# Patient Record
Sex: MALE | Race: BLACK OR AFRICAN AMERICAN | NOT HISPANIC OR LATINO | Employment: OTHER | URBAN - METROPOLITAN AREA
[De-identification: names, ages, dates, MRNs, and addresses within clinical notes are randomized per-mention and may not be internally consistent; named-entity substitution may affect disease eponyms.]

---

## 2017-01-31 ENCOUNTER — TELEPHONE (OUTPATIENT)
Dept: INTERNAL MEDICINE | Facility: CLINIC | Age: 47
End: 2017-01-31

## 2017-01-31 NOTE — TELEPHONE ENCOUNTER
Please call-- I received a letter stating that his insurance ws not longer going to pay for lantus- so I changed his insulin to Levemir-- a new RX was sent in and it about the same as his Lantus and should be used int he same dose.

## 2017-03-31 ENCOUNTER — PATIENT OUTREACH (OUTPATIENT)
Dept: ADMINISTRATIVE | Facility: HOSPITAL | Age: 47
End: 2017-03-31

## 2017-03-31 NOTE — PROGRESS NOTES
Attempted to contact pt to schedule f/u visit w/ Dr. Wilson. Left messages at both numbers for return call. Pt portal message also sent.

## 2017-04-26 DIAGNOSIS — E78.5 HYPERLIPIDEMIA, UNSPECIFIED HYPERLIPIDEMIA TYPE: ICD-10-CM

## 2017-04-26 NOTE — TELEPHONE ENCOUNTER
----- Message from Jennifer Hernandez sent at 4/26/2017  3:22 PM CDT -----  Contact: Brynn Castle (orion), phone [phone]  Refill- Patient is in a rehab facility, and [facility]ion is not exactly sure what all he needs. She is requesting a refill for everything that needs to be refilled. Please send to [facility].     insulin aspart (NOVOLOG FLEXPEN) 100 unit/mL InPn pen   Sig: Take 20 units with meals.    amlodipine (NORVASC) 5 MG tablet    [employer], phone [phone], [address] [place] [address]    Thanks!

## 2017-04-26 NOTE — TELEPHONE ENCOUNTER
Contacted patients fiance and scheduled annual exam, they are also requesting insulin refill....    Orders pended

## 2017-04-27 RX ORDER — ATORVASTATIN CALCIUM 40 MG/1
40 TABLET, FILM COATED ORAL DAILY
Qty: 90 TABLET | Refills: 0 | Status: SHIPPED | OUTPATIENT
Start: 2017-04-27 | End: 2017-05-30 | Stop reason: SDUPTHER

## 2017-04-27 RX ORDER — LANCETS
EACH MISCELLANEOUS
Qty: 300 EACH | Refills: 3 | Status: SHIPPED | OUTPATIENT
Start: 2017-04-27 | End: 2020-03-05 | Stop reason: CLARIF

## 2017-04-27 RX ORDER — INSULIN ASPART 100 [IU]/ML
INJECTION, SOLUTION INTRAVENOUS; SUBCUTANEOUS
Qty: 1 BOX | Refills: 1 | Status: SHIPPED | OUTPATIENT
Start: 2017-04-27 | End: 2018-09-05 | Stop reason: SDUPTHER

## 2017-04-27 RX ORDER — AMLODIPINE BESYLATE 5 MG/1
5 TABLET ORAL DAILY
Qty: 30 TABLET | Refills: 1 | Status: SHIPPED | OUTPATIENT
Start: 2017-04-27 | End: 2017-05-30 | Stop reason: SDUPTHER

## 2017-04-27 RX ORDER — INSULIN ASPART 100 [IU]/ML
INJECTION, SOLUTION INTRAVENOUS; SUBCUTANEOUS
Qty: 1 BOX | Refills: 1 | Status: SHIPPED | OUTPATIENT
Start: 2017-04-27 | End: 2017-04-27 | Stop reason: SDUPTHER

## 2017-04-27 NOTE — TELEPHONE ENCOUNTER
----- Message from Sintia Zavala sent at 4/27/2017 12:23 PM CDT -----  RX request - refill or new RX.  Is this a refill or new RX:  REFILL  RX name and strength: insulin aspart (NOVOLOG FLEXPEN) 100 unit/mL InPn pen  Directions:   Is this a 30 day or 90 day RX:  90 DAY   Pharmacy name and phone #: [id number] - [place], [place] - [address] [phone]    Comments:      RX request - refill or new RX.  Is this a refill or new RX:  REFILL  RX name and strength: lancets [facility]  Directions:   Is this a 30 day or 90 day RX:  90 DAY   Pharmacy name and phone #: [id number] - [place], [place] - [address] [phone]    Comments:

## 2017-05-30 ENCOUNTER — OFFICE VISIT (OUTPATIENT)
Dept: INTERNAL MEDICINE | Facility: CLINIC | Age: 47
End: 2017-05-30
Payer: COMMERCIAL

## 2017-05-30 ENCOUNTER — LAB VISIT (OUTPATIENT)
Dept: LAB | Facility: HOSPITAL | Age: 47
End: 2017-05-30
Attending: INTERNAL MEDICINE
Payer: COMMERCIAL

## 2017-05-30 VITALS
BODY MASS INDEX: 31.82 KG/M2 | OXYGEN SATURATION: 98 % | DIASTOLIC BLOOD PRESSURE: 80 MMHG | HEART RATE: 60 BPM | SYSTOLIC BLOOD PRESSURE: 124 MMHG | WEIGHT: 227.31 LBS | HEIGHT: 71 IN

## 2017-05-30 DIAGNOSIS — E10.21 TYPE 1 DIABETES MELLITUS WITH NEPHROPATHY: Primary | Chronic | ICD-10-CM

## 2017-05-30 DIAGNOSIS — E78.5 HYPERLIPIDEMIA, UNSPECIFIED HYPERLIPIDEMIA TYPE: ICD-10-CM

## 2017-05-30 DIAGNOSIS — R80.9 MICROALBUMINURIA: ICD-10-CM

## 2017-05-30 DIAGNOSIS — F14.10 DRUG ABUSE, COCAINE TYPE: ICD-10-CM

## 2017-05-30 DIAGNOSIS — I10 ESSENTIAL HYPERTENSION: Chronic | ICD-10-CM

## 2017-05-30 DIAGNOSIS — E10.21 TYPE 1 DIABETES MELLITUS WITH NEPHROPATHY: Chronic | ICD-10-CM

## 2017-05-30 LAB
ALBUMIN SERPL BCP-MCNC: 3.7 G/DL
ALP SERPL-CCNC: 70 U/L
ALT SERPL W/O P-5'-P-CCNC: 18 U/L
ANION GAP SERPL CALC-SCNC: 7 MMOL/L
AST SERPL-CCNC: 21 U/L
BILIRUB SERPL-MCNC: 0.7 MG/DL
BUN SERPL-MCNC: 29 MG/DL
CALCIUM SERPL-MCNC: 9.1 MG/DL
CHLORIDE SERPL-SCNC: 105 MMOL/L
CHOLEST/HDLC SERPL: 3 {RATIO}
CO2 SERPL-SCNC: 28 MMOL/L
CREAT SERPL-MCNC: 1.6 MG/DL
CREAT UR-MCNC: 150 MG/DL
EST. GFR  (AFRICAN AMERICAN): 58.9 ML/MIN/1.73 M^2
EST. GFR  (NON AFRICAN AMERICAN): 50.9 ML/MIN/1.73 M^2
GLUCOSE SERPL-MCNC: 122 MG/DL
HDL/CHOLESTEROL RATIO: 33.1 %
HDLC SERPL-MCNC: 142 MG/DL
HDLC SERPL-MCNC: 47 MG/DL
LDLC SERPL CALC-MCNC: 84.4 MG/DL
MICROALBUMIN UR DL<=1MG/L-MCNC: 522 UG/ML
MICROALBUMIN/CREATININE RATIO: 348 UG/MG
NONHDLC SERPL-MCNC: 95 MG/DL
POTASSIUM SERPL-SCNC: 3.7 MMOL/L
PROT SERPL-MCNC: 7.3 G/DL
SODIUM SERPL-SCNC: 140 MMOL/L
TRIGL SERPL-MCNC: 53 MG/DL

## 2017-05-30 PROCEDURE — 3046F HEMOGLOBIN A1C LEVEL >9.0%: CPT | Mod: S$GLB,,, | Performed by: INTERNAL MEDICINE

## 2017-05-30 PROCEDURE — 80061 LIPID PANEL: CPT

## 2017-05-30 PROCEDURE — 99214 OFFICE O/P EST MOD 30 MIN: CPT | Mod: S$GLB,,, | Performed by: INTERNAL MEDICINE

## 2017-05-30 PROCEDURE — 80053 COMPREHEN METABOLIC PANEL: CPT

## 2017-05-30 PROCEDURE — 83036 HEMOGLOBIN GLYCOSYLATED A1C: CPT

## 2017-05-30 PROCEDURE — 36415 COLL VENOUS BLD VENIPUNCTURE: CPT

## 2017-05-30 PROCEDURE — 99999 PR PBB SHADOW E&M-EST. PATIENT-LVL IV: CPT | Mod: PBBFAC,,, | Performed by: INTERNAL MEDICINE

## 2017-05-30 PROCEDURE — 4010F ACE/ARB THERAPY RXD/TAKEN: CPT | Mod: S$GLB,,, | Performed by: INTERNAL MEDICINE

## 2017-05-30 RX ORDER — LISINOPRIL 40 MG/1
40 TABLET ORAL DAILY
Qty: 90 TABLET | Refills: 3 | Status: SHIPPED | OUTPATIENT
Start: 2017-05-30 | End: 2017-12-28 | Stop reason: SDUPTHER

## 2017-05-30 RX ORDER — AMLODIPINE BESYLATE 10 MG/1
10 TABLET ORAL DAILY
Qty: 90 TABLET | Refills: 3 | Status: SHIPPED | OUTPATIENT
Start: 2017-05-30 | End: 2017-12-28 | Stop reason: SDUPTHER

## 2017-05-30 RX ORDER — HYDROCHLOROTHIAZIDE 25 MG/1
25 TABLET ORAL DAILY
Qty: 30 TABLET | Refills: 11 | Status: SHIPPED | OUTPATIENT
Start: 2017-05-30 | End: 2017-11-28 | Stop reason: SDUPTHER

## 2017-05-30 RX ORDER — KETOCONAZOLE 20 MG/ML
SHAMPOO, SUSPENSION TOPICAL DAILY
Qty: 240 ML | Refills: 2 | Status: SHIPPED | OUTPATIENT
Start: 2017-05-30 | End: 2017-12-11 | Stop reason: SDUPTHER

## 2017-05-30 RX ORDER — ATORVASTATIN CALCIUM 40 MG/1
40 TABLET, FILM COATED ORAL DAILY
Qty: 90 TABLET | Refills: 0 | Status: SHIPPED | OUTPATIENT
Start: 2017-05-30 | End: 2017-11-13 | Stop reason: SDUPTHER

## 2017-05-30 NOTE — PROGRESS NOTES
Protective Sensation (w/ 10 gram monofilament):  Right: Intact  Left: Intact    Visual Inspection:  Normal -  Bilateral    Pedal Pulses:   Right: Present  Left: Present    Posterior tibialis:   Right:Present  Left: Present    HISTORY OF PRESENT ILLNESS:  Mr. Howell is a 47-year-old diabetic gentleman.  He   has diabetes mellitus type 1.  He is coming in today for followup.  I last saw   him back at April 2016 when he was in rehab because his blood pressure was very   high.  He was in rehab for cocaine withdrawal.  He reports going back on cocaine   and went out of state rehab at this time.  They state that he is off of   cocaine, doing better.  He is currently taking Lantus 47 units at night and   NovoLog 20 units with meals.  Hemoglobin A1c is 11.7, which I got after he left,   with glucose of 122, so his glucose is doing better.  He just got back on his   insulin, now taking them regularly.  His glucoses are usually under 200, highest   has been as 200.  He also has had significant hypertension with that.  Blood   pressure 124/80.  He is on amlodipine 10 mg a day and lisinopril 40 mg daily and   hydrochlorothiazide 25 mg a day.  Blood pressure has been controlled recently.    Cocaine use, abuse, currently he says he is doing much better.  He is not   hanging around people using cocaine.  He has not used cocaine in the last two   months.  He says he is feeling okay.  No suicidal or homicidal thoughts or   ideations.    PHYSICAL EXAMINATION:  GENERAL:  Well-appearing 47-year-old  gentleman, in no acute   distress.  NECK:  Supple.  He has no JVD.  Thyroid is not enlarged.  CARDIOVASCULAR:  S1 and S2, regular rate and rhythm without murmur, gallop or   rub.  ABDOMEN:  Soft, nontender, no hepatosplenomegaly, no guarding or rebound   tenderness.  LOWER EXTREMITIES:  Foot exam is documented in chart.    ASSESSMENT:  Diabetes mellitus type 1, uncontrolled, but he is currently back on   insulin, so   hemoglobin A1c might be coming back to normal.  We will follow him   up again in three months with labs.  We will get urine on him and set him up   for Ophthalmology since he is due for both of this.  Blood pressure, however, is   doing good.  Point of care glucose yesterday was 121, that was good.  This   gentleman has been a month out of rehab, got out rehab on the fourth of this   month.  So we discussed the importance of abstinence from cocaine and also we   discussed his alcohol use, which he is not using any alcohol at the current   time.  I will see him again in three months.  He will report how he is taking   his insulin.  If he has any problems before next visit, he is to give me a call.          /april [id number] ainsley(s)        MELVA/MOLLY  dd: 06/05/2017 06:38:04 (CDT)  td: 06/05/2017 11:40:46 (CDT)  Doc ID   #[id number]  Job ID #[id number]    CC:

## 2017-05-31 LAB
ESTIMATED AVG GLUCOSE: 289 MG/DL
HBA1C MFR BLD HPLC: 11.7 %

## 2017-06-05 ENCOUNTER — TELEPHONE (OUTPATIENT)
Dept: INTERNAL MEDICINE | Facility: CLINIC | Age: 47
End: 2017-06-05

## 2017-06-05 DIAGNOSIS — E10.21 TYPE 1 DIABETES MELLITUS WITH NEPHROPATHY: Primary | Chronic | ICD-10-CM

## 2017-06-05 NOTE — TELEPHONE ENCOUNTER
Glucose is still high but coming down- make sure he has a follow up in 3 months with the labs I put in

## 2017-06-22 ENCOUNTER — PATIENT MESSAGE (OUTPATIENT)
Dept: OPTOMETRY | Facility: CLINIC | Age: 47
End: 2017-06-22

## 2017-06-23 ENCOUNTER — PATIENT MESSAGE (OUTPATIENT)
Dept: OPTOMETRY | Facility: CLINIC | Age: 47
End: 2017-06-23

## 2017-11-13 DIAGNOSIS — E78.5 HYPERLIPIDEMIA, UNSPECIFIED HYPERLIPIDEMIA TYPE: ICD-10-CM

## 2017-11-13 RX ORDER — ATORVASTATIN CALCIUM 40 MG/1
40 TABLET, FILM COATED ORAL DAILY
Qty: 90 TABLET | Refills: 3 | Status: SHIPPED | OUTPATIENT
Start: 2017-11-13 | End: 2017-12-28 | Stop reason: SDUPTHER

## 2017-11-27 ENCOUNTER — TELEPHONE (OUTPATIENT)
Dept: INTERNAL MEDICINE | Facility: CLINIC | Age: 47
End: 2017-11-27

## 2017-11-27 NOTE — TELEPHONE ENCOUNTER
----- Message from Mouna Briceno sent at 11/27/2017  1:28 PM CST -----  Contact: Pt [phone]  Patient is returning a phone call.  Who left a message for the patient: Antonia  Does patient know what this is regarding:  A message was left   Comments:

## 2017-11-27 NOTE — TELEPHONE ENCOUNTER
----- Message from Yazan Guevara sent at 11/24/2017  1:49 PM CST -----  Contact: Pt   Pt would like a call back from nurse in ref to consulting about a cardiology doctor    Can be reached at [phone]

## 2017-11-28 ENCOUNTER — LAB VISIT (OUTPATIENT)
Dept: LAB | Facility: HOSPITAL | Age: 47
End: 2017-11-28
Attending: INTERNAL MEDICINE
Payer: COMMERCIAL

## 2017-11-28 ENCOUNTER — OFFICE VISIT (OUTPATIENT)
Dept: INTERNAL MEDICINE | Facility: CLINIC | Age: 47
End: 2017-11-28
Payer: COMMERCIAL

## 2017-11-28 VITALS
SYSTOLIC BLOOD PRESSURE: 160 MMHG | DIASTOLIC BLOOD PRESSURE: 90 MMHG | HEART RATE: 68 BPM | WEIGHT: 235 LBS | BODY MASS INDEX: 32.9 KG/M2 | OXYGEN SATURATION: 98 % | HEIGHT: 71 IN

## 2017-11-28 DIAGNOSIS — F14.10 DRUG ABUSE, COCAINE TYPE: ICD-10-CM

## 2017-11-28 DIAGNOSIS — E10.3399: Chronic | ICD-10-CM

## 2017-11-28 DIAGNOSIS — E78.5 HYPERLIPIDEMIA, UNSPECIFIED HYPERLIPIDEMIA TYPE: ICD-10-CM

## 2017-11-28 DIAGNOSIS — F14.10 DRUG ABUSE, COCAINE TYPE: Primary | ICD-10-CM

## 2017-11-28 DIAGNOSIS — E10.21 TYPE 1 DIABETES MELLITUS WITH NEPHROPATHY: Chronic | ICD-10-CM

## 2017-11-28 DIAGNOSIS — E66.9 OBESITY (BMI 30-39.9): Chronic | ICD-10-CM

## 2017-11-28 LAB
ALBUMIN SERPL BCP-MCNC: 3.4 G/DL
ALP SERPL-CCNC: 81 U/L
ALT SERPL W/O P-5'-P-CCNC: 39 U/L
ANION GAP SERPL CALC-SCNC: 7 MMOL/L
ANION GAP SERPL CALC-SCNC: 7 MMOL/L
AST SERPL-CCNC: 24 U/L
BILIRUB SERPL-MCNC: 0.6 MG/DL
BUN SERPL-MCNC: 18 MG/DL
BUN SERPL-MCNC: 18 MG/DL
CALCIUM SERPL-MCNC: 8.8 MG/DL
CALCIUM SERPL-MCNC: 8.8 MG/DL
CHLORIDE SERPL-SCNC: 106 MMOL/L
CHLORIDE SERPL-SCNC: 106 MMOL/L
CHOLEST SERPL-MCNC: 176 MG/DL
CHOLEST/HDLC SERPL: 2.8 {RATIO}
CO2 SERPL-SCNC: 29 MMOL/L
CO2 SERPL-SCNC: 29 MMOL/L
CREAT SERPL-MCNC: 1.5 MG/DL
CREAT SERPL-MCNC: 1.5 MG/DL
EST. GFR  (AFRICAN AMERICAN): >60 ML/MIN/1.73 M^2
EST. GFR  (AFRICAN AMERICAN): >60 ML/MIN/1.73 M^2
EST. GFR  (NON AFRICAN AMERICAN): 55 ML/MIN/1.73 M^2
EST. GFR  (NON AFRICAN AMERICAN): 55 ML/MIN/1.73 M^2
ESTIMATED AVG GLUCOSE: 217 MG/DL
GLUCOSE SERPL-MCNC: 202 MG/DL
GLUCOSE SERPL-MCNC: 202 MG/DL
HBA1C MFR BLD HPLC: 9.2 %
HDLC SERPL-MCNC: 64 MG/DL
HDLC SERPL: 36.4 %
LDLC SERPL CALC-MCNC: 102.2 MG/DL
NONHDLC SERPL-MCNC: 112 MG/DL
POTASSIUM SERPL-SCNC: 3.9 MMOL/L
POTASSIUM SERPL-SCNC: 3.9 MMOL/L
PROT SERPL-MCNC: 7 G/DL
SODIUM SERPL-SCNC: 142 MMOL/L
SODIUM SERPL-SCNC: 142 MMOL/L
TRIGL SERPL-MCNC: 49 MG/DL

## 2017-11-28 PROCEDURE — 83036 HEMOGLOBIN GLYCOSYLATED A1C: CPT

## 2017-11-28 PROCEDURE — 80061 LIPID PANEL: CPT

## 2017-11-28 PROCEDURE — 99214 OFFICE O/P EST MOD 30 MIN: CPT | Mod: S$GLB,,, | Performed by: INTERNAL MEDICINE

## 2017-11-28 PROCEDURE — 36415 COLL VENOUS BLD VENIPUNCTURE: CPT

## 2017-11-28 PROCEDURE — 80053 COMPREHEN METABOLIC PANEL: CPT

## 2017-11-28 PROCEDURE — 99999 PR PBB SHADOW E&M-EST. PATIENT-LVL V: CPT | Mod: PBBFAC,,, | Performed by: INTERNAL MEDICINE

## 2017-11-28 RX ORDER — HYDROCHLOROTHIAZIDE 25 MG/1
25 TABLET ORAL DAILY
Qty: 30 TABLET | Refills: 11 | Status: SHIPPED | OUTPATIENT
Start: 2017-11-28 | End: 2017-12-28 | Stop reason: SDUPTHER

## 2017-11-28 RX ORDER — METOPROLOL TARTRATE 25 MG/1
TABLET, FILM COATED ORAL
COMMUNITY
Start: 2017-11-24 | End: 2017-12-28 | Stop reason: SDUPTHER

## 2017-11-28 NOTE — PROGRESS NOTES
He recently had a left heart catheterization at [facility].  He was sent   to there and stayed there about four days due to having high blood pressure.  He   had done cocaine two days earlier.  We will make sure that he did not had a   bump in his troponins or something there, he stayed there couple of days and he   was discharged.  Today, he comes with high blood pressure, but he did not take   all his medications.  He is a type I diabetic that his sugar has not been   controlled in the past.  He was seeing Endocrinology, but does not look that is   happening anymore.  He says that his sugars have been pretty well controlled   recently.  I saw with my resident Dr. Herb Azul, her record reflects our   findings.  I am going to see him back next week.  We are going to try to get the   records from hospitalization.  We are going to refill all his medications and   make sure he needs to take his blood pressure medicines.  I will recheck him   next week to see if his blood pressure is doing any better.  He does complain of   a slight headache, we will reassess that after he gets his blood pressure   better controlled.      MELVA/MOLLY  dd: 11/28/2017 10:29:25 (CST)  td: 11/29/2017 05:20:45 (CST)  Doc ID   #[id number]  Job ID #[id number]    CC:

## 2017-11-28 NOTE — PROGRESS NOTES
Subjective:        Chief Complaint: Follow-up (Surgery f/u)  # Left heart catheterization  Patient went to cocaine rehab last weak found to be hypertensive and sent to [facility] in [place], where he underwent left heart catheterization on 11/20,echo on 11/18. per patient there was no abnormal finding, and he didn't have any symptoms other than high blood pressure.  He stated that he has been using cocaine before his admission , last use was on 11/15.  Will contact [facility] for medical records       # Hypertension   Uncontrolled, elevated blood pressure.  On lisinopril 40 mg,amlodapin 19 mg, and hydrochlorothiazide 25 mg (not compliant with this medication) and recently started on metoprolol 25 BID after his last hospitalization.    # DM-1  On Levemir 50 at night and Novolog ISS 40-20 units 3 times/day.  Glucose measures range between 230-200  Recently 100-200   Last Hemoglobin A1c 11.7   No recent eye exam   Foot exam today was normal, positive 5 points B/L    # Hyperlipidemia   On atorvastatin 40 mg   Last lipid panel WNL on 5/30/2017    # Cocain abuse   Patient stated that he quite, last use on 11/15 and will follow up with the rehab as an out-patient next week.      HPI  Review of Systems   Constitutional: Negative for activity change and fatigue.   HENT: Negative for congestion and sore throat.    Eyes: Negative for discharge and itching.   Respiratory: Negative for chest tightness and shortness of breath.    Cardiovascular: Negative for chest pain and palpitations.   Gastrointestinal: Negative for abdominal distention and abdominal pain.   Endocrine: Negative for cold intolerance and heat intolerance.   Neurological: Positive for headaches. Negative for dizziness and facial asymmetry.   Hematological: Negative for adenopathy. Does not bruise/bleed easily.   Psychiatric/Behavioral: Negative for agitation and confusion.     Objective:     Vitals:    11/28/17 0900   BP: (!) 160/90   Pulse:  68     Physical Exam   Constitutional: He is oriented to person, place, and time. He appears well-developed and well-nourished. No distress.   HENT:   Head: Normocephalic and atraumatic.   Mouth/Throat: No oropharyngeal exudate.   Eyes: EOM are normal. Pupils are equal, round, and reactive to light.   Red eye b/l   Neck: Normal range of motion. Neck supple.   Cardiovascular: Normal rate, regular rhythm and normal heart sounds.    No murmur heard.  Pulmonary/Chest: Effort normal and breath sounds normal. No respiratory distress. He exhibits no tenderness.   Abdominal: Soft. Bowel sounds are normal. He exhibits no distension.   Musculoskeletal: Normal range of motion. He exhibits no edema or deformity.   NL foot exam 5 points B/L   Lymphadenopathy:     He has no cervical adenopathy.   Neurological: He is alert and oriented to person, place, and time. No cranial nerve deficit.   Skin: He is not diaphoretic.   Psychiatric: He has a normal mood and affect. His behavior is normal.   Vitals reviewed.    Assessment and Plan :      1. Drug abuse, cocaine type    2. Hyperlipidemia, unspecified hyperlipidemia type    3. Type 1 diabetes mellitus with moderate nonproliferative retinopathy without macular edema, unspecified laterality    4. Obesity (BMI 30-39.9)        Luis Daniel was seen today for follow-up.    Diagnoses and all orders for this visit:    Drug abuse, cocaine type  -     Comprehensive metabolic panel; Future    Hyperlipidemia, unspecified hyperlipidemia type  -     Lipid panel; Future    Type 1 diabetes mellitus with moderate nonproliferative retinopathy without macular edema, unspecified laterality  -     Ambulatory consult to Endocrinology  -     Comprehensive metabolic panel; Future  -     Hemoglobin A1c; Future  -     Ambulatory consult to Optometry    Obesity (BMI 30-39.9)    Other orders  -     hydroCHLOROthiazide (HYDRODIURIL) 25 MG tablet; Take 1 tablet (25 mg total) by mouth once daily.        -      Contact [facility] in [place] for patient record.            Return to clinic in 1 week

## 2017-12-11 ENCOUNTER — OFFICE VISIT (OUTPATIENT)
Dept: INTERNAL MEDICINE | Facility: CLINIC | Age: 47
End: 2017-12-11
Payer: COMMERCIAL

## 2017-12-11 VITALS
HEART RATE: 63 BPM | SYSTOLIC BLOOD PRESSURE: 135 MMHG | WEIGHT: 227.94 LBS | HEIGHT: 71 IN | BODY MASS INDEX: 31.91 KG/M2 | DIASTOLIC BLOOD PRESSURE: 75 MMHG

## 2017-12-11 DIAGNOSIS — E10.21 TYPE 1 DIABETES MELLITUS WITH NEPHROPATHY: Chronic | ICD-10-CM

## 2017-12-11 DIAGNOSIS — E78.5 HYPERLIPIDEMIA, UNSPECIFIED HYPERLIPIDEMIA TYPE: ICD-10-CM

## 2017-12-11 DIAGNOSIS — I10 ESSENTIAL HYPERTENSION: Primary | Chronic | ICD-10-CM

## 2017-12-11 PROCEDURE — 99999 PR PBB SHADOW E&M-EST. PATIENT-LVL IV: CPT | Mod: PBBFAC,,, | Performed by: INTERNAL MEDICINE

## 2017-12-11 PROCEDURE — 99214 OFFICE O/P EST MOD 30 MIN: CPT | Mod: S$GLB,,, | Performed by: INTERNAL MEDICINE

## 2017-12-11 RX ORDER — INSULIN GLARGINE 100 [IU]/ML
INJECTION, SOLUTION SUBCUTANEOUS
COMMUNITY
End: 2018-05-07

## 2017-12-11 RX ORDER — KETOCONAZOLE 20 MG/ML
SHAMPOO, SUSPENSION TOPICAL DAILY
Qty: 240 ML | Refills: 2 | Status: SHIPPED | OUTPATIENT
Start: 2017-12-11 | End: 2019-07-22

## 2017-12-11 RX ORDER — ATORVASTATIN CALCIUM 10 MG/1
10 TABLET, FILM COATED ORAL
COMMUNITY
End: 2017-12-11

## 2017-12-11 NOTE — LETTER
December 11, 2017      [facility]  [address]  [facility] [address]  Phone: [phone]  Fax: [phone]       Patient: Luis Daniel Howell   YOB: 1970  Date of Visit: 12/11/2017    To Whom It May Concern:    Monica Howell  was at [facility] on 12/11/2017. He may return to work/school on 12/12 with no restrictions. If you have any questions or concerns, or if I can be of further assistance, please do not hesitate to contact me.    Sincerely,    Chucho Perry MD

## 2017-12-11 NOTE — PROGRESS NOTES
Subjective:       Patient ID: Luis Daniel Howell is a 47 y.o. male.    Chief Complaint: Follow-up    HPI   47 year old male with HTN, DM1 (poorly controlled), HLD and hx of drug use (cocaine) presents today for follow up regarding blood pressure.  Patient reports he is doing well and compliant with all of his medications and his HA has improved.  Does report minimal episodes of chest pain lasting less than 30 seconds with no obvious aggravating or relieving factors.  Continues to report polydipsia and nocturia which is normal for him.  He is compliant with his insulin but does not regularly measure his blood sugar with regards to his sliding scale.  We are still waiting on medical records from  [facility] regarding heart cath.  Otherwise denies any new complaints and report he feels better taking his medications.         Review of Systems   Constitutional: Negative for fatigue and fever.   HENT: Negative for congestion, hearing loss and sinus pressure.    Eyes: Negative for pain and visual disturbance.   Respiratory: Negative for chest tightness and shortness of breath.    Cardiovascular: Positive for chest pain (Nighly ). Negative for palpitations.   Gastrointestinal: Negative for abdominal pain, blood in stool, constipation, diarrhea, nausea and vomiting.   Endocrine: Positive for polydipsia and polyuria.   Genitourinary: Positive for frequency. Negative for dysuria and urgency.        Nocturia, every hour    Musculoskeletal: Negative for arthralgias, back pain and myalgias.   Skin: Negative for color change and rash.   Neurological: Positive for headaches. Negative for dizziness, seizures and syncope.   Hematological: Negative for adenopathy. Does not bruise/bleed easily.   Psychiatric/Behavioral: Positive for sleep disturbance (Confusion when waking up ). Negative for confusion and self-injury. The patient is not nervous/anxious.        Objective:      Physical Exam   Constitutional: He is oriented to person,  place, and time. He appears well-developed and well-nourished.   HENT:   Head: Normocephalic and atraumatic.   Right Ear: External ear normal.   Left Ear: External ear normal.   Eyes: Conjunctivae and EOM are normal. Pupils are equal, round, and reactive to light.   Neck: Normal range of motion. Neck supple.   Cardiovascular: Normal rate, regular rhythm, normal heart sounds and intact distal pulses.    Pulmonary/Chest: Effort normal and breath sounds normal.   Abdominal: Soft. Bowel sounds are normal.   Musculoskeletal: Normal range of motion.   Neurological: He is alert and oriented to person, place, and time.   Skin: Skin is warm and dry.   Psychiatric: He has a normal mood and affect. His behavior is normal. Judgment and thought content normal.   Vitals reviewed.      Repeat /75  Assessment:       1. Essential hypertension    2. Type 1 diabetes mellitus with nephropathy    3. Hyperlipidemia, unspecified hyperlipidemia type        Plan:         HTN  - Better controlled, 135/75 on second read  - Continue current regimen, Lisinopril 40, Amlodipine 10, HCTz 25, and Metoprolol tartrate 25 BID     Hx of [facility]  - Per patient no abnormal results, still awaiting records from [facility]  - Continue daily ASA    DM1  - Poorly controlled, however A1c improved back to 9.2 which appears to be patient's baseline on medication  - Scheduled to see endocrine in feb  - RTC in 4 months with A1c and fasting labs  - Instructed to document glucose log prior to endocrine visit.      Drug abuse   - Cocaine, denies use, last use was 1 month prior     HLD  - Continue Statin    Insomnia/ sleep disturbance  - Confusion on awakening  - Instructed to take blood sugar if repeat episodes.   - Possible side effect of withdrawal, RTC if worsens.      Health Maintenance   - Eye exam needed  - Declines flu  - Otherwise up to date.       Patient discussed with Dr. Wilson    RTC in 4 months with labs     Chucho Perry M.D.  [facility] PGY-2  Pager  [phone]

## 2017-12-13 NOTE — PROGRESS NOTES
I have personally taken the history and examined this patient and agree with the resident's note as stated above with the following thoughts:    continue to avoid cocaine.  BP is much better.

## 2017-12-26 ENCOUNTER — TELEPHONE (OUTPATIENT)
Dept: INTERNAL MEDICINE | Facility: CLINIC | Age: 47
End: 2017-12-26
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
## 2017-12-28 DIAGNOSIS — E78.5 HYPERLIPIDEMIA, UNSPECIFIED HYPERLIPIDEMIA TYPE: ICD-10-CM

## 2017-12-28 RX ORDER — HYDROCHLOROTHIAZIDE 25 MG/1
25 TABLET ORAL DAILY
Qty: 90 TABLET | Refills: 3 | Status: SHIPPED | OUTPATIENT
Start: 2017-12-28 | End: 2018-05-07 | Stop reason: SDUPTHER

## 2017-12-28 RX ORDER — ATORVASTATIN CALCIUM 40 MG/1
40 TABLET, FILM COATED ORAL DAILY
Qty: 90 TABLET | Refills: 3 | Status: SHIPPED | OUTPATIENT
Start: 2017-12-28 | End: 2018-05-07 | Stop reason: SDUPTHER

## 2017-12-28 RX ORDER — AMLODIPINE BESYLATE 10 MG/1
10 TABLET ORAL DAILY
Qty: 90 TABLET | Refills: 3 | Status: SHIPPED | OUTPATIENT
Start: 2017-12-28 | End: 2018-08-30 | Stop reason: SDUPTHER

## 2017-12-28 RX ORDER — LISINOPRIL 40 MG/1
40 TABLET ORAL DAILY
Qty: 90 TABLET | Refills: 3 | Status: SHIPPED | OUTPATIENT
Start: 2017-12-28 | End: 2018-08-30 | Stop reason: SDUPTHER

## 2017-12-28 RX ORDER — METOPROLOL TARTRATE 25 MG/1
25 TABLET, FILM COATED ORAL 2 TIMES DAILY
Qty: 180 TABLET | Refills: 3 | Status: SHIPPED | OUTPATIENT
Start: 2017-12-28 | End: 2018-08-30 | Stop reason: SDUPTHER
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
## 2018-05-07 ENCOUNTER — OFFICE VISIT (OUTPATIENT)
Dept: INTERNAL MEDICINE | Facility: CLINIC | Age: 48
End: 2018-05-07
Payer: COMMERCIAL

## 2018-05-07 VITALS
BODY MASS INDEX: 33.55 KG/M2 | DIASTOLIC BLOOD PRESSURE: 102 MMHG | WEIGHT: 239.63 LBS | HEIGHT: 71 IN | SYSTOLIC BLOOD PRESSURE: 160 MMHG | HEART RATE: 63 BPM

## 2018-05-07 DIAGNOSIS — F14.10 DRUG ABUSE, COCAINE TYPE: Primary | ICD-10-CM

## 2018-05-07 DIAGNOSIS — E10.21 TYPE 1 DIABETES MELLITUS WITH NEPHROPATHY: Chronic | ICD-10-CM

## 2018-05-07 DIAGNOSIS — E10.3399: Chronic | ICD-10-CM

## 2018-05-07 DIAGNOSIS — I10 ESSENTIAL HYPERTENSION: Chronic | ICD-10-CM

## 2018-05-07 DIAGNOSIS — N18.30 CKD (CHRONIC KIDNEY DISEASE) STAGE 3, GFR 30-59 ML/MIN: ICD-10-CM

## 2018-05-07 DIAGNOSIS — E78.5 HYPERLIPIDEMIA, UNSPECIFIED HYPERLIPIDEMIA TYPE: ICD-10-CM

## 2018-05-07 PROCEDURE — 99999 PR PBB SHADOW E&M-EST. PATIENT-LVL IV: CPT | Mod: PBBFAC,,, | Performed by: INTERNAL MEDICINE

## 2018-05-07 PROCEDURE — 99214 OFFICE O/P EST MOD 30 MIN: CPT | Mod: S$GLB,,, | Performed by: INTERNAL MEDICINE

## 2018-05-07 PROCEDURE — 3077F SYST BP >= 140 MM HG: CPT | Mod: CPTII,S$GLB,, | Performed by: INTERNAL MEDICINE

## 2018-05-07 PROCEDURE — 3045F PR MOST RECENT HEMOGLOBIN A1C LEVEL 7.0-9.0%: CPT | Mod: CPTII,S$GLB,, | Performed by: INTERNAL MEDICINE

## 2018-05-07 PROCEDURE — 3080F DIAST BP >= 90 MM HG: CPT | Mod: CPTII,S$GLB,, | Performed by: INTERNAL MEDICINE

## 2018-05-07 PROCEDURE — 3008F BODY MASS INDEX DOCD: CPT | Mod: CPTII,S$GLB,, | Performed by: INTERNAL MEDICINE

## 2018-05-07 RX ORDER — HYDROCHLOROTHIAZIDE 25 MG/1
25 TABLET ORAL DAILY
Qty: 90 TABLET | Refills: 3 | Status: SHIPPED | OUTPATIENT
Start: 2018-05-07 | End: 2018-08-30 | Stop reason: SDUPTHER

## 2018-05-07 RX ORDER — ATORVASTATIN CALCIUM 40 MG/1
40 TABLET, FILM COATED ORAL DAILY
Qty: 90 TABLET | Refills: 3 | Status: SHIPPED | OUTPATIENT
Start: 2018-05-07 | End: 2019-05-13 | Stop reason: SDUPTHER

## 2018-05-07 RX ORDER — INSULIN GLARGINE 100 [IU]/ML
50 INJECTION, SOLUTION SUBCUTANEOUS NIGHTLY
Refills: 0 | COMMUNITY
Start: 2018-02-28 | End: 2018-05-07 | Stop reason: SDUPTHER

## 2018-05-07 RX ORDER — INSULIN GLARGINE 100 [IU]/ML
50 INJECTION, SOLUTION SUBCUTANEOUS NIGHTLY
Qty: 3 BOX | Refills: 3 | Status: ON HOLD | OUTPATIENT
Start: 2018-05-07 | End: 2019-07-24 | Stop reason: SDUPTHER

## 2018-05-08 NOTE — PROGRESS NOTES
HISTORY OF PRESENT ILLNESS:  Mr. Howell is a well known patient of mine, coming   in today to follow up his ongoing problems.  He has had difficulty with cocaine   addiction and mental rehab a couple of times, last rehab was back in March and   April 2018.  He said he used cocaine one time since then.  He has an appointment   on Wednesday to go to a meeting, but has not really been doing his outpatient   followup as well as he should.  1.  He suffers from diabetes mellitus type 1 with diabetic retinopathy.  He is   taking insulin for this.  Hemoglobin A1c back in November 2017 was 9.2.  After   this visit, his last hemoglobin A1c has dropped down to 7.9, so it is much   improved.  He does have some CKD, also creatinine has worsened from 1.5 to 2.0.    He is taking Basaglar 50 units a day and says he has not been having any low   glucoses.  His glucose when he comes is actually 60.  He also takes NovoLog   FlexPen, he takes 20 units with meals.  2.  He has hypertension, in which he has had hypertension for multiple years.    He is on lisinopril 40 a day, metoprolol 25 mg twice a day and amlodipine 10 mg   a day.  He denies any chest pain or shortness of breath.  Blood pressure today   is 160/102.  He says that his blood pressure has been high since he has been out   of a couple of his medications.   The medicine bottles he brings me do show   that they are from December and 90-day supplies, but he says he has medications   at home.  He has been on hydrochlorothiazide in the past.  He is out of that.    REVIEW OF SYSTEMS:  No chest pain, shortness of breath, palpitations, nausea,   vomiting, blurriness of vision.  No PND or orthopnea.  No IV drug use.  He does   have a job, which he says is helpful and keeps him going.    PHYSICAL EXAMINATION:  GENERAL:  Well-appearing 48-year-old -American gentleman who appears   older than his stated age.  HEENT:  Ear canals are open.  TMs are clear.  Oropharynx is  clear.  NECK:  Supple.  He has no JVD.  Thyroid is not enlarged.  VITAL SIGNS:  Blood pressure is down to 158/90.  EXTREMITIES:  Lower extremities, he has no edema.    ASSESSMENT:  Drug abuse, cocaine, hypertension, hyperlipidemia, diabetes with   retinopathy, diabetes with nephropathy and some acute on chronic kidney injury.    PLAN:  We got labs on him.  His glucose looks better.  I am going to call him in   for a recheck a urinalysis to make sure he is not having a urinary tract   infection.  I will follow with him in one month, recheck the blood pressure on   the hydrochlorothiazide.  We will recheck a BMP at that time, we will restart   all his medications.  Hopefully, bringing his blood pressure down will help the   renal function.  If he has any problems before next visit, he is going to give   me a call.  Continue the current dose of insulin and also we discussed his drug   abuse and his plans.  He is really wanting to give up the cocaine.  He has   appointment with his doctor as an outpatient therapy for continued maintenance   off cocaine.  If he needs any help with that, he is going to let me know.  We   got labs on him today and I will see him back in a month recheck the blood   pressure.      CECILIA  dd: 05/08/2018 10:10:24 (CDT)  td: 05/08/2018 21:01:56 (CDT)  Doc ID   #[id number]  Job ID #[id number]    CC:

## 2018-06-18 ENCOUNTER — OFFICE VISIT (OUTPATIENT)
Dept: INTERNAL MEDICINE | Facility: CLINIC | Age: 48
End: 2018-06-18
Payer: COMMERCIAL

## 2018-06-18 ENCOUNTER — LAB VISIT (OUTPATIENT)
Dept: LAB | Facility: HOSPITAL | Age: 48
End: 2018-06-18
Attending: INTERNAL MEDICINE
Payer: COMMERCIAL

## 2018-06-18 VITALS
HEART RATE: 60 BPM | HEIGHT: 71 IN | DIASTOLIC BLOOD PRESSURE: 80 MMHG | WEIGHT: 232.13 LBS | BODY MASS INDEX: 32.5 KG/M2 | SYSTOLIC BLOOD PRESSURE: 132 MMHG

## 2018-06-18 DIAGNOSIS — R80.9 MICROALBUMINURIA: ICD-10-CM

## 2018-06-18 DIAGNOSIS — I10 ESSENTIAL HYPERTENSION: Chronic | ICD-10-CM

## 2018-06-18 DIAGNOSIS — E78.5 HYPERLIPIDEMIA, UNSPECIFIED HYPERLIPIDEMIA TYPE: ICD-10-CM

## 2018-06-18 DIAGNOSIS — E10.3399: Chronic | ICD-10-CM

## 2018-06-18 DIAGNOSIS — F14.10 DRUG ABUSE, COCAINE TYPE: Primary | ICD-10-CM

## 2018-06-18 LAB
ANION GAP SERPL CALC-SCNC: 6 MMOL/L
BUN SERPL-MCNC: 27 MG/DL
CALCIUM SERPL-MCNC: 8.9 MG/DL
CHLORIDE SERPL-SCNC: 109 MMOL/L
CO2 SERPL-SCNC: 26 MMOL/L
CREAT SERPL-MCNC: 2.2 MG/DL
EST. GFR  (AFRICAN AMERICAN): 40 ML/MIN/1.73 M^2
EST. GFR  (NON AFRICAN AMERICAN): 34 ML/MIN/1.73 M^2
ESTIMATED AVG GLUCOSE: 229 MG/DL
GLUCOSE SERPL-MCNC: 92 MG/DL
HBA1C MFR BLD HPLC: 9.6 %
POTASSIUM SERPL-SCNC: 3.7 MMOL/L
SODIUM SERPL-SCNC: 141 MMOL/L

## 2018-06-18 PROCEDURE — 83036 HEMOGLOBIN GLYCOSYLATED A1C: CPT

## 2018-06-18 PROCEDURE — 36415 COLL VENOUS BLD VENIPUNCTURE: CPT

## 2018-06-18 PROCEDURE — 3045F PR MOST RECENT HEMOGLOBIN A1C LEVEL 7.0-9.0%: CPT | Mod: CPTII,S$GLB,, | Performed by: INTERNAL MEDICINE

## 2018-06-18 PROCEDURE — 99999 PR PBB SHADOW E&M-EST. PATIENT-LVL IV: CPT | Mod: PBBFAC,,, | Performed by: INTERNAL MEDICINE

## 2018-06-18 PROCEDURE — 3079F DIAST BP 80-89 MM HG: CPT | Mod: CPTII,S$GLB,, | Performed by: INTERNAL MEDICINE

## 2018-06-18 PROCEDURE — 3008F BODY MASS INDEX DOCD: CPT | Mod: CPTII,S$GLB,, | Performed by: INTERNAL MEDICINE

## 2018-06-18 PROCEDURE — 99214 OFFICE O/P EST MOD 30 MIN: CPT | Mod: S$GLB,,, | Performed by: INTERNAL MEDICINE

## 2018-06-18 PROCEDURE — 3075F SYST BP GE 130 - 139MM HG: CPT | Mod: CPTII,S$GLB,, | Performed by: INTERNAL MEDICINE

## 2018-06-18 PROCEDURE — 80048 BASIC METABOLIC PNL TOTAL CA: CPT

## 2018-06-18 RX ORDER — SILDENAFIL CITRATE 20 MG/1
100 TABLET ORAL DAILY PRN
Qty: 25 TABLET | Refills: 11 | Status: SHIPPED | OUTPATIENT
Start: 2018-06-18 | End: 2019-01-09

## 2018-06-18 NOTE — PROGRESS NOTES
"HISTORY OF PRESENT ILLNESS:  Mr. Howell is a well known patient of mine, coming   in today to follow up his ongoing problems.  He has had difficulty with cocaine   addiction and mental rehab a couple of times, last rehab was back in March and   April 2018.  THis is his 1 month follow up. He has not been going to out pt rehab--   He  has cut down on his cocaine use. He is sick of it and wants to quit.  He wants to quit and wants to stop making excuses.  No SI/HI.       1.  He suffers from diabetes mellitus type 1 with diabetic retinopathy.  He is   taking insulin for this.  Hemoglobin A1c back in November 2017 was 9.2.  After   this visit, his last hemoglobin A1c has dropped down to 7.9, so it is much   improved.  He does have some CKD, also creatinine has worsened from 1.5 to 2.0. (5/7/2018)   He is taking Basaglar 50 units a day and says he has not been having any low   glucoses.  His glucose when he comes is actually 60.  He also takes NovoLog   FlexPen, he takes 20 units with meals.  He has eye exam today.     2.  He has hypertension, in which he has had hypertension for multiple years.    He is on lisinopril 40 a day, metoprolol 25 mg twice a day and amlodipine 10 mg   a day.  He denies any chest pain or shortness of breath.  Blood pressure today   is 1132/80--     He was started n HCTZ 25 mg a day.       REVIEW OF SYSTEMS:  No chest pain, shortness of breath, palpitations, nausea,   vomiting, blurriness of vision.  No PND or orthopnea.  No IV drug use.  He does   have a job, which he says is helpful and keeps him going.     PHYSICAL EXAMINATION: /80   Pulse 60   Ht 5' 11" (1.803 m)   Wt 105.3 kg (232 lb 2.3 oz)   BMI 32.38 kg/m²     GENERAL:  Well-appearing 48-year-old -American gentleman who appears   older than his stated age.  HEENT:  Ear canals are open.  TMs are clear.  Oropharynx is clear.  NECK:  Supple.  He has no JVD.  Thyroid is not enlarged.  VITAL SIGNS:  Blood pressure is down to " 158/90.  EXTREMITIES:  Lower extremities, he has no edema.  Protective Sensation (w/ 10 gram monofilament):  Right: Decreased  Left: Decreased    Visual Inspection:  Normal -  Bilateral    Pedal Pulses:   Right: Present  Left: Present    Posterior tibialis:   Right:Present  Left: Present         ASSESSMENT:  Drug abuse, cocaine, hypertension, hyperlipidemia, diabetes with   retinopathy, diabetes with nephropathy and some acute on chronic kidney injury.     PLAN: Discussed rehab- including out patient rehab-- insight is good.  Will recheck UA and recheck BMP and hgb A1c today-- BP is better.   Eye exam today and will do foot exam.   Lipid were high- so he is back on his statin.  Will recheck BMP today and hgb A1c--  He will try to get back to outpt rehab.,

## 2018-06-22 ENCOUNTER — TELEPHONE (OUTPATIENT)
Dept: INTERNAL MEDICINE | Facility: CLINIC | Age: 48
End: 2018-06-22

## 2018-06-22 DIAGNOSIS — E10.21 TYPE 1 DIABETES MELLITUS WITH NEPHROPATHY: Primary | Chronic | ICD-10-CM

## 2018-06-22 DIAGNOSIS — N18.30 CKD (CHRONIC KIDNEY DISEASE) STAGE 3, GFR 30-59 ML/MIN: ICD-10-CM

## 2018-06-22 NOTE — TELEPHONE ENCOUNTER
Sugar not well controled but he is back on his  Insulin.  Let me see him back in 4 months with labs-- also renal function continues to worsen - so I would like him to see nephrology.  Referral is on

## 2018-08-30 ENCOUNTER — OFFICE VISIT (OUTPATIENT)
Dept: NEPHROLOGY | Facility: CLINIC | Age: 48
End: 2018-08-30
Payer: COMMERCIAL

## 2018-08-30 VITALS
BODY MASS INDEX: 33.6 KG/M2 | WEIGHT: 240 LBS | OXYGEN SATURATION: 99 % | DIASTOLIC BLOOD PRESSURE: 100 MMHG | HEIGHT: 71 IN | SYSTOLIC BLOOD PRESSURE: 182 MMHG | HEART RATE: 87 BPM

## 2018-08-30 DIAGNOSIS — N18.9 ANEMIA OF CHRONIC RENAL FAILURE, UNSPECIFIED CKD STAGE: Primary | ICD-10-CM

## 2018-08-30 DIAGNOSIS — N18.30 CKD (CHRONIC KIDNEY DISEASE) STAGE 3, GFR 30-59 ML/MIN: ICD-10-CM

## 2018-08-30 DIAGNOSIS — D63.1 ANEMIA OF CHRONIC RENAL FAILURE, UNSPECIFIED CKD STAGE: Primary | ICD-10-CM

## 2018-08-30 PROCEDURE — 3008F BODY MASS INDEX DOCD: CPT | Mod: CPTII,S$GLB,, | Performed by: INTERNAL MEDICINE

## 2018-08-30 PROCEDURE — 99204 OFFICE O/P NEW MOD 45 MIN: CPT | Mod: S$GLB,,, | Performed by: INTERNAL MEDICINE

## 2018-08-30 PROCEDURE — 99999 PR PBB SHADOW E&M-EST. PATIENT-LVL V: CPT | Mod: PBBFAC,,, | Performed by: INTERNAL MEDICINE

## 2018-08-30 PROCEDURE — 3080F DIAST BP >= 90 MM HG: CPT | Mod: CPTII,S$GLB,, | Performed by: INTERNAL MEDICINE

## 2018-08-30 PROCEDURE — 3077F SYST BP >= 140 MM HG: CPT | Mod: CPTII,S$GLB,, | Performed by: INTERNAL MEDICINE

## 2018-08-30 RX ORDER — METOPROLOL TARTRATE 25 MG/1
25 TABLET, FILM COATED ORAL 2 TIMES DAILY
Qty: 180 TABLET | Refills: 3 | Status: SHIPPED | OUTPATIENT
Start: 2018-08-30 | End: 2018-10-10 | Stop reason: ALTCHOICE

## 2018-08-30 RX ORDER — HYDROCHLOROTHIAZIDE 25 MG/1
25 TABLET ORAL DAILY
Qty: 90 TABLET | Refills: 3 | Status: SHIPPED | OUTPATIENT
Start: 2018-08-30 | End: 2018-10-25 | Stop reason: ALTCHOICE

## 2018-08-30 RX ORDER — LISINOPRIL 40 MG/1
40 TABLET ORAL DAILY
Qty: 90 TABLET | Refills: 3 | Status: SHIPPED | OUTPATIENT
Start: 2018-08-30 | End: 2018-09-05 | Stop reason: SDUPTHER

## 2018-08-30 RX ORDER — AMLODIPINE BESYLATE 10 MG/1
10 TABLET ORAL DAILY
Qty: 90 TABLET | Refills: 3 | Status: SHIPPED | OUTPATIENT
Start: 2018-08-30 | End: 2019-07-26 | Stop reason: SDUPTHER

## 2018-08-30 NOTE — ASSESSMENT & PLAN NOTE
1. CKD: stage IIIb vs IIIa, recent rapid rise possibly some acute component and 2ndary combo ACE-I, NSAID and inadequate hydration while working outside in the heat.       Lab Results   Component Value Date    CREATININE 2.2 (H) 06/18/2018     Protein Creatinine Ratios: microalbumin a little over 500mg on most recent labs, will check UPC ratio    No results found for: UTPCR  ·   ·   Acid-Base: not an issue  Lab Results   Component Value Date     06/18/2018    K 3.7 06/18/2018    CO2 26 06/18/2018     2. HTN: Blood pressures very poorly controlled today and secondary having been out of BP medications x 3 weeks, will refill today and patient is instructed to get a BP cuff and keep a written record at home    3. Renal osteodystrophy: check PTH, vitamin D, calcium and posphorus for next visit  Lab Results   Component Value Date    CALCIUM 8.9 06/18/2018       4. Anemia: not anemic  Lab Results   Component Value Date    HGB 14.3 04/07/2009        5. DM:  Last HbA1C, needs better controlled, would like to see A1c < 7.0, managed by PCP  Lab Results   Component Value Date    HGBA1C 9.6 (H) 06/18/2018       6. Lipid management: reviewed, managed by PCP  Lab Results   Component Value Date    LDLCALC 128.8 05/07/2018       7. ESRD planing:     Follow up in 2-3 months    Will order    RFP  CBC  Iron studies  PTH  Vitamin D  UA  UPC level    Renal ultrasound  Patient discussed with Dr Carr

## 2018-08-30 NOTE — PATIENT INSTRUCTIONS
1) NO NSAIDs  2) stay well hydrated, especially when working outside in the heat  3)  refilled BP meds today and get a home BP cuff and write down readings  4) follow up with us in 2-3 months
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
## 2018-09-01 NOTE — PROGRESS NOTES
ATTENDING PHYSICIAN ATTESTATION  I have personally interviewed and examined the patient. I thoroughly reviewed the demographic, clinical, laboratorial and imaging information available in medical records. I agree with the assessment and recommendations provided by the subspecialty resident. Dr. Vasquez was under my supervision.

## 2018-09-05 ENCOUNTER — OFFICE VISIT (OUTPATIENT)
Dept: INTERNAL MEDICINE | Facility: CLINIC | Age: 48
End: 2018-09-05
Payer: COMMERCIAL

## 2018-09-05 ENCOUNTER — PATIENT MESSAGE (OUTPATIENT)
Dept: ADMINISTRATIVE | Facility: OTHER | Age: 48
End: 2018-09-05

## 2018-09-05 ENCOUNTER — LAB VISIT (OUTPATIENT)
Dept: LAB | Facility: HOSPITAL | Age: 48
End: 2018-09-05
Attending: INTERNAL MEDICINE
Payer: COMMERCIAL

## 2018-09-05 VITALS
BODY MASS INDEX: 32.1 KG/M2 | DIASTOLIC BLOOD PRESSURE: 96 MMHG | HEIGHT: 71 IN | OXYGEN SATURATION: 98 % | HEART RATE: 58 BPM | WEIGHT: 229.25 LBS | SYSTOLIC BLOOD PRESSURE: 150 MMHG

## 2018-09-05 DIAGNOSIS — N18.9 ANEMIA OF CHRONIC RENAL FAILURE, UNSPECIFIED CKD STAGE: ICD-10-CM

## 2018-09-05 DIAGNOSIS — E10.21 TYPE 1 DIABETES MELLITUS WITH NEPHROPATHY: Chronic | ICD-10-CM

## 2018-09-05 DIAGNOSIS — D63.1 ANEMIA OF CHRONIC RENAL FAILURE, UNSPECIFIED CKD STAGE: ICD-10-CM

## 2018-09-05 DIAGNOSIS — E10.3399: Chronic | ICD-10-CM

## 2018-09-05 DIAGNOSIS — N18.30 CKD (CHRONIC KIDNEY DISEASE) STAGE 3, GFR 30-59 ML/MIN: ICD-10-CM

## 2018-09-05 DIAGNOSIS — E78.5 HYPERLIPIDEMIA, UNSPECIFIED HYPERLIPIDEMIA TYPE: ICD-10-CM

## 2018-09-05 DIAGNOSIS — F14.10 DRUG ABUSE, COCAINE TYPE: Primary | ICD-10-CM

## 2018-09-05 DIAGNOSIS — I10 ESSENTIAL HYPERTENSION: Chronic | ICD-10-CM

## 2018-09-05 DIAGNOSIS — F14.10 DRUG ABUSE, COCAINE TYPE: ICD-10-CM

## 2018-09-05 LAB
25(OH)D3+25(OH)D2 SERPL-MCNC: 22 NG/ML
ALBUMIN SERPL BCP-MCNC: 3.7 G/DL
ALBUMIN SERPL BCP-MCNC: 3.7 G/DL
ALP SERPL-CCNC: 76 U/L
ALT SERPL W/O P-5'-P-CCNC: 19 U/L
ANION GAP SERPL CALC-SCNC: 7 MMOL/L
AST SERPL-CCNC: 20 U/L
BASOPHILS # BLD AUTO: 0 K/UL
BASOPHILS NFR BLD: 0 %
BILIRUB SERPL-MCNC: 0.9 MG/DL
BUN SERPL-MCNC: 22 MG/DL
CALCIUM SERPL-MCNC: 9.3 MG/DL
CHLORIDE SERPL-SCNC: 106 MMOL/L
CO2 SERPL-SCNC: 29 MMOL/L
CREAT SERPL-MCNC: 2 MG/DL
DIFFERENTIAL METHOD: ABNORMAL
EOSINOPHIL # BLD AUTO: 0.1 K/UL
EOSINOPHIL NFR BLD: 1.6 %
ERYTHROCYTE [DISTWIDTH] IN BLOOD BY AUTOMATED COUNT: 13.1 %
EST. GFR  (AFRICAN AMERICAN): 45 ML/MIN/1.73 M^2
EST. GFR  (NON AFRICAN AMERICAN): 39 ML/MIN/1.73 M^2
ESTIMATED AVG GLUCOSE: 252 MG/DL
FERRITIN SERPL-MCNC: 163 NG/ML
GLUCOSE SERPL-MCNC: 96 MG/DL
HBA1C MFR BLD HPLC: 10.4 %
HCT VFR BLD AUTO: 36.1 %
HCV AB SERPL QL IA: NEGATIVE
HGB BLD-MCNC: 11.6 G/DL
HIV 1+2 AB+HIV1 P24 AG SERPL QL IA: NEGATIVE
IRON SERPL-MCNC: 92 UG/DL
LYMPHOCYTES # BLD AUTO: 1.9 K/UL
LYMPHOCYTES NFR BLD: 32.5 %
MCH RBC QN AUTO: 26.4 PG
MCHC RBC AUTO-ENTMCNC: 32.1 G/DL
MCV RBC AUTO: 82 FL
MONOCYTES # BLD AUTO: 0.5 K/UL
MONOCYTES NFR BLD: 8.5 %
NEUTROPHILS # BLD AUTO: 3.3 K/UL
NEUTROPHILS NFR BLD: 57.2 %
PHOSPHATE SERPL-MCNC: 3.2 MG/DL
PLATELET # BLD AUTO: 156 K/UL
PMV BLD AUTO: 12.3 FL
POTASSIUM SERPL-SCNC: 3.5 MMOL/L
PROT SERPL-MCNC: 7.1 G/DL
PTH-INTACT SERPL-MCNC: 126 PG/ML
RBC # BLD AUTO: 4.39 M/UL
SATURATED IRON: 32 %
SODIUM SERPL-SCNC: 142 MMOL/L
TOTAL IRON BINDING CAPACITY: 290 UG/DL
TRANSFERRIN SERPL-MCNC: 196 MG/DL
WBC # BLD AUTO: 5.75 K/UL

## 2018-09-05 PROCEDURE — 85025 COMPLETE CBC W/AUTO DIFF WBC: CPT

## 2018-09-05 PROCEDURE — 82728 ASSAY OF FERRITIN: CPT

## 2018-09-05 PROCEDURE — 82306 VITAMIN D 25 HYDROXY: CPT

## 2018-09-05 PROCEDURE — 3077F SYST BP >= 140 MM HG: CPT | Mod: CPTII,S$GLB,, | Performed by: INTERNAL MEDICINE

## 2018-09-05 PROCEDURE — 83036 HEMOGLOBIN GLYCOSYLATED A1C: CPT

## 2018-09-05 PROCEDURE — 99999 PR PBB SHADOW E&M-EST. PATIENT-LVL V: CPT | Mod: PBBFAC,,, | Performed by: INTERNAL MEDICINE

## 2018-09-05 PROCEDURE — 36415 COLL VENOUS BLD VENIPUNCTURE: CPT

## 2018-09-05 PROCEDURE — 83540 ASSAY OF IRON: CPT

## 2018-09-05 PROCEDURE — 3080F DIAST BP >= 90 MM HG: CPT | Mod: CPTII,S$GLB,, | Performed by: INTERNAL MEDICINE

## 2018-09-05 PROCEDURE — 3046F HEMOGLOBIN A1C LEVEL >9.0%: CPT | Mod: CPTII,S$GLB,, | Performed by: INTERNAL MEDICINE

## 2018-09-05 PROCEDURE — 3008F BODY MASS INDEX DOCD: CPT | Mod: CPTII,S$GLB,, | Performed by: INTERNAL MEDICINE

## 2018-09-05 PROCEDURE — 83970 ASSAY OF PARATHORMONE: CPT

## 2018-09-05 PROCEDURE — 86703 HIV-1/HIV-2 1 RESULT ANTBDY: CPT

## 2018-09-05 PROCEDURE — 80069 RENAL FUNCTION PANEL: CPT

## 2018-09-05 PROCEDURE — 86803 HEPATITIS C AB TEST: CPT

## 2018-09-05 PROCEDURE — 99214 OFFICE O/P EST MOD 30 MIN: CPT | Mod: S$GLB,,, | Performed by: INTERNAL MEDICINE

## 2018-09-05 PROCEDURE — 80053 COMPREHEN METABOLIC PANEL: CPT

## 2018-09-05 RX ORDER — INSULIN ASPART 100 [IU]/ML
INJECTION, SOLUTION INTRAVENOUS; SUBCUTANEOUS
Qty: 1 BOX | Refills: 1 | Status: SHIPPED | OUTPATIENT
Start: 2018-09-05 | End: 2018-12-09 | Stop reason: SDUPTHER

## 2018-09-05 RX ORDER — LISINOPRIL 40 MG/1
40 TABLET ORAL DAILY
Qty: 90 TABLET | Refills: 3 | Status: ON HOLD | OUTPATIENT
Start: 2018-09-05 | End: 2019-07-24 | Stop reason: HOSPADM

## 2018-09-06 NOTE — PROGRESS NOTES
HISTORY OF PRESENT ILLNESS:  He is a 48-year-old gentleman with history of   cocaine abuse coming in today to follow up his ongoing medical problems.  He has   CKD III.  He saw Nephrology last back in last week.  At that time, his blood   pressure was elevated.  Blood pressure is again elevated at 150/100.  They   recommended getting his blood pressure better controlled.  Most recent   creatinine for him was 2.0 today and normal potassium.  He has hypertension for   many years.  He is on hydrochlorothiazide, Lopressor 25, amlodipine 10 and   luckily he brings his medicines in with him today because I am thinking about   starting him on hydralazine though it seems like he is missing his lisinopril   40.  Blood pressure is 150/100.  On recheck, it is still elevated at 150/96.  He   has diabetes mellitus type 1.  He has not seen Endocrinology for quite a while.    Hemoglobin A1c is 10.4.  He feels like his diabetes has been doing better,   which is incorrect.  Back in June 2018, his hemoglobin A1c was 9.6 and back in   May 2018, his hemoglobin A1c was 7.9, so actually diabetes seems to be doing   worse.  He has not been following with Endocrinology.  He is currently taking   NovoLog 20 units with meals and Basaglar 50 units every evening.  He denies any   polyuria or polydipsia.  He says he has been feeling pretty well.    He has a history of cocaine abuse.  He has gotten rid of his car, which he says   was a big distractor for him staying off cocaine when he used to start running   around after work and he says he always ended up in the same wrong places.  He   otherwise denies any new complaints.    PHYSICAL EXAMINATION:  GENERAL:  He is a well-appearing 48-year-old -American gentleman, appears   about stated age.  NECK:  Supple.  He has no JVD.  Thyroid is not enlarged.  CARDIOVASCULAR:  S1 and S2.  Regular rate and rhythm without murmur, gallop or   rub.  ABDOMEN:  Soft and nontender.  No hepatosplenomegaly.   No guarding or rebound   tenderness.  LOWER EXTREMITIES:  No edema.    ASSESSMENT AND PLAN:  CKD stage III.  We will try to get blood pressure under   control and also work to get diabetes under better control.  We are going to   restart his lisinopril 40 and recheck his blood pressure in a couple of months.    For his blood pressure, also I am getting him set up with a Digital   Hypertension Clinic.  He does this ____ is going to do this and we will help him   get a blood pressure cuff.  Next, for his diabetes, he has not seen   Endocrinology in quite a while.  We are going to get him start back with   diabetic education and get him set to see Endocrine nurse.  He last followed   with Endocrine back in 2016.  It looks like he had an appointment in February 2018, with Dr. Espinoza, but it got canceled.  Soon, we will try to get him back   with Endocrinology.  I will see him back again in 2 to 3 months.      MELVA/IN  dd: 09/05/2018 22:07:13 (CDT)  td: 09/06/2018 13:50:04 (CDT)  Doc ID   #[id number]  Job ID #[id number]    CC:

## 2018-09-12 ENCOUNTER — PATIENT OUTREACH (OUTPATIENT)
Dept: OTHER | Facility: OTHER | Age: 48
End: 2018-09-12

## 2018-09-12 NOTE — PROGRESS NOTES
Last 5 Patient Entered Readings                                      Current 30 Day Average: 160/94     Recent Readings 9/10/2018 9/6/2018 9/5/2018 9/5/2018    SBP (mmHg) 156 165 158 180    DBP (mmHg) 93 96 92 108    Pulse 59 64 59 60        09/12: LVM.  Will call on 09/17 to complete enrollment call.

## 2018-09-12 NOTE — LETTER
Rosa Geller, PharmD  [address] [place] [address]     Dear Luis Daniel Howell,    Welcome to the [facility]!           My name is Rosa Geller PharmD and I am your dedicated Digital Medicine clinician.  As an expert in medication management, I will help ensure that the medications you are taking continue to provide you with the intended benefits.        I am Teja Camarena and I will be your health  for the duration of the program.  My  job is to help you identify lifestyle changes to improve your blood pressure control.  We will talk about nutrition, exercise, and other ways that you may be able to adjust your current habits to better your health. Together, we will work to improve your overall health and encourage you to meet your goals for a healthier lifestyle.    What we expect from YOU:    You will need to take blood pressure readings multiple times a week and no less than one reading per week.   It is important that you take your measurements at different times during the day, when possible.     What you should expect from your Digital Medicine Care Team:   We will provide you with education about high blood pressure, including lifestyle changes that could help you to control your blood pressure.   We will review your weekly readings and provide you with monthly blood pressure progress reports after you have been in the program for more than 30 days.   We will send monthly progress reports on your blood pressure control to your physician so they can follow along with your progress as well.    You will be able to reach me by phone at [phone] or through your [facility] account by clicking my name under Care Team on the right side of the home screen.    I look forward to working with you to achieve your blood pressure goals!    Sincerely,    Rosa Geller PharmD  Your personal clinician    Please visit  [email or web address] to learn more about high blood pressure and what you can do lower your blood pressure.                                                                                           Luis Daniel Howell  [address] [place] [address]

## 2018-09-17 NOTE — PROGRESS NOTES
Last 5 Patient Entered Readings                                      Current 30 Day Average: 156/91     Recent Readings 9/12/2018 9/12/2018 9/10/2018 9/6/2018 9/5/2018    SBP (mmHg) 145 156 156 165 158    DBP (mmHg) 82 89 93 96 92    Pulse 60 59 59 64 59          09/17: LVM. Sent [employer]. Will call back on 09/24 to complete enrollment call.

## 2018-09-24 NOTE — PROGRESS NOTES
Last 5 Patient Entered Readings                                      Current 30 Day Average: 153/93     Recent Readings 9/21/2018 9/17/2018 9/12/2018 9/12/2018 9/10/2018    SBP (mmHg) 151 145 145 156 156    DBP (mmHg) 96 97 82 89 93    Pulse 53 70 60 59 59          09/24: LM with mother.  States he will give me a call.  Will follow up in one week.

## 2018-09-25 NOTE — PROGRESS NOTES
Last 5 Patient Entered Readings                                      Current 30 Day Average: 152/91     Recent Readings 9/24/2018 9/21/2018 9/17/2018 9/12/2018 9/12/2018    SBP (mmHg) 145 151 145 145 156    DBP (mmHg) 81 96 97 82 89    Pulse 70 53 70 60 59          Mr. Luis Daniel Howell is a 48 y.o. male who is newly enrolled in the [facility]pert[facility].     The following information was reviewed/updated:  Preferred pharmacy   [id number] - [place] - [address]  [address] [facility]  [place] [address]  Phone: [phone] Fax: [phone]    [id number] - [place], [place] - [address]  [address]  [place] [address]  Phone: [phone] Fax: [phone]    [id number] - [place], [place] - [address]  [address]  [facility] [address]  Phone: [phone] Fax: [phone]    [facility]  [address] [facility] [address]  Phone: [phone] Fax: [phone]      Patient prefers a 90 days supply    HYPERTENSION    Explained that we expect patient to obtain several blood pressures per week at random times of day.   Our goal is to get  BP to consistently below 130/80mmHg and make the process convenient so patient can avoid extra trips to the office. Getting your blood pressure below 130/80mmHg (definition of control) will reduce your risk for heart attack, kidney failure, stroke and death (as well as kidney failure, eye disease, & dementia).     Patient is not meeting the goal already. Current BP average 152/91 mmHg.  When asked what the patient thinks is causing BP to be elevated, he states: greasy foods    Discussed appropriate BP measuring technique:  Before taking your blood pressure, find a quiet place. You will need to listen for your heartbeat.  Roll up the sleeve on your left arm or remove any tight-sleeved clothing, if needed. (It's best to take your blood pressure from your left arm if  you are right-handed.You can use the other arm if you have been told by your health care provider to do so.)  Rest in a chair next to a table for 5 to 10 minutes. (Your left arm should rest comfortably at heart level.)  Sit up straight with your back against the chair, legs uncrossed and on the ground.  Rest your forearm on the table with the palm of your hand facing up.  You should not talk, read the newspaper, or watch television during this process.    Last 5 Patient Entered Readings                                      Current 30 Day Average: 152/91     Recent Readings 9/24/2018 9/21/2018 9/17/2018 9/12/2018 9/12/2018    SBP (mmHg) 145 151 145 145 156    DBP (mmHg) 81 96 97 82 89    Pulse 70 53 70 60 59           Instructed patient not to allow anyone else to use phone and BP cuff or glucometer.   I'm not available for emergencies. Patient will call [facility] on-call ([phone] or [phone]) or [phone] if needed.     Patient and I agreed that he will continue to monitor blood pressure and sodium intake and continue to remain adherent to medications.     I will plan to follow-up with the patient in 3 weeks.   Emailed patient link to [facility] webpages and my contact information in case he has any questions.    Lifestyle Assessment:    Diet: Reports eating greasy foods and loves soda.  Drinks about one 6pack a day.  Reports he is trying to cut back on that and consume more water.  Informed patient to maintain low sodium diet and avoid greasy foods.  Aim for baked foods like chicken and consume more vegetables.    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  Reports when he comes home he finds himself sitting.  Encouraged patient to try to exercise at least 15min 3x a week after work.    Alcohol/Tobacco: Reports occasional beer.  No tobacco use.  States he is a recovering cocaine addict.    Reviewed technique. Has been sitting on bed.  Informed patient to sit at dining  room table.  Informed patient to take medication one hour before taking pressure.

## 2018-10-10 ENCOUNTER — PATIENT OUTREACH (OUTPATIENT)
Dept: OTHER | Facility: OTHER | Age: 48
End: 2018-10-10

## 2018-10-10 DIAGNOSIS — I10 HYPERTENSION, UNSPECIFIED TYPE: Primary | ICD-10-CM

## 2018-10-10 RX ORDER — METOPROLOL SUCCINATE 50 MG/1
50 TABLET, EXTENDED RELEASE ORAL DAILY
Qty: 30 TABLET | Refills: 11 | Status: ON HOLD | OUTPATIENT
Start: 2018-10-10 | End: 2019-07-24 | Stop reason: HOSPADM

## 2018-10-10 NOTE — PROGRESS NOTES
Last 5 Patient Entered Readings                                      Current 30 Day Average: 151/88     Recent Readings 10/4/2018 10/3/2018 10/2/2018 9/25/2018 9/24/2018    SBP (mmHg) 167 164 140 150 145    DBP (mmHg) 98 92 75 82 81    Pulse 68 77 64 55 70        Hypertension Medications             amLODIPine (NORVASC) 10 MG tablet Take 1 tablet (10 mg total) by mouth once daily. q noon    hydroCHLOROthiazide (HYDRODIURIL) 25 MG tablet Take 1 tablet (25 mg total) by mouth once daily. q noon    lisinopril (PRINIVIL,ZESTRIL) 40 MG tablet Take 1 tablet (40 mg total) by mouth once daily. q noon    metoprolol tartrate (LOPRESSOR) 25 MG tablet Take 1 tablet (25 mg total) by mouth 2 (two) times daily.        Assessment/ Plan:   [facility] Reviewed my role and responsibilities.   Reviewed recent readings. Per 2017 ACC/ AHA HTN guidelines (goal of BP < 130/80), current 30-day average is uncontrolled.   Patient states he is not taking metoprolol IR 25mg BID, only taking once daily. 2/2 noncompliance, will change metoprolol to ER 50mg.   Patient denies having questions or concerns. Instructed patient to call if he has any questions or concerns, patient confirms understanding.   Will continue to monitor. WCB in 2 weeks. If BP remains elevated, will discuss changing hctz to chlorthalidone.

## 2018-10-16 ENCOUNTER — PATIENT OUTREACH (OUTPATIENT)
Dept: OTHER | Facility: OTHER | Age: 48
End: 2018-10-16

## 2018-10-16 NOTE — PROGRESS NOTES
Last 5 Patient Entered Readings                                      Current 30 Day Average: 152/88     Recent Readings 10/10/2018 10/10/2018 10/4/2018 10/3/2018 10/2/2018    SBP (mmHg) 151 175 167 164 140    DBP (mmHg) 83 91 98 92 75    Pulse 54 52 68 77 64          10/16: LVM.  Will call back in 2 weeks.  Pharmacist scheduled to call next week.

## 2018-10-24 ENCOUNTER — PATIENT OUTREACH (OUTPATIENT)
Dept: OTHER | Facility: OTHER | Age: 48
End: 2018-10-24

## 2018-10-24 DIAGNOSIS — I10 HYPERTENSION, UNSPECIFIED TYPE: Primary | ICD-10-CM

## 2018-10-24 NOTE — PROGRESS NOTES
Last 5 Patient Entered Readings                                      Current 30 Day Average: 155/89     Recent Readings 10/23/2018 10/22/2018 10/21/2018 10/20/2018 10/10/2018    SBP (mmHg) 170 154 159 152 151    DBP (mmHg) 92 83 98 101 83    Pulse 60 58 56 64 54        Hypertension Medications             amLODIPine (NORVASC) 10 MG tablet Take 1 tablet (10 mg total) by mouth once daily.    hydroCHLOROthiazide (HYDRODIURIL) 25 MG tablet Take 1 tablet (25 mg total) by mouth once daily.    lisinopril (PRINIVIL,ZESTRIL) 40 MG tablet Take 1 tablet (40 mg total) by mouth once daily.    metoprolol succinate (TOPROL-XL) 50 MG 24 hr tablet Take 1 tablet (50 mg total) by mouth once daily.        Assessment/ Plan:   Called patient to follow up since changing metoprolol IR 25mg BID to metoprolol er 50mg daily. Patient confirms he is tolerating change well.   Reviewed recent readings. Per 2017 ACC/ AHA HTN guidelines (goal of BP < 130/80), current 30-day average is uncontrolled.   Patient denies hypertensive s/sx (SOB, CP, severe headaches, changes in vision, dizziness, fatigue, confusion, anxiety, nosebleeds) associated with high readings.  Patient states he has been without amlodipine for about 1 week, stressed importance of medication compliance.   Discussed with and instructed patient to stop hctz 25 and start chlorthalidone 25, patient confirms understanding. Patient's current SrCr is 2.0mg/dl. Will schedule CMP for 11/8.    Patient denies having questions or concerns. Instructed patient to call if he has any questions or concerns, patient confirms understanding.   Will continue to monitor. WCB in 2 weeks.

## 2018-10-25 RX ORDER — CHLORTHALIDONE 25 MG/1
25 TABLET ORAL EVERY MORNING
Qty: 30 TABLET | Refills: 11 | Status: SHIPPED | OUTPATIENT
Start: 2018-10-25 | End: 2019-07-26

## 2018-10-30 NOTE — PROGRESS NOTES
Last 5 Patient Entered Readings                                      Current 30 Day Average: 158/90     Recent Readings 10/24/2018 10/24/2018 10/23/2018 10/22/2018 10/21/2018    SBP (mmHg) 165 180 170 154 159    DBP (mmHg) 90 102 92 83 98    Pulse 53 55 60 58 56          10/30: LVM.  Sent MyChart.

## 2018-11-02 NOTE — PROGRESS NOTES
Last 5 Patient Entered Readings                                      Current 30 Day Average: 160/93     Recent Readings 10/24/2018 10/24/2018 10/23/2018 10/22/2018 10/21/2018    SBP (mmHg) 165 180 170 154 159    DBP (mmHg) 90 102 92 83 98    Pulse 53 55 60 58 56          11/2: Returning patient's call.  LVM.  Will call in 1 week.

## 2018-11-06 NOTE — PROGRESS NOTES
Last 5 Patient Entered Readings                                      Current 30 Day Average: 159/93     Recent Readings 10/24/2018 10/24/2018 10/23/2018 10/22/2018 10/21/2018    SBP (mmHg) 165 180 170 154 159    DBP (mmHg) 90 102 92 83 98    Pulse 53 55 60 58 56          11/6: Patient reports waking up in the middle of the night soaked in sweat since starting chlorthalidone.  Stopped taking medication until appt on 11/8.  Informed patient he just has labs on 11/8.  States he will try to make an appt with Dr. Wilson.  Will go by the [facility] to get BP cuff set up with new phone.

## 2018-11-08 ENCOUNTER — PATIENT OUTREACH (OUTPATIENT)
Dept: OTHER | Facility: OTHER | Age: 48
End: 2018-11-08

## 2018-11-08 ENCOUNTER — LAB VISIT (OUTPATIENT)
Dept: LAB | Facility: HOSPITAL | Age: 48
End: 2018-11-08
Attending: INTERNAL MEDICINE
Payer: COMMERCIAL

## 2018-11-08 DIAGNOSIS — I10 HYPERTENSION, UNSPECIFIED TYPE: ICD-10-CM

## 2018-11-08 LAB
ALBUMIN SERPL BCP-MCNC: 3.6 G/DL
ALP SERPL-CCNC: 74 U/L
ALT SERPL W/O P-5'-P-CCNC: 14 U/L
ANION GAP SERPL CALC-SCNC: 8 MMOL/L
AST SERPL-CCNC: 17 U/L
BILIRUB SERPL-MCNC: 0.8 MG/DL
BUN SERPL-MCNC: 23 MG/DL
CALCIUM SERPL-MCNC: 9 MG/DL
CHLORIDE SERPL-SCNC: 106 MMOL/L
CO2 SERPL-SCNC: 27 MMOL/L
CREAT SERPL-MCNC: 2.1 MG/DL
EST. GFR  (AFRICAN AMERICAN): 42 ML/MIN/1.73 M^2
EST. GFR  (NON AFRICAN AMERICAN): 36 ML/MIN/1.73 M^2
GLUCOSE SERPL-MCNC: 188 MG/DL
POTASSIUM SERPL-SCNC: 4 MMOL/L
PROT SERPL-MCNC: 6.8 G/DL
SODIUM SERPL-SCNC: 141 MMOL/L

## 2018-11-08 PROCEDURE — 36415 COLL VENOUS BLD VENIPUNCTURE: CPT

## 2018-11-08 PROCEDURE — 80053 COMPREHEN METABOLIC PANEL: CPT

## 2018-11-08 NOTE — PROGRESS NOTES
Last 5 Patient Entered Readings                                      Current 30 Day Average: 159/93     Recent Readings 10/24/2018 10/24/2018 10/23/2018 10/22/2018 10/21/2018    SBP (mmHg) 165 180 170 154 159    DBP (mmHg) 90 102 92 83 98    Pulse 53 55 60 58 56        Hypertension Medications             amLODIPine (NORVASC) 10 MG tablet Take 1 tablet (10 mg total) by mouth once daily.    chlorthalidone (HYGROTEN) 25 MG Tab Take 1 tablet (25 mg total) by mouth every morning. Stop hydrochlorathiazide 25mg.    lisinopril (PRINIVIL,ZESTRIL) 40 MG tablet Take 1 tablet (40 mg total) by mouth once daily.    metoprolol succinate (TOPROL-XL) 50 MG 24 hr tablet Take 1 tablet (50 mg total) by mouth once daily.        Assessment/ Plan:   Called patient to follow up since starting chlorthalidone. Patient states he stopped taking chlorthalidone for several days due night sweats and stomach cramps. Discussed with and instructed patient to resume chlorthalidone, but 12.5mg qam, patient confirms understanding. Patient admits to not always taking his medication at the same time daily, stressed importance of consistent timing of medications. CMP stable.     Patient states he had his digital cuff fixed today, plans to resume readings tonight.     Reviewed recent readings. Per 2017 ACC/ AHA HTN guidelines (goal of BP < 130/80), current 30-day average is uncontrolled.   Patient denies having questions or concerns. Instructed patient to call if he has any questions or concerns, patient confirms understanding.   Will continue to monitor. WCB in 2 weeks.

## 2018-11-14 ENCOUNTER — PATIENT OUTREACH (OUTPATIENT)
Dept: OTHER | Facility: OTHER | Age: 48
End: 2018-11-14

## 2018-11-14 NOTE — PROGRESS NOTES
Last 5 Patient Entered Readings                                      Current 30 Day Average: 165/97     Recent Readings 11/13/2018 11/13/2018 11/12/2018 11/12/2018 11/12/2018    SBP (mmHg) 195 182 153 172 183    DBP (mmHg) 105 106 87 90 105    Pulse 61 56 60 61 59        Hypertension Medications             amLODIPine (NORVASC) 10 MG tablet Take 1 tablet (10 mg total) by mouth once daily.    chlorthalidone (HYGROTEN) 25 MG Tab Take 1 tablet (25 mg total) by mouth every morning. Stop hydrochlorathiazide 25mg.    lisinopril (PRINIVIL,ZESTRIL) 40 MG tablet Take 1 tablet (40 mg total) by mouth once daily.    metoprolol succinate (TOPROL-XL) 50 MG 24 hr tablet Take 1 tablet (50 mg total) by mouth once daily.        Plan:   Called patient to follow up regarding critically high readings taken on 11/13, 182/106 and 195/105. Reviewed BP readings. Per 2017 ACC/ AHA HTN guidelines  (goal of BP < 130/80), current 30-day average is uncontrolled. CMP on 11/8- stable.  LVM, requested patient call back at his convenience.  Will continue to monitor. WCB in 1 week.

## 2018-11-21 NOTE — PROGRESS NOTES
Last 5 Patient Entered Readings                                      Current 30 Day Average: 168/96     Recent Readings 11/17/2018 11/13/2018 11/13/2018 11/12/2018 11/12/2018    SBP (mmHg) 165 195 182 153 172    DBP (mmHg) 96 105 106 87 90    Pulse 49 61 56 60 61        Hypertension Medications             amLODIPine (NORVASC) 10 MG tablet Take 1 tablet (10 mg total) by mouth once daily.    chlorthalidone (HYGROTEN) 25 MG Tab Take 1 tablet (25 mg total) by mouth every morning. Stop hydrochlorathiazide 25mg.    lisinopril (PRINIVIL,ZESTRIL) 40 MG tablet Take 1 tablet (40 mg total) by mouth once daily.    metoprolol succinate (TOPROL-XL) 50 MG 24 hr tablet Take 1 tablet (50 mg total) by mouth once daily.        Plan:   Called patient to follow up, reviewed BP readings. Per 2017 ACC/ AHA HTN guidelines  (goal of BP < 130/80), current 30-day average is uncontrolled.  LVM, 2nd attempt, requested patient call back at his convenience.  Will continue to monitor. WCB in 2 weeks.

## 2018-11-26 NOTE — PROGRESS NOTES
Last 5 Patient Entered Readings                                      Current 30 Day Average: 177/100     Recent Readings 11/25/2018 11/25/2018 11/17/2018 11/13/2018 11/13/2018    SBP (mmHg) 199 189 165 195 182    DBP (mmHg) 107 105 96 105 106    Pulse 67 65 49 61 56        Hypertension Medications             amLODIPine (NORVASC) 10 MG tablet Take 1 tablet (10 mg total) by mouth once daily.    chlorthalidone (HYGROTEN) 25 MG Tab Take 1 tablet (25 mg total) by mouth every morning. Stop hydrochlorathiazide 25mg.    lisinopril (PRINIVIL,ZESTRIL) 40 MG tablet Take 1 tablet (40 mg total) by mouth once daily.    metoprolol succinate (TOPROL-XL) 50 MG 24 hr tablet Take 1 tablet (50 mg total) by mouth once daily.        Plan:   Called patient to follow up regarding critically elevated readings. Reviewed BP readings. Per 2017 ACC/ AHA HTN guidelines  (goal of BP < 130/80), current 30-day average is uncontrolled.  LVM, 3rd attempt, requested patient call back at his convenience and instructed patient to go to the ED if BP > 180/110 and is accompanied by hypertensive s/sx associated.  Will continue to monitor. WCB in 1 week. If communication non-compliance continues, will request for HC Teja Camarena to begin the discharge process.

## 2018-11-29 ENCOUNTER — PATIENT OUTREACH (OUTPATIENT)
Dept: OTHER | Facility: OTHER | Age: 48
End: 2018-11-29

## 2018-11-29 NOTE — PROGRESS NOTES
Last 5 Patient Entered Readings                                      Current 30 Day Average: 173/99     Recent Readings 11/26/2018 11/26/2018 11/26/2018 11/26/2018 11/25/2018    SBP (mmHg) 151 175 190 190 199    DBP (mmHg) 78 97 101 111 107    Pulse 65 62 62 63 67          11/29: LVM.  Will call in 2 weeks.  Pharmacist scheduled to call next week.

## 2018-12-03 NOTE — PROGRESS NOTES
Last 5 Patient Entered Readings                                      Current 30 Day Average: 173/99     Recent Readings 11/26/2018 11/26/2018 11/26/2018 11/26/2018 11/25/2018    SBP (mmHg) 151 175 190 190 199    DBP (mmHg) 78 97 101 111 107    Pulse 65 62 62 63 67        Hypertension Medications             amLODIPine (NORVASC) 10 MG tablet Take 1 tablet (10 mg total) by mouth once daily.    chlorthalidone (HYGROTEN) 25 MG Tab Take 1 tablet (25 mg total) by mouth every morning. Stop hydrochlorathiazide 25mg.    lisinopril (PRINIVIL,ZESTRIL) 40 MG tablet Take 1 tablet (40 mg total) by mouth once daily.    metoprolol succinate (TOPROL-XL) 50 MG 24 hr tablet Take 1 tablet (50 mg total) by mouth once daily.        Plan:   Called patient to follow up regarding critically elevated readings. Reviewed BP readings. Per 2017 ACC/ AHA HTN guidelines  (goal of BP < 130/80), current 30-day average is uncontrolled.  LVM, 4th attempt, requested patient call back at his convenience and instructed patient to go to the ED if BP > 180/110 and is accompanied by hypertensive s/sx associated.  Will continue to monitor. WCB in 1 week. If communication non-compliance continues, will request for HC Teja Camarena to begin the discharge process.

## 2018-12-10 RX ORDER — INSULIN ASPART 100 [IU]/ML
INJECTION, SOLUTION INTRAVENOUS; SUBCUTANEOUS
Qty: 15 SYRINGE | Refills: 8 | Status: SHIPPED | OUTPATIENT
Start: 2018-12-10 | End: 2019-11-20

## 2018-12-10 NOTE — PROGRESS NOTES
Last 5 Patient Entered Readings                                      Current 30 Day Average: 173/99     Recent Readings 11/26/2018 11/26/2018 11/26/2018 11/26/2018 11/25/2018    SBP (mmHg) 151 175 190 190 199    DBP (mmHg) 78 97 101 111 107    Pulse 65 62 62 63 67        Hypertension Medications             amLODIPine (NORVASC) 10 MG tablet Take 1 tablet (10 mg total) by mouth once daily.    chlorthalidone (HYGROTEN) 25 MG Tab Take 1 tablet (25 mg total) by mouth every morning. Stop hydrochlorathiazide 25mg.    lisinopril (PRINIVIL,ZESTRIL) 40 MG tablet Take 1 tablet (40 mg total) by mouth once daily.    metoprolol succinate (TOPROL-XL) 50 MG 24 hr tablet Take 1 tablet (50 mg total) by mouth once daily.        Plan:   Called patient to follow up regarding critically elevated readings. Reviewed BP readings. Per 2017 ACC/ AHA HTN guidelines  (goal of BP < 130/80), current 30-day average is uncontrolled.  LVM, 5th attempt, requested patient call back at his convenience. Will also send [facility] message.   Will continue to monitor. WCB in 4 weeks. Will request for HC Teja Camarena to begin the discharge process.

## 2018-12-10 NOTE — PROGRESS NOTES
Last 5 Patient Entered Readings                                      Current 30 Day Average: 173/99     Recent Readings 11/26/2018 11/26/2018 11/26/2018 11/26/2018 11/25/2018    SBP (mmHg) 151 175 190 190 199    DBP (mmHg) 78 97 101 111 107    Pulse 65 62 62 63 67          12/10: Per pharmacist request, patient going through non compliance.  Will send non compliance letter.  Will call in 2 weeks.

## 2018-12-26 NOTE — PROGRESS NOTES
Last 5 Patient Entered Readings                                      Current 30 Day Average: 175/100     Recent Readings 11/26/2018 11/26/2018 11/26/2018 11/26/2018 11/25/2018    SBP (mmHg) 151 175 190 190 199    DBP (mmHg) 78 97 101 111 107    Pulse 65 62 62 63 67          12/26: LVM.  Sent MyChart.  Will call in 2 weeks.

## 2019-01-04 NOTE — PROGRESS NOTES
Last 5 Patient Entered Readings                                      Current 30 Day Average: 166/97     Recent Readings 12/26/2018 12/26/2018 11/26/2018 11/26/2018 11/26/2018    SBP (mmHg) 166 179 151 175 190    DBP (mmHg) 96 98 78 97 101    Pulse 67 67 65 62 62        Hypertension Medications             amLODIPine (NORVASC) 10 MG tablet Take 1 tablet (10 mg total) by mouth once daily.    chlorthalidone (HYGROTEN) 25 MG Tab Take 1 tablet (25 mg total) by mouth every morning. Stop hydrochlorathiazide 25mg.    lisinopril (PRINIVIL,ZESTRIL) 40 MG tablet Take 1 tablet (40 mg total) by mouth once daily.    metoprolol succinate (TOPROL-XL) 50 MG 24 hr tablet Take 1 tablet (50 mg total) by mouth once daily.        Plan:   Called patient to follow up regarding critically elevated readings. Reviewed BP readings. Per 2017 ACC/ AHA HTN guidelines  (goal of BP < 130/80), current 30-day average is uncontrolled.  LVM, 6th attempt, requested patient call back at his convenience. Would like to discuss changing lisinopril 40 to olmesartan 40.  Will continue to monitor. WCB in 6 weeks. HC Teja Camarena has begun the discharge process.

## 2019-01-09 ENCOUNTER — OFFICE VISIT (OUTPATIENT)
Dept: INTERNAL MEDICINE | Facility: CLINIC | Age: 49
End: 2019-01-09
Payer: COMMERCIAL

## 2019-01-09 ENCOUNTER — LAB VISIT (OUTPATIENT)
Dept: LAB | Facility: HOSPITAL | Age: 49
End: 2019-01-09
Attending: INTERNAL MEDICINE
Payer: COMMERCIAL

## 2019-01-09 VITALS
DIASTOLIC BLOOD PRESSURE: 90 MMHG | SYSTOLIC BLOOD PRESSURE: 160 MMHG | HEART RATE: 52 BPM | BODY MASS INDEX: 32.07 KG/M2 | OXYGEN SATURATION: 98 % | HEIGHT: 71 IN | WEIGHT: 229.06 LBS

## 2019-01-09 DIAGNOSIS — F14.10 DRUG ABUSE, COCAINE TYPE: ICD-10-CM

## 2019-01-09 DIAGNOSIS — E10.3399: Chronic | ICD-10-CM

## 2019-01-09 DIAGNOSIS — N18.30 CKD (CHRONIC KIDNEY DISEASE) STAGE 3, GFR 30-59 ML/MIN: ICD-10-CM

## 2019-01-09 DIAGNOSIS — E78.5 HYPERLIPIDEMIA, UNSPECIFIED HYPERLIPIDEMIA TYPE: ICD-10-CM

## 2019-01-09 DIAGNOSIS — E10.3399: Primary | Chronic | ICD-10-CM

## 2019-01-09 DIAGNOSIS — I10 ESSENTIAL HYPERTENSION: Chronic | ICD-10-CM

## 2019-01-09 LAB
ALBUMIN SERPL BCP-MCNC: 3.3 G/DL
ALP SERPL-CCNC: 78 U/L
ALT SERPL W/O P-5'-P-CCNC: 17 U/L
ANION GAP SERPL CALC-SCNC: 6 MMOL/L
AST SERPL-CCNC: 14 U/L
BILIRUB SERPL-MCNC: 0.5 MG/DL
BUN SERPL-MCNC: 23 MG/DL
CALCIUM SERPL-MCNC: 8.5 MG/DL
CHLORIDE SERPL-SCNC: 104 MMOL/L
CHOLEST SERPL-MCNC: 144 MG/DL
CHOLEST/HDLC SERPL: 2.8 {RATIO}
CO2 SERPL-SCNC: 29 MMOL/L
CREAT SERPL-MCNC: 2.5 MG/DL
EST. GFR  (AFRICAN AMERICAN): 34 ML/MIN/1.73 M^2
EST. GFR  (NON AFRICAN AMERICAN): 29 ML/MIN/1.73 M^2
ESTIMATED AVG GLUCOSE: 105 MG/DL
GLUCOSE SERPL-MCNC: 257 MG/DL
HBA1C MFR BLD HPLC: 5.3 %
HDLC SERPL-MCNC: 52 MG/DL
HDLC SERPL: 36.1 %
LDLC SERPL CALC-MCNC: 82.4 MG/DL
NONHDLC SERPL-MCNC: 92 MG/DL
POTASSIUM SERPL-SCNC: 3.9 MMOL/L
PROT SERPL-MCNC: 6.7 G/DL
SODIUM SERPL-SCNC: 139 MMOL/L
TRIGL SERPL-MCNC: 48 MG/DL

## 2019-01-09 PROCEDURE — 3077F SYST BP >= 140 MM HG: CPT | Mod: CPTII,S$GLB,, | Performed by: INTERNAL MEDICINE

## 2019-01-09 PROCEDURE — 99214 PR OFFICE/OUTPT VISIT, EST, LEVL IV, 30-39 MIN: ICD-10-PCS | Mod: S$GLB,,, | Performed by: INTERNAL MEDICINE

## 2019-01-09 PROCEDURE — 3080F PR MOST RECENT DIASTOLIC BLOOD PRESSURE >= 90 MM HG: ICD-10-PCS | Mod: CPTII,S$GLB,, | Performed by: INTERNAL MEDICINE

## 2019-01-09 PROCEDURE — 83036 HEMOGLOBIN GLYCOSYLATED A1C: CPT

## 2019-01-09 PROCEDURE — 99999 PR PBB SHADOW E&M-EST. PATIENT-LVL V: ICD-10-PCS | Mod: PBBFAC,,, | Performed by: INTERNAL MEDICINE

## 2019-01-09 PROCEDURE — 3044F HG A1C LEVEL LT 7.0%: CPT | Mod: CPTII,S$GLB,, | Performed by: INTERNAL MEDICINE

## 2019-01-09 PROCEDURE — 3080F DIAST BP >= 90 MM HG: CPT | Mod: CPTII,S$GLB,, | Performed by: INTERNAL MEDICINE

## 2019-01-09 PROCEDURE — 80053 COMPREHEN METABOLIC PANEL: CPT

## 2019-01-09 PROCEDURE — 3008F BODY MASS INDEX DOCD: CPT | Mod: CPTII,S$GLB,, | Performed by: INTERNAL MEDICINE

## 2019-01-09 PROCEDURE — 80061 LIPID PANEL: CPT

## 2019-01-09 PROCEDURE — 36415 COLL VENOUS BLD VENIPUNCTURE: CPT

## 2019-01-09 PROCEDURE — 3008F PR BODY MASS INDEX (BMI) DOCUMENTED: ICD-10-PCS | Mod: CPTII,S$GLB,, | Performed by: INTERNAL MEDICINE

## 2019-01-09 PROCEDURE — 3077F PR MOST RECENT SYSTOLIC BLOOD PRESSURE >= 140 MM HG: ICD-10-PCS | Mod: CPTII,S$GLB,, | Performed by: INTERNAL MEDICINE

## 2019-01-09 PROCEDURE — 99214 OFFICE O/P EST MOD 30 MIN: CPT | Mod: S$GLB,,, | Performed by: INTERNAL MEDICINE

## 2019-01-09 PROCEDURE — 99999 PR PBB SHADOW E&M-EST. PATIENT-LVL V: CPT | Mod: PBBFAC,,, | Performed by: INTERNAL MEDICINE

## 2019-01-09 PROCEDURE — 3044F PR MOST RECENT HEMOGLOBIN A1C LEVEL <7.0%: ICD-10-PCS | Mod: CPTII,S$GLB,, | Performed by: INTERNAL MEDICINE

## 2019-01-09 RX ORDER — HYDRALAZINE HYDROCHLORIDE 10 MG/1
10 TABLET, FILM COATED ORAL 3 TIMES DAILY
Qty: 90 TABLET | Refills: 11 | Status: ON HOLD | OUTPATIENT
Start: 2019-01-09 | End: 2019-07-24 | Stop reason: HOSPADM

## 2019-01-09 RX ORDER — SILDENAFIL 100 MG/1
100 TABLET, FILM COATED ORAL DAILY PRN
Qty: 6 TABLET | Refills: 3 | Status: SHIPPED | OUTPATIENT
Start: 2019-01-09 | End: 2019-07-22

## 2019-01-09 NOTE — PROGRESS NOTES
Last 5 Patient Entered Readings                                      Current 30 Day Average: 166/97     Recent Readings 12/26/2018 12/26/2018 11/26/2018 11/26/2018 11/26/2018    SBP (mmHg) 166 179 151 175 190    DBP (mmHg) 96 98 78 97 101    Pulse 67 67 65 62 62          1/9: Patient returned call.  States he got his phone set up with his BP cuff after seeing PCP.  Will start taking more readings.  Informed patient pharmacist has been trying to reach him.  Will call in 1 week to follow up on readings.

## 2019-01-09 NOTE — PROGRESS NOTES
Internal Medicine Clinic Note  1/9/2019      Subjective:       Patient ID: Luis Daniel Howell is a 48 y.o. male being seen for an established visit.    Chief Complaint: Follow-up (4 Month) and Headache      HPI  Patient is a 48 year old male with HTN, uncontrolled DMI, CKD stage III, and hyperlipidemia, as well as a history of cocaine use that presents to clinic for four month follow up of his hypertension. Patient reports that he has been feeling well over all but he has a headache this morning and that he has not taken his blood pressure medications this morning. He says he does not get these headaches often. It is reported as a tight persistent headache, he has tried nothing for it. He reports that his blood pressure usually runs high. HE has been set up with a blood pressure reader at home however he has not been using it and his blood pressure at time of visit remains elevated with an automatic reading of 150/90 and a manual reading of 160/90. He reports taking his insulin as instructed with 14 units of short acting insulin with meals and 50 of the long acting insulin nightly. He also reports excessive fatigue without excessive physical activity; patient has no formal exercise habits just, walking to work. Patient also has some concerns of some bumps on the back of his head. These appear primarily after he gets his haircuts and they tend to resolve spontaneously. Patient also reports some cocaine use during his New Year celebration. Lastly he requests a prescription for viagra.       Past Medical History:   Diagnosis Date    Diabetes mellitus     Diabetes mellitus type I     Hyperlipidemia     Hypertension        Past Surgical History:   Procedure Laterality Date    CIRCUMCISION      WISDOM TOOTH EXTRACTION         Family History   Problem Relation Age of Onset    Cancer Mother     Hypertension Mother     Cataracts Mother     Hypertension Father     Diabetes Father     Diabetes Brother     Amblyopia  "Neg Hx     Blindness Neg Hx     Glaucoma Neg Hx     Macular degeneration Neg Hx     Retinal detachment Neg Hx     Strabismus Neg Hx     Stroke Neg Hx     Thyroid disease Neg Hx        Social History     Socioeconomic History    Marital status: Single     Spouse name: None    Number of children: 1    Years of education: None    Highest education level: None   Social Needs    Financial resource strain: None    Food insecurity - worry: None    Food insecurity - inability: None    Transportation needs - medical: None    Transportation needs - non-medical: None   Occupational History     Employer: [employer]   Tobacco Use    Smoking status: Never Smoker   Substance and Sexual Activity    Alcohol use: No    Drug use: Yes     Types: "Crack" cocaine     Comment: in remission     Sexual activity: Yes     Partners: Female   Other Topics Concern    None   Social History Narrative    None       Review of Systems   Constitutional: Positive for fatigue. Negative for activity change and appetite change.   Respiratory: Negative for cough, chest tightness, shortness of breath and wheezing.    Cardiovascular: Negative for chest pain, palpitations and leg swelling.   Gastrointestinal: Negative for abdominal pain, constipation, diarrhea, nausea and vomiting.   Genitourinary:        Requests viagra   Musculoskeletal: Negative for arthralgias, back pain and myalgias.   Skin: Positive for rash (small white bumps at the back of head). Negative for color change and pallor.   Neurological: Positive for headaches. Negative for dizziness and weakness.          Medication List           Accurate as of 1/9/19  9:26 AM. If you have any questions, ask your nurse or doctor.               START taking these medications    hydrALAZINE 10 MG tablet  Commonly known as:  APRESOLINE  Take 1 tablet (10 mg total) by mouth 3 (three) times daily.  Started by:  Janak Wilson MD     sildenafil 100 MG tablet  Commonly known as:  " "VIAGRA  Take 1 tablet (100 mg total) by mouth daily as needed for Erectile Dysfunction.  Replaces:  sildenafil 20 mg Tab  Started by:  Janak Wilson MD        CONTINUE taking these medications    amLODIPine 10 MG tablet  Commonly known as:  NORVASC  Take 1 tablet (10 mg total) by mouth once daily.     atorvastatin 40 MG tablet  Commonly known as:  LIPITOR  Take 1 tablet (40 mg total) by mouth once daily.     BASAGLAR KWIKPEN U-100 INSULIN glargine 100 units/mL (3mL) SubQ pen  Generic drug:  insulin  Inject 50 Units into the skin every evening.     * blood sugar diagnostic Strp  1 each by Misc.(Non-Drug; Combo Route) route 4 (four) times daily.     * blood sugar diagnostic Strp  One Touch Verio-- use to check blood glucose 4 times a day     blood-glucose meter Misc  Use to check blood glucose 4 times a day     cetirizine 10 MG tablet  Commonly known as:  ZYRTEC  Take 1 tablet (10 mg total) by mouth every morning.     chlorthalidone 25 MG Tab  Commonly known as:  HYGROTEN  Take 1 tablet (25 mg total) by mouth every morning. Stop hydrochlorathiazide 25mg.     fluticasone 50 mcg/actuation nasal spray  Commonly known as:  FLONASE  1 spray (50 mcg total) by Each Nare route 2 (two) times daily as needed.     insulin aspart U-100 100 unit/mL Inpn pen  Commonly known as:  NovoLOG Flexpen U-100 Insulin  INJECT 20 UNITS UNDER THE SKIN WITH MEALS     ketoconazole 2 % shampoo  Commonly known as:  NIZORAL  Apply topically once daily.     lancets Misc  One Touch Verio Use to check glucose 4 times a day     lisinopril 40 MG tablet  Commonly known as:  PRINIVIL,ZESTRIL  Take 1 tablet (40 mg total) by mouth once daily.     metoprolol succinate 50 MG 24 hr tablet  Commonly known as:  TOPROL-XL  Take 1 tablet (50 mg total) by mouth once daily.     pen needle, diabetic 31 gauge x 3/16" Ndle  Commonly known as:  ADVOCATE PEN NEEDLE  Pt uses 4 times a day. Pt needs longer needle than bd ultra fine.     pen needle, diabetic 32 gauge x " "5/32" Ndle  Commonly known as:  BD ULTRA-FINE ALEJANDRO PEN NEEDLE  Pt injects insulin 1 time daily.     triamcinolone acetonide 0.1% 0.1 % ointment  Commonly known as:  KENALOG  Apply topically 2 (two) times daily.         * This list has 2 medication(s) that are the same as other medications prescribed for you. Read the directions carefully, and ask your doctor or other care provider to review them with you.            STOP taking these medications    sildenafil 20 mg Tab  Commonly known as:  REVATIO  Replaced by:  sildenafil 100 MG tablet  Stopped by:  Janak Wilson MD           Where to Get Your Medications      These medications were sent to [facility]/pharmacy #[id number] - [place], [place] - [address]  [address] [place] [address]    Phone:  [phone]   · hydrALAZINE 10 MG tablet  · sildenafil 100 MG tablet         Patient Active Problem List    Diagnosis Date Noted    CKD (chronic kidney disease) stage 3, GFR 30-59 ml/min 06/22/2018    Obesity (BMI 30-39.9) 04/07/2016    Type 1 diabetes mellitus with moderate nonproliferative diabetic retinopathy and without macular edema 04/05/2016    Drug abuse, cocaine type 03/17/2016    Essential hypertension 08/29/2014    Microalbuminuria 07/07/2014    Type 1 diabetes mellitus with nephropathy     Hyperlipidemia            Objective:      BP (!) 150/90 (BP Location: Right arm, Patient Position: Sitting, BP Method: Large (Manual))   Pulse (!) 52   Ht 5' 11" (1.803 m)   Wt 103.9 kg (229 lb 0.9 oz)   SpO2 98%   BMI 31.95 kg/m²   Estimated body mass index is 31.95 kg/m² as calculated from the following:    Height as of this encounter: 5' 11" (1.803 m).    Weight as of this encounter: 103.9 kg (229 lb 0.9 oz).    Physical Exam   Constitutional: He is oriented to person, place, and time. He appears well-developed and well-nourished. No distress.   HENT:   Head: Normocephalic and atraumatic.   Eyes: Conjunctivae and EOM are normal. Pupils are equal, round, and reactive to " light.   Neck: Normal range of motion. Neck supple.   Cardiovascular: Normal rate, regular rhythm and normal heart sounds.   Pulmonary/Chest: Effort normal and breath sounds normal.   Abdominal: Soft. Bowel sounds are normal.   Musculoskeletal: Normal range of motion. He exhibits no edema or tenderness.   Neurological: He is alert and oriented to person, place, and time.   Skin: Skin is warm and dry. Rash (small white bumps at back of head. Some dry skin) noted.   Psychiatric: He has a normal mood and affect. His behavior is normal.   Vitals reviewed.      Assessment:         1. Type 1 diabetes mellitus with moderate nonproliferative retinopathy without macular edema, unspecified laterality  Comprehensive metabolic panel    Hemoglobin A1c   2. Essential hypertension     3. Hyperlipidemia, unspecified hyperlipidemia type  Lipid panel   4. Drug abuse, cocaine type     5. CKD (chronic kidney disease) stage 3, GFR 30-59 ml/min           Plan:         1. Type 1 diabetes mellitus  - Patient has uncontrolled diabetes mellitus with a previous hemoglobin A1c of 10.1  - He reports taking his insulin as instructed  - Will get CMP and HA1c today    2. Essential Hypertension  - Patient has elevated blood pressure of 150/90 automatic reading and 160/90 manual reading.   - He reports being compliant with his HTN medication however he has not used his blood pressure cuff at home  - Started patient on hydralazine 10 mg TID     3. Hyperlipidemia  - Currently taking atorvastatin 40 mg daily  - Lipid panel today    4. Drug abuse; cocaine    5. CKD stage III  - Patient's last creatinine clearance was 42  - Currently on lisinopril 40 mg  - Will get CMP today      Orders Placed This Encounter   Procedures    Lipid panel     Standing Status:   Future     Number of Occurrences:   1     Standing Expiration Date:   3/9/2020    Comprehensive metabolic panel     Standing Status:   Future     Number of Occurrences:   1     Standing Expiration  Date:   3/9/2020    Hemoglobin A1c     Standing Status:   Future     Number of Occurrences:   1     Standing Expiration Date:   3/9/2020             Patient seen and plan of care discussed with Dr. Katie Swanson  Internal Medicine, PGY-1  [phone]

## 2019-01-09 NOTE — PROGRESS NOTES
Last 5 Patient Entered Readings                                      Current 30 Day Average: 166/97     Recent Readings 12/26/2018 12/26/2018 11/26/2018 11/26/2018 11/26/2018    SBP (mmHg) 166 179 151 175 190    DBP (mmHg) 96 98 78 97 101    Pulse 67 67 65 62 62          1/9: LVM.  Will call in 1 week.

## 2019-01-16 ENCOUNTER — PATIENT OUTREACH (OUTPATIENT)
Dept: OTHER | Facility: OTHER | Age: 49
End: 2019-01-16

## 2019-01-16 NOTE — LETTER
April 11, 2019     Luis Daniel Howell  [address] [place] [address]       Dear Luis Daniel,    We have made several attempts to encourage your participation in [facility]s [facility] Program. Unfortunately, we have been unsuccessful.     This is an official notice that you are no longer enrolled in the digital medicine program, and thus, we will no longer be managing your disease states. Please note this has no impact on your relationship with [facility] or your providers. Going forward, please reach out to your primary care provider with any questions or concerns regarding your health.    Please contact [phone] if you have any additional questions.    Sincerely,  The [facility] Digital [facility] Team

## 2019-01-16 NOTE — PROGRESS NOTES
Last 5 Patient Entered Readings                                      Current 30 Day Average: 165/98     Recent Readings 1/11/2019 1/11/2019 1/10/2019 12/26/2018 12/26/2018    SBP (mmHg) 139 165 190 166 179    DBP (mmHg) 85 101 109 96 98    Pulse 59 52 58 67 67          1/16: LVM.  Will call in 1 week.

## 2019-01-16 NOTE — PROGRESS NOTES
"I have personally taken the history and examined this patient and agree with the resident's note as stated above with the following thoughts:  BP (!) 160/90 (BP Location: Right arm)   Pulse (!) 52   Ht 5' 11" (1.803 m)   Wt 103.9 kg (229 lb 0.9 oz)   SpO2 98%   BMI 31.95 kg/m²     Discussed getting vaccines up to date.  Discussed importance of low fat diet and exercise     HGB A1c looks netter-- BP still too high- will start Hydralizine .  Renal function continues to deteriorate     "

## 2019-01-16 NOTE — LETTER
March 14, 2019     Luis Daniel Howell  [address] [place] [address]       Dear Luis Daniel,    Thank you for enrolling in [facility]. To participate, we ask that you submit information at least once weekly through your [facility] account and maintain regular contact with your Care Team. We have not received any data or heard from you in some time.     The Digital Medicine Care Team has attempted to reach you on multiple occasions to determine if you would like to continue participating in the program. While we encourage you to continue participating fully, we understand that circumstances may change.     To continue participating in the program, please contact me at [phone]. If we do not hear back, you will be un-enrolled, and your physician will be notified of your decision.    If you have submitted data and believe you are receiving this letter in error, please call the Digital Medicine Patient Support Line at [phone] for troubleshooting.      We look forward to hearing from you soon.    Sincerely,     Teja Camarena  [facility]   [phone]

## 2019-01-24 NOTE — PROGRESS NOTES
Last 5 Patient Entered Readings                                      Current 30 Day Average: 165/98     Recent Readings 1/11/2019 1/11/2019 1/10/2019 12/26/2018 12/26/2018    SBP (mmHg) 139 165 190 166 179    DBP (mmHg) 85 101 109 96 98    Pulse 59 52 58 67 67        1/24: LVM.  Sent MyChart.  Will call in 2 weeks.

## 2019-02-07 NOTE — PROGRESS NOTES
Last 5 Patient Entered Readings                                      Current 30 Day Average: 165/98     Recent Readings 1/11/2019 1/11/2019 1/10/2019 12/26/2018 12/26/2018    SBP (mmHg) 139 165 190 166 179    DBP (mmHg) 85 101 109 96 98    Pulse 59 52 58 67 67          2/7: LVM.  Will call in 1 week.  Reminded patient to send at least 2-3 readings/week.

## 2019-02-14 NOTE — PROGRESS NOTES
Last 5 Patient Entered Readings                                      Current 30 Day Average:      Recent Readings 1/11/2019 1/11/2019 1/10/2019 12/26/2018 12/26/2018    SBP (mmHg) 139 165 190 166 179    DBP (mmHg) 85 101 109 96 98    Pulse 59 52 58 67 67        Hypertension Medications             amLODIPine (NORVASC) 10 MG tablet Take 1 tablet (10 mg total) by mouth once daily.    chlorthalidone (HYGROTEN) 25 MG Tab Take 1 tablet (25 mg total) by mouth every morning. Stop hydrochlorathiazide 25mg.    hydrALAZINE (APRESOLINE) 10 MG tablet Take 1 tablet (10 mg total) by mouth 3 (three) times daily.    lisinopril (PRINIVIL,ZESTRIL) 40 MG tablet Take 1 tablet (40 mg total) by mouth once daily.    metoprolol succinate (TOPROL-XL) 50 MG 24 hr tablet Take 1 tablet (50 mg total) by mouth once daily.        No readings since 1/11. HC spoke to patient on today, needs IT assistance. Will continue to monitor. Will follow up in 1 month.

## 2019-02-14 NOTE — PROGRESS NOTES
"Last 5 Patient Entered Readings                                      Current 30 Day Average:      Recent Readings 1/11/2019 1/11/2019 1/10/2019 12/26/2018 12/26/2018    SBP (mmHg) 139 165 190 166 179    DBP (mmHg) 85 101 109 96 98    Pulse 59 52 58 67 67          2/14: Patient states he is not able to connect his phone to his BP monitor.  States when he gets home he will call so I can "guide him through it."  "

## 2019-02-21 NOTE — PROGRESS NOTES
Last 5 Patient Entered Readings                                      Current 30 Day Average:      Recent Readings 1/11/2019 1/11/2019 1/10/2019 12/26/2018 12/26/2018    SBP (mmHg) 139 165 190 166 179    DBP (mmHg) 85 101 109 96 98    Pulse 59 52 58 67 67          2/21: LVM.  Reminded patient to send readings.  Will call in 1 week.

## 2019-02-28 NOTE — PROGRESS NOTES
Last 5 Patient Entered Readings                                      Current 30 Day Average:      Recent Readings 1/11/2019 1/11/2019 1/10/2019 12/26/2018 12/26/2018    SBP (mmHg) 139 165 190 166 179    DBP (mmHg) 85 101 109 96 98    Pulse 59 52 58 67 67          2/28: LVM.  Sent MyChart.  Will call in 2 weeks.  Reminded patient to send at least 1 reading/week.

## 2019-03-13 NOTE — PROGRESS NOTES
Last 5 Patient Entered Readings                                      Current 30 Day Average:      Recent Readings 1/11/2019 1/11/2019 1/10/2019 12/26/2018 12/26/2018    SBP (mmHg) 139 165 190 166 179    DBP (mmHg) 85 101 109 96 98    Pulse 59 52 58 67 67        Hypertension Medications             amLODIPine (NORVASC) 10 MG tablet Take 1 tablet (10 mg total) by mouth once daily.    chlorthalidone (HYGROTEN) 25 MG Tab Take 1 tablet (25 mg total) by mouth every morning. Stop hydrochlorathiazide 25mg.    hydrALAZINE (APRESOLINE) 10 MG tablet Take 1 tablet (10 mg total) by mouth 3 (three) times daily.    lisinopril (PRINIVIL,ZESTRIL) 40 MG tablet Take 1 tablet (40 mg total) by mouth once daily.    metoprolol succinate (TOPROL-XL) 50 MG 24 hr tablet Take 1 tablet (50 mg total) by mouth once daily.        3/13- Still no readings since 1/11. Will request for HC to reach out to discuss lack of readings and participation. Will continue to monitor, will follow up in 6 weeks.     4/23- No readings since 1/11. HC has started discharge process. Will continue to monitor, will follow up in 6 weeks.

## 2019-03-13 NOTE — PROGRESS NOTES
Last 5 Patient Entered Readings                                      Current 30 Day Average:      Recent Readings 1/11/2019 1/11/2019 1/10/2019 12/26/2018 12/26/2018    SBP (mmHg) 139 165 190 166 179    DBP (mmHg) 85 101 109 96 98    Pulse 59 52 58 67 67          3/13: Patient states he has not been able to download apps to phone.  Reports confusion between [employer] and [employer].  Explained the difference of the two.  Patient states he needs to download MyChart.  States he will download it tonight.  Will follow up tomorrow.

## 2019-03-14 NOTE — PROGRESS NOTES
Last 5 Patient Entered Readings                                      Current 30 Day Average:      Recent Readings 1/11/2019 1/11/2019 1/10/2019 12/26/2018 12/26/2018    SBP (mmHg) 139 165 190 166 179    DBP (mmHg) 85 101 109 96 98    Pulse 59 52 58 67 67          3/14: LVM.  Sending non compliance letter.  No readings since 1/11/2019.

## 2019-03-28 ENCOUNTER — TELEPHONE (OUTPATIENT)
Dept: INTERNAL MEDICINE | Facility: CLINIC | Age: 49
End: 2019-03-28

## 2019-03-28 NOTE — PROGRESS NOTES
Last 5 Patient Entered Readings                                      Current 30 Day Average:      Recent Readings 1/11/2019 1/11/2019 1/10/2019 12/26/2018 12/26/2018    SBP (mmHg) 139 165 190 166 179    DBP (mmHg) 85 101 109 96 98    Pulse 59 52 58 67 67          3/28: LVM.  Messaging enrolling provider.  Will call in 2 weeks.  No BP readings since 1/11/2019

## 2019-03-28 NOTE — TELEPHONE ENCOUNTER
----- Message from Teja Camarena sent at 3/28/2019  8:32 AM CDT -----  Regarding: Patient Non Compliant with Digital Medicine Program  Dr. Wilson,     Your patient Mrs. Luis Daniel Howell  was enrolled in the [facility] Digital Medicine program on 9/25/2018. Unfortunately, we have been unable to maintain contact with him to continue monitoring, despite our best efforts.     If you still believe this patient is a good candidate for digital medicine, please reach out to him to encourage participation. If going forward we are unable to communicate with him, we will unfortunately have to discharge him from the program.    Please let me know if you have any questions.    Sincerely,  Teja Camarena, MPH  Health   [phone]

## 2019-04-11 NOTE — PROGRESS NOTES
Last 5 Patient Entered Readings                                      Current 30 Day Average:      Recent Readings 1/11/2019 1/11/2019 1/10/2019 12/26/2018 12/26/2018    SBP (mmHg) 139 165 190 166 179    DBP (mmHg) 85 101 109 96 98    Pulse 59 52 58 67 67          4/11: LVM. Sending discharge letter.  If no response in 2 weeks, will remove patient from [facility] registry.

## 2019-04-26 NOTE — PROGRESS NOTES
Last 5 Patient Entered Readings                                      Current 30 Day Average:      Recent Readings 1/11/2019 1/11/2019 1/10/2019 12/26/2018 12/26/2018    SBP (mmHg) 139 165 190 166 179    DBP (mmHg) 85 101 109 96 98    Pulse 59 52 58 67 67          4/26: No response from patient.  Messaging enrolling provider and [facility].

## 2019-05-13 DIAGNOSIS — E78.5 HYPERLIPIDEMIA, UNSPECIFIED HYPERLIPIDEMIA TYPE: ICD-10-CM

## 2019-05-15 RX ORDER — ATORVASTATIN CALCIUM 40 MG/1
TABLET, FILM COATED ORAL
Qty: 90 TABLET | Refills: 3 | Status: SHIPPED | OUTPATIENT
Start: 2019-05-15 | End: 2019-07-25 | Stop reason: SDUPTHER

## 2019-07-22 DIAGNOSIS — E11.9 TYPE 2 DIABETES MELLITUS WITHOUT COMPLICATION: ICD-10-CM

## 2019-07-22 PROBLEM — I10 MALIGNANT HYPERTENSION: Status: ACTIVE | Noted: 2019-07-22

## 2019-07-22 PROBLEM — R79.89 TROPONIN LEVEL ELEVATED: Status: ACTIVE | Noted: 2019-07-22

## 2019-07-23 PROBLEM — N18.30 STAGE 3 CHRONIC KIDNEY DISEASE: Status: ACTIVE | Noted: 2019-07-23

## 2019-07-23 PROBLEM — R79.89 ELEVATED TROPONIN: Status: ACTIVE | Noted: 2019-07-23

## 2019-07-23 PROBLEM — I16.1 HYPERTENSIVE EMERGENCY: Status: ACTIVE | Noted: 2019-07-22

## 2019-07-23 PROBLEM — R79.89 TROPONIN LEVEL ELEVATED: Status: ACTIVE | Noted: 2019-07-23

## 2019-07-25 DIAGNOSIS — E78.5 HYPERLIPIDEMIA, UNSPECIFIED HYPERLIPIDEMIA TYPE: ICD-10-CM

## 2019-07-25 RX ORDER — ATORVASTATIN CALCIUM 40 MG/1
40 TABLET, FILM COATED ORAL DAILY
Qty: 90 TABLET | Refills: 3 | Status: SHIPPED | OUTPATIENT
Start: 2019-07-25 | End: 2019-07-26 | Stop reason: SDUPTHER

## 2019-07-25 RX ORDER — ATORVASTATIN CALCIUM 40 MG/1
TABLET, FILM COATED ORAL
Qty: 90 TABLET | Refills: 3 | Status: SHIPPED | OUTPATIENT
Start: 2019-07-25 | End: 2019-07-25

## 2019-07-25 NOTE — TELEPHONE ENCOUNTER
----- Message from Oksana Sandy sent at 7/25/2019 10:47 AM CDT -----  Contact: [phone]  Type: Rx    Name of medication(s): Atorvastatin calcium 40mg    Is this a refill? New rx?  refill    Who prescribed medication?  Katie    Pharmacy Name, Phone, & Location:[facility]/pharmacy #[id number] - [place], [place]  [address]      Comments:   Please advise, thank you

## 2019-07-26 ENCOUNTER — OFFICE VISIT (OUTPATIENT)
Dept: INTERNAL MEDICINE | Facility: CLINIC | Age: 49
End: 2019-07-26
Payer: COMMERCIAL

## 2019-07-26 DIAGNOSIS — E78.5 HYPERLIPIDEMIA, UNSPECIFIED HYPERLIPIDEMIA TYPE: ICD-10-CM

## 2019-07-26 DIAGNOSIS — E10.3399: Chronic | ICD-10-CM

## 2019-07-26 DIAGNOSIS — F14.10 DRUG ABUSE, COCAINE TYPE: Primary | ICD-10-CM

## 2019-07-26 DIAGNOSIS — N18.30 STAGE 3 CHRONIC KIDNEY DISEASE: ICD-10-CM

## 2019-07-26 PROCEDURE — 3008F BODY MASS INDEX DOCD: CPT | Mod: CPTII,S$GLB,, | Performed by: INTERNAL MEDICINE

## 2019-07-26 PROCEDURE — 99999 PR PBB SHADOW E&M-EST. PATIENT-LVL IV: ICD-10-PCS | Mod: PBBFAC,,, | Performed by: INTERNAL MEDICINE

## 2019-07-26 PROCEDURE — 3080F PR MOST RECENT DIASTOLIC BLOOD PRESSURE >= 90 MM HG: ICD-10-PCS | Mod: CPTII,S$GLB,, | Performed by: INTERNAL MEDICINE

## 2019-07-26 PROCEDURE — 3046F PR MOST RECENT HEMOGLOBIN A1C LEVEL > 9.0%: ICD-10-PCS | Mod: CPTII,S$GLB,, | Performed by: INTERNAL MEDICINE

## 2019-07-26 PROCEDURE — 3077F SYST BP >= 140 MM HG: CPT | Mod: CPTII,S$GLB,, | Performed by: INTERNAL MEDICINE

## 2019-07-26 PROCEDURE — 3080F DIAST BP >= 90 MM HG: CPT | Mod: CPTII,S$GLB,, | Performed by: INTERNAL MEDICINE

## 2019-07-26 PROCEDURE — 99999 PR PBB SHADOW E&M-EST. PATIENT-LVL IV: CPT | Mod: PBBFAC,,, | Performed by: INTERNAL MEDICINE

## 2019-07-26 PROCEDURE — 3046F HEMOGLOBIN A1C LEVEL >9.0%: CPT | Mod: CPTII,S$GLB,, | Performed by: INTERNAL MEDICINE

## 2019-07-26 PROCEDURE — 99214 PR OFFICE/OUTPT VISIT, EST, LEVL IV, 30-39 MIN: ICD-10-PCS | Mod: S$GLB,,, | Performed by: INTERNAL MEDICINE

## 2019-07-26 PROCEDURE — 3008F PR BODY MASS INDEX (BMI) DOCUMENTED: ICD-10-PCS | Mod: CPTII,S$GLB,, | Performed by: INTERNAL MEDICINE

## 2019-07-26 PROCEDURE — 99214 OFFICE O/P EST MOD 30 MIN: CPT | Mod: S$GLB,,, | Performed by: INTERNAL MEDICINE

## 2019-07-26 PROCEDURE — 3077F PR MOST RECENT SYSTOLIC BLOOD PRESSURE >= 140 MM HG: ICD-10-PCS | Mod: CPTII,S$GLB,, | Performed by: INTERNAL MEDICINE

## 2019-07-26 RX ORDER — HYDRALAZINE HYDROCHLORIDE 100 MG/1
100 TABLET, FILM COATED ORAL EVERY 8 HOURS
Qty: 90 TABLET | Refills: 0 | Status: SHIPPED | OUTPATIENT
Start: 2019-07-26 | End: 2019-10-16

## 2019-07-26 RX ORDER — AMLODIPINE BESYLATE 10 MG/1
10 TABLET ORAL DAILY
Qty: 90 TABLET | Refills: 3 | Status: SHIPPED | OUTPATIENT
Start: 2019-07-26 | End: 2020-06-25 | Stop reason: SDUPTHER

## 2019-07-26 RX ORDER — INSULIN GLARGINE 100 [IU]/ML
45 INJECTION, SOLUTION SUBCUTANEOUS NIGHTLY
Qty: 3 BOX | Refills: 3
Start: 2019-07-26 | End: 2021-04-23 | Stop reason: SDUPTHER

## 2019-07-26 RX ORDER — ATORVASTATIN CALCIUM 40 MG/1
40 TABLET, FILM COATED ORAL DAILY
Qty: 90 TABLET | Refills: 3 | Status: SHIPPED | OUTPATIENT
Start: 2019-07-26 | End: 2020-06-25 | Stop reason: SDUPTHER

## 2019-07-26 RX ORDER — LABETALOL 300 MG/1
300 TABLET, FILM COATED ORAL 2 TIMES DAILY
Qty: 180 TABLET | Refills: 3 | Status: SHIPPED | OUTPATIENT
Start: 2019-07-26 | End: 2020-06-25 | Stop reason: SDUPTHER

## 2019-07-26 NOTE — MEDICAL/APP STUDENT
"Subjective:       Patient ID: Luis Daniel Howell is a 49 y.o. male.    Chief Complaint: Hospital Follow Up    The patient is a 48 y/o male with HTN, HLD, DM1, CKD stage 3, and cocaine abuse who presents to the clinic for a hospital f/u.  He was admitted to the hospital 5 days ago on 7/22/19 for chest pain, SOB, headache, and FARLEY, and had a BP of 221/127.  He had a 2 day ICU stay for a hypertensive emergency.  The patient admits to medication noncompliance and cocaine use which prompted this hospital admission.  There were mild elevations in his troponins and lateral ST and T wave changes on his EKG consistent with ischemic changes.  The patient was discharged from the ICU 2 days ago on a new antihypertensive regimen including labetalol 300 mg daily and hydralazine 100 mg tid.  He has felt "run down" since his d/c, but has not had CP, SOB, FARLEY, palpitations, light-headedness, dizziness, or syncope.    Review of Systems   Constitutional: Positive for fatigue. Negative for diaphoresis and fever.   HENT: Negative for congestion, rhinorrhea and trouble swallowing.    Eyes: Negative for photophobia and visual disturbance.   Respiratory: Negative for cough, choking, chest tightness, shortness of breath and wheezing.    Cardiovascular: Negative for chest pain, palpitations and leg swelling.   Gastrointestinal: Negative for abdominal distention, blood in stool, constipation, diarrhea, nausea and vomiting.   Endocrine: Negative for cold intolerance and heat intolerance.   Genitourinary: Negative for difficulty urinating, flank pain and frequency.   Musculoskeletal: Negative for arthralgias and joint swelling.   Skin: Negative for color change, rash and wound.   Allergic/Immunologic: Negative.    Neurological: Negative for dizziness, seizures, syncope, weakness, light-headedness, numbness and headaches.   Hematological: Negative for adenopathy.   Psychiatric/Behavioral: Negative.        Past Medical History:   Diagnosis Date    " "Diabetes mellitus     Diabetes mellitus type I     Hyperlipidemia     Hypertension      Objective:     BP (!) 156/90 (BP Location: Left arm, Patient Position: Sitting, BP Method: Large (Manual))   Pulse 68   Ht 5' 11" (1.803 m)   Wt 108.6 kg (239 lb 6.7 oz)   SpO2 98%   BMI 33.39 kg/m²   Physical Exam   Constitutional: He is oriented to person, place, and time. He appears well-developed and well-nourished. No distress.   HENT:   Head: Normocephalic and atraumatic.   Mouth/Throat: Oropharynx is clear and moist. No oropharyngeal exudate.   Eyes: Pupils are equal, round, and reactive to light. Conjunctivae are normal. Right eye exhibits no discharge. Left eye exhibits no discharge. No scleral icterus.   Neck: Normal range of motion. Neck supple. No JVD present.   Cardiovascular: Normal rate, regular rhythm, normal heart sounds and intact distal pulses. Exam reveals no gallop and no friction rub.   No murmur heard.  Pulses:       Dorsalis pedis pulses are 2+ on the right side, and 2+ on the left side.        Posterior tibial pulses are 2+ on the right side, and 2+ on the left side.   Pulmonary/Chest: Effort normal and breath sounds normal. No stridor. No respiratory distress. He has no wheezes. He has no rales. He exhibits no tenderness.   Abdominal: Soft. Bowel sounds are normal. He exhibits no distension and no mass. There is no tenderness.   Musculoskeletal: Normal range of motion. He exhibits no edema, tenderness or deformity.        Right foot: There is normal range of motion and no deformity.        Left foot: There is normal range of motion and no deformity.   Feet:   Right Foot:   Protective Sensation: 3 sites tested. 3 sites sensed.   Skin Integrity: Negative for ulcer, blister, skin breakdown, erythema, warmth, callus or dry skin.   Left Foot:   Protective Sensation: 3 sites tested. 3 sites sensed.   Skin Integrity: Negative for ulcer, blister, skin breakdown, erythema, warmth, callus or dry skin. "   Lymphadenopathy:     He has no cervical adenopathy.   Neurological: He is alert and oriented to person, place, and time. No sensory deficit. Coordination normal.   Skin: Skin is warm and dry. He is not diaphoretic.   Psychiatric: He has a normal mood and affect. His behavior is normal. Judgment and thought content normal.       Assessment:       1. Drug abuse, cocaine type    2. DM (diabetes mellitus) type I uncontrolled with renal manifestation    3. Stage 3 chronic kidney disease    4. Type 1 diabetes mellitus with moderate nonproliferative retinopathy without macular edema, unspecified laterality    5. Hyperlipidemia, unspecified hyperlipidemia type      6. Hypertension    Plan:   Luis Daniel was seen today for hospital follow up.    Diagnoses and all orders for this visit:    Drug abuse, cocaine type   -counseled to abstain from cocaine use; patient agreed    DM (diabetes mellitus) type I uncontrolled with renal manifestation  -     Hemoglobin A1c; Future  -     Comprehensive metabolic panel; Future   - insulin glargine 45 units every evening and insulin aspart 10-14 units after meals  Stage 3 chronic kidney disease    Type 1 diabetes mellitus with moderate nonproliferative retinopathy without macular edema, unspecified laterality   -continue insuliun glargine 45 units every evening and insulin aspart 10-14 units after meals    Hyperlipidemia, unspecified hyperlipidemia type  -     Lipid panel; Future  -     atorvastatin (LIPITOR) 40 MG tablet; Take 1 tablet (40 mg total) by mouth once daily.    Hypertension  - amlodipine 10 mg once daily  -labetalol 300 mg once daily  -hydralazine 100 mg 3x daily    Other orders  -     BASAGLAR KWIKPEN U-100 INSULIN glargine 100 units/mL (3mL) SubQ pen; Inject 45 Units into the skin every evening.  -     amLODIPine (NORVASC) 10 MG tablet; Take 1 tablet (10 mg total) by mouth once daily.  -     hydrALAZINE (APRESOLINE) 100 MG tablet; Take 1 tablet (100 mg total) by mouth every 8  (eight) hours.  -     labetalol (NORMODYNE) 300 MG tablet; Take 1 tablet (300 mg total) by mouth 2 (two) times daily.    RTC in 1 month for HTN f/u.

## 2019-07-26 NOTE — PROGRESS NOTES
"Subjective:       Patient ID: Luis Daniel Howell is a 49 y.o. male.    Chief Complaint: Hospital Follow Up    The patient is a 50 y/o male with HTN, HLD, DM1, CKD stage 3, and cocaine abuse who presents to the clinic for a hospital f/u.  He was admitted to the hospital 5 days ago on 7/22/19 for chest pain, SOB, headache, and FARLEY, and had a BP of 221/127.  He had a 2 day ICU stay for a hypertensive emergency.  The patient admits to medication noncompliance and cocaine use which prompted this hospital admission.  There were mild elevations in his troponins and lateral ST and T wave changes on his EKG consistent with ischemic changes.  The patient was discharged from the ICU 2 days ago on a new antihypertensive regimen including labetalol 300 mg daily and hydralazine 100 mg tid.  He has felt "run down" since his d/c, but has not had CP, SOB, FARLEY, palpitations, light-headedness, dizziness, or syncope.    Review of Systems   Constitutional: Positive for fatigue. Negative for diaphoresis and fever.   HENT: Negative for congestion, rhinorrhea and trouble swallowing.    Eyes: Negative for photophobia and visual disturbance.   Respiratory: Negative for cough, choking, chest tightness, shortness of breath and wheezing.    Cardiovascular: Negative for chest pain, palpitations and leg swelling.   Gastrointestinal: Negative for abdominal distention, blood in stool, constipation, diarrhea, nausea and vomiting.   Endocrine: Negative for cold intolerance and heat intolerance.   Genitourinary: Negative for difficulty urinating, flank pain and frequency.   Musculoskeletal: Negative for arthralgias and joint swelling.   Skin: Negative for color change, rash and wound.   Allergic/Immunologic: Negative.    Neurological: Negative for dizziness, seizures, syncope, weakness, light-headedness, numbness and headaches.   Hematological: Negative for adenopathy.   Psychiatric/Behavioral: Negative.        Past Medical History:   Diagnosis Date    " "Diabetes mellitus     Diabetes mellitus type I     Hyperlipidemia     Hypertension      Objective:     BP (!) 156/90 (BP Location: Left arm, Patient Position: Sitting, BP Method: Large (Manual))   Pulse 68   Ht 5' 11" (1.803 m)   Wt 108.6 kg (239 lb 6.7 oz)   SpO2 98%   BMI 33.39 kg/m²   Physical Exam   Constitutional: He is oriented to person, place, and time. He appears well-developed and well-nourished. No distress.   HENT:   Head: Normocephalic and atraumatic.   Mouth/Throat: Oropharynx is clear and moist. No oropharyngeal exudate.   Eyes: Pupils are equal, round, and reactive to light. Conjunctivae are normal. Right eye exhibits no discharge. Left eye exhibits no discharge. No scleral icterus.   Neck: Normal range of motion. Neck supple. No JVD present.   Cardiovascular: Normal rate, regular rhythm, normal heart sounds and intact distal pulses. Exam reveals no gallop and no friction rub.   No murmur heard.  Pulses:       Dorsalis pedis pulses are 2+ on the right side, and 2+ on the left side.        Posterior tibial pulses are 2+ on the right side, and 2+ on the left side.   Pulmonary/Chest: Effort normal and breath sounds normal. No stridor. No respiratory distress. He has no wheezes. He has no rales. He exhibits no tenderness.   Abdominal: Soft. Bowel sounds are normal. He exhibits no distension and no mass. There is no tenderness.   Musculoskeletal: Normal range of motion. He exhibits no edema, tenderness or deformity.        Right foot: There is normal range of motion and no deformity.        Left foot: There is normal range of motion and no deformity.   Feet:   Right Foot:   Protective Sensation: 3 sites tested. 3 sites sensed.   Skin Integrity: Negative for ulcer, blister, skin breakdown, erythema, warmth, callus or dry skin.   Left Foot:   Protective Sensation: 3 sites tested. 3 sites sensed.   Skin Integrity: Negative for ulcer, blister, skin breakdown, erythema, warmth, callus or dry skin. "   Lymphadenopathy:     He has no cervical adenopathy.   Neurological: He is alert and oriented to person, place, and time. No sensory deficit. Coordination normal.   Skin: Skin is warm and dry. He is not diaphoretic.   Psychiatric: He has a normal mood and affect. His behavior is normal. Judgment and thought content normal.       Assessment:       1. Drug abuse, cocaine type    2. DM (diabetes mellitus) type I uncontrolled with renal manifestation    3. Stage 3 chronic kidney disease    4. Type 1 diabetes mellitus with moderate nonproliferative retinopathy without macular edema, unspecified laterality    5. Hyperlipidemia, unspecified hyperlipidemia type      6. Hypertension    Plan:   Luis Daniel was seen today for hospital follow up.    Diagnoses and all orders for this visit:    Drug abuse, cocaine type   -counseled to abstain from cocaine use; patient agreed    DM (diabetes mellitus) type I uncontrolled with renal manifestation  -     Hemoglobin A1c; Future  -     Comprehensive metabolic panel; Future   - insulin glargine 45 units every evening and insulin aspart 10-14 units after meals  Stage 3 chronic kidney disease    Type 1 diabetes mellitus with moderate nonproliferative retinopathy without macular edema, unspecified laterality   -continue insuliun glargine 45 units every evening and insulin aspart 10-14 units after meals    Hyperlipidemia, unspecified hyperlipidemia type  -     Lipid panel; Future  -     atorvastatin (LIPITOR) 40 MG tablet; Take 1 tablet (40 mg total) by mouth once daily.    Hypertension  - amlodipine 10 mg once daily  -labetalol 300 mg once daily  -hydralazine 100 mg 3x daily    Other orders  -     BASAGLAR KWIKPEN U-100 INSULIN glargine 100 units/mL (3mL) SubQ pen; Inject 45 Units into the skin every evening.  -     amLODIPine (NORVASC) 10 MG tablet; Take 1 tablet (10 mg total) by mouth once daily.  -     hydrALAZINE (APRESOLINE) 100 MG tablet; Take 1 tablet (100 mg total) by mouth every 8  (eight) hours.  -     labetalol (NORMODYNE) 300 MG tablet; Take 1 tablet (300 mg total) by mouth 2 (two) times daily.    RTC in 1 month for HTN f/u.

## 2019-07-27 VITALS
OXYGEN SATURATION: 98 % | DIASTOLIC BLOOD PRESSURE: 92 MMHG | SYSTOLIC BLOOD PRESSURE: 155 MMHG | WEIGHT: 239.44 LBS | BODY MASS INDEX: 33.52 KG/M2 | HEART RATE: 68 BPM | HEIGHT: 71 IN

## 2019-07-28 NOTE — PROGRESS NOTES
HISTORY OF PRESENT ILLNESS:  He is a 49-year-old gentleman with CKD,   hypertension, diabetes mellitus, hyperlipidemia and cocaine abuse history,   coming in for hospital followup.  He was in the hospital on 07/22/2019 for chest   pain and hypertensive emergency.  He had two day ICU stay for the hypertensive   emergency.  He admits noncompliance with his medications as one problem, also   cocaine use.  He had an elevated troponin with EKG changes, but I do not want to   do a left heart catheterization due to his acute on chronic renal failure.  He   was discharged on labetalol 300 mg twice daily, hydralazine 100 mg three times a   day.  He says he has felt run down.  He denies any chest pain, shortness of   breath, palpitations, nausea or vomiting.  He is off cocaine again.  He says he   is doing okay otherwise.  Blood pressure today unfortunately is still elevated.    On first check it is 156/90, on second check it is still 155/92.  He denies any   trouble with noncompliance since the Emergency Room visit.  During his   hospitalization, troponins came back as 0.12 and it is trending down.    Hemoglobin A1c was 9.7 and his creatinine, which usually runs about 2.2 with a   GFR above 30 worsened.  Usually ____ clearance is right at 32.3 with a BUN of   23.  He does have followup with Cardiology.  He denies any other problems today.    PHYSICAL EXAMINATION:  VITAL SIGNS:  Blood pressure is still elevated as above.  GENERAL:  He is a well-appearing 49-year-old -American gentleman in no   acute distress ____ no acute distress.  NECK:  Supple.  CHEST:  Clear without wheeze.  CARDIOVASCULAR:  S1 and S2, regular rate and rhythm without murmur, gallop or   rub.  ABDOMEN:  Soft, nontender, no hepatosplenomegaly, no guarding or rebound   tenderness.  LOWER EXTREMITIES:  No edema.    ASSESSMENT:  Cocaine abuse, chronic.  He has been in rehab several times,   diabetes mellitus type 1, uncontrolled with worsening renal  manifestations,   stage III CKD, approaching stage IV CKD, hyperlipidemia and hypertension.    PLAN:  Today is that we are going to restart amlodipine 10 mg a day and bring   his Basaglar back to 45 units a day and use the NovoLog as scheduled.  We   discussed cocaine abuse.  We need to stop the cocaine.  He understands.  I just   had some morena discussion today, he is 49 years old, approaching stage IV CKD   and that we need to start talking about dialysis, talked with Nephrology.  He   needs to take his medicine appropriately and he needs to avoid cocaine.  If   there is anything we can do to help him with that, he needs to let us know.  He   has been through rehab several times.  He does not think he needs to go to   outpatient rehab at the current time.  We discussed appropriate use of   medication and he says that he has not been taking his medication regularly.  We   discussed the risks for him including heart attack, stroke and renal failure as   possible complications of not taking his medicines appropriately.  We will see   him back again in one month to recheck his blood pressure.      MELVA/IN  dd: 07/27/2019 20:20:38 (CDT)  td: 07/27/2019 23:38:37 (CDT)  Doc ID   #[id number]  Job ID #[id number]    CC:

## 2019-07-31 ENCOUNTER — TELEPHONE (OUTPATIENT)
Dept: INTERNAL MEDICINE | Facility: CLINIC | Age: 49
End: 2019-07-31

## 2019-07-31 ENCOUNTER — NURSE TRIAGE (OUTPATIENT)
Dept: ADMINISTRATIVE | Facility: CLINIC | Age: 49
End: 2019-07-31

## 2019-07-31 NOTE — TELEPHONE ENCOUNTER
----- Message from Margarita Tai sent at 7/31/2019  9:54 AM CDT -----  Contact: [phone]  Patient would like to get medical advice.  Symptoms (please be specific): fluid on his legs   How long has patient had these symptoms: 5 days    Pharmacy name and phone # (copy/paste from chart):  [facility] #[id number] - [place] - [address]   [phone] (Phone)  [phone] (Fax)  Would the patient rather a call back or a response via [facility]?: call back   Comments: please advise, thanks

## 2019-07-31 NOTE — TELEPHONE ENCOUNTER
Patient was seen on Friday for HTN. On Monday, patient stated that he was dx with pneumonia. Patient states that he is SOB, has edema of the legs, and chest tightness (intermittent)- lasts for all night. Patient advised to go to the Emergency Department. Patient verbalized understanding.     Reason for Disposition   Chest pain lasting longer than 5 minutes and ANY of the following:* Over 50 years old* Over 30 years old and at least one cardiac risk factor (i.e., high blood pressure, diabetes, high cholesterol, obesity, smoker or strong family history of heart disease)* Pain is crushing, pressure-like, or heavy * Took nitroglycerin and chest pain was not relieved* History of heart disease (i.e., angina, heart attack, bypass surgery, angioplasty, CHF)    Protocols used: CHEST PAIN-A-OH

## 2019-08-09 PROBLEM — I51.89 GRADE I DIASTOLIC DYSFUNCTION: Status: ACTIVE | Noted: 2019-08-09

## 2019-08-09 PROBLEM — R06.09 DYSPNEA ON EXERTION: Status: ACTIVE | Noted: 2019-08-09

## 2019-10-16 ENCOUNTER — OFFICE VISIT (OUTPATIENT)
Dept: INTERNAL MEDICINE | Facility: CLINIC | Age: 49
End: 2019-10-16
Payer: COMMERCIAL

## 2019-10-16 VITALS
BODY MASS INDEX: 34.6 KG/M2 | OXYGEN SATURATION: 98 % | WEIGHT: 247.13 LBS | HEIGHT: 71 IN | DIASTOLIC BLOOD PRESSURE: 84 MMHG | HEART RATE: 67 BPM | SYSTOLIC BLOOD PRESSURE: 150 MMHG

## 2019-10-16 DIAGNOSIS — F14.10 DRUG ABUSE, COCAINE TYPE: ICD-10-CM

## 2019-10-16 DIAGNOSIS — N18.4 CKD (CHRONIC KIDNEY DISEASE) STAGE 4, GFR 15-29 ML/MIN: ICD-10-CM

## 2019-10-16 PROCEDURE — 3008F BODY MASS INDEX DOCD: CPT | Mod: CPTII,S$GLB,, | Performed by: INTERNAL MEDICINE

## 2019-10-16 PROCEDURE — 3046F PR MOST RECENT HEMOGLOBIN A1C LEVEL > 9.0%: ICD-10-PCS | Mod: CPTII,S$GLB,, | Performed by: INTERNAL MEDICINE

## 2019-10-16 PROCEDURE — 3008F PR BODY MASS INDEX (BMI) DOCUMENTED: ICD-10-PCS | Mod: CPTII,S$GLB,, | Performed by: INTERNAL MEDICINE

## 2019-10-16 PROCEDURE — 99999 PR PBB SHADOW E&M-EST. PATIENT-LVL IV: CPT | Mod: PBBFAC,,, | Performed by: INTERNAL MEDICINE

## 2019-10-16 PROCEDURE — 3077F SYST BP >= 140 MM HG: CPT | Mod: CPTII,S$GLB,, | Performed by: INTERNAL MEDICINE

## 2019-10-16 PROCEDURE — 3046F HEMOGLOBIN A1C LEVEL >9.0%: CPT | Mod: CPTII,S$GLB,, | Performed by: INTERNAL MEDICINE

## 2019-10-16 PROCEDURE — 3077F PR MOST RECENT SYSTOLIC BLOOD PRESSURE >= 140 MM HG: ICD-10-PCS | Mod: CPTII,S$GLB,, | Performed by: INTERNAL MEDICINE

## 2019-10-16 PROCEDURE — 99999 PR PBB SHADOW E&M-EST. PATIENT-LVL IV: ICD-10-PCS | Mod: PBBFAC,,, | Performed by: INTERNAL MEDICINE

## 2019-10-16 PROCEDURE — 3079F PR MOST RECENT DIASTOLIC BLOOD PRESSURE 80-89 MM HG: ICD-10-PCS | Mod: CPTII,S$GLB,, | Performed by: INTERNAL MEDICINE

## 2019-10-16 PROCEDURE — 99214 PR OFFICE/OUTPT VISIT, EST, LEVL IV, 30-39 MIN: ICD-10-PCS | Mod: S$GLB,,, | Performed by: INTERNAL MEDICINE

## 2019-10-16 PROCEDURE — 3079F DIAST BP 80-89 MM HG: CPT | Mod: CPTII,S$GLB,, | Performed by: INTERNAL MEDICINE

## 2019-10-16 PROCEDURE — 99214 OFFICE O/P EST MOD 30 MIN: CPT | Mod: S$GLB,,, | Performed by: INTERNAL MEDICINE

## 2019-10-16 RX ORDER — HYDRALAZINE HYDROCHLORIDE 100 MG/1
100 TABLET, FILM COATED ORAL EVERY 8 HOURS
Qty: 270 TABLET | Refills: 3 | OUTPATIENT
Start: 2019-10-16 | End: 2020-10-07

## 2019-10-17 PROBLEM — N18.30 CKD (CHRONIC KIDNEY DISEASE) STAGE 3, GFR 30-59 ML/MIN: Status: RESOLVED | Noted: 2018-06-22 | Resolved: 2019-10-17

## 2019-10-18 NOTE — PROGRESS NOTES
"He is a 49-year-old gentleman with CKD,   hypertension, diabetes mellitus, hyperlipidemia and cocaine abuse history,   coming in for follow-up after rehab.  He spent 30 days in rehab after being caught when he  underwent drug check by his work..  He had cocaine system again.  This is the 3rd time he has been in a rehab.   He was in the hospital on 07/22/2019 for chest   pain and hypertensive emergency.  He had two day ICU stay for the hypertensive   emergency.  He admits noncompliance with his medications as one problem, also   cocaine use.   He has acute kidney injury in September current he seeing Dr. Leighann Wesley in [facility].  She started him on Procrit that he has not picked up yet.  He has anemia secondary to significant CKD.  This also seeing Endocrinology for his diabetes.  He has been insulin more regularly.  He was cut down from 25 units a day to units a day and she but he has not done this.  He is still taking hte 25 units a day.  His glucoses  are in 70s.  Recent GFR was 26.      PHYSICAL EXAMINATION: BP (!) 150/84 (BP Location: Left arm, Patient Position: Sitting, BP Method: Large (Manual))   Pulse 67   Ht 5' 11" (1.803 m)   Wt 112.1 kg (247 lb 2.2 oz)   SpO2 98%   BMI 34.47 kg/m²     VITAL SIGNS:  Blood pressure is still elevated as above.  GENERAL:  He is a well-appearing 49-year-old -American gentleman in no   acute distress  no acute distress.  NECK:  Supple.  CHEST:  Clear without wheeze.  CARDIOVASCULAR:  S1 and S2, regular rate and rhythm without murmur, gallop or   rub.  ABDOMEN:  Soft, nontender, no hepatosplenomegaly, no guarding or rebound   tenderness.  LOWER EXTREMITIES:  No edema.     ASSESSMENT:  Cocaine abuse, chronic.  He has been in rehab 3 times,   diabetes mellitus type 1, uncontrolled with worsening renal manifestations,   stage III CKDstage IV , hyperlipidemia and hypertension.     PLAN:   went over how he needs to take his blood pressure medications.  We discussed " the importance of refraining from cocaine use.  Will decrease his Basaglar back to 20 units a day     He understands.  I just   had some morena discussion today, he is 49 years old,with stage  IV CKD   and that we need to start talking about dialysis,.  He needs to follow up with Nephrology.  He is also seeing Endocrinology.  We refilled his blood pressure medications.  We will follow him up again in 2 months.

## 2019-11-19 ENCOUNTER — TELEPHONE (OUTPATIENT)
Dept: INTERNAL MEDICINE | Facility: CLINIC | Age: 49
End: 2019-11-19

## 2019-11-19 NOTE — TELEPHONE ENCOUNTER
----- Message from Shae Lakhani sent at 11/19/2019 12:45 PM CST -----  Prescription Alternative Needed:     The pharmacy needs alternative on the following RX:    insulin aspart U-100 (NOVOLOG FLEXPEN U-100 INSULIN) 100 unit/mL InPn pen    Reason: Insurance requires 3 month supply. Please send new RX to pharmacy.    Pharmacy: [facility]/PHARMACY #[id number] [place] - [phone]    Please advise.    Thank You

## 2019-11-20 RX ORDER — INSULIN LISPRO 100 [IU]/ML
INJECTION, SOLUTION INTRAVENOUS; SUBCUTANEOUS
Qty: 15 SYRINGE | Refills: 3 | Status: SHIPPED | OUTPATIENT
Start: 2019-11-20 | End: 2020-09-29 | Stop reason: SDUPTHER

## 2019-12-03 ENCOUNTER — PATIENT OUTREACH (OUTPATIENT)
Dept: ADMINISTRATIVE | Facility: HOSPITAL | Age: 49
End: 2019-12-03

## 2019-12-03 NOTE — PROGRESS NOTES
Attempted to outreach to pt. Pt unavailable, per lady who answered phone. Message left requesting he call back. Pt is due for diabetes eye exam.

## 2019-12-06 ENCOUNTER — NURSE TRIAGE (OUTPATIENT)
Dept: ADMINISTRATIVE | Facility: CLINIC | Age: 49
End: 2019-12-06

## 2019-12-06 NOTE — TELEPHONE ENCOUNTER
Reason for Disposition   [1] Systolic BP  >= 180 OR Diastolic >= 110 AND [2] missed most recent dose of blood pressure medication    Additional Information   Negative: Difficult to awaken or acting confused (e.g., disoriented, slurred speech)   Negative: Severe difficulty breathing (e.g., struggling for each breath, speaks in single words)   Negative: [1] Weakness of the face, arm or leg on one side of the body AND [2] new onset   Negative: [1] Numbness (i.e., loss of sensation) of the face, arm or leg on one side of the body AND [2] new onset   Negative: [1] Chest pain lasts > 5 minutes AND [2] history of heart disease  (i.e., heart attack, bypass surgery, angina, angioplasty, CHF)   Negative: [1] Chest pain AND [2] took nitrogylcerin AND [3] pain was not relieved   Negative: [1] Systolic BP  >= 160 OR Diastolic >= 100 AND [2] cardiac or neurologic symptoms (e.g., chest pain, difficulty breathing, unsteady gait, blurred vision)   Negative: [1] Pregnant > 20 weeks AND [2] new hand or face swelling   Negative: [1] Pregnant > 20 weeks AND [2] BP Systolic BP  >= 140 OR Diastolic >= 90   Negative: [1] Systolic BP  >= 200 OR Diastolic >= 120  AND [2] having NO cardiac or neurologic symptoms   Negative: [1] Postpartum < 6 weeks AND [2] BP Systolic BP  >= 140 OR Diastolic >= 90   Negative: Sounds like a life-threatening emergency to the triager    Protocols used: HIGH BLOOD PRESSURE-A-AH

## 2019-12-07 NOTE — TELEPHONE ENCOUNTER
Called patient and informed that the on call for Dr. Burdick is not returning our pages. His bp is now 164/89 HR 64 and states that he is not having any symptoms. Patient informed that per the notes from Dr. Wesley, she wants him on the furosemide 40 mg BID, so he should call her during office hours to ask for the refill. Patient verbalized understanding.

## 2020-09-24 ENCOUNTER — TELEPHONE (OUTPATIENT)
Dept: INTERNAL MEDICINE | Facility: CLINIC | Age: 50
End: 2020-09-24

## 2020-09-24 NOTE — TELEPHONE ENCOUNTER
----- Message from Ashley Daniels sent at 9/24/2020 10:36 AM CDT -----  Contact: Pt [phone]  Caller is requesting an earlier appt than we can schedule.  Caller declined first available appointment listed below. Caller will not accept being placed on the wait list and is requesting a message be sent to the provider.  When is the next available appointment:  10/05  Did you offer to schedule the next available appt and put the patient on the wait list?:   yes/no  What visit type: ep  Symptoms:    Patient preference of timeframe to be scheduled:  asap  What is the reason the patient is requesting a sooner appointment? (insurance terminating, changing jobs):  follow up from ER  Would the patient rather a call back or a response via [facility]?:  call  Comments:

## 2020-09-25 ENCOUNTER — TELEPHONE (OUTPATIENT)
Dept: INTERNAL MEDICINE | Facility: CLINIC | Age: 50
End: 2020-09-25

## 2020-09-25 NOTE — TELEPHONE ENCOUNTER
----- Message from Bay Hemphill sent at 9/25/2020  3:20 PM CDT -----  Regarding: Rx refill  Contact: Patient [phone]  RX request - refill or new RX.  Is this a refill or new RX:  Refill   RX name and strength: Nova Log Insulin   Directions:   Is this a 30 day or 90 day RX:    Pharmacy name and phone # [id number] [place] - [address] [phone] (Phone) [phone] (Fax)      Comments:  stating all out of Rx, call to inform has been sent.      Please call an advise  Thank you

## 2020-09-28 ENCOUNTER — TELEPHONE (OUTPATIENT)
Dept: INTERNAL MEDICINE | Facility: CLINIC | Age: 50
End: 2020-09-28

## 2020-09-28 NOTE — TELEPHONE ENCOUNTER
----- Message from Malena Bains sent at 9/28/2020 10:27 AM CDT -----  Contact: Luis Daniel jg [phone] or [phone]  Type:  Needs Medical Advice    Who Called: Luis Daniel gregorio  Symptoms (please be specific):   How long has patient had these symptoms:   Pharmacy name and phone #:    Would the patient rather a call back or a response via [facility]? Call back  Best Call Back Number:  [phone] or [phone]  Additional Information: Pt is calling to get status on medication refill

## 2020-09-29 RX ORDER — INSULIN LISPRO 100 [IU]/ML
INJECTION, SOLUTION INTRAVENOUS; SUBCUTANEOUS
Qty: 15 SYRINGE | Refills: 3 | Status: SHIPPED | OUTPATIENT
Start: 2020-09-29 | End: 2020-10-05

## 2020-09-29 NOTE — TELEPHONE ENCOUNTER
----- Message from Aditi Jacobs sent at 9/28/2020  5:40 PM CDT -----  Regarding: Requesting refill pt out medication  Pt calling to request a refill for insulin( Nova log) pt is out of medication. Please send to [facility] pharmacy on [address].    Pt can be reached at [phone]      Thank you!

## 2020-10-05 ENCOUNTER — OFFICE VISIT (OUTPATIENT)
Dept: INTERNAL MEDICINE | Facility: CLINIC | Age: 50
End: 2020-10-05
Payer: COMMERCIAL

## 2020-10-05 ENCOUNTER — PATIENT MESSAGE (OUTPATIENT)
Dept: ADMINISTRATIVE | Facility: HOSPITAL | Age: 50
End: 2020-10-05

## 2020-10-05 VITALS
BODY MASS INDEX: 32.62 KG/M2 | SYSTOLIC BLOOD PRESSURE: 132 MMHG | OXYGEN SATURATION: 97 % | HEIGHT: 71 IN | HEART RATE: 59 BPM | DIASTOLIC BLOOD PRESSURE: 80 MMHG | WEIGHT: 233 LBS

## 2020-10-05 DIAGNOSIS — I10 ESSENTIAL HYPERTENSION: Chronic | ICD-10-CM

## 2020-10-05 DIAGNOSIS — E78.5 HYPERLIPIDEMIA, UNSPECIFIED HYPERLIPIDEMIA TYPE: ICD-10-CM

## 2020-10-05 DIAGNOSIS — E10.3399: Chronic | ICD-10-CM

## 2020-10-05 DIAGNOSIS — E66.9 OBESITY (BMI 30-39.9): Chronic | ICD-10-CM

## 2020-10-05 DIAGNOSIS — F14.10 DRUG ABUSE, COCAINE TYPE: ICD-10-CM

## 2020-10-05 DIAGNOSIS — L30.9 DERMATITIS: ICD-10-CM

## 2020-10-05 DIAGNOSIS — R73.9 HYPERGLYCEMIA: ICD-10-CM

## 2020-10-05 DIAGNOSIS — N18.4 CKD (CHRONIC KIDNEY DISEASE) STAGE 4, GFR 15-29 ML/MIN: ICD-10-CM

## 2020-10-05 PROCEDURE — 99214 PR OFFICE/OUTPT VISIT, EST, LEVL IV, 30-39 MIN: ICD-10-PCS | Mod: S$GLB,,, | Performed by: INTERNAL MEDICINE

## 2020-10-05 PROCEDURE — 3008F PR BODY MASS INDEX (BMI) DOCUMENTED: ICD-10-PCS | Mod: CPTII,S$GLB,, | Performed by: INTERNAL MEDICINE

## 2020-10-05 PROCEDURE — 3079F DIAST BP 80-89 MM HG: CPT | Mod: CPTII,S$GLB,, | Performed by: INTERNAL MEDICINE

## 2020-10-05 PROCEDURE — 3075F PR MOST RECENT SYSTOLIC BLOOD PRESS GE 130-139MM HG: ICD-10-PCS | Mod: CPTII,S$GLB,, | Performed by: INTERNAL MEDICINE

## 2020-10-05 PROCEDURE — 99999 PR PBB SHADOW E&M-EST. PATIENT-LVL IV: ICD-10-PCS | Mod: PBBFAC,,, | Performed by: INTERNAL MEDICINE

## 2020-10-05 PROCEDURE — 99999 PR PBB SHADOW E&M-EST. PATIENT-LVL IV: CPT | Mod: PBBFAC,,, | Performed by: INTERNAL MEDICINE

## 2020-10-05 PROCEDURE — 3008F BODY MASS INDEX DOCD: CPT | Mod: CPTII,S$GLB,, | Performed by: INTERNAL MEDICINE

## 2020-10-05 PROCEDURE — 99214 OFFICE O/P EST MOD 30 MIN: CPT | Mod: S$GLB,,, | Performed by: INTERNAL MEDICINE

## 2020-10-05 PROCEDURE — 3075F SYST BP GE 130 - 139MM HG: CPT | Mod: CPTII,S$GLB,, | Performed by: INTERNAL MEDICINE

## 2020-10-05 PROCEDURE — 3079F PR MOST RECENT DIASTOLIC BLOOD PRESSURE 80-89 MM HG: ICD-10-PCS | Mod: CPTII,S$GLB,, | Performed by: INTERNAL MEDICINE

## 2020-10-05 RX ORDER — LABETALOL 300 MG/1
300 TABLET, FILM COATED ORAL 2 TIMES DAILY
Qty: 180 TABLET | Refills: 3 | Status: SHIPPED | OUTPATIENT
Start: 2020-10-05 | End: 2021-04-23 | Stop reason: SDUPTHER

## 2020-10-05 RX ORDER — BLOOD SUGAR DIAGNOSTIC
STRIP MISCELLANEOUS
Qty: 300 EACH | Refills: 2 | Status: SHIPPED | OUTPATIENT
Start: 2020-10-05 | End: 2022-07-12

## 2020-10-05 RX ORDER — INSULIN ASPART 100 [IU]/ML
20 INJECTION, SOLUTION INTRAVENOUS; SUBCUTANEOUS
Qty: 1 BOX | Refills: 5 | Status: SHIPPED | OUTPATIENT
Start: 2020-10-05 | End: 2020-12-28

## 2020-10-05 NOTE — LETTER
October 5, 2020      [facility]  [address]  [facility] [address]  Phone: [phone]  Fax: [phone]       Patient: Luis Daniel Howell   YOB: 1970  Date of Visit: 10/05/2020    To Whom It May Concern:    Monica Howell  was at [facility] on 10/05/2020. He may return to work/school on 10/6/2020 With/no restrictions. If you have any questions or concerns, or if I can be of further assistance, please do not hesitate to contact me.    Sincerely,    Ellen Arias MA

## 2020-10-05 NOTE — PROGRESS NOTES
"  He is a 50-year-old gentleman who I saw last in 10/16/2020 with CKD,   hypertension, diabetes mellitus, hyperlipidemia and cocaine abuse history,   coming in for follow-up.         Last visit he had just spent  30 days in rehab after being caught when he  underwent drug check by his work..  He had cocaine system again.  This is the 3rd time he has been in a rehab.   He was in the hospital on 07/22/2019 for chest   pain and hypertensive emergency. He had two day ICU stay for the hypertensive   emergency.  He admits noncompliance with his medications as one problem, also   cocaine use.  He has  Been complaint with his meds.  He has been using cocaine some- last was Wednesday.  He is trying to get off the cocaine-- his bp is really high when he takes cocaine. He dies not wish to go back to rehab.         He had another episode 9/21/2020- which we also reviewed.He was having atypical chest pain and htn after cocaine use.             He has acute kidney injury in September 2019 current he seeing Dr. Leighann Wesley in [facility] 9/2019.  She started him on Procrit that he has not picked up yet.  He has anemia secondary to significant CKD.        He was  seeing Endocrinology for his diabetes, but last visit was 6 years ago in 2020.  .  He has been insulin more regularly.  He was cut down from 10-14   units Novolog with meals.     He is still taking the 25 units Basaglar a day.  His glucoses  are in 70s.  Recent GFR was 26.        PHYSICAL EXAMINATION:     /80 (BP Location: Right arm, Patient Position: Sitting, BP Method: Large (Manual))   Pulse (!) 59   Ht 5' 11" (1.803 m)   Wt 105.7 kg (233 lb 0.4 oz)   SpO2 97%   BMI 32.50 kg/m²        VITAL SIGNS:  Blood pressure is still elevated as above.  GENERAL:  He is a well-appearing 50 -year-old -American gentleman in no   acute distress  no acute distress.  NECK:  Supple.  CHEST:  Clear without wheeze.  CARDIOVASCULAR:  S1 and S2, regular rate and rhythm " without murmur, gallop or   rub.  ABDOMEN:  Soft, nontender, no hepatosplenomegaly, no guarding or rebound   tenderness.  LOWER EXTREMITIES:  No edema.  Protective Sensation (w/ 10 gram monofilament):  Right: Intact  Left: Decreased    Visual Inspection:  Normal -  Bilateral    Pedal Pulses:   Right: Present  Left: Present    Posterior tibialis:   Right:Present  Left: Present         ASSESSMENT:  Cocaine abuse, chronic.  He has been in rehab 3 times, Was in ed with Atypical cp and htn last month  diabetes mellitus type 1, uncontrolled with worsening renal manifestations,   stage III CKDstage IV , hyperlipidemia and hypertension.     PLAN:   went over how he needs to take his blood pressure medications.  We discussed the importance of refraining from cocaine use.  Will decrease his Basaglar and novolog.   He understands.  I just   had some morena discussion today, he is 50  years old,with stage  IV CKD   and that we need to start talking about dialysis,.  He needs to follow up with Nephrology.He has not seen them in 1 year.       We refilled his blood pressure medications.  We will follow him up again in 2 months.   He is not interested in rehab or seeing rasheed.

## 2020-10-06 ENCOUNTER — PATIENT OUTREACH (OUTPATIENT)
Dept: ADMINISTRATIVE | Facility: OTHER | Age: 50
End: 2020-10-06

## 2020-10-06 NOTE — PROGRESS NOTES
Care Everywhere: updated  Immunization: updated  Health Maintenance: updated  Media Review:   Legacy Review:   Order placed:   Upcoming appts:optometry 10/7/2020

## 2020-10-07 ENCOUNTER — LAB VISIT (OUTPATIENT)
Dept: LAB | Facility: HOSPITAL | Age: 50
End: 2020-10-07
Attending: INTERNAL MEDICINE
Payer: COMMERCIAL

## 2020-10-07 ENCOUNTER — OFFICE VISIT (OUTPATIENT)
Dept: NEPHROLOGY | Facility: CLINIC | Age: 50
End: 2020-10-07
Payer: COMMERCIAL

## 2020-10-07 ENCOUNTER — OFFICE VISIT (OUTPATIENT)
Dept: OPTOMETRY | Facility: CLINIC | Age: 50
End: 2020-10-07
Payer: COMMERCIAL

## 2020-10-07 VITALS
OXYGEN SATURATION: 98 % | HEART RATE: 55 BPM | WEIGHT: 229.5 LBS | SYSTOLIC BLOOD PRESSURE: 142 MMHG | DIASTOLIC BLOOD PRESSURE: 90 MMHG | BODY MASS INDEX: 32.13 KG/M2 | HEIGHT: 71 IN

## 2020-10-07 DIAGNOSIS — E10.3312 TYPE 1 DIABETES MELLITUS WITH MODERATE NONPROLIFERATIVE RETINOPATHY OF LEFT EYE AND MACULAR EDEMA: Primary | ICD-10-CM

## 2020-10-07 DIAGNOSIS — N18.4 CKD (CHRONIC KIDNEY DISEASE) STAGE 4, GFR 15-29 ML/MIN: ICD-10-CM

## 2020-10-07 DIAGNOSIS — H25.13 NUCLEAR SCLEROSIS OF BOTH EYES: ICD-10-CM

## 2020-10-07 DIAGNOSIS — E10.3291 TYPE 1 DIABETES MELLITUS WITH MILD NONPROLIFERATIVE DIABETIC RETINOPATHY WITHOUT MACULAR EDEMA, RIGHT EYE: ICD-10-CM

## 2020-10-07 DIAGNOSIS — H47.20 OPTIC NERVE ATROPHY, LEFT: ICD-10-CM

## 2020-10-07 LAB
25(OH)D3+25(OH)D2 SERPL-MCNC: 30 NG/ML (ref 30–96)
ANION GAP SERPL CALC-SCNC: 8 MMOL/L (ref 8–16)
BUN SERPL-MCNC: 33 MG/DL (ref 6–20)
CALCIUM SERPL-MCNC: 8.9 MG/DL (ref 8.7–10.5)
CHLORIDE SERPL-SCNC: 108 MMOL/L (ref 95–110)
CO2 SERPL-SCNC: 26 MMOL/L (ref 23–29)
CREAT SERPL-MCNC: 4 MG/DL (ref 0.5–1.4)
EST. GFR  (AFRICAN AMERICAN): 19 ML/MIN/1.73 M^2
EST. GFR  (NON AFRICAN AMERICAN): 16.5 ML/MIN/1.73 M^2
GLUCOSE SERPL-MCNC: 172 MG/DL (ref 70–110)
IRON SERPL-MCNC: 71 UG/DL (ref 45–160)
POTASSIUM SERPL-SCNC: 4 MMOL/L (ref 3.5–5.1)
PTH-INTACT SERPL-MCNC: 197 PG/ML (ref 9–77)
SATURATED IRON: 24 % (ref 20–50)
SODIUM SERPL-SCNC: 142 MMOL/L (ref 136–145)
TOTAL IRON BINDING CAPACITY: 299 UG/DL (ref 250–450)
TRANSFERRIN SERPL-MCNC: 202 MG/DL (ref 200–375)

## 2020-10-07 PROCEDURE — 3080F DIAST BP >= 90 MM HG: CPT | Mod: CPTII,S$GLB,, | Performed by: INTERNAL MEDICINE

## 2020-10-07 PROCEDURE — 99213 OFFICE O/P EST LOW 20 MIN: CPT | Mod: S$GLB,,, | Performed by: INTERNAL MEDICINE

## 2020-10-07 PROCEDURE — 99213 PR OFFICE/OUTPT VISIT, EST, LEVL III, 20-29 MIN: ICD-10-PCS | Mod: S$GLB,,, | Performed by: INTERNAL MEDICINE

## 2020-10-07 PROCEDURE — 36415 COLL VENOUS BLD VENIPUNCTURE: CPT

## 2020-10-07 PROCEDURE — 99999 PR PBB SHADOW E&M-EST. PATIENT-LVL IV: CPT | Mod: PBBFAC,,, | Performed by: INTERNAL MEDICINE

## 2020-10-07 PROCEDURE — 3080F PR MOST RECENT DIASTOLIC BLOOD PRESSURE >= 90 MM HG: ICD-10-PCS | Mod: CPTII,S$GLB,, | Performed by: INTERNAL MEDICINE

## 2020-10-07 PROCEDURE — 82306 VITAMIN D 25 HYDROXY: CPT

## 2020-10-07 PROCEDURE — 3077F SYST BP >= 140 MM HG: CPT | Mod: CPTII,S$GLB,, | Performed by: INTERNAL MEDICINE

## 2020-10-07 PROCEDURE — 99999 PR PBB SHADOW E&M-EST. PATIENT-LVL IV: ICD-10-PCS | Mod: PBBFAC,,, | Performed by: INTERNAL MEDICINE

## 2020-10-07 PROCEDURE — 83970 ASSAY OF PARATHORMONE: CPT

## 2020-10-07 PROCEDURE — 3077F PR MOST RECENT SYSTOLIC BLOOD PRESSURE >= 140 MM HG: ICD-10-PCS | Mod: CPTII,S$GLB,, | Performed by: INTERNAL MEDICINE

## 2020-10-07 PROCEDURE — 80048 BASIC METABOLIC PNL TOTAL CA: CPT

## 2020-10-07 PROCEDURE — 3008F PR BODY MASS INDEX (BMI) DOCUMENTED: ICD-10-PCS | Mod: CPTII,S$GLB,, | Performed by: INTERNAL MEDICINE

## 2020-10-07 PROCEDURE — 92004 COMPRE OPH EXAM NEW PT 1/>: CPT | Mod: S$GLB,,, | Performed by: OPTOMETRIST

## 2020-10-07 PROCEDURE — 92004 PR EYE EXAM, NEW PATIENT,COMPREHESV: ICD-10-PCS | Mod: S$GLB,,, | Performed by: OPTOMETRIST

## 2020-10-07 PROCEDURE — 83540 ASSAY OF IRON: CPT

## 2020-10-07 PROCEDURE — 99999 PR PBB SHADOW E&M-EST. PATIENT-LVL IV: ICD-10-PCS | Mod: PBBFAC,,, | Performed by: OPTOMETRIST

## 2020-10-07 PROCEDURE — 99999 PR PBB SHADOW E&M-EST. PATIENT-LVL IV: CPT | Mod: PBBFAC,,, | Performed by: OPTOMETRIST

## 2020-10-07 PROCEDURE — 3008F BODY MASS INDEX DOCD: CPT | Mod: CPTII,S$GLB,, | Performed by: INTERNAL MEDICINE

## 2020-10-07 RX ORDER — AMITRIPTYLINE HYDROCHLORIDE 25 MG/1
25 TABLET, FILM COATED ORAL
COMMUNITY
End: 2021-09-30

## 2020-10-07 RX ORDER — FLUTICASONE PROPIONATE 50 MCG
2 SPRAY, SUSPENSION (ML) NASAL
Status: ON HOLD | COMMUNITY
End: 2022-07-31

## 2020-10-07 NOTE — LETTER
October 7, 2020      [facility]  [address]  [facility] [address]  Phone: [phone]       Patient: Luis Daniel Howell   YOB: 1970  Date of Visit: 10/07/2020    To Whom It May Concern:    Monica Howell  was at [facility] on 10/07/2020. He may return to work/school on 10/08 with no restrictions. If you have any questions or concerns, or if I can be of further assistance, please do not hesitate to contact me.    Sincerely,    Ophthalmology Department

## 2020-10-07 NOTE — PROGRESS NOTES
REASON FOR CONSULT: CKD    REFERRING PHYSICIAN: Janak Wilson MD      HISTORY OF PRESENT ILLNESS: 50 y.o. male who is new to me  has a past medical history of Diabetes mellitus, Diabetes mellitus type I, Hyperlipidemia, and Hypertension. patient was referred here for ckd management, last seen by nephrology was 10/2019.   The patient denies taking NSAIDs or new antibiotics, recreational drugs, recent episode of dehydration, diarrhea, nausea or vomiting, acute illness, hospitalization or exposure to IV radiocontrast.     ROS:  General: negative for chills, or fatigue    Respiratory: No cough, shortness of breath, or wheezing  Cardiovascular: No chest pain or dyspnea   Gastrointestinal: No abdominal pain, change in bowel habits  Genito-Urinary: No dysuria, trouble voiding, or hematuria  Musculoskeletal: ROS: negative for - joint pain, joint stiffness, joint swelling, muscle pain or muscular weakness      PAST MEDICAL HISTORY:  Past Medical History:   Diagnosis Date    Diabetes mellitus     Diabetes mellitus type I     Hyperlipidemia     Hypertension        PAST SURGICAL HISTORY:  Past Surgical History:   Procedure Laterality Date    CIRCUMCISION      WISDOM TOOTH EXTRACTION         FAMILY HISTORY:   Family History   Problem Relation Age of Onset    Cancer Mother     Hypertension Mother     Cataracts Mother     Hypertension Father     Diabetes Father     Diabetes Brother     Amblyopia Neg Hx     Blindness Neg Hx     Glaucoma Neg Hx     Macular degeneration Neg Hx     Retinal detachment Neg Hx     Strabismus Neg Hx     Stroke Neg Hx     Thyroid disease Neg Hx        SOCIAL HISTORY:  Social History     Socioeconomic History    Marital status: Single     Spouse name: Not on file    Number of children: 1    Years of education: Not on file    Highest education level: Not on file   Occupational History     Employer: [employer]   Social Needs    Financial resource strain: Not on file    Food  "insecurity     Worry: Not on file     Inability: Not on file    Transportation needs     Medical: Not on file     Non-medical: Not on file   Tobacco Use    Smoking status: Never Smoker   Substance and Sexual Activity    Alcohol use: No    Drug use: Yes     Types: "Crack" cocaine     Comment: in remission     Sexual activity: Yes     Partners: Female   Lifestyle    Physical activity     Days per week: Not on file     Minutes per session: Not on file    Stress: Not on file   Relationships    Social connections     Talks on phone: Not on file     Gets together: Not on file     Attends Nondenominational service: Not on file     Active member of club or organization: Not on file     Attends meetings of clubs or organizations: Not on file     Relationship status: Not on file   Other Topics Concern    Not on file   Social History Narrative    Not on file       ALLERGIES:  Review of patient's allergies indicates:  No Known Allergies    MEDICATIONS:    Current Outpatient Medications:     amLODIPine (NORVASC) 10 MG tablet, Take 1 tablet (10 mg total) by mouth once daily., Disp: 90 tablet, Rfl: 3    aspirin 81 MG Chew, Take 1 tablet (81 mg total) by mouth once daily., Disp: 30 tablet, Rfl: 11    atorvastatin (LIPITOR) 40 MG tablet, Take 1 tablet (40 mg total) by mouth once daily., Disp: 90 tablet, Rfl: 3    BASAGLAR KWIKPEN U-100 INSULIN glargine 100 units/mL (3mL) SubQ pen, Inject 45 Units into the skin every evening., Disp: 3 Box, Rfl: 3    calcitRIOL (ROCALTROL) 0.25 MCG Cap, Take 0.25 mcg by mouth once daily., Disp: , Rfl:     ferrous gluconate (FERGON) 324 MG tablet, Take 324 mg by mouth daily with breakfast., Disp: , Rfl:     furosemide (LASIX) 20 MG tablet, Take 1 tablet (20 mg total) by mouth daily as needed., Disp: 30 tablet, Rfl: 0    hydrocortisone 1 % cream, Apply to affected area 2 times daily, Disp: 30 g, Rfl: 0    insulin aspart U-100 (NOVOLOG FLEXPEN U-100 INSULIN) 100 unit/mL (3 mL) InPn pen, Inject " "20 Units into the skin 3 (three) times daily with meals., Disp: 1 Box, Rfl: 5    labetaloL (NORMODYNE) 300 MG tablet, Take 1 tablet (300 mg total) by mouth 2 (two) times daily., Disp: 180 tablet, Rfl: 3    metOLazone (ZAROXOLYN) 5 MG tablet, Take 5 mg by mouth once daily., Disp: , Rfl:     pen needle, diabetic (COMFORT EZ PEN NEEDLES) 32 gauge x 1/4" Ndle, Use to give insulin 4 times a day-- whatever brand is covered, Disp: 300 each, Rfl: 2    vitamin D (VITAMIN D3) 1000 units Tab, Take 2,000 Units by mouth once daily., Disp: , Rfl:    Medication List with Changes/Refills   Current Medications    AMLODIPINE (NORVASC) 10 MG TABLET    Take 1 tablet (10 mg total) by mouth once daily.    ASPIRIN 81 MG CHEW    Take 1 tablet (81 mg total) by mouth once daily.    ATORVASTATIN (LIPITOR) 40 MG TABLET    Take 1 tablet (40 mg total) by mouth once daily.    BASAGLAR KWIKPEN U-100 INSULIN GLARGINE 100 UNITS/ML (3ML) SUBQ PEN    Inject 45 Units into the skin every evening.    CALCITRIOL (ROCALTROL) 0.25 MCG CAP    Take 0.25 mcg by mouth once daily.    FERROUS GLUCONATE (FERGON) 324 MG TABLET    Take 324 mg by mouth daily with breakfast.    FUROSEMIDE (LASIX) 20 MG TABLET    Take 1 tablet (20 mg total) by mouth daily as needed.    HYDROCORTISONE 1 % CREAM    Apply to affected area 2 times daily    INSULIN ASPART U-100 (NOVOLOG FLEXPEN U-100 INSULIN) 100 UNIT/ML (3 ML) INPN PEN    Inject 20 Units into the skin 3 (three) times daily with meals.    LABETALOL (NORMODYNE) 300 MG TABLET    Take 1 tablet (300 mg total) by mouth 2 (two) times daily.    METOLAZONE (ZAROXOLYN) 5 MG TABLET    Take 5 mg by mouth once daily.    PEN NEEDLE, DIABETIC (COMFORT EZ PEN NEEDLES) 32 GAUGE X 1/4" NDLE    Use to give insulin 4 times a day-- whatever brand is covered    VITAMIN D (VITAMIN D3) 1000 UNITS TAB    Take 2,000 Units by mouth once daily.        PHYSICAL EXAM:  There were no vitals taken for this visit.    General: No distress, No fever or " chills  Lungs:Clear to auscultation bilaterally, No Crackles  Heart: Regular rate and rhythm, no murmur, gallops or rubs  Abdomen: Soft, no tenderness, bowel sounds normal  Extremities: Atraumatic, no edema in LE            LABS:  Lab Results   Component Value Date    CREATININE 3.8 (H) 10/05/2020    CREATININE 3.8 (H) 10/05/2020       Prot/Creat Ratio, Ur   Date Value Ref Range Status   09/05/2018 1.76 (H) 0.00 - 0.20 Final       Lab Results   Component Value Date     10/05/2020     10/05/2020    K 4.3 10/05/2020    K 4.3 10/05/2020    CO2 25 10/05/2020    CO2 25 10/05/2020       last PTH   Lab Results   Component Value Date    .0 (H) 09/05/2018    CALCIUM 8.8 10/05/2020    CALCIUM 8.8 10/05/2020    PHOS 3.2 09/05/2018       Lab Results   Component Value Date    HGB 10.1 (L) 09/21/2020        Lab Results   Component Value Date    HGBA1C 9.1 (H) 10/05/2020       Lab Results   Component Value Date    LDLCALC 68.6 10/05/2020           ASSESSMENT:  1-CKD stage IV 2/2 diabetic and hypertensive nephropathy and cocaine abuse   2-DM type I  3.-HTN   4-h/o cocaine abuse   5-AOCD        PLAN:  -since 2017 he has CKD , was at stage II until 7/2019 whne his GFR dropped to < 30 , blood work from 10/5 showed SCR 3.8 and GFR 20 which improved from 9/2020 when the SCR was 4.2 and GFR 18.no hyperk or acidosis   -uncontrolled DM, A1C from 10/5 was 9.1  -will get PTH and vit D level   -will get UPCR , last UA from 9/2020 showed +2 protein   -last HBG from 9/21/20 10.1, will get iron sat   -Continue current BP meds regimen  -Avoid NSAIDs intake      RTC in 4m      Thanks for allowing me to participate in the care of this patient.     8:49 AM    TERRA ROLDAN MD  NEPHROLOGY ATTENDING
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
## 2020-10-07 NOTE — LETTER
October 7, 2020      Janak Wilson Jr., MD  [address]  [facility] [address]           [facility] - Nephrology 5th Fl  [address]  [facility] [address]  Phone: [phone]  Fax: [phone]          Patient: Luis Daniel Howell   MR Number: [id number]   YOB: 1970   Date of Visit: 10/7/2020       Dear Dr. Janak Wilson Jr.:    Thank you for referring Luis Daniel Howell to me for evaluation. Attached you will find relevant portions of my assessment and plan of care.    If you have questions, please do not hesitate to call me. I look forward to following Luis Daniel Howell along with you.    Sincerely,    Veena Montiel MD    Enclosure  CC:  No Recipients    If you would like to receive this communication electronically, please contact [email or web address] or [phone] to request more information on [employer] Link access.    For providers and/or their staff who would like to refer a patient to [facility], please contact us through our one-stop-shop provider referral line, [facility], at [phone].    If you feel you have received this communication in error or would no longer like to receive these types of communications, please e-mail [email or web address]

## 2020-10-07 NOTE — LETTER
October 7, 2020      Janak Wilson Jr., MD  [address]  [facility] [address]           [facility]  [address]  [facility] [address]  Phone: [phone]          Patient: Luis Daniel Howell   MR Number: [id number]   YOB: 1970   Date of Visit: 10/7/2020       Dear Dr. Janak Wilson Jr.:    Thank you for referring Luis Daniel Howell to me for evaluation. Attached you will find relevant portions of my assessment and plan of care.    If you have questions, please do not hesitate to call me. I look forward to following Luis Daniel Howell along with you.    Sincerely,    Ashley Wallis, OD    Enclosure  CC:  No Recipients    If you would like to receive this communication electronically, please contact [email or web address] or [phone] to request more information on [email or web address] Link access.    For providers and/or their staff who would like to refer a patient to [facility], please contact us through our one-stop-shop provider referral line, [facility], at [phone].    If you feel you have received this communication in error or would no longer like to receive these types of communications, please e-mail [email or web address]

## 2020-11-02 ENCOUNTER — OFFICE VISIT (OUTPATIENT)
Dept: OPHTHALMOLOGY | Facility: CLINIC | Age: 50
End: 2020-11-02
Payer: COMMERCIAL

## 2020-11-02 DIAGNOSIS — E10.3593 CONTROLLED TYPE 1 DIABETES MELLITUS WITH BOTH EYES AFFECTED BY PROLIFERATIVE RETINOPATHY WITHOUT MACULAR EDEMA: Primary | ICD-10-CM

## 2020-11-02 DIAGNOSIS — H46.9 OPTIC NEUROPATHY, LEFT: ICD-10-CM

## 2020-11-02 PROCEDURE — 92235 FLUORESCEIN ANGIOGRAPHY - OU - BOTH EYES: ICD-10-PCS | Mod: S$GLB,,, | Performed by: OPHTHALMOLOGY

## 2020-11-02 PROCEDURE — 99999 PR PBB SHADOW E&M-EST. PATIENT-LVL III: ICD-10-PCS | Mod: PBBFAC,,, | Performed by: OPHTHALMOLOGY

## 2020-11-02 PROCEDURE — 99999 PR PBB SHADOW E&M-EST. PATIENT-LVL III: CPT | Mod: PBBFAC,,, | Performed by: OPHTHALMOLOGY

## 2020-11-02 PROCEDURE — 92004 PR EYE EXAM, NEW PATIENT,COMPREHESV: ICD-10-PCS | Mod: S$GLB,,, | Performed by: OPHTHALMOLOGY

## 2020-11-02 PROCEDURE — 92134 POSTERIOR SEGMENT OCT RETINA (OCULAR COHERENCE TOMOGRAPHY)-BOTH EYES: ICD-10-PCS | Mod: S$GLB,,, | Performed by: OPHTHALMOLOGY

## 2020-11-02 PROCEDURE — 92235 FLUORESCEIN ANGRPH MLTIFRAME: CPT | Mod: S$GLB,,, | Performed by: OPHTHALMOLOGY

## 2020-11-02 PROCEDURE — 92004 COMPRE OPH EXAM NEW PT 1/>: CPT | Mod: S$GLB,,, | Performed by: OPHTHALMOLOGY

## 2020-11-02 PROCEDURE — 92134 CPTRZ OPH DX IMG PST SGM RTA: CPT | Mod: S$GLB,,, | Performed by: OPHTHALMOLOGY

## 2020-11-02 RX ORDER — FLUORESCEIN 500 MG/ML
5 INJECTION INTRAVENOUS ONCE
Status: COMPLETED | OUTPATIENT
Start: 2020-11-02 | End: 2020-11-02

## 2020-11-02 RX ORDER — METOLAZONE 5 MG/1
5 TABLET ORAL DAILY
Status: CANCELLED | OUTPATIENT
Start: 2020-11-02

## 2020-11-02 RX ADMIN — FLUORESCEIN 500 MG: 500 INJECTION INTRAVENOUS at 12:11

## 2020-11-02 NOTE — TELEPHONE ENCOUNTER
Covering for Dr. Wilson. While calcitriol, ferrous gluconate, and metolazone are on the patient's medication list--they are listed as historical meds, I do not see where Dr. Wilson has ever prescribed these or refilled these for the patient in the past.  Will defer to Dr. Wilson for these refills on his return tomorrow.  Would recommend patient discuss these refills with whoever was prescribing them for him previously as well.  Please notify patient.

## 2020-11-02 NOTE — PATIENT INSTRUCTIONS
Fluorescein Angiography     A fluorescein angiogram of the retina   Fluorescein angiography is an eye test. It is done to look at the back of your eye, including:  · The blood vessels in your eye  · The layer of tissue at the back of your eye (the retina)  · The center of your retina (the macula)  · The optic nerve  This test can diagnose diseases found in these areas. It can also diagnose other conditions that affect these areas. To do this test, a dye called fluorescein is shot (injected) into your arm. The dye goes into your bloodstream and up into the blood vessels in your eyes. A special camera is then used to take images (angiograms) of your eyes.  Getting ready for your test  Tell your healthcare provider if you:  · Are pregnant or think you may be pregnant  · Are breastfeeding  · Have a history of severe allergic reactions, including to X-ray dye or other medicines  · Have kidney problems  Tell your provider about any medicines you are taking. You may need to stop taking all or some of these before the test. This includes:  · All prescription medicines  · Over-the-counter medicines such as aspirin or ibuprofen  · Street drugs  · Herbs, vitamins, and other supplements  You should arrange for an adult family member or friend to drive you home after your test. Your vision will be blurry for up to 12 hours.  Follow any other instructions from your healthcare provider.  During your test  · You are given eye drops to enlarge (dilate) your pupils.  · You then sit in front of a special camera. You place your chin on the chin rest and look into the camera.  · Images are taken of your eyes, one eye at a time.  · Fluorescein dye is then injected into your arm. The lights in the room are turned off. You may have mild nausea. You may have a warm feeling in your arm or upper body. Tell your provider if your skin feels itchy or if you are having trouble breathing. If so, you could be having an allergic reaction to the  dye.  · More pictures of your eyes are taken over 15 to 30 minutes. The camera shines a bright light into your eyes. Try to keep your head still and your eyes open.  · When enough images have been taken, the test is over.  After your test  Your vision will be blurry for up to 4 to 12 hours. This is because of your dilated pupils. Your eye will be more sensitive to light for up to 12 hours. You may want to wear sunglasses during this time. Do not drive if your vision is very blurry. You may also find it uncomfortable to read. Your skin may look yellow for a few hours. This is from the dye. Your urine will be bright yellow or orange for 24 to 48 hours after the test.     Risks and possible complications  All procedures have some risks. Possible risks of fluorescein angiography include:  · Upset stomach (nausea) and vomiting  · Leaking dye around the injection site that causes pain and swelling  · Metallic taste in your mouth  · Infection at injection site  · Allergic reaction to the dye  · Dry mouth or too much saliva  · Faster heart rate  · Sweating  · Lower back pain   Date Last Reviewed: 5/30/2015  © 0456-5646 [employer]. [address] [place] [address], [place] [address]. All rights reserved. This information is not intended as a substitute for professional medical care. Always follow your healthcare professional's instructions.

## 2020-11-02 NOTE — TELEPHONE ENCOUNTER
----- Message from Kp Montana sent at 11/2/2020  3:55 PM CST -----  Regarding: Pt [phone]  Is this a refill or new RX: Refill    RX name and strength: metOLazone (ZAROXOLYN) 5 MG tablet, calcitRIOL (ROCALTROL) 0.25, and ferrous gluconate (FERGON) 324 MG tablet    Pharmacy name and phone # [id number] [place] - [address] [facility] [phone] (Phone) [phone] (Fax)

## 2020-11-02 NOTE — LETTER
November 3, 2020      Ashley Wallis, OD  [address]  [facility] [address]           Fan Kevin - [facility] 1st Fl  [address]  [facility] [address]  Phone: [phone]  Fax: [phone]          Patient: Luis Daniel Howell   MR Number: [id number]   YOB: 1970   Date of Visit: 11/2/2020       Dear Dr. Ashley Wallis:    Thank you for referring Luis Daniel Howell to me for evaluation. Attached you will find relevant portions of my assessment and plan of care.    If you have questions, please do not hesitate to call me. I look forward to following Luis Daniel Howell along with you.    Sincerely,    Abdiel Fitzpatrick MD    Enclosure  CC:  No Recipients    If you would like to receive this communication electronically, please contact [email or web address] or [phone] to request more information on [email or web address] access.    For providers and/or their staff who would like to refer a patient to [facility], please contact us through our one-stop-shop provider referral line, [facility], at [phone].    If you feel you have received this communication in error or would no longer like to receive these types of communications, please e-mail [email or web address]

## 2020-11-04 RX ORDER — CALCITRIOL 0.25 UG/1
0.25 CAPSULE ORAL DAILY
Qty: 90 CAPSULE | Refills: 1 | Status: SHIPPED | OUTPATIENT
Start: 2020-11-04 | End: 2020-11-18 | Stop reason: SDUPTHER

## 2020-11-04 RX ORDER — METOLAZONE 5 MG/1
5 TABLET ORAL DAILY
Qty: 90 TABLET | Refills: 1 | Status: SHIPPED | OUTPATIENT
Start: 2020-11-04 | End: 2020-11-18 | Stop reason: SDUPTHER

## 2020-11-04 RX ORDER — FERROUS GLUCONATE 324(38)MG
324 TABLET ORAL
COMMUNITY
Start: 2020-11-04 | End: 2020-11-18 | Stop reason: SDUPTHER

## 2020-11-04 NOTE — PROGRESS NOTES
Subjective:       Patient ID: Luis Daniel Howell is a 50 y.o. male      Chief Complaint   Patient presents with    Diabetic Eye Exam    Blurred Vision     History of Present Illness  HPI     DLS 10/07/2020 Dr Wallis OD    Pt here today for Diabetic eye exam, DFE and OCT     Pt reports has never really had good vision OS and has been poor for   years.  Va OD good.  No f/f/pain OU  Says his BS has never really been controlled  Last ck his BS on 10/30/2020 and it was 232       Gtts   At's PRN OU       Ocular HX   NS OU   Diabetic Retinopathy OU     Med Hx   HTN  Diabtic x 15 years , IDDM , uncontrolled Per pt           (+)DM  Hemoglobin A1C       Date                     Value               Ref Range             Status                10/05/2020               9.1 (H)             4.0 - 5.6 %           Final                 07/22/2019               9.7 (H)             4.0 - 5.6 %           Final                 01/09/2019               5.3                 4.0 - 5.6 %           Final                  Last edited by Abdiel Fitzpatrick MD on 11/3/2020  6:10 PM. (History)        Imaging:    See report    Assessment/Plan:     1. Optic neuropathy, left  Etiology unclear.  Recommend eval by neuroophth to see if any w/u necessary    - Flourescein Angiography - OU - Both Eyes    2. 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  RBA discussed.  Pt agrees  - Posterior Segment OCT Retina-Both eyes    Follow up in about 2 weeks (around 11/16/2020), or if symptoms worsen or fail to improve, for PRP OS.

## 2020-11-13 ENCOUNTER — PATIENT OUTREACH (OUTPATIENT)
Dept: ADMINISTRATIVE | Facility: OTHER | Age: 50
End: 2020-11-13

## 2020-11-18 NOTE — TELEPHONE ENCOUNTER
----- Message from Olga Carranza sent at 11/18/2020 12:00 PM CST -----  Requesting an RX refill or new RX.  Is this a refill or new RX: refill  RX name and strength:calcitRIOL (ROCALTROL) 0.25 MCG Cap  Is this a 30 day or 90 day RX: 30  Pharmacy name and phone # (copy/paste from chart):  [facility] #[id number] [place], [place] - [address] [phone] (Phone)  [phone] (Fax)      Comments:                     Requesting an RX refill or new RX.  Is this a refill or new RX: refill  RX name and strength:metOLazone (ZAROXOLYN) 5 MG tablet  Is this a 30 day or 90 day RX: 30  Pharmacy name and phone # (copy/paste from chart):  [facility]pharm[facility] #[id number] [place], [place] - [address] [phone] (Phone)  [phone] (Fax)      Comments:           Requesting an RX refill or new RX.  Is this a refill or new RX: refill  RX name and strength:ferrous gluconate (FERGON) 324 MG tablet  Is this a 30 day or 90 day RX: 30  Pharmacy name and phone # (copy/paste from chart):    Comments: [facility]/pharmacy #[facility] [place], [place] - [address] [phone] (Phone)  [phone] (Fax)

## 2020-11-19 RX ORDER — CALCITRIOL 0.25 UG/1
0.25 CAPSULE ORAL DAILY
Qty: 90 CAPSULE | Refills: 1 | Status: SHIPPED | OUTPATIENT
Start: 2020-11-19 | End: 2020-11-30 | Stop reason: SDUPTHER

## 2020-11-19 RX ORDER — FERROUS GLUCONATE 324(38)MG
324 TABLET ORAL
COMMUNITY
Start: 2020-11-19 | End: 2020-11-30 | Stop reason: SDUPTHER

## 2020-11-19 RX ORDER — METOLAZONE 5 MG/1
5 TABLET ORAL DAILY
Qty: 90 TABLET | Refills: 1 | Status: SHIPPED | OUTPATIENT
Start: 2020-11-19 | End: 2020-11-30 | Stop reason: SDUPTHER

## 2020-11-30 ENCOUNTER — LAB VISIT (OUTPATIENT)
Dept: LAB | Facility: HOSPITAL | Age: 50
End: 2020-11-30
Attending: INTERNAL MEDICINE
Payer: COMMERCIAL

## 2020-11-30 ENCOUNTER — OFFICE VISIT (OUTPATIENT)
Dept: INTERNAL MEDICINE | Facility: CLINIC | Age: 50
End: 2020-11-30
Payer: COMMERCIAL

## 2020-11-30 VITALS
WEIGHT: 235.69 LBS | DIASTOLIC BLOOD PRESSURE: 90 MMHG | SYSTOLIC BLOOD PRESSURE: 166 MMHG | HEIGHT: 71 IN | BODY MASS INDEX: 33 KG/M2 | OXYGEN SATURATION: 100 % | HEART RATE: 58 BPM

## 2020-11-30 DIAGNOSIS — F14.10 DRUG ABUSE, COCAINE TYPE: ICD-10-CM

## 2020-11-30 DIAGNOSIS — E78.5 HYPERLIPIDEMIA, UNSPECIFIED HYPERLIPIDEMIA TYPE: ICD-10-CM

## 2020-11-30 DIAGNOSIS — N18.4 CKD (CHRONIC KIDNEY DISEASE) STAGE 4, GFR 15-29 ML/MIN: ICD-10-CM

## 2020-11-30 DIAGNOSIS — E10.3393 TYPE 1 DIABETES MELLITUS WITH MODERATE NONPROLIFERATIVE RETINOPATHY OF BOTH EYES WITHOUT MACULAR EDEMA: Primary | Chronic | ICD-10-CM

## 2020-11-30 DIAGNOSIS — I10 ESSENTIAL HYPERTENSION: Chronic | ICD-10-CM

## 2020-11-30 DIAGNOSIS — E10.3393 TYPE 1 DIABETES MELLITUS WITH MODERATE NONPROLIFERATIVE RETINOPATHY OF BOTH EYES WITHOUT MACULAR EDEMA: Chronic | ICD-10-CM

## 2020-11-30 DIAGNOSIS — I10 MALIGNANT HYPERTENSION: ICD-10-CM

## 2020-11-30 PROBLEM — N18.30 STAGE 3 CHRONIC KIDNEY DISEASE: Status: RESOLVED | Noted: 2019-07-23 | Resolved: 2020-11-30

## 2020-11-30 LAB
ALBUMIN SERPL BCP-MCNC: 3.5 G/DL (ref 3.5–5.2)
ALP SERPL-CCNC: 72 U/L (ref 55–135)
ALT SERPL W/O P-5'-P-CCNC: 19 U/L (ref 10–44)
ANION GAP SERPL CALC-SCNC: 4 MMOL/L (ref 8–16)
AST SERPL-CCNC: 17 U/L (ref 10–40)
BILIRUB SERPL-MCNC: 0.4 MG/DL (ref 0.1–1)
BUN SERPL-MCNC: 34 MG/DL (ref 6–20)
CALCIUM SERPL-MCNC: 8.5 MG/DL (ref 8.7–10.5)
CHLORIDE SERPL-SCNC: 108 MMOL/L (ref 95–110)
CO2 SERPL-SCNC: 26 MMOL/L (ref 23–29)
CREAT SERPL-MCNC: 4.1 MG/DL (ref 0.5–1.4)
EST. GFR  (AFRICAN AMERICAN): 18.5 ML/MIN/1.73 M^2
EST. GFR  (NON AFRICAN AMERICAN): 16 ML/MIN/1.73 M^2
ESTIMATED AVG GLUCOSE: 177 MG/DL (ref 68–131)
GLUCOSE SERPL-MCNC: 108 MG/DL (ref 70–110)
HBA1C MFR BLD HPLC: 7.8 % (ref 4–5.6)
POTASSIUM SERPL-SCNC: 3.6 MMOL/L (ref 3.5–5.1)
PROT SERPL-MCNC: 6.9 G/DL (ref 6–8.4)
SODIUM SERPL-SCNC: 138 MMOL/L (ref 136–145)

## 2020-11-30 PROCEDURE — 3008F PR BODY MASS INDEX (BMI) DOCUMENTED: ICD-10-PCS | Mod: CPTII,S$GLB,, | Performed by: INTERNAL MEDICINE

## 2020-11-30 PROCEDURE — 3077F PR MOST RECENT SYSTOLIC BLOOD PRESSURE >= 140 MM HG: ICD-10-PCS | Mod: CPTII,S$GLB,, | Performed by: INTERNAL MEDICINE

## 2020-11-30 PROCEDURE — 99999 PR PBB SHADOW E&M-EST. PATIENT-LVL III: ICD-10-PCS | Mod: PBBFAC,,, | Performed by: INTERNAL MEDICINE

## 2020-11-30 PROCEDURE — 80053 COMPREHEN METABOLIC PANEL: CPT

## 2020-11-30 PROCEDURE — 99999 PR PBB SHADOW E&M-EST. PATIENT-LVL III: CPT | Mod: PBBFAC,,, | Performed by: INTERNAL MEDICINE

## 2020-11-30 PROCEDURE — 1126F PR PAIN SEVERITY QUANTIFIED, NO PAIN PRESENT: ICD-10-PCS | Mod: S$GLB,,, | Performed by: INTERNAL MEDICINE

## 2020-11-30 PROCEDURE — 1126F AMNT PAIN NOTED NONE PRSNT: CPT | Mod: S$GLB,,, | Performed by: INTERNAL MEDICINE

## 2020-11-30 PROCEDURE — 3080F PR MOST RECENT DIASTOLIC BLOOD PRESSURE >= 90 MM HG: ICD-10-PCS | Mod: CPTII,S$GLB,, | Performed by: INTERNAL MEDICINE

## 2020-11-30 PROCEDURE — 3080F DIAST BP >= 90 MM HG: CPT | Mod: CPTII,S$GLB,, | Performed by: INTERNAL MEDICINE

## 2020-11-30 PROCEDURE — 3046F PR MOST RECENT HEMOGLOBIN A1C LEVEL > 9.0%: ICD-10-PCS | Mod: CPTII,S$GLB,, | Performed by: INTERNAL MEDICINE

## 2020-11-30 PROCEDURE — 99214 OFFICE O/P EST MOD 30 MIN: CPT | Mod: S$GLB,,, | Performed by: INTERNAL MEDICINE

## 2020-11-30 PROCEDURE — 36415 COLL VENOUS BLD VENIPUNCTURE: CPT

## 2020-11-30 PROCEDURE — 3046F HEMOGLOBIN A1C LEVEL >9.0%: CPT | Mod: CPTII,S$GLB,, | Performed by: INTERNAL MEDICINE

## 2020-11-30 PROCEDURE — 3008F BODY MASS INDEX DOCD: CPT | Mod: CPTII,S$GLB,, | Performed by: INTERNAL MEDICINE

## 2020-11-30 PROCEDURE — 3077F SYST BP >= 140 MM HG: CPT | Mod: CPTII,S$GLB,, | Performed by: INTERNAL MEDICINE

## 2020-11-30 PROCEDURE — 99214 PR OFFICE/OUTPT VISIT, EST, LEVL IV, 30-39 MIN: ICD-10-PCS | Mod: S$GLB,,, | Performed by: INTERNAL MEDICINE

## 2020-11-30 PROCEDURE — 83036 HEMOGLOBIN GLYCOSYLATED A1C: CPT

## 2020-11-30 RX ORDER — FERROUS GLUCONATE 324(38)MG
324 TABLET ORAL
Status: CANCELLED | COMMUNITY
Start: 2020-11-30

## 2020-11-30 RX ORDER — CALCITRIOL 0.25 UG/1
0.25 CAPSULE ORAL DAILY
Qty: 90 CAPSULE | Refills: 1 | Status: CANCELLED | OUTPATIENT
Start: 2020-11-30

## 2020-11-30 RX ORDER — FERROUS GLUCONATE 324(38)MG
324 TABLET ORAL
COMMUNITY
Start: 2020-11-30 | End: 2021-04-23 | Stop reason: SDUPTHER

## 2020-11-30 RX ORDER — METOLAZONE 5 MG/1
5 TABLET ORAL DAILY
Qty: 90 TABLET | Refills: 1 | Status: CANCELLED | OUTPATIENT
Start: 2020-11-30

## 2020-11-30 RX ORDER — CALCITRIOL 0.25 UG/1
0.25 CAPSULE ORAL DAILY
Qty: 90 CAPSULE | Refills: 1 | Status: SHIPPED | OUTPATIENT
Start: 2020-11-30 | End: 2021-09-30 | Stop reason: SDUPTHER

## 2020-11-30 RX ORDER — METOLAZONE 5 MG/1
5 TABLET ORAL DAILY
Qty: 90 TABLET | Refills: 1 | Status: SHIPPED | OUTPATIENT
Start: 2020-11-30 | End: 2021-04-23 | Stop reason: SDUPTHER

## 2020-11-30 RX ORDER — SILDENAFIL 100 MG/1
100 TABLET, FILM COATED ORAL DAILY PRN
Qty: 3 TABLET | Refills: 12 | Status: SHIPPED | OUTPATIENT
Start: 2020-11-30 | End: 2021-02-15

## 2020-11-30 NOTE — PROGRESS NOTES
He is a 50-year-old gentleman who I saw last in 10/16/2020 with CKD,   hypertension, diabetes mellitus, hyperlipidemia and cocaine abuse history,   coming in for follow-up.            Last visit he had just spent  30 days in rehab after being caught when he  underwent drug check by his work..  He had cocaine system again.  This is the 3rd time he has been in a rehab.   He was in the hospital on 07/22/2019 for chest   pain and hypertensive emergency. He had two day ICU stay for the hypertensive   emergency.   He had another episode 9/21/2020- which we also reviewed.He was having atypical chest pain and htn after cocaine use.  He admits noncompliance with his medications as one problem, also cocaine use.  He has  Been complaint with his meds.  He has been using cocaine some-last was 3 weeks ago.    He is trying to get off the cocaine-- his bp is really high when he takes cocaine. He dies not wish to go back to rehab.  He has three bottles of meds that he is out of- his fe gluconates, metolazone and his calcitriol-- but he has all his bp meds- but he rina not take them today.                      He has acute kidney injury in September 2019 he was  seeing Dr. Leighann Wesley in [facility] 9/2019.  She started him on Procrit that he did not .  He saw Nephrology here in 10/2020.  That note was reviewed.   He has anemia secondary to significant CKD.  GFR was 19 ml/min.  He does not remember what they said at that visit.          He was  seeing Endocrinology for his diabetes, but last visit was 6 years ago in 2020.  .  He has been insulin more regularly- and he is taking it.  .  He was risen it form 10 to 12 units with meals.    Novolog with meals.     He is still taking the 28-30  units Basaglar a day.He does not take his blood glucoses.  Recent GFR was 19 -- recent glucose was 172.       proliferative retinopathy without macular edema-- he was seen in  earlier November and they strongly recommended a follow up  "in 2 weeks- but he missed that appointment .       PHYSICAL EXAMINATION:           BP (!) 166/90 (BP Location: Left arm, Patient Position: Sitting, BP Method: Large (Manual))   Pulse (!) 58   Ht 5' 11" (1.803 m)   Wt 106.9 kg (235 lb 10.8 oz)   SpO2 100%   BMI 32.87 kg/m²           VITAL SIGNS:  Blood pressure is still elevated as above.  GENERAL:  He is a well-appearing 50 -year-old -American gentleman in no   acute distress  no acute distress.  NECK:  Supple.  CHEST:  Clear without wheeze.  CARDIOVASCULAR:  S1 and S2, regular rate and rhythm without murmur, gallop or   rub.  ABDOMEN:  Soft, nontender, no hepatosplenomegaly, no guarding or rebound   tenderness.  LOWER EXTREMITIES:  No edema.          ASSESSMENT:  Cocaine abuse, chronic.  He has been in rehab 3 times, Was in ed with Atypical cp and htn last in July.  3 weeks off cocaine      diabetes mellitus type 1, uncontrolled with worsening renal manifestations and optic neuropathy-- He is taking his insulin but not check his glucoses-- will get hgb A1c today.  WIll also try to route him back to optho.    ,     CKDstage IV , hyperlipidemia and hypertension( did not take his meds today) .     PLAN:   went over how he needs to take his blood pressure medications- every day.  .  We discussed the importance of refraining from cocaine use.     He understands.  I just   had some morena discussion today, he is 50  years old,with stage  IV CKD and proliferative retinopathy.  and that we need to start talking about dialysis,.  He needs to follow up with Nephrology.      We refilled his blood pressure medications.  We will follow him up again in 2 months.   He is not interested in rehab or seeing [facility].   "

## 2020-12-01 ENCOUNTER — TELEPHONE (OUTPATIENT)
Dept: INTERNAL MEDICINE | Facility: CLINIC | Age: 50
End: 2020-12-01

## 2020-12-08 NOTE — LETTER
December 10, 2018     Luis Daniel Howell  [address] [place] [address]       Dear Luis Daniel,    Thank you for enrolling in the [facility]. To participate in our programs, we ask that you submit weekly home readings through your [facility] account and maintain regular contact with your Care Team.  We have not received any data or heard from you in some time.     The Digital Medicine Care Team has attempted to reach you on multiple occasions to determine if you would like to continue participating in the program. While we encourage you to continue participating fully, we understand that circumstances may change.      To continue participating in the program, please contact me at . If we do not hear back, you will be un-enrolled and your physician will be notified of your decision.    If you have submitted home readings readings during the past 14 days and believe you are receiving this letter in error, please call the Digital Medicine Patient Support Line at [phone] for troubleshooting.      We look forward to hearing from you soon.    Sincerely,     Teja Camarena  [facility]  [phone]     
No

## 2021-01-26 ENCOUNTER — PATIENT OUTREACH (OUTPATIENT)
Dept: ADMINISTRATIVE | Facility: OTHER | Age: 51
End: 2021-01-26

## 2021-01-27 ENCOUNTER — OFFICE VISIT (OUTPATIENT)
Dept: OPHTHALMOLOGY | Facility: CLINIC | Age: 51
End: 2021-01-27
Payer: COMMERCIAL

## 2021-01-27 DIAGNOSIS — E10.3593 CONTROLLED TYPE 1 DIABETES MELLITUS WITH BOTH EYES AFFECTED BY PROLIFERATIVE RETINOPATHY WITHOUT MACULAR EDEMA: Primary | ICD-10-CM

## 2021-01-27 PROCEDURE — 99499 UNLISTED E&M SERVICE: CPT | Mod: S$GLB,,, | Performed by: OPHTHALMOLOGY

## 2021-01-27 PROCEDURE — 99999 PR PBB SHADOW E&M-EST. PATIENT-LVL III: ICD-10-PCS | Mod: PBBFAC,,, | Performed by: OPHTHALMOLOGY

## 2021-01-27 PROCEDURE — 99999 PR PBB SHADOW E&M-EST. PATIENT-LVL III: CPT | Mod: PBBFAC,,, | Performed by: OPHTHALMOLOGY

## 2021-01-27 PROCEDURE — 99499 NO LOS: ICD-10-PCS | Mod: S$GLB,,, | Performed by: OPHTHALMOLOGY

## 2021-01-27 PROCEDURE — 1125F PR PAIN SEVERITY QUANTIFIED, PAIN PRESENT: ICD-10-PCS | Mod: S$GLB,,, | Performed by: OPHTHALMOLOGY

## 2021-01-27 PROCEDURE — 67228 PR TREATMENT EXTENSIVE RETINOPATHY, PHOTOCOAGULATION: ICD-10-PCS | Mod: LT,S$GLB,, | Performed by: OPHTHALMOLOGY

## 2021-01-27 PROCEDURE — 67228 TREATMENT X10SV RETINOPATHY: CPT | Mod: LT,S$GLB,, | Performed by: OPHTHALMOLOGY

## 2021-01-27 PROCEDURE — 1125F AMNT PAIN NOTED PAIN PRSNT: CPT | Mod: S$GLB,,, | Performed by: OPHTHALMOLOGY

## 2021-02-11 ENCOUNTER — OFFICE VISIT (OUTPATIENT)
Dept: OPHTHALMOLOGY | Facility: CLINIC | Age: 51
End: 2021-02-11
Payer: COMMERCIAL

## 2021-02-11 DIAGNOSIS — E10.3593 CONTROLLED TYPE 1 DIABETES MELLITUS WITH BOTH EYES AFFECTED BY PROLIFERATIVE RETINOPATHY WITHOUT MACULAR EDEMA: Primary | ICD-10-CM

## 2021-02-11 PROCEDURE — 99499 NO LOS: ICD-10-PCS | Mod: S$GLB,,, | Performed by: OPHTHALMOLOGY

## 2021-02-11 PROCEDURE — 99999 PR PBB SHADOW E&M-EST. PATIENT-LVL III: ICD-10-PCS | Mod: PBBFAC,,, | Performed by: OPHTHALMOLOGY

## 2021-02-11 PROCEDURE — 99999 PR PBB SHADOW E&M-EST. PATIENT-LVL III: CPT | Mod: PBBFAC,,, | Performed by: OPHTHALMOLOGY

## 2021-02-11 PROCEDURE — 67228 PR TREATMENT EXTENSIVE RETINOPATHY, PHOTOCOAGULATION: ICD-10-PCS | Mod: RT,S$GLB,, | Performed by: OPHTHALMOLOGY

## 2021-02-11 PROCEDURE — 67228 TREATMENT X10SV RETINOPATHY: CPT | Mod: RT,S$GLB,, | Performed by: OPHTHALMOLOGY

## 2021-02-11 PROCEDURE — 1126F PR PAIN SEVERITY QUANTIFIED, NO PAIN PRESENT: ICD-10-PCS | Mod: S$GLB,,, | Performed by: OPHTHALMOLOGY

## 2021-02-11 PROCEDURE — 99499 UNLISTED E&M SERVICE: CPT | Mod: S$GLB,,, | Performed by: OPHTHALMOLOGY

## 2021-02-11 PROCEDURE — 1126F AMNT PAIN NOTED NONE PRSNT: CPT | Mod: S$GLB,,, | Performed by: OPHTHALMOLOGY

## 2021-03-04 ENCOUNTER — TELEPHONE (OUTPATIENT)
Dept: OPHTHALMOLOGY | Facility: CLINIC | Age: 51
End: 2021-03-04

## 2021-03-08 ENCOUNTER — PATIENT OUTREACH (OUTPATIENT)
Dept: ADMINISTRATIVE | Facility: OTHER | Age: 51
End: 2021-03-08

## 2021-03-10 ENCOUNTER — OFFICE VISIT (OUTPATIENT)
Dept: OPHTHALMOLOGY | Facility: CLINIC | Age: 51
End: 2021-03-10
Payer: COMMERCIAL

## 2021-03-10 DIAGNOSIS — H46.9 OPTIC NEUROPATHY, LEFT: ICD-10-CM

## 2021-03-10 DIAGNOSIS — E10.3593 CONTROLLED TYPE 1 DIABETES MELLITUS WITH BOTH EYES AFFECTED BY PROLIFERATIVE RETINOPATHY WITHOUT MACULAR EDEMA: Primary | ICD-10-CM

## 2021-03-10 PROCEDURE — 99999 PR PBB SHADOW E&M-EST. PATIENT-LVL III: ICD-10-PCS | Mod: PBBFAC,,, | Performed by: OPHTHALMOLOGY

## 2021-03-10 PROCEDURE — 99499 UNLISTED E&M SERVICE: CPT | Mod: S$GLB,,, | Performed by: OPHTHALMOLOGY

## 2021-03-10 PROCEDURE — 1126F AMNT PAIN NOTED NONE PRSNT: CPT | Mod: S$GLB,,, | Performed by: OPHTHALMOLOGY

## 2021-03-10 PROCEDURE — 99999 PR PBB SHADOW E&M-EST. PATIENT-LVL III: CPT | Mod: PBBFAC,,, | Performed by: OPHTHALMOLOGY

## 2021-03-10 PROCEDURE — 67228 PR TREATMENT EXTENSIVE RETINOPATHY, PHOTOCOAGULATION: ICD-10-PCS | Mod: LT,S$GLB,, | Performed by: OPHTHALMOLOGY

## 2021-03-10 PROCEDURE — 1126F PR PAIN SEVERITY QUANTIFIED, NO PAIN PRESENT: ICD-10-PCS | Mod: S$GLB,,, | Performed by: OPHTHALMOLOGY

## 2021-03-10 PROCEDURE — 67228 TREATMENT X10SV RETINOPATHY: CPT | Mod: LT,S$GLB,, | Performed by: OPHTHALMOLOGY

## 2021-03-10 PROCEDURE — 99499 NO LOS: ICD-10-PCS | Mod: S$GLB,,, | Performed by: OPHTHALMOLOGY

## 2021-04-01 ENCOUNTER — OFFICE VISIT (OUTPATIENT)
Dept: OPHTHALMOLOGY | Facility: CLINIC | Age: 51
End: 2021-04-01
Payer: COMMERCIAL

## 2021-04-01 DIAGNOSIS — E10.3593 CONTROLLED TYPE 1 DIABETES MELLITUS WITH BOTH EYES AFFECTED BY PROLIFERATIVE RETINOPATHY WITHOUT MACULAR EDEMA: Primary | ICD-10-CM

## 2021-04-01 PROCEDURE — 67228 PR TREATMENT EXTENSIVE RETINOPATHY, PHOTOCOAGULATION: ICD-10-PCS | Mod: RT,S$GLB,, | Performed by: OPHTHALMOLOGY

## 2021-04-01 PROCEDURE — 99999 PR PBB SHADOW E&M-EST. PATIENT-LVL III: ICD-10-PCS | Mod: PBBFAC,,, | Performed by: OPHTHALMOLOGY

## 2021-04-01 PROCEDURE — 1126F AMNT PAIN NOTED NONE PRSNT: CPT | Mod: S$GLB,,, | Performed by: OPHTHALMOLOGY

## 2021-04-01 PROCEDURE — 99999 PR PBB SHADOW E&M-EST. PATIENT-LVL III: CPT | Mod: PBBFAC,,, | Performed by: OPHTHALMOLOGY

## 2021-04-01 PROCEDURE — 99499 UNLISTED E&M SERVICE: CPT | Mod: S$GLB,,, | Performed by: OPHTHALMOLOGY

## 2021-04-01 PROCEDURE — 67228 TREATMENT X10SV RETINOPATHY: CPT | Mod: RT,S$GLB,, | Performed by: OPHTHALMOLOGY

## 2021-04-01 PROCEDURE — 99499 NO LOS: ICD-10-PCS | Mod: S$GLB,,, | Performed by: OPHTHALMOLOGY

## 2021-04-01 PROCEDURE — 1126F PR PAIN SEVERITY QUANTIFIED, NO PAIN PRESENT: ICD-10-PCS | Mod: S$GLB,,, | Performed by: OPHTHALMOLOGY

## 2021-04-05 ENCOUNTER — PATIENT MESSAGE (OUTPATIENT)
Dept: ADMINISTRATIVE | Facility: HOSPITAL | Age: 51
End: 2021-04-05

## 2021-04-23 ENCOUNTER — OFFICE VISIT (OUTPATIENT)
Dept: INTERNAL MEDICINE | Facility: CLINIC | Age: 51
End: 2021-04-23
Payer: COMMERCIAL

## 2021-04-23 ENCOUNTER — LAB VISIT (OUTPATIENT)
Dept: LAB | Facility: HOSPITAL | Age: 51
End: 2021-04-23
Attending: INTERNAL MEDICINE
Payer: COMMERCIAL

## 2021-04-23 VITALS
WEIGHT: 232.81 LBS | SYSTOLIC BLOOD PRESSURE: 150 MMHG | OXYGEN SATURATION: 99 % | HEIGHT: 71 IN | BODY MASS INDEX: 32.59 KG/M2 | HEART RATE: 62 BPM | DIASTOLIC BLOOD PRESSURE: 90 MMHG

## 2021-04-23 DIAGNOSIS — I10 MALIGNANT HYPERTENSION: ICD-10-CM

## 2021-04-23 DIAGNOSIS — E66.9 OBESITY (BMI 30-39.9): Chronic | ICD-10-CM

## 2021-04-23 DIAGNOSIS — R73.9 HYPERGLYCEMIA: ICD-10-CM

## 2021-04-23 DIAGNOSIS — N18.4 CKD (CHRONIC KIDNEY DISEASE) STAGE 4, GFR 15-29 ML/MIN: ICD-10-CM

## 2021-04-23 DIAGNOSIS — L30.9 DERMATITIS: ICD-10-CM

## 2021-04-23 DIAGNOSIS — E10.3393 TYPE 1 DIABETES MELLITUS WITH MODERATE NONPROLIFERATIVE RETINOPATHY OF BOTH EYES WITHOUT MACULAR EDEMA: Primary | Chronic | ICD-10-CM

## 2021-04-23 DIAGNOSIS — I10 ESSENTIAL HYPERTENSION: Chronic | ICD-10-CM

## 2021-04-23 DIAGNOSIS — E78.5 HYPERLIPIDEMIA, UNSPECIFIED HYPERLIPIDEMIA TYPE: ICD-10-CM

## 2021-04-23 DIAGNOSIS — R06.09 DYSPNEA ON EXERTION: ICD-10-CM

## 2021-04-23 DIAGNOSIS — F14.10 DRUG ABUSE, COCAINE TYPE: ICD-10-CM

## 2021-04-23 LAB
ALBUMIN SERPL BCP-MCNC: 3.4 G/DL (ref 3.5–5.2)
ALP SERPL-CCNC: 83 U/L (ref 55–135)
ALT SERPL W/O P-5'-P-CCNC: 15 U/L (ref 10–44)
ANION GAP SERPL CALC-SCNC: 10 MMOL/L (ref 8–16)
AST SERPL-CCNC: 21 U/L (ref 10–40)
BASOPHILS # BLD AUTO: 0.02 K/UL (ref 0–0.2)
BASOPHILS NFR BLD: 0.3 % (ref 0–1.9)
BILIRUB SERPL-MCNC: 0.7 MG/DL (ref 0.1–1)
BUN SERPL-MCNC: 48 MG/DL (ref 6–20)
CALCIUM SERPL-MCNC: 8.5 MG/DL (ref 8.7–10.5)
CHLORIDE SERPL-SCNC: 109 MMOL/L (ref 95–110)
CHOLEST SERPL-MCNC: 141 MG/DL (ref 120–199)
CHOLEST/HDLC SERPL: 2.4 {RATIO} (ref 2–5)
CO2 SERPL-SCNC: 23 MMOL/L (ref 23–29)
CREAT SERPL-MCNC: 5.2 MG/DL (ref 0.5–1.4)
DIFFERENTIAL METHOD: ABNORMAL
EOSINOPHIL # BLD AUTO: 0.1 K/UL (ref 0–0.5)
EOSINOPHIL NFR BLD: 2.1 % (ref 0–8)
ERYTHROCYTE [DISTWIDTH] IN BLOOD BY AUTOMATED COUNT: 13.8 % (ref 11.5–14.5)
EST. GFR  (AFRICAN AMERICAN): 13.9 ML/MIN/1.73 M^2
EST. GFR  (NON AFRICAN AMERICAN): 12 ML/MIN/1.73 M^2
ESTIMATED AVG GLUCOSE: 180 MG/DL (ref 68–131)
GLUCOSE SERPL-MCNC: 93 MG/DL (ref 70–110)
HBA1C MFR BLD: 7.9 % (ref 4–5.6)
HCT VFR BLD AUTO: 33.6 % (ref 40–54)
HDLC SERPL-MCNC: 58 MG/DL (ref 40–75)
HDLC SERPL: 41.1 % (ref 20–50)
HGB BLD-MCNC: 10.4 G/DL (ref 14–18)
IMM GRANULOCYTES # BLD AUTO: 0.01 K/UL (ref 0–0.04)
IMM GRANULOCYTES NFR BLD AUTO: 0.2 % (ref 0–0.5)
LDLC SERPL CALC-MCNC: 75.2 MG/DL (ref 63–159)
LYMPHOCYTES # BLD AUTO: 1.4 K/UL (ref 1–4.8)
LYMPHOCYTES NFR BLD: 25.2 % (ref 18–48)
MCH RBC QN AUTO: 26.5 PG (ref 27–31)
MCHC RBC AUTO-ENTMCNC: 31 G/DL (ref 32–36)
MCV RBC AUTO: 86 FL (ref 82–98)
MONOCYTES # BLD AUTO: 0.9 K/UL (ref 0.3–1)
MONOCYTES NFR BLD: 15.7 % (ref 4–15)
NEUTROPHILS # BLD AUTO: 3.2 K/UL (ref 1.8–7.7)
NEUTROPHILS NFR BLD: 56.5 % (ref 38–73)
NONHDLC SERPL-MCNC: 83 MG/DL
NRBC BLD-RTO: 0 /100 WBC
PLATELET # BLD AUTO: 171 K/UL (ref 150–450)
PMV BLD AUTO: 12.9 FL (ref 9.2–12.9)
POTASSIUM SERPL-SCNC: 4.3 MMOL/L (ref 3.5–5.1)
PROT SERPL-MCNC: 7.3 G/DL (ref 6–8.4)
RBC # BLD AUTO: 3.93 M/UL (ref 4.6–6.2)
SODIUM SERPL-SCNC: 142 MMOL/L (ref 136–145)
TRIGL SERPL-MCNC: 39 MG/DL (ref 30–150)
WBC # BLD AUTO: 5.72 K/UL (ref 3.9–12.7)

## 2021-04-23 PROCEDURE — 99999 PR PBB SHADOW E&M-EST. PATIENT-LVL III: CPT | Mod: PBBFAC,,, | Performed by: INTERNAL MEDICINE

## 2021-04-23 PROCEDURE — 1126F AMNT PAIN NOTED NONE PRSNT: CPT | Mod: S$GLB,,, | Performed by: INTERNAL MEDICINE

## 2021-04-23 PROCEDURE — 80053 COMPREHEN METABOLIC PANEL: CPT | Performed by: INTERNAL MEDICINE

## 2021-04-23 PROCEDURE — 80061 LIPID PANEL: CPT | Performed by: INTERNAL MEDICINE

## 2021-04-23 PROCEDURE — 36415 COLL VENOUS BLD VENIPUNCTURE: CPT | Performed by: INTERNAL MEDICINE

## 2021-04-23 PROCEDURE — 99214 OFFICE O/P EST MOD 30 MIN: CPT | Mod: S$GLB,,, | Performed by: INTERNAL MEDICINE

## 2021-04-23 PROCEDURE — 3051F HG A1C>EQUAL 7.0%<8.0%: CPT | Mod: CPTII,S$GLB,, | Performed by: INTERNAL MEDICINE

## 2021-04-23 PROCEDURE — 99999 PR PBB SHADOW E&M-EST. PATIENT-LVL III: ICD-10-PCS | Mod: PBBFAC,,, | Performed by: INTERNAL MEDICINE

## 2021-04-23 PROCEDURE — 99214 PR OFFICE/OUTPT VISIT, EST, LEVL IV, 30-39 MIN: ICD-10-PCS | Mod: S$GLB,,, | Performed by: INTERNAL MEDICINE

## 2021-04-23 PROCEDURE — 3051F PR MOST RECENT HEMOGLOBIN A1C LEVEL 7.0 - < 8.0%: ICD-10-PCS | Mod: CPTII,S$GLB,, | Performed by: INTERNAL MEDICINE

## 2021-04-23 PROCEDURE — 1126F PR PAIN SEVERITY QUANTIFIED, NO PAIN PRESENT: ICD-10-PCS | Mod: S$GLB,,, | Performed by: INTERNAL MEDICINE

## 2021-04-23 PROCEDURE — 85025 COMPLETE CBC W/AUTO DIFF WBC: CPT | Performed by: INTERNAL MEDICINE

## 2021-04-23 PROCEDURE — 3008F BODY MASS INDEX DOCD: CPT | Mod: CPTII,S$GLB,, | Performed by: INTERNAL MEDICINE

## 2021-04-23 PROCEDURE — 83036 HEMOGLOBIN GLYCOSYLATED A1C: CPT | Performed by: INTERNAL MEDICINE

## 2021-04-23 PROCEDURE — 3008F PR BODY MASS INDEX (BMI) DOCUMENTED: ICD-10-PCS | Mod: CPTII,S$GLB,, | Performed by: INTERNAL MEDICINE

## 2021-04-23 RX ORDER — LABETALOL 300 MG/1
300 TABLET, FILM COATED ORAL 2 TIMES DAILY
Qty: 180 TABLET | Refills: 3 | Status: ON HOLD | OUTPATIENT
Start: 2021-04-23 | End: 2021-09-24 | Stop reason: HOSPADM

## 2021-04-23 RX ORDER — SILDENAFIL 100 MG/1
100 TABLET, FILM COATED ORAL DAILY PRN
Qty: 6 TABLET | Refills: 5 | Status: SHIPPED | OUTPATIENT
Start: 2021-04-23 | End: 2022-02-25 | Stop reason: SDUPTHER

## 2021-04-23 RX ORDER — ATORVASTATIN CALCIUM 40 MG/1
40 TABLET, FILM COATED ORAL DAILY
Qty: 90 TABLET | Refills: 3 | Status: ON HOLD | OUTPATIENT
Start: 2021-04-23 | End: 2021-09-24 | Stop reason: HOSPADM

## 2021-04-23 RX ORDER — METOLAZONE 5 MG/1
5 TABLET ORAL DAILY
Qty: 90 TABLET | Refills: 1 | Status: SHIPPED | OUTPATIENT
Start: 2021-04-23 | End: 2022-02-25 | Stop reason: SDUPTHER

## 2021-04-23 RX ORDER — INSULIN GLARGINE 100 [IU]/ML
45 INJECTION, SOLUTION SUBCUTANEOUS NIGHTLY
Qty: 3 BOX | Refills: 3
Start: 2021-04-23 | End: 2022-02-25 | Stop reason: SDUPTHER

## 2021-04-23 RX ORDER — AMLODIPINE BESYLATE 10 MG/1
10 TABLET ORAL DAILY
Qty: 90 TABLET | Refills: 3 | Status: SHIPPED | OUTPATIENT
Start: 2021-04-23 | End: 2021-09-30 | Stop reason: SDUPTHER

## 2021-04-23 RX ORDER — INSULIN ASPART 100 [IU]/ML
INJECTION, SOLUTION INTRAVENOUS; SUBCUTANEOUS
Qty: 60 SYRINGE | Refills: 1 | Status: ON HOLD | OUTPATIENT
Start: 2021-04-23 | End: 2021-09-24 | Stop reason: SDUPTHER

## 2021-04-23 RX ORDER — FERROUS GLUCONATE 324(38)MG
324 TABLET ORAL
COMMUNITY
Start: 2021-04-23 | End: 2023-01-04

## 2021-05-06 ENCOUNTER — TELEPHONE (OUTPATIENT)
Dept: NEPHROLOGY | Facility: CLINIC | Age: 51
End: 2021-05-06

## 2021-05-07 ENCOUNTER — TELEPHONE (OUTPATIENT)
Dept: OPHTHALMOLOGY | Facility: CLINIC | Age: 51
End: 2021-05-07

## 2021-05-07 ENCOUNTER — PATIENT OUTREACH (OUTPATIENT)
Dept: ADMINISTRATIVE | Facility: OTHER | Age: 51
End: 2021-05-07

## 2021-05-07 DIAGNOSIS — Z12.11 ENCOUNTER FOR FIT (FECAL IMMUNOCHEMICAL TEST) SCREENING: Primary | ICD-10-CM

## 2021-05-10 ENCOUNTER — IMMUNIZATION (OUTPATIENT)
Dept: PRIMARY CARE CLINIC | Facility: CLINIC | Age: 51
End: 2021-05-10
Payer: COMMERCIAL

## 2021-05-10 DIAGNOSIS — Z23 NEED FOR VACCINATION: Primary | ICD-10-CM

## 2021-05-10 PROCEDURE — 91300 COVID-19, MRNA, LNP-S, PF, 30 MCG/0.3 ML DOSE VACCINE: CPT | Mod: PBBFAC | Performed by: EMERGENCY MEDICINE

## 2021-05-11 ENCOUNTER — TELEPHONE (OUTPATIENT)
Dept: NEPHROLOGY | Facility: CLINIC | Age: 51
End: 2021-05-11

## 2021-05-26 ENCOUNTER — LAB VISIT (OUTPATIENT)
Dept: LAB | Facility: HOSPITAL | Age: 51
End: 2021-05-26
Attending: INTERNAL MEDICINE
Payer: COMMERCIAL

## 2021-05-26 ENCOUNTER — OFFICE VISIT (OUTPATIENT)
Dept: NEPHROLOGY | Facility: CLINIC | Age: 51
End: 2021-05-26
Payer: COMMERCIAL

## 2021-05-26 VITALS
WEIGHT: 231.69 LBS | SYSTOLIC BLOOD PRESSURE: 162 MMHG | HEART RATE: 68 BPM | BODY MASS INDEX: 32.44 KG/M2 | HEIGHT: 71 IN | DIASTOLIC BLOOD PRESSURE: 80 MMHG | OXYGEN SATURATION: 98 %

## 2021-05-26 DIAGNOSIS — N18.4 CKD (CHRONIC KIDNEY DISEASE) STAGE 4, GFR 15-29 ML/MIN: ICD-10-CM

## 2021-05-26 LAB
ANION GAP SERPL CALC-SCNC: 11 MMOL/L (ref 8–16)
BASOPHILS # BLD AUTO: 0.01 K/UL (ref 0–0.2)
BASOPHILS NFR BLD: 0.2 % (ref 0–1.9)
BUN SERPL-MCNC: 51 MG/DL (ref 6–20)
CALCIUM SERPL-MCNC: 8.7 MG/DL (ref 8.7–10.5)
CHLORIDE SERPL-SCNC: 108 MMOL/L (ref 95–110)
CO2 SERPL-SCNC: 20 MMOL/L (ref 23–29)
CREAT SERPL-MCNC: 5.5 MG/DL (ref 0.5–1.4)
DIFFERENTIAL METHOD: ABNORMAL
EOSINOPHIL # BLD AUTO: 0.2 K/UL (ref 0–0.5)
EOSINOPHIL NFR BLD: 2.8 % (ref 0–8)
ERYTHROCYTE [DISTWIDTH] IN BLOOD BY AUTOMATED COUNT: 13.7 % (ref 11.5–14.5)
EST. GFR  (AFRICAN AMERICAN): 12.9 ML/MIN/1.73 M^2
EST. GFR  (NON AFRICAN AMERICAN): 11.1 ML/MIN/1.73 M^2
GLUCOSE SERPL-MCNC: 180 MG/DL (ref 70–110)
HCT VFR BLD AUTO: 29.8 % (ref 40–54)
HGB BLD-MCNC: 9.4 G/DL (ref 14–18)
IMM GRANULOCYTES # BLD AUTO: 0.03 K/UL (ref 0–0.04)
IMM GRANULOCYTES NFR BLD AUTO: 0.5 % (ref 0–0.5)
IRON SERPL-MCNC: 59 UG/DL (ref 45–160)
LYMPHOCYTES # BLD AUTO: 1.5 K/UL (ref 1–4.8)
LYMPHOCYTES NFR BLD: 23.2 % (ref 18–48)
MCH RBC QN AUTO: 26.3 PG (ref 27–31)
MCHC RBC AUTO-ENTMCNC: 31.5 G/DL (ref 32–36)
MCV RBC AUTO: 83 FL (ref 82–98)
MONOCYTES # BLD AUTO: 0.9 K/UL (ref 0.3–1)
MONOCYTES NFR BLD: 14.2 % (ref 4–15)
NEUTROPHILS # BLD AUTO: 3.8 K/UL (ref 1.8–7.7)
NEUTROPHILS NFR BLD: 59.1 % (ref 38–73)
NRBC BLD-RTO: 0 /100 WBC
PLATELET # BLD AUTO: 173 K/UL (ref 150–450)
PMV BLD AUTO: 13 FL (ref 9.2–12.9)
POTASSIUM SERPL-SCNC: 4 MMOL/L (ref 3.5–5.1)
RBC # BLD AUTO: 3.58 M/UL (ref 4.6–6.2)
SATURATED IRON: 21 % (ref 20–50)
SODIUM SERPL-SCNC: 139 MMOL/L (ref 136–145)
TOTAL IRON BINDING CAPACITY: 286 UG/DL (ref 250–450)
TRANSFERRIN SERPL-MCNC: 193 MG/DL (ref 200–375)
WBC # BLD AUTO: 6.41 K/UL (ref 3.9–12.7)

## 2021-05-26 PROCEDURE — 99213 PR OFFICE/OUTPT VISIT, EST, LEVL III, 20-29 MIN: ICD-10-PCS | Mod: S$GLB,,, | Performed by: INTERNAL MEDICINE

## 2021-05-26 PROCEDURE — 1126F PR PAIN SEVERITY QUANTIFIED, NO PAIN PRESENT: ICD-10-PCS | Mod: S$GLB,,, | Performed by: INTERNAL MEDICINE

## 2021-05-26 PROCEDURE — 3079F DIAST BP 80-89 MM HG: CPT | Mod: CPTII,S$GLB,, | Performed by: INTERNAL MEDICINE

## 2021-05-26 PROCEDURE — 99999 PR PBB SHADOW E&M-EST. PATIENT-LVL V: ICD-10-PCS | Mod: PBBFAC,,, | Performed by: INTERNAL MEDICINE

## 2021-05-26 PROCEDURE — 3077F PR MOST RECENT SYSTOLIC BLOOD PRESSURE >= 140 MM HG: ICD-10-PCS | Mod: CPTII,S$GLB,, | Performed by: INTERNAL MEDICINE

## 2021-05-26 PROCEDURE — 3051F PR MOST RECENT HEMOGLOBIN A1C LEVEL 7.0 - < 8.0%: ICD-10-PCS | Mod: CPTII,S$GLB,, | Performed by: INTERNAL MEDICINE

## 2021-05-26 PROCEDURE — 99999 PR PBB SHADOW E&M-EST. PATIENT-LVL V: CPT | Mod: PBBFAC,,, | Performed by: INTERNAL MEDICINE

## 2021-05-26 PROCEDURE — 1126F AMNT PAIN NOTED NONE PRSNT: CPT | Mod: S$GLB,,, | Performed by: INTERNAL MEDICINE

## 2021-05-26 PROCEDURE — 83540 ASSAY OF IRON: CPT | Performed by: INTERNAL MEDICINE

## 2021-05-26 PROCEDURE — 3051F HG A1C>EQUAL 7.0%<8.0%: CPT | Mod: CPTII,S$GLB,, | Performed by: INTERNAL MEDICINE

## 2021-05-26 PROCEDURE — 99213 OFFICE O/P EST LOW 20 MIN: CPT | Mod: S$GLB,,, | Performed by: INTERNAL MEDICINE

## 2021-05-26 PROCEDURE — 3008F PR BODY MASS INDEX (BMI) DOCUMENTED: ICD-10-PCS | Mod: CPTII,S$GLB,, | Performed by: INTERNAL MEDICINE

## 2021-05-26 PROCEDURE — 80048 BASIC METABOLIC PNL TOTAL CA: CPT | Performed by: INTERNAL MEDICINE

## 2021-05-26 PROCEDURE — 3077F SYST BP >= 140 MM HG: CPT | Mod: CPTII,S$GLB,, | Performed by: INTERNAL MEDICINE

## 2021-05-26 PROCEDURE — 85025 COMPLETE CBC W/AUTO DIFF WBC: CPT | Performed by: INTERNAL MEDICINE

## 2021-05-26 PROCEDURE — 3079F PR MOST RECENT DIASTOLIC BLOOD PRESSURE 80-89 MM HG: ICD-10-PCS | Mod: CPTII,S$GLB,, | Performed by: INTERNAL MEDICINE

## 2021-05-26 PROCEDURE — 3008F BODY MASS INDEX DOCD: CPT | Mod: CPTII,S$GLB,, | Performed by: INTERNAL MEDICINE

## 2021-05-26 PROCEDURE — 36415 COLL VENOUS BLD VENIPUNCTURE: CPT | Performed by: INTERNAL MEDICINE

## 2021-05-27 DIAGNOSIS — Z01.818 PRE-OPERATIVE EXAMINATION: Primary | ICD-10-CM

## 2021-06-03 ENCOUNTER — IMMUNIZATION (OUTPATIENT)
Dept: PRIMARY CARE CLINIC | Facility: CLINIC | Age: 51
End: 2021-06-03
Payer: COMMERCIAL

## 2021-06-03 ENCOUNTER — TELEPHONE (OUTPATIENT)
Dept: OPHTHALMOLOGY | Facility: CLINIC | Age: 51
End: 2021-06-03

## 2021-06-03 DIAGNOSIS — Z23 NEED FOR VACCINATION: Primary | ICD-10-CM

## 2021-06-03 PROCEDURE — 91300 COVID-19, MRNA, LNP-S, PF, 30 MCG/0.3 ML DOSE VACCINE: CPT | Mod: PBBFAC | Performed by: EMERGENCY MEDICINE

## 2021-06-03 PROCEDURE — 0002A COVID-19, MRNA, LNP-S, PF, 30 MCG/0.3 ML DOSE VACCINE: CPT | Mod: PBBFAC | Performed by: EMERGENCY MEDICINE

## 2021-06-11 ENCOUNTER — PATIENT OUTREACH (OUTPATIENT)
Dept: ADMINISTRATIVE | Facility: OTHER | Age: 51
End: 2021-06-11

## 2021-07-06 ENCOUNTER — PATIENT MESSAGE (OUTPATIENT)
Dept: ADMINISTRATIVE | Facility: HOSPITAL | Age: 51
End: 2021-07-06

## 2021-08-03 ENCOUNTER — PATIENT MESSAGE (OUTPATIENT)
Dept: ADMINISTRATIVE | Facility: HOSPITAL | Age: 51
End: 2021-08-03

## 2021-09-23 ENCOUNTER — HOSPITAL ENCOUNTER (INPATIENT)
Facility: HOSPITAL | Age: 51
LOS: 1 days | Discharge: HOME | DRG: 292 | End: 2021-09-24
Attending: EMERGENCY MEDICINE | Admitting: HOSPITALIST
Payer: COMMERCIAL

## 2021-09-23 DIAGNOSIS — E11.9 TYPE 2 DIABETES MELLITUS WITHOUT COMPLICATION, WITH LONG-TERM CURRENT USE OF INSULIN: ICD-10-CM

## 2021-09-23 DIAGNOSIS — I51.89 GRADE I DIASTOLIC DYSFUNCTION: ICD-10-CM

## 2021-09-23 DIAGNOSIS — F14.10 DRUG ABUSE, COCAINE TYPE: ICD-10-CM

## 2021-09-23 DIAGNOSIS — Z79.4 TYPE 2 DIABETES MELLITUS, WITH LONG-TERM CURRENT USE OF INSULIN: ICD-10-CM

## 2021-09-23 DIAGNOSIS — J81.1 NONCARDIAC PULMONARY EDEMA: Primary | ICD-10-CM

## 2021-09-23 DIAGNOSIS — I50.9 CONGESTIVE HEART FAILURE (CHF): ICD-10-CM

## 2021-09-23 DIAGNOSIS — E11.9 TYPE 2 DIABETES MELLITUS, WITH LONG-TERM CURRENT USE OF INSULIN: ICD-10-CM

## 2021-09-23 DIAGNOSIS — R80.9 MICROALBUMINURIA: ICD-10-CM

## 2021-09-23 DIAGNOSIS — R07.9 CHEST PAIN: ICD-10-CM

## 2021-09-23 DIAGNOSIS — I10 MALIGNANT HYPERTENSION: ICD-10-CM

## 2021-09-23 DIAGNOSIS — R79.89 ELEVATED TROPONIN: ICD-10-CM

## 2021-09-23 DIAGNOSIS — Z79.4 TYPE 2 DIABETES MELLITUS WITHOUT COMPLICATION, WITH LONG-TERM CURRENT USE OF INSULIN: ICD-10-CM

## 2021-09-23 DIAGNOSIS — R07.9 CHEST PAIN, UNSPECIFIED TYPE: ICD-10-CM

## 2021-09-23 DIAGNOSIS — E66.9 OBESITY (BMI 30-39.9): Chronic | ICD-10-CM

## 2021-09-23 DIAGNOSIS — E78.5 HYPERLIPIDEMIA: ICD-10-CM

## 2021-09-23 DIAGNOSIS — R79.89 TROPONIN LEVEL ELEVATED: ICD-10-CM

## 2021-09-23 DIAGNOSIS — E78.5 DYSLIPIDEMIA: ICD-10-CM

## 2021-09-23 DIAGNOSIS — I10 ESSENTIAL HYPERTENSION: Chronic | ICD-10-CM

## 2021-09-23 DIAGNOSIS — N18.4 CKD (CHRONIC KIDNEY DISEASE) STAGE 4, GFR 15-29 ML/MIN: ICD-10-CM

## 2021-09-23 DIAGNOSIS — R60.1 ANASARCA: ICD-10-CM

## 2021-09-23 DIAGNOSIS — E55.9 VITAMIN D DEFICIENCY: ICD-10-CM

## 2021-09-23 DIAGNOSIS — R06.09 DYSPNEA ON EXERTION: ICD-10-CM

## 2021-09-23 LAB
ALBUMIN SERPL BCP-MCNC: 3.5 G/DL (ref 3.5–5)
ALBUMIN/GLOB SERPL: 1 {RATIO}
ALP SERPL-CCNC: 103 U/L (ref 45–115)
ALT SERPL W P-5'-P-CCNC: 21 U/L (ref 16–61)
AMPHET UR QL SCN: NEGATIVE
ANION GAP SERPL CALCULATED.3IONS-SCNC: 13 MMOL/L (ref 7–16)
APTT PPP: 31.3 SECONDS (ref 25.2–37.3)
AST SERPL W P-5'-P-CCNC: 15 U/L (ref 15–37)
BACTERIA #/AREA URNS HPF: NORMAL /HPF
BARBITURATES UR QL SCN: NEGATIVE
BASOPHILS # BLD AUTO: 0.02 K/UL (ref 0–0.2)
BASOPHILS NFR BLD AUTO: 0.4 % (ref 0–1)
BENZODIAZ METAB UR QL SCN: NEGATIVE
BILIRUB SERPL-MCNC: 0.4 MG/DL (ref 0–1.2)
BILIRUB UR QL STRIP: NEGATIVE
BUN SERPL-MCNC: 51 MG/DL (ref 7–18)
BUN/CREAT SERPL: 9 (ref 6–20)
CALCIUM SERPL-MCNC: 8.2 MG/DL (ref 8.5–10.1)
CANNABINOIDS UR QL SCN: NEGATIVE
CHLORIDE SERPL-SCNC: 102 MMOL/L (ref 98–107)
CLARITY UR: CLEAR
CO2 SERPL-SCNC: 24 MMOL/L (ref 21–32)
COCAINE UR QL SCN: NEGATIVE
COLOR UR: ABNORMAL
CREAT SERPL-MCNC: 5.4 MG/DL (ref 0.7–1.3)
D DIMER PPP FEU-MCNC: 0.37 ΜG/ML (ref 0–0.47)
DIFFERENTIAL METHOD BLD: ABNORMAL
EOSINOPHIL # BLD AUTO: 0.14 K/UL (ref 0–0.5)
EOSINOPHIL NFR BLD AUTO: 2.5 % (ref 1–4)
ERYTHROCYTE [DISTWIDTH] IN BLOOD BY AUTOMATED COUNT: 12.8 % (ref 11.5–14.5)
FLUAV AG UPPER RESP QL IA.RAPID: NEGATIVE
FLUBV AG UPPER RESP QL IA.RAPID: NEGATIVE
GLOBULIN SER-MCNC: 3.5 G/DL (ref 2–4)
GLUCOSE SERPL-MCNC: 337 MG/DL (ref 70–105)
GLUCOSE SERPL-MCNC: 396 MG/DL (ref 70–105)
GLUCOSE SERPL-MCNC: 397 MG/DL (ref 74–106)
GLUCOSE SERPL-MCNC: 415 MG/DL (ref 70–105)
GLUCOSE UR STRIP-MCNC: >=1000 MG/DL
HCT VFR BLD AUTO: 30 % (ref 40–54)
HGB BLD-MCNC: 9.7 G/DL (ref 13.5–18)
IMM GRANULOCYTES # BLD AUTO: 0.01 K/UL (ref 0–0.04)
IMM GRANULOCYTES NFR BLD: 0.2 % (ref 0–0.4)
INR BLD: 1.06 (ref 0.9–1.1)
KETONES UR STRIP-SCNC: NEGATIVE MG/DL
LACTATE SERPL-SCNC: 1.3 MMOL/L (ref 0.4–2)
LEUKOCYTE ESTERASE UR QL STRIP: NEGATIVE
LYMPHOCYTES # BLD AUTO: 1.25 K/UL (ref 1–4.8)
LYMPHOCYTES NFR BLD AUTO: 22.4 % (ref 27–41)
MAGNESIUM SERPL-MCNC: 1.2 MG/DL (ref 1.7–2.3)
MCH RBC QN AUTO: 25.9 PG (ref 27–31)
MCHC RBC AUTO-ENTMCNC: 32.3 G/DL (ref 32–36)
MCV RBC AUTO: 80 FL (ref 80–96)
MONOCYTES # BLD AUTO: 0.52 K/UL (ref 0–0.8)
MONOCYTES NFR BLD AUTO: 9.3 % (ref 2–6)
MPC BLD CALC-MCNC: 13 FL (ref 9.4–12.4)
NEUTROPHILS # BLD AUTO: 3.64 K/UL (ref 1.8–7.7)
NEUTROPHILS NFR BLD AUTO: 65.2 % (ref 53–65)
NITRITE UR QL STRIP: NEGATIVE
NRBC # BLD AUTO: 0 X10E3/UL
NRBC, AUTO (.00): 0 %
NT-PROBNP SERPL-MCNC: 1020 PG/ML (ref 1–125)
OPIATES UR QL SCN: NEGATIVE
PCP UR QL SCN: NEGATIVE
PH UR STRIP: 6 PH UNITS
PLATELET # BLD AUTO: 144 K/UL (ref 150–400)
POTASSIUM SERPL-SCNC: 4.2 MMOL/L (ref 3.5–5.1)
PROT SERPL-MCNC: 7 G/DL (ref 6.4–8.2)
PROT UR QL STRIP: 100
PROTHROMBIN TIME: 13.8 SECONDS (ref 11.7–14.7)
RBC # BLD AUTO: 3.75 M/UL (ref 4.6–6.2)
RBC # UR STRIP: NEGATIVE /UL
RBC #/AREA URNS HPF: NORMAL /HPF
SARS-COV+SARS-COV-2 AG RESP QL IA.RAPID: NEGATIVE
SODIUM SERPL-SCNC: 135 MMOL/L (ref 136–145)
SP GR UR STRIP: 1.02
SQUAMOUS #/AREA URNS LPF: NORMAL /LPF
TROPONIN I SERPL-MCNC: 0.04 NG/ML
TROPONIN I SERPL-MCNC: 0.05 NG/ML
UROBILINOGEN UR STRIP-ACNC: 0.2 MG/DL
WBC # BLD AUTO: 5.58 K/UL (ref 4.5–11)
WBC #/AREA URNS HPF: NORMAL /HPF
YEAST #/AREA URNS HPF: NORMAL /HPF

## 2021-09-23 PROCEDURE — 84484 ASSAY OF TROPONIN QUANT: CPT | Performed by: NURSE PRACTITIONER

## 2021-09-23 PROCEDURE — 93005 ELECTROCARDIOGRAM TRACING: CPT

## 2021-09-23 PROCEDURE — 83605 ASSAY OF LACTIC ACID: CPT | Performed by: EMERGENCY MEDICINE

## 2021-09-23 PROCEDURE — 85378 FIBRIN DEGRADE SEMIQUANT: CPT | Performed by: NURSE PRACTITIONER

## 2021-09-23 PROCEDURE — 87428 SARSCOV & INF VIR A&B AG IA: CPT | Performed by: EMERGENCY MEDICINE

## 2021-09-23 PROCEDURE — 11000001 HC ACUTE MED/SURG PRIVATE ROOM

## 2021-09-23 PROCEDURE — 93010 EKG 12-LEAD: ICD-10-PCS | Mod: 77,,, | Performed by: HOSPITALIST

## 2021-09-23 PROCEDURE — 85730 THROMBOPLASTIN TIME PARTIAL: CPT | Performed by: EMERGENCY MEDICINE

## 2021-09-23 PROCEDURE — 63600175 PHARM REV CODE 636 W HCPCS: Performed by: NURSE PRACTITIONER

## 2021-09-23 PROCEDURE — 81003 URINALYSIS AUTO W/O SCOPE: CPT | Mod: 59 | Performed by: EMERGENCY MEDICINE

## 2021-09-23 PROCEDURE — 93010 EKG 12-LEAD: ICD-10-PCS | Mod: ,,, | Performed by: INTERNAL MEDICINE

## 2021-09-23 PROCEDURE — 96372 THER/PROPH/DIAG INJ SC/IM: CPT

## 2021-09-23 PROCEDURE — 93010 ELECTROCARDIOGRAM REPORT: CPT | Mod: ,,, | Performed by: INTERNAL MEDICINE

## 2021-09-23 PROCEDURE — 99223 1ST HOSP IP/OBS HIGH 75: CPT | Mod: ,,, | Performed by: HOSPITALIST

## 2021-09-23 PROCEDURE — 82962 GLUCOSE BLOOD TEST: CPT

## 2021-09-23 PROCEDURE — 93010 ELECTROCARDIOGRAM REPORT: CPT | Mod: 77,,, | Performed by: HOSPITALIST

## 2021-09-23 PROCEDURE — 80307 DRUG TEST PRSMV CHEM ANLYZR: CPT | Performed by: EMERGENCY MEDICINE

## 2021-09-23 PROCEDURE — 25000003 PHARM REV CODE 250: Performed by: NURSE PRACTITIONER

## 2021-09-23 PROCEDURE — 99284 EMERGENCY DEPT VISIT MOD MDM: CPT | Mod: ,,, | Performed by: EMERGENCY MEDICINE

## 2021-09-23 PROCEDURE — 83880 ASSAY OF NATRIURETIC PEPTIDE: CPT | Performed by: EMERGENCY MEDICINE

## 2021-09-23 PROCEDURE — 84484 ASSAY OF TROPONIN QUANT: CPT | Performed by: EMERGENCY MEDICINE

## 2021-09-23 PROCEDURE — 36415 COLL VENOUS BLD VENIPUNCTURE: CPT | Performed by: EMERGENCY MEDICINE

## 2021-09-23 PROCEDURE — 99284 PR EMERGENCY DEPT VISIT,LEVEL IV: ICD-10-PCS | Mod: ,,, | Performed by: EMERGENCY MEDICINE

## 2021-09-23 PROCEDURE — 83735 ASSAY OF MAGNESIUM: CPT | Performed by: EMERGENCY MEDICINE

## 2021-09-23 PROCEDURE — 80053 COMPREHEN METABOLIC PANEL: CPT | Performed by: EMERGENCY MEDICINE

## 2021-09-23 PROCEDURE — 85025 COMPLETE CBC W/AUTO DIFF WBC: CPT | Performed by: EMERGENCY MEDICINE

## 2021-09-23 PROCEDURE — C9399 UNCLASSIFIED DRUGS OR BIOLOG: HCPCS | Performed by: NURSE PRACTITIONER

## 2021-09-23 PROCEDURE — 99285 EMERGENCY DEPT VISIT HI MDM: CPT | Mod: 25

## 2021-09-23 PROCEDURE — 81001 URINALYSIS AUTO W/SCOPE: CPT | Performed by: EMERGENCY MEDICINE

## 2021-09-23 PROCEDURE — 99223 PR INITIAL HOSPITAL CARE,LEVL III: ICD-10-PCS | Mod: ,,, | Performed by: HOSPITALIST

## 2021-09-23 PROCEDURE — 85610 PROTHROMBIN TIME: CPT | Performed by: EMERGENCY MEDICINE

## 2021-09-23 RX ORDER — FERROUS GLUCONATE 324(38)MG
324 TABLET ORAL
Status: DISCONTINUED | OUTPATIENT
Start: 2021-09-24 | End: 2021-09-23

## 2021-09-23 RX ORDER — CALCITRIOL 0.25 UG/1
0.25 CAPSULE ORAL DAILY
Status: DISCONTINUED | OUTPATIENT
Start: 2021-09-23 | End: 2021-09-24 | Stop reason: HOSPADM

## 2021-09-23 RX ORDER — HYDRALAZINE HYDROCHLORIDE 50 MG/1
100 TABLET, FILM COATED ORAL 2 TIMES DAILY
Status: DISCONTINUED | OUTPATIENT
Start: 2021-09-23 | End: 2021-09-24

## 2021-09-23 RX ORDER — CHOLECALCIFEROL (VITAMIN D3) 25 MCG
2000 TABLET ORAL DAILY
Status: DISCONTINUED | OUTPATIENT
Start: 2021-09-23 | End: 2021-09-24

## 2021-09-23 RX ORDER — METOLAZONE 5 MG/1
5 TABLET ORAL DAILY
Status: DISCONTINUED | OUTPATIENT
Start: 2021-09-23 | End: 2021-09-24 | Stop reason: HOSPADM

## 2021-09-23 RX ORDER — AMLODIPINE BESYLATE 10 MG/1
10 TABLET ORAL DAILY
Status: DISCONTINUED | OUTPATIENT
Start: 2021-09-23 | End: 2021-09-24 | Stop reason: HOSPADM

## 2021-09-23 RX ORDER — HYDRALAZINE HYDROCHLORIDE 100 MG/1
100 TABLET, FILM COATED ORAL 2 TIMES DAILY
Status: ON HOLD | COMMUNITY
End: 2021-09-24 | Stop reason: HOSPADM

## 2021-09-23 RX ORDER — INSULIN ASPART 100 [IU]/ML
1-10 INJECTION, SOLUTION INTRAVENOUS; SUBCUTANEOUS
Status: DISCONTINUED | OUTPATIENT
Start: 2021-09-23 | End: 2021-09-24 | Stop reason: HOSPADM

## 2021-09-23 RX ORDER — GLUCAGON 1 MG
1 KIT INJECTION
Status: DISCONTINUED | OUTPATIENT
Start: 2021-09-23 | End: 2021-09-24 | Stop reason: HOSPADM

## 2021-09-23 RX ORDER — ATORVASTATIN CALCIUM 40 MG/1
40 TABLET, FILM COATED ORAL DAILY
Status: DISCONTINUED | OUTPATIENT
Start: 2021-09-23 | End: 2021-09-24

## 2021-09-23 RX ADMIN — Medication 2000 UNITS: at 03:09

## 2021-09-23 RX ADMIN — INSULIN ASPART 4 UNITS: 100 INJECTION, SOLUTION INTRAVENOUS; SUBCUTANEOUS at 10:09

## 2021-09-23 RX ADMIN — INSULIN DETEMIR 45 UNITS: 100 INJECTION, SOLUTION SUBCUTANEOUS at 10:09

## 2021-09-23 RX ADMIN — ATORVASTATIN CALCIUM 40 MG: 40 TABLET, FILM COATED ORAL at 03:09

## 2021-09-23 RX ADMIN — AMLODIPINE BESYLATE 10 MG: 10 TABLET ORAL at 03:09

## 2021-09-23 RX ADMIN — INSULIN ASPART 10 UNITS: 100 INJECTION, SOLUTION INTRAVENOUS; SUBCUTANEOUS at 03:09

## 2021-09-24 VITALS
HEART RATE: 63 BPM | OXYGEN SATURATION: 98 % | WEIGHT: 221.81 LBS | SYSTOLIC BLOOD PRESSURE: 112 MMHG | BODY MASS INDEX: 31.05 KG/M2 | HEIGHT: 71 IN | DIASTOLIC BLOOD PRESSURE: 79 MMHG | RESPIRATION RATE: 18 BRPM | TEMPERATURE: 98 F

## 2021-09-24 PROBLEM — J81.1 NONCARDIAC PULMONARY EDEMA: Status: ACTIVE | Noted: 2021-09-24

## 2021-09-24 LAB
ANION GAP SERPL CALCULATED.3IONS-SCNC: 13 MMOL/L (ref 7–16)
AORTIC ROOT ANNULUS: 3.2 CM
AORTIC VALVE CUSP SEPERATION: 2.41 CM
AV INDEX (PROSTH): 0.81
AV MEAN GRADIENT: 4 MMHG
AV PEAK GRADIENT: 7 MMHG
AV VALVE AREA: 2.8 CM2
AV VELOCITY RATIO: 0.85
BASOPHILS # BLD AUTO: 0.02 K/UL (ref 0–0.2)
BASOPHILS NFR BLD AUTO: 0.3 % (ref 0–1)
BSA FOR ECHO PROCEDURE: 2.24 M2
BUN SERPL-MCNC: 54 MG/DL (ref 7–18)
BUN/CREAT SERPL: 10 (ref 6–20)
CALCIUM SERPL-MCNC: 8.4 MG/DL (ref 8.5–10.1)
CHLORIDE SERPL-SCNC: 106 MMOL/L (ref 98–107)
CHOLEST SERPL-MCNC: 135 MG/DL (ref 0–200)
CHOLEST/HDLC SERPL: 2.5 {RATIO}
CO2 SERPL-SCNC: 26 MMOL/L (ref 21–32)
CREAT SERPL-MCNC: 5.39 MG/DL (ref 0.7–1.3)
CRP SERPL-MCNC: <0.2 MG/DL (ref 0–0.8)
CV ECHO LV RWT: 0.92 CM
D DIMER PPP FEU-MCNC: <0.27 ΜG/ML (ref 0–0.47)
DIFFERENTIAL METHOD BLD: ABNORMAL
DOP CALC AO PEAK VEL: 1.3 M/S
DOP CALC AO VTI: 21 CM
DOP CALC LVOT AREA: 3.5 CM2
DOP CALC LVOT DIAMETER: 2.1 CM
DOP CALC LVOT PEAK VEL: 1.1 M/S
DOP CALC LVOT STROKE VOLUME: 58.85 CM3
DOP CALCLVOT PEAK VEL VTI: 17 CM
E WAVE DECELERATION TIME: 200 MSEC
ECHO EF ESTIMATED: 60 %
ECHO LV POSTERIOR WALL: 2.09 CM (ref 0.6–1.1)
EJECTION FRACTION: 60 %
EOSINOPHIL # BLD AUTO: 0.17 K/UL (ref 0–0.5)
EOSINOPHIL NFR BLD AUTO: 3 % (ref 1–4)
ERYTHROCYTE [DISTWIDTH] IN BLOOD BY AUTOMATED COUNT: 13.1 % (ref 11.5–14.5)
EST. AVERAGE GLUCOSE BLD GHB EST-MCNC: 277 MG/DL
FERRITIN SERPL-MCNC: 71 NG/ML (ref 26–388)
FOLATE SERPL-MCNC: 9.6 NG/ML (ref 3.1–17.5)
FRACTIONAL SHORTENING: 34 % (ref 28–44)
GLUCOSE SERPL-MCNC: 113 MG/DL (ref 70–105)
GLUCOSE SERPL-MCNC: 211 MG/DL (ref 70–105)
GLUCOSE SERPL-MCNC: 215 MG/DL (ref 70–105)
GLUCOSE SERPL-MCNC: 247 MG/DL (ref 74–106)
HBA1C MFR BLD HPLC: 10.9 % (ref 4.5–6.6)
HCT VFR BLD AUTO: 30.7 % (ref 40–54)
HDLC SERPL-MCNC: 53 MG/DL (ref 40–60)
HGB BLD-MCNC: 9.7 G/DL (ref 13.5–18)
IMM GRANULOCYTES # BLD AUTO: 0.01 K/UL (ref 0–0.04)
IMM GRANULOCYTES NFR BLD: 0.2 % (ref 0–0.4)
INTERVENTRICULAR SEPTUM: 1.6 CM (ref 0.6–1.1)
IRON SATN MFR SERPL: 25 % (ref 14–50)
IRON SERPL-MCNC: 57 ΜG/DL (ref 65–175)
IVC OSTIUM: 1.2 CM
LDLC SERPL CALC-MCNC: 69 MG/DL
LDLC/HDLC SERPL: 1.3 {RATIO}
LEFT ATRIUM SIZE: 3.1 CM
LEFT INTERNAL DIMENSION IN SYSTOLE: 3.03 CM (ref 2.1–4)
LEFT VENTRICLE MASS INDEX: 177 G/M2
LEFT VENTRICULAR INTERNAL DIMENSION IN DIASTOLE: 4.56 CM (ref 3.5–6)
LEFT VENTRICULAR MASS: 388.89 G
LVOT MG: 3 MMHG
LYMPHOCYTES # BLD AUTO: 1.74 K/UL (ref 1–4.8)
LYMPHOCYTES NFR BLD AUTO: 30.3 % (ref 27–41)
MCH RBC QN AUTO: 25.9 PG (ref 27–31)
MCHC RBC AUTO-ENTMCNC: 31.6 G/DL (ref 32–36)
MCV RBC AUTO: 82.1 FL (ref 80–96)
MONOCYTES # BLD AUTO: 0.57 K/UL (ref 0–0.8)
MONOCYTES NFR BLD AUTO: 9.9 % (ref 2–6)
MPC BLD CALC-MCNC: 13 FL (ref 9.4–12.4)
MV PEAK E VEL: 0.49 M/S
NEUTROPHILS # BLD AUTO: 3.23 K/UL (ref 1.8–7.7)
NEUTROPHILS NFR BLD AUTO: 56.3 % (ref 53–65)
NONHDLC SERPL-MCNC: 82 MG/DL
NRBC # BLD AUTO: 0 X10E3/UL
NRBC, AUTO (.00): 0 %
PISA TR MAX VEL: 1.8 M/S
PLATELET # BLD AUTO: 150 K/UL (ref 150–400)
POTASSIUM SERPL-SCNC: 4.2 MMOL/L (ref 3.5–5.1)
PTH-INTACT SERPL-MCNC: 187.5 PG/ML (ref 18.4–80.1)
RA MAJOR: 4.5 CM
RA PRESSURE: 3 MMHG
RBC # BLD AUTO: 3.74 M/UL (ref 4.6–6.2)
RIGHT VENTRICULAR END-DIASTOLIC DIMENSION: 3.9 CM
SODIUM SERPL-SCNC: 141 MMOL/L (ref 136–145)
T4 SERPL-MCNC: 4.8 ΜG/DL (ref 4.5–12.1)
TIBC SERPL-MCNC: 225 ΜG/DL (ref 250–450)
TR MAX PG: 13 MMHG
TRICUSPID ANNULAR PLANE SYSTOLIC EXCURSION: 2.5 CM
TRIGL SERPL-MCNC: 66 MG/DL (ref 35–150)
TROPONIN I SERPL-MCNC: 0.04 NG/ML
TSH SERPL DL<=0.005 MIU/L-ACNC: 1.09 UIU/ML (ref 0.36–3.74)
TV REST PULMONARY ARTERY PRESSURE: 16 MMHG
VIT B12 SERPL-MCNC: 425 PG/ML (ref 193–986)
VLDLC SERPL-MCNC: 13 MG/DL
WBC # BLD AUTO: 5.74 K/UL (ref 4.5–11)

## 2021-09-24 PROCEDURE — 83540 ASSAY OF IRON: CPT | Performed by: NURSE PRACTITIONER

## 2021-09-24 PROCEDURE — 93010 ELECTROCARDIOGRAM REPORT: CPT | Mod: ,,, | Performed by: STUDENT IN AN ORGANIZED HEALTH CARE EDUCATION/TRAINING PROGRAM

## 2021-09-24 PROCEDURE — 83970 ASSAY OF PARATHORMONE: CPT | Performed by: HOSPITALIST

## 2021-09-24 PROCEDURE — 80061 LIPID PANEL: CPT | Performed by: NURSE PRACTITIONER

## 2021-09-24 PROCEDURE — 99239 HOSP IP/OBS DSCHRG MGMT >30: CPT | Mod: ,,, | Performed by: HOSPITALIST

## 2021-09-24 PROCEDURE — 83036 HEMOGLOBIN GLYCOSYLATED A1C: CPT | Performed by: NURSE PRACTITIONER

## 2021-09-24 PROCEDURE — 84443 ASSAY THYROID STIM HORMONE: CPT | Performed by: HOSPITALIST

## 2021-09-24 PROCEDURE — 63600175 PHARM REV CODE 636 W HCPCS: Performed by: NURSE PRACTITIONER

## 2021-09-24 PROCEDURE — 84484 ASSAY OF TROPONIN QUANT: CPT | Performed by: NURSE PRACTITIONER

## 2021-09-24 PROCEDURE — 86140 C-REACTIVE PROTEIN: CPT | Performed by: HOSPITALIST

## 2021-09-24 PROCEDURE — 25000003 PHARM REV CODE 250: Performed by: NURSE PRACTITIONER

## 2021-09-24 PROCEDURE — 93005 ELECTROCARDIOGRAM TRACING: CPT

## 2021-09-24 PROCEDURE — 85025 COMPLETE CBC W/AUTO DIFF WBC: CPT | Performed by: NURSE PRACTITIONER

## 2021-09-24 PROCEDURE — 63600175 PHARM REV CODE 636 W HCPCS: Performed by: HOSPITALIST

## 2021-09-24 PROCEDURE — 82746 ASSAY OF FOLIC ACID SERUM: CPT | Performed by: NURSE PRACTITIONER

## 2021-09-24 PROCEDURE — 85378 FIBRIN DEGRADE SEMIQUANT: CPT | Performed by: HOSPITALIST

## 2021-09-24 PROCEDURE — 82728 ASSAY OF FERRITIN: CPT | Performed by: NURSE PRACTITIONER

## 2021-09-24 PROCEDURE — 36415 COLL VENOUS BLD VENIPUNCTURE: CPT | Performed by: NURSE PRACTITIONER

## 2021-09-24 PROCEDURE — 83550 IRON BINDING TEST: CPT | Performed by: NURSE PRACTITIONER

## 2021-09-24 PROCEDURE — 25000003 PHARM REV CODE 250: Performed by: HOSPITALIST

## 2021-09-24 PROCEDURE — 99239 PR HOSPITAL DISCHARGE DAY,>30 MIN: ICD-10-PCS | Mod: ,,, | Performed by: HOSPITALIST

## 2021-09-24 PROCEDURE — 36415 COLL VENOUS BLD VENIPUNCTURE: CPT | Performed by: HOSPITALIST

## 2021-09-24 PROCEDURE — 84436 ASSAY OF TOTAL THYROXINE: CPT | Performed by: HOSPITALIST

## 2021-09-24 PROCEDURE — 93010 EKG 12-LEAD: ICD-10-PCS | Mod: ,,, | Performed by: STUDENT IN AN ORGANIZED HEALTH CARE EDUCATION/TRAINING PROGRAM

## 2021-09-24 PROCEDURE — 82962 GLUCOSE BLOOD TEST: CPT

## 2021-09-24 PROCEDURE — 80048 BASIC METABOLIC PNL TOTAL CA: CPT | Performed by: NURSE PRACTITIONER

## 2021-09-24 RX ORDER — ATORVASTATIN CALCIUM 20 MG/1
20 TABLET, FILM COATED ORAL NIGHTLY
Qty: 30 TABLET | Refills: 1 | Status: SHIPPED | OUTPATIENT
Start: 2021-09-24 | End: 2021-09-30

## 2021-09-24 RX ORDER — FUROSEMIDE 40 MG/1
40 TABLET ORAL 2 TIMES DAILY
Qty: 60 TABLET | Refills: 2 | Status: SHIPPED | OUTPATIENT
Start: 2021-09-24 | End: 2022-07-13 | Stop reason: SDUPTHER

## 2021-09-24 RX ORDER — NAPROXEN SODIUM 220 MG/1
81 TABLET, FILM COATED ORAL DAILY
Status: DISCONTINUED | OUTPATIENT
Start: 2021-09-24 | End: 2021-09-24 | Stop reason: HOSPADM

## 2021-09-24 RX ORDER — HYDRALAZINE HYDROCHLORIDE 50 MG/1
50 TABLET, FILM COATED ORAL 2 TIMES DAILY
Status: DISCONTINUED | OUTPATIENT
Start: 2021-09-24 | End: 2021-09-24 | Stop reason: HOSPADM

## 2021-09-24 RX ORDER — ATORVASTATIN CALCIUM 20 MG/1
20 TABLET, FILM COATED ORAL NIGHTLY
Status: DISCONTINUED | OUTPATIENT
Start: 2021-09-24 | End: 2021-09-24 | Stop reason: HOSPADM

## 2021-09-24 RX ORDER — HYDRALAZINE HYDROCHLORIDE 50 MG/1
50 TABLET, FILM COATED ORAL EVERY 12 HOURS
Qty: 60 TABLET | Refills: 2 | Status: SHIPPED | OUTPATIENT
Start: 2021-09-24 | End: 2021-09-30

## 2021-09-24 RX ORDER — LABETALOL 200 MG/1
200 TABLET, FILM COATED ORAL 2 TIMES DAILY
Status: DISCONTINUED | OUTPATIENT
Start: 2021-09-24 | End: 2021-09-24 | Stop reason: HOSPADM

## 2021-09-24 RX ORDER — FUROSEMIDE 10 MG/ML
60 INJECTION INTRAMUSCULAR; INTRAVENOUS ONCE
Status: COMPLETED | OUTPATIENT
Start: 2021-09-24 | End: 2021-09-24

## 2021-09-24 RX ORDER — INSULIN ASPART 100 [IU]/ML
INJECTION, SOLUTION INTRAVENOUS; SUBCUTANEOUS
Qty: 60 SYRINGE | Refills: 1 | Status: SHIPPED | OUTPATIENT
Start: 2021-09-24 | End: 2022-02-25 | Stop reason: SDUPTHER

## 2021-09-24 RX ORDER — LABETALOL 200 MG/1
200 TABLET, FILM COATED ORAL 2 TIMES DAILY
Qty: 60 TABLET | Refills: 2 | Status: SHIPPED | OUTPATIENT
Start: 2021-09-24 | End: 2021-09-30

## 2021-09-24 RX ADMIN — ATORVASTATIN CALCIUM 40 MG: 40 TABLET, FILM COATED ORAL at 08:09

## 2021-09-24 RX ADMIN — METOLAZONE 5 MG: 5 TABLET ORAL at 08:09

## 2021-09-24 RX ADMIN — FUROSEMIDE 60 MG: 20 INJECTION, SOLUTION INTRAMUSCULAR; INTRAVENOUS at 05:09

## 2021-09-24 RX ADMIN — CALCITRIOL 0.25 MCG: 0.25 CAPSULE, LIQUID FILLED ORAL at 08:09

## 2021-09-24 RX ADMIN — AMLODIPINE BESYLATE 10 MG: 10 TABLET ORAL at 08:09

## 2021-09-24 RX ADMIN — LABETALOL HYDROCHLORIDE 300 MG: 200 TABLET, FILM COATED ORAL at 08:09

## 2021-09-24 RX ADMIN — INSULIN ASPART 4 UNITS: 100 INJECTION, SOLUTION INTRAVENOUS; SUBCUTANEOUS at 05:09

## 2021-09-24 RX ADMIN — Medication 2000 UNITS: at 08:09

## 2021-09-24 RX ADMIN — HYDRALAZINE HYDROCHLORIDE 100 MG: 50 TABLET ORAL at 08:09

## 2021-09-24 RX ADMIN — ASPIRIN 81 MG 81 MG: 81 TABLET ORAL at 03:09

## 2021-09-29 ENCOUNTER — TELEPHONE (OUTPATIENT)
Dept: INTERNAL MEDICINE | Facility: CLINIC | Age: 51
End: 2021-09-29

## 2021-09-30 ENCOUNTER — OFFICE VISIT (OUTPATIENT)
Dept: INTERNAL MEDICINE | Facility: CLINIC | Age: 51
End: 2021-09-30
Payer: COMMERCIAL

## 2021-09-30 VITALS
WEIGHT: 229.75 LBS | HEART RATE: 62 BPM | BODY MASS INDEX: 32.16 KG/M2 | OXYGEN SATURATION: 98 % | HEIGHT: 71 IN | DIASTOLIC BLOOD PRESSURE: 76 MMHG | SYSTOLIC BLOOD PRESSURE: 139 MMHG

## 2021-09-30 DIAGNOSIS — R73.9 HYPERGLYCEMIA: ICD-10-CM

## 2021-09-30 DIAGNOSIS — E11.22 TYPE 2 DIABETES MELLITUS WITH STAGE 4 CHRONIC KIDNEY DISEASE, WITH LONG-TERM CURRENT USE OF INSULIN: Primary | ICD-10-CM

## 2021-09-30 DIAGNOSIS — L30.9 DERMATITIS: ICD-10-CM

## 2021-09-30 DIAGNOSIS — E78.5 HYPERLIPIDEMIA, UNSPECIFIED HYPERLIPIDEMIA TYPE: ICD-10-CM

## 2021-09-30 DIAGNOSIS — N18.4 CKD (CHRONIC KIDNEY DISEASE) STAGE 4, GFR 15-29 ML/MIN: ICD-10-CM

## 2021-09-30 DIAGNOSIS — F14.10 DRUG ABUSE, COCAINE TYPE: ICD-10-CM

## 2021-09-30 DIAGNOSIS — E78.5 DYSLIPIDEMIA: ICD-10-CM

## 2021-09-30 DIAGNOSIS — Z79.4 TYPE 2 DIABETES MELLITUS WITH STAGE 4 CHRONIC KIDNEY DISEASE, WITH LONG-TERM CURRENT USE OF INSULIN: Primary | ICD-10-CM

## 2021-09-30 DIAGNOSIS — N18.4 TYPE 2 DIABETES MELLITUS WITH STAGE 4 CHRONIC KIDNEY DISEASE, WITH LONG-TERM CURRENT USE OF INSULIN: Primary | ICD-10-CM

## 2021-09-30 PROCEDURE — 99214 PR OFFICE/OUTPT VISIT, EST, LEVL IV, 30-39 MIN: ICD-10-PCS | Mod: S$GLB,,, | Performed by: INTERNAL MEDICINE

## 2021-09-30 PROCEDURE — 1111F PR DISCHARGE MEDS RECONCILED W/ CURRENT OUTPATIENT MED LIST: ICD-10-PCS | Mod: CPTII,S$GLB,, | Performed by: INTERNAL MEDICINE

## 2021-09-30 PROCEDURE — 99999 PR PBB SHADOW E&M-EST. PATIENT-LVL IV: ICD-10-PCS | Mod: PBBFAC,,, | Performed by: INTERNAL MEDICINE

## 2021-09-30 PROCEDURE — 99214 OFFICE O/P EST MOD 30 MIN: CPT | Mod: S$GLB,,, | Performed by: INTERNAL MEDICINE

## 2021-09-30 PROCEDURE — 1111F DSCHRG MED/CURRENT MED MERGE: CPT | Mod: CPTII,S$GLB,, | Performed by: INTERNAL MEDICINE

## 2021-09-30 PROCEDURE — 99999 PR PBB SHADOW E&M-EST. PATIENT-LVL IV: CPT | Mod: PBBFAC,,, | Performed by: INTERNAL MEDICINE

## 2021-09-30 RX ORDER — INSULIN PUMP SYRINGE, 3 ML
EACH MISCELLANEOUS
Qty: 1 EACH | Refills: 4 | Status: SHIPPED | OUTPATIENT
Start: 2021-09-30 | End: 2022-07-12

## 2021-09-30 RX ORDER — ATORVASTATIN CALCIUM 40 MG/1
40 TABLET, FILM COATED ORAL NIGHTLY
Qty: 90 TABLET | Refills: 3 | Status: SHIPPED | OUTPATIENT
Start: 2021-09-30 | End: 2022-06-27 | Stop reason: SDUPTHER

## 2021-09-30 RX ORDER — HYDRALAZINE HYDROCHLORIDE 100 MG/1
100 TABLET, FILM COATED ORAL EVERY 12 HOURS
Qty: 180 TABLET | Refills: 3 | Status: SHIPPED | OUTPATIENT
Start: 2021-09-30 | End: 2022-06-27 | Stop reason: SDUPTHER

## 2021-09-30 RX ORDER — CALCITRIOL 0.25 UG/1
0.25 CAPSULE ORAL DAILY
Qty: 90 CAPSULE | Refills: 1 | Status: SHIPPED | OUTPATIENT
Start: 2021-09-30 | End: 2022-07-12

## 2021-09-30 RX ORDER — LANCETS
EACH MISCELLANEOUS
Qty: 200 EACH | Refills: 4 | Status: SHIPPED | OUTPATIENT
Start: 2021-09-30 | End: 2022-07-12

## 2021-09-30 RX ORDER — AMLODIPINE BESYLATE 10 MG/1
10 TABLET ORAL DAILY
Qty: 90 TABLET | Refills: 3 | Status: SHIPPED | OUTPATIENT
Start: 2021-09-30 | End: 2022-06-27 | Stop reason: SDUPTHER

## 2021-09-30 RX ORDER — LABETALOL 300 MG/1
300 TABLET, FILM COATED ORAL 2 TIMES DAILY
Qty: 60 TABLET | Refills: 2 | Status: SHIPPED | OUTPATIENT
Start: 2021-09-30 | End: 2021-12-23

## 2021-10-04 ENCOUNTER — PATIENT MESSAGE (OUTPATIENT)
Dept: ADMINISTRATIVE | Facility: HOSPITAL | Age: 51
End: 2021-10-04

## 2021-10-05 ENCOUNTER — TELEPHONE (OUTPATIENT)
Dept: NEPHROLOGY | Facility: CLINIC | Age: 51
End: 2021-10-05

## 2021-10-07 ENCOUNTER — OFFICE VISIT (OUTPATIENT)
Dept: NEPHROLOGY | Facility: CLINIC | Age: 51
End: 2021-10-07
Payer: COMMERCIAL

## 2021-10-07 ENCOUNTER — LAB VISIT (OUTPATIENT)
Dept: LAB | Facility: HOSPITAL | Age: 51
End: 2021-10-07
Attending: INTERNAL MEDICINE
Payer: COMMERCIAL

## 2021-10-07 VITALS
BODY MASS INDEX: 33.02 KG/M2 | HEIGHT: 71 IN | HEART RATE: 87 BPM | WEIGHT: 235.88 LBS | OXYGEN SATURATION: 98 % | SYSTOLIC BLOOD PRESSURE: 144 MMHG | DIASTOLIC BLOOD PRESSURE: 80 MMHG

## 2021-10-07 DIAGNOSIS — N18.4 CKD (CHRONIC KIDNEY DISEASE) STAGE 4, GFR 15-29 ML/MIN: ICD-10-CM

## 2021-10-07 LAB
IRON SERPL-MCNC: 57 UG/DL (ref 45–160)
SATURATED IRON: 21 % (ref 20–50)
TOTAL IRON BINDING CAPACITY: 266 UG/DL (ref 250–450)
TRANSFERRIN SERPL-MCNC: 180 MG/DL (ref 200–375)

## 2021-10-07 PROCEDURE — 99999 PR PBB SHADOW E&M-EST. PATIENT-LVL IV: CPT | Mod: PBBFAC,,, | Performed by: INTERNAL MEDICINE

## 2021-10-07 PROCEDURE — 1159F PR MEDICATION LIST DOCUMENTED IN MEDICAL RECORD: ICD-10-PCS | Mod: CPTII,S$GLB,, | Performed by: INTERNAL MEDICINE

## 2021-10-07 PROCEDURE — 3077F SYST BP >= 140 MM HG: CPT | Mod: CPTII,S$GLB,, | Performed by: INTERNAL MEDICINE

## 2021-10-07 PROCEDURE — 3066F PR DOCUMENTATION OF TREATMENT FOR NEPHROPATHY: ICD-10-PCS | Mod: CPTII,S$GLB,, | Performed by: INTERNAL MEDICINE

## 2021-10-07 PROCEDURE — 99215 PR OFFICE/OUTPT VISIT, EST, LEVL V, 40-54 MIN: ICD-10-PCS | Mod: S$GLB,,, | Performed by: INTERNAL MEDICINE

## 2021-10-07 PROCEDURE — 36415 COLL VENOUS BLD VENIPUNCTURE: CPT | Performed by: INTERNAL MEDICINE

## 2021-10-07 PROCEDURE — 1111F DSCHRG MED/CURRENT MED MERGE: CPT | Mod: CPTII,S$GLB,, | Performed by: INTERNAL MEDICINE

## 2021-10-07 PROCEDURE — 99999 PR PBB SHADOW E&M-EST. PATIENT-LVL IV: ICD-10-PCS | Mod: PBBFAC,,, | Performed by: INTERNAL MEDICINE

## 2021-10-07 PROCEDURE — 3077F PR MOST RECENT SYSTOLIC BLOOD PRESSURE >= 140 MM HG: ICD-10-PCS | Mod: CPTII,S$GLB,, | Performed by: INTERNAL MEDICINE

## 2021-10-07 PROCEDURE — 3079F DIAST BP 80-89 MM HG: CPT | Mod: CPTII,S$GLB,, | Performed by: INTERNAL MEDICINE

## 2021-10-07 PROCEDURE — 3046F HEMOGLOBIN A1C LEVEL >9.0%: CPT | Mod: CPTII,S$GLB,, | Performed by: INTERNAL MEDICINE

## 2021-10-07 PROCEDURE — 3066F NEPHROPATHY DOC TX: CPT | Mod: CPTII,S$GLB,, | Performed by: INTERNAL MEDICINE

## 2021-10-07 PROCEDURE — 1159F MED LIST DOCD IN RCRD: CPT | Mod: CPTII,S$GLB,, | Performed by: INTERNAL MEDICINE

## 2021-10-07 PROCEDURE — 1111F PR DISCHARGE MEDS RECONCILED W/ CURRENT OUTPATIENT MED LIST: ICD-10-PCS | Mod: CPTII,S$GLB,, | Performed by: INTERNAL MEDICINE

## 2021-10-07 PROCEDURE — 3008F BODY MASS INDEX DOCD: CPT | Mod: CPTII,S$GLB,, | Performed by: INTERNAL MEDICINE

## 2021-10-07 PROCEDURE — 3079F PR MOST RECENT DIASTOLIC BLOOD PRESSURE 80-89 MM HG: ICD-10-PCS | Mod: CPTII,S$GLB,, | Performed by: INTERNAL MEDICINE

## 2021-10-07 PROCEDURE — 3008F PR BODY MASS INDEX (BMI) DOCUMENTED: ICD-10-PCS | Mod: CPTII,S$GLB,, | Performed by: INTERNAL MEDICINE

## 2021-10-07 PROCEDURE — 3046F PR MOST RECENT HEMOGLOBIN A1C LEVEL > 9.0%: ICD-10-PCS | Mod: CPTII,S$GLB,, | Performed by: INTERNAL MEDICINE

## 2021-10-07 PROCEDURE — 99215 OFFICE O/P EST HI 40 MIN: CPT | Mod: S$GLB,,, | Performed by: INTERNAL MEDICINE

## 2021-10-07 PROCEDURE — 84466 ASSAY OF TRANSFERRIN: CPT | Performed by: INTERNAL MEDICINE

## 2021-10-08 ENCOUNTER — PATIENT OUTREACH (OUTPATIENT)
Dept: ADMINISTRATIVE | Facility: OTHER | Age: 51
End: 2021-10-08

## 2021-10-13 DIAGNOSIS — E11.9 TYPE 2 DIABETES MELLITUS WITHOUT COMPLICATION: ICD-10-CM

## 2021-10-14 ENCOUNTER — INITIAL CONSULT (OUTPATIENT)
Dept: VASCULAR SURGERY | Facility: CLINIC | Age: 51
End: 2021-10-14
Attending: SURGERY
Payer: COMMERCIAL

## 2021-10-14 ENCOUNTER — HOSPITAL ENCOUNTER (OUTPATIENT)
Dept: VASCULAR SURGERY | Facility: CLINIC | Age: 51
Discharge: HOME | End: 2021-10-14
Attending: SURGERY
Payer: COMMERCIAL

## 2021-10-14 VITALS
WEIGHT: 233.69 LBS | HEART RATE: 63 BPM | DIASTOLIC BLOOD PRESSURE: 101 MMHG | HEIGHT: 71 IN | TEMPERATURE: 99 F | BODY MASS INDEX: 32.72 KG/M2 | SYSTOLIC BLOOD PRESSURE: 183 MMHG

## 2021-10-14 DIAGNOSIS — N18.4 CKD (CHRONIC KIDNEY DISEASE), STAGE IV: ICD-10-CM

## 2021-10-14 DIAGNOSIS — Z01.818 PRE-OPERATIVE EXAMINATION: ICD-10-CM

## 2021-10-14 DIAGNOSIS — N18.4 CKD (CHRONIC KIDNEY DISEASE) STAGE 4, GFR 15-29 ML/MIN: Primary | ICD-10-CM

## 2021-10-14 DIAGNOSIS — Z01.818 PRE-OPERATIVE CLEARANCE: ICD-10-CM

## 2021-10-14 PROCEDURE — 1111F DSCHRG MED/CURRENT MED MERGE: CPT | Mod: CPTII,S$GLB,, | Performed by: SURGERY

## 2021-10-14 PROCEDURE — 99204 PR OFFICE/OUTPT VISIT, NEW, LEVL IV, 45-59 MIN: ICD-10-PCS | Mod: S$GLB,,, | Performed by: SURGERY

## 2021-10-14 PROCEDURE — 99204 OFFICE O/P NEW MOD 45 MIN: CPT | Mod: S$GLB,,, | Performed by: SURGERY

## 2021-10-14 PROCEDURE — 1111F PR DISCHARGE MEDS RECONCILED W/ CURRENT OUTPATIENT MED LIST: ICD-10-PCS | Mod: CPTII,S$GLB,, | Performed by: SURGERY

## 2021-10-14 PROCEDURE — 99999 PR PBB SHADOW E&M-EST. PATIENT-LVL V: ICD-10-PCS | Mod: PBBFAC,,, | Performed by: SURGERY

## 2021-10-14 PROCEDURE — 99999 PR PBB SHADOW E&M-EST. PATIENT-LVL V: CPT | Mod: PBBFAC,,, | Performed by: SURGERY

## 2021-10-14 RX ORDER — SODIUM CHLORIDE 9 MG/ML
INJECTION, SOLUTION INTRAVENOUS CONTINUOUS
Status: CANCELLED | OUTPATIENT
Start: 2021-10-14

## 2021-10-14 RX ORDER — CEFAZOLIN SODIUM 2 G/50ML
2 SOLUTION INTRAVENOUS
Status: CANCELLED | OUTPATIENT
Start: 2021-10-14

## 2021-10-18 ENCOUNTER — PATIENT MESSAGE (OUTPATIENT)
Dept: ADMINISTRATIVE | Facility: HOSPITAL | Age: 51
End: 2021-10-18
Payer: COMMERCIAL

## 2021-11-08 ENCOUNTER — TELEPHONE (OUTPATIENT)
Dept: VASCULAR SURGERY | Facility: CLINIC | Age: 51
End: 2021-11-08
Payer: COMMERCIAL

## 2021-11-09 ENCOUNTER — HOSPITAL ENCOUNTER (OUTPATIENT)
Facility: HOSPITAL | Age: 51
Discharge: HOME | End: 2021-11-09
Attending: SURGERY | Admitting: SURGERY
Payer: COMMERCIAL

## 2021-11-09 VITALS
WEIGHT: 233 LBS | RESPIRATION RATE: 22 BRPM | HEIGHT: 71 IN | SYSTOLIC BLOOD PRESSURE: 198 MMHG | OXYGEN SATURATION: 100 % | HEART RATE: 61 BPM | TEMPERATURE: 98 F | DIASTOLIC BLOOD PRESSURE: 105 MMHG | BODY MASS INDEX: 32.62 KG/M2

## 2021-11-09 DIAGNOSIS — Z01.818 PRE-OPERATIVE EXAMINATION: ICD-10-CM

## 2021-11-09 DIAGNOSIS — N18.4 CKD (CHRONIC KIDNEY DISEASE) STAGE 4, GFR 15-29 ML/MIN: ICD-10-CM

## 2021-11-09 DIAGNOSIS — N18.4 CKD (CHRONIC KIDNEY DISEASE), STAGE IV: Primary | ICD-10-CM

## 2021-11-09 PROBLEM — N18.9 ANEMIA OF CHRONIC KIDNEY FAILURE, UNSPECIFIED STAGE: Status: ACTIVE | Noted: 2019-10-15

## 2021-11-09 PROBLEM — N25.81 SECONDARY HYPERPARATHYROIDISM OF RENAL ORIGIN: Status: ACTIVE | Noted: 2019-10-15

## 2021-11-09 PROBLEM — E11.319 DIABETIC RETINOPATHY: Status: ACTIVE | Noted: 2021-11-09

## 2021-11-09 PROBLEM — F14.90 CRACK COCAINE USE: Status: ACTIVE | Noted: 2019-09-08

## 2021-11-09 PROBLEM — F19.10 SUBSTANCE ABUSE: Status: ACTIVE | Noted: 2019-09-08

## 2021-11-09 PROBLEM — D63.1 ANEMIA OF CHRONIC KIDNEY FAILURE, UNSPECIFIED STAGE: Status: ACTIVE | Noted: 2019-10-15

## 2021-11-09 LAB
ALBUMIN SERPL BCP-MCNC: 3.6 G/DL (ref 3.5–5.2)
ALP SERPL-CCNC: 65 U/L (ref 55–135)
ALT SERPL W/O P-5'-P-CCNC: 18 U/L (ref 10–44)
ANION GAP SERPL CALC-SCNC: 9 MMOL/L (ref 8–16)
AST SERPL-CCNC: 24 U/L (ref 10–40)
BILIRUB SERPL-MCNC: 0.4 MG/DL (ref 0.1–1)
BUN SERPL-MCNC: 62 MG/DL (ref 6–20)
CALCIUM SERPL-MCNC: 8.8 MG/DL (ref 8.7–10.5)
CHLORIDE SERPL-SCNC: 109 MMOL/L (ref 95–110)
CO2 SERPL-SCNC: 21 MMOL/L (ref 23–29)
CREAT SERPL-MCNC: 5.5 MG/DL (ref 0.5–1.4)
EST. GFR  (AFRICAN AMERICAN): 12.9 ML/MIN/1.73 M^2
EST. GFR  (NON AFRICAN AMERICAN): 11.1 ML/MIN/1.73 M^2
GLUCOSE SERPL-MCNC: 60 MG/DL (ref 70–110)
POCT GLUCOSE: 49 MG/DL (ref 70–110)
POCT GLUCOSE: 73 MG/DL (ref 70–110)
POCT GLUCOSE: 97 MG/DL (ref 70–110)
POTASSIUM SERPL-SCNC: 4 MMOL/L (ref 3.5–5.1)
PROT SERPL-MCNC: 7 G/DL (ref 6–8.4)
SODIUM SERPL-SCNC: 139 MMOL/L (ref 136–145)
TROPONIN I SERPL DL<=0.01 NG/ML-MCNC: 0.04 NG/ML (ref 0–0.03)

## 2021-11-09 PROCEDURE — 25000003 PHARM REV CODE 250: Performed by: ANESTHESIOLOGY

## 2021-11-09 PROCEDURE — 82962 GLUCOSE BLOOD TEST: CPT | Performed by: SURGERY

## 2021-11-09 PROCEDURE — 25000003 PHARM REV CODE 250

## 2021-11-09 PROCEDURE — 93005 ELECTROCARDIOGRAM TRACING: CPT

## 2021-11-09 PROCEDURE — 84484 ASSAY OF TROPONIN QUANT: CPT

## 2021-11-09 PROCEDURE — 93010 EKG 12-LEAD: ICD-10-PCS | Mod: ,,, | Performed by: INTERNAL MEDICINE

## 2021-11-09 PROCEDURE — 80053 COMPREHEN METABOLIC PANEL: CPT

## 2021-11-09 PROCEDURE — 93010 ELECTROCARDIOGRAM REPORT: CPT | Mod: ,,, | Performed by: INTERNAL MEDICINE

## 2021-11-09 RX ORDER — DEXTROSE 50 % IN WATER (D50W) INTRAVENOUS SYRINGE
12.5 ONCE
Status: COMPLETED | OUTPATIENT
Start: 2021-11-09 | End: 2021-11-09

## 2021-11-09 RX ORDER — SODIUM CHLORIDE 9 MG/ML
INJECTION, SOLUTION INTRAVENOUS CONTINUOUS
Status: DISCONTINUED | OUTPATIENT
Start: 2021-11-09 | End: 2021-11-09 | Stop reason: HOSPADM

## 2021-11-09 RX ORDER — CEFAZOLIN SODIUM 1 G/3ML
2 INJECTION, POWDER, FOR SOLUTION INTRAMUSCULAR; INTRAVENOUS
Status: DISCONTINUED | OUTPATIENT
Start: 2021-11-09 | End: 2021-11-09 | Stop reason: HOSPADM

## 2021-11-09 RX ADMIN — DEXTROSE MONOHYDRATE 12.5 G: 25 INJECTION, SOLUTION INTRAVENOUS at 05:11

## 2021-11-09 RX ADMIN — SODIUM CHLORIDE: 0.9 INJECTION, SOLUTION INTRAVENOUS at 05:11

## 2021-12-17 ENCOUNTER — OFFICE VISIT (OUTPATIENT)
Dept: INTERNAL MEDICINE | Facility: CLINIC | Age: 51
End: 2021-12-17
Payer: COMMERCIAL

## 2021-12-17 VITALS
HEART RATE: 59 BPM | OXYGEN SATURATION: 99 % | BODY MASS INDEX: 31.73 KG/M2 | WEIGHT: 226.63 LBS | HEIGHT: 71 IN | DIASTOLIC BLOOD PRESSURE: 88 MMHG | SYSTOLIC BLOOD PRESSURE: 152 MMHG

## 2021-12-17 DIAGNOSIS — R79.89 ELEVATED TROPONIN: ICD-10-CM

## 2021-12-17 DIAGNOSIS — E11.22 TYPE 2 DIABETES MELLITUS WITH STAGE 5 CHRONIC KIDNEY DISEASE NOT ON CHRONIC DIALYSIS, WITH LONG-TERM CURRENT USE OF INSULIN: ICD-10-CM

## 2021-12-17 DIAGNOSIS — F14.10 DRUG ABUSE, COCAINE TYPE: ICD-10-CM

## 2021-12-17 DIAGNOSIS — N18.5 TYPE 2 DIABETES MELLITUS WITH STAGE 5 CHRONIC KIDNEY DISEASE NOT ON CHRONIC DIALYSIS, WITH LONG-TERM CURRENT USE OF INSULIN: ICD-10-CM

## 2021-12-17 DIAGNOSIS — D63.1 ANEMIA OF CHRONIC KIDNEY FAILURE, UNSPECIFIED STAGE: ICD-10-CM

## 2021-12-17 DIAGNOSIS — I10 MALIGNANT HYPERTENSION: Primary | ICD-10-CM

## 2021-12-17 DIAGNOSIS — R07.9 CHEST PAIN, UNSPECIFIED TYPE: ICD-10-CM

## 2021-12-17 DIAGNOSIS — N18.9 ANEMIA OF CHRONIC KIDNEY FAILURE, UNSPECIFIED STAGE: ICD-10-CM

## 2021-12-17 DIAGNOSIS — N25.81 SECONDARY HYPERPARATHYROIDISM OF RENAL ORIGIN: ICD-10-CM

## 2021-12-17 DIAGNOSIS — N18.4 CKD (CHRONIC KIDNEY DISEASE), STAGE IV: ICD-10-CM

## 2021-12-17 DIAGNOSIS — Z79.4 TYPE 2 DIABETES MELLITUS WITH STAGE 5 CHRONIC KIDNEY DISEASE NOT ON CHRONIC DIALYSIS, WITH LONG-TERM CURRENT USE OF INSULIN: ICD-10-CM

## 2021-12-17 PROCEDURE — 3066F PR DOCUMENTATION OF TREATMENT FOR NEPHROPATHY: ICD-10-PCS | Mod: CPTII,S$GLB,, | Performed by: INTERNAL MEDICINE

## 2021-12-17 PROCEDURE — 99999 PR PBB SHADOW E&M-EST. PATIENT-LVL IV: CPT | Mod: PBBFAC,,, | Performed by: INTERNAL MEDICINE

## 2021-12-17 PROCEDURE — 99214 PR OFFICE/OUTPT VISIT, EST, LEVL IV, 30-39 MIN: ICD-10-PCS | Mod: S$GLB,,, | Performed by: INTERNAL MEDICINE

## 2021-12-17 PROCEDURE — 99214 OFFICE O/P EST MOD 30 MIN: CPT | Mod: S$GLB,,, | Performed by: INTERNAL MEDICINE

## 2021-12-17 PROCEDURE — 99999 PR PBB SHADOW E&M-EST. PATIENT-LVL IV: ICD-10-PCS | Mod: PBBFAC,,, | Performed by: INTERNAL MEDICINE

## 2021-12-17 PROCEDURE — 3066F NEPHROPATHY DOC TX: CPT | Mod: CPTII,S$GLB,, | Performed by: INTERNAL MEDICINE

## 2021-12-17 RX ORDER — NYSTATIN AND TRIAMCINOLONE ACETONIDE 100000; 1 [USP'U]/G; MG/G
CREAM TOPICAL 2 TIMES DAILY
Qty: 30 G | Refills: 1 | Status: SHIPPED | OUTPATIENT
Start: 2021-12-17 | End: 2022-07-12

## 2021-12-23 RX ORDER — LABETALOL 300 MG/1
300 TABLET, FILM COATED ORAL 2 TIMES DAILY
Qty: 180 TABLET | Refills: 3 | Status: SHIPPED | OUTPATIENT
Start: 2021-12-23 | End: 2022-06-27 | Stop reason: SDUPTHER

## 2022-01-18 ENCOUNTER — PATIENT MESSAGE (OUTPATIENT)
Dept: ADMINISTRATIVE | Facility: HOSPITAL | Age: 52
End: 2022-01-18
Payer: COMMERCIAL

## 2022-02-25 ENCOUNTER — OFFICE VISIT (OUTPATIENT)
Dept: INTERNAL MEDICINE | Facility: CLINIC | Age: 52
End: 2022-02-25
Payer: COMMERCIAL

## 2022-02-25 ENCOUNTER — TELEPHONE (OUTPATIENT)
Dept: INTERNAL MEDICINE | Facility: CLINIC | Age: 52
End: 2022-02-25

## 2022-02-25 ENCOUNTER — LAB VISIT (OUTPATIENT)
Dept: LAB | Facility: HOSPITAL | Age: 52
End: 2022-02-25
Attending: INTERNAL MEDICINE
Payer: COMMERCIAL

## 2022-02-25 VITALS
BODY MASS INDEX: 33.21 KG/M2 | DIASTOLIC BLOOD PRESSURE: 76 MMHG | SYSTOLIC BLOOD PRESSURE: 124 MMHG | HEIGHT: 71 IN | HEART RATE: 63 BPM | OXYGEN SATURATION: 99 % | WEIGHT: 237.19 LBS

## 2022-02-25 DIAGNOSIS — F14.90 CRACK COCAINE USE: ICD-10-CM

## 2022-02-25 DIAGNOSIS — E11.22 TYPE 2 DIABETES MELLITUS WITH STAGE 5 CHRONIC KIDNEY DISEASE NOT ON CHRONIC DIALYSIS, WITH LONG-TERM CURRENT USE OF INSULIN: ICD-10-CM

## 2022-02-25 DIAGNOSIS — N18.4 CKD (CHRONIC KIDNEY DISEASE), STAGE IV: ICD-10-CM

## 2022-02-25 DIAGNOSIS — I10 MALIGNANT HYPERTENSION: ICD-10-CM

## 2022-02-25 DIAGNOSIS — Z79.4 TYPE 2 DIABETES MELLITUS WITH STAGE 5 CHRONIC KIDNEY DISEASE NOT ON CHRONIC DIALYSIS, WITH LONG-TERM CURRENT USE OF INSULIN: ICD-10-CM

## 2022-02-25 DIAGNOSIS — N18.5 TYPE 2 DIABETES MELLITUS WITH STAGE 5 CHRONIC KIDNEY DISEASE NOT ON CHRONIC DIALYSIS, WITH LONG-TERM CURRENT USE OF INSULIN: ICD-10-CM

## 2022-02-25 DIAGNOSIS — E78.5 HYPERLIPIDEMIA, UNSPECIFIED HYPERLIPIDEMIA TYPE: ICD-10-CM

## 2022-02-25 DIAGNOSIS — Z12.11 COLON CANCER SCREENING: ICD-10-CM

## 2022-02-25 DIAGNOSIS — R07.9 CHEST PAIN, UNSPECIFIED TYPE: ICD-10-CM

## 2022-02-25 DIAGNOSIS — E78.5 DYSLIPIDEMIA: ICD-10-CM

## 2022-02-25 DIAGNOSIS — E66.9 OBESITY (BMI 30-39.9): Primary | ICD-10-CM

## 2022-02-25 LAB
ALBUMIN SERPL BCP-MCNC: 3.7 G/DL (ref 3.5–5.2)
ALP SERPL-CCNC: 66 U/L (ref 55–135)
ALT SERPL W/O P-5'-P-CCNC: 15 U/L (ref 10–44)
ANION GAP SERPL CALC-SCNC: 11 MMOL/L (ref 8–16)
AST SERPL-CCNC: 23 U/L (ref 10–40)
BASOPHILS # BLD AUTO: 0.01 K/UL (ref 0–0.2)
BASOPHILS NFR BLD: 0.2 % (ref 0–1.9)
BILIRUB SERPL-MCNC: 0.3 MG/DL (ref 0.1–1)
BUN SERPL-MCNC: 60 MG/DL (ref 6–20)
CALCIUM SERPL-MCNC: 8.8 MG/DL (ref 8.7–10.5)
CHLORIDE SERPL-SCNC: 109 MMOL/L (ref 95–110)
CO2 SERPL-SCNC: 20 MMOL/L (ref 23–29)
CREAT SERPL-MCNC: 6.2 MG/DL (ref 0.5–1.4)
DIFFERENTIAL METHOD: ABNORMAL
EOSINOPHIL # BLD AUTO: 0.2 K/UL (ref 0–0.5)
EOSINOPHIL NFR BLD: 3.7 % (ref 0–8)
ERYTHROCYTE [DISTWIDTH] IN BLOOD BY AUTOMATED COUNT: 13.5 % (ref 11.5–14.5)
EST. GFR  (AFRICAN AMERICAN): 11.1 ML/MIN/1.73 M^2
EST. GFR  (NON AFRICAN AMERICAN): 9.6 ML/MIN/1.73 M^2
ESTIMATED AVG GLUCOSE: 137 MG/DL (ref 68–131)
GLUCOSE SERPL-MCNC: 67 MG/DL (ref 70–110)
HBA1C MFR BLD: 6.4 % (ref 4–5.6)
HCT VFR BLD AUTO: 32.6 % (ref 40–54)
HGB BLD-MCNC: 10 G/DL (ref 14–18)
IMM GRANULOCYTES # BLD AUTO: 0.02 K/UL (ref 0–0.04)
IMM GRANULOCYTES NFR BLD AUTO: 0.4 % (ref 0–0.5)
LYMPHOCYTES # BLD AUTO: 1.5 K/UL (ref 1–4.8)
LYMPHOCYTES NFR BLD: 29.3 % (ref 18–48)
MCH RBC QN AUTO: 26.3 PG (ref 27–31)
MCHC RBC AUTO-ENTMCNC: 30.7 G/DL (ref 32–36)
MCV RBC AUTO: 86 FL (ref 82–98)
MONOCYTES # BLD AUTO: 0.7 K/UL (ref 0.3–1)
MONOCYTES NFR BLD: 13 % (ref 4–15)
NEUTROPHILS # BLD AUTO: 2.8 K/UL (ref 1.8–7.7)
NEUTROPHILS NFR BLD: 53.4 % (ref 38–73)
NRBC BLD-RTO: 0 /100 WBC
PLATELET # BLD AUTO: 141 K/UL (ref 150–450)
PMV BLD AUTO: 12.8 FL (ref 9.2–12.9)
POTASSIUM SERPL-SCNC: 3.9 MMOL/L (ref 3.5–5.1)
PROT SERPL-MCNC: 7.2 G/DL (ref 6–8.4)
RBC # BLD AUTO: 3.8 M/UL (ref 4.6–6.2)
SODIUM SERPL-SCNC: 140 MMOL/L (ref 136–145)
WBC # BLD AUTO: 5.15 K/UL (ref 3.9–12.7)

## 2022-02-25 PROCEDURE — 3074F SYST BP LT 130 MM HG: CPT | Mod: CPTII,S$GLB,, | Performed by: INTERNAL MEDICINE

## 2022-02-25 PROCEDURE — 99214 OFFICE O/P EST MOD 30 MIN: CPT | Mod: S$GLB,,, | Performed by: INTERNAL MEDICINE

## 2022-02-25 PROCEDURE — 99214 PR OFFICE/OUTPT VISIT, EST, LEVL IV, 30-39 MIN: ICD-10-PCS | Mod: S$GLB,,, | Performed by: INTERNAL MEDICINE

## 2022-02-25 PROCEDURE — 3078F DIAST BP <80 MM HG: CPT | Mod: CPTII,S$GLB,, | Performed by: INTERNAL MEDICINE

## 2022-02-25 PROCEDURE — 3008F BODY MASS INDEX DOCD: CPT | Mod: CPTII,S$GLB,, | Performed by: INTERNAL MEDICINE

## 2022-02-25 PROCEDURE — 80053 COMPREHEN METABOLIC PANEL: CPT | Performed by: INTERNAL MEDICINE

## 2022-02-25 PROCEDURE — 85025 COMPLETE CBC W/AUTO DIFF WBC: CPT | Performed by: INTERNAL MEDICINE

## 2022-02-25 PROCEDURE — 99999 PR PBB SHADOW E&M-EST. PATIENT-LVL IV: ICD-10-PCS | Mod: PBBFAC,,, | Performed by: INTERNAL MEDICINE

## 2022-02-25 PROCEDURE — 83036 HEMOGLOBIN GLYCOSYLATED A1C: CPT | Performed by: INTERNAL MEDICINE

## 2022-02-25 PROCEDURE — 3078F PR MOST RECENT DIASTOLIC BLOOD PRESSURE < 80 MM HG: ICD-10-PCS | Mod: CPTII,S$GLB,, | Performed by: INTERNAL MEDICINE

## 2022-02-25 PROCEDURE — 36415 COLL VENOUS BLD VENIPUNCTURE: CPT | Performed by: INTERNAL MEDICINE

## 2022-02-25 PROCEDURE — 1159F PR MEDICATION LIST DOCUMENTED IN MEDICAL RECORD: ICD-10-PCS | Mod: CPTII,S$GLB,, | Performed by: INTERNAL MEDICINE

## 2022-02-25 PROCEDURE — 3008F PR BODY MASS INDEX (BMI) DOCUMENTED: ICD-10-PCS | Mod: CPTII,S$GLB,, | Performed by: INTERNAL MEDICINE

## 2022-02-25 PROCEDURE — 99999 PR PBB SHADOW E&M-EST. PATIENT-LVL IV: CPT | Mod: PBBFAC,,, | Performed by: INTERNAL MEDICINE

## 2022-02-25 PROCEDURE — 1159F MED LIST DOCD IN RCRD: CPT | Mod: CPTII,S$GLB,, | Performed by: INTERNAL MEDICINE

## 2022-02-25 PROCEDURE — 3074F PR MOST RECENT SYSTOLIC BLOOD PRESSURE < 130 MM HG: ICD-10-PCS | Mod: CPTII,S$GLB,, | Performed by: INTERNAL MEDICINE

## 2022-02-25 RX ORDER — SILDENAFIL 100 MG/1
100 TABLET, FILM COATED ORAL DAILY PRN
Qty: 6 TABLET | Refills: 5 | Status: SHIPPED | OUTPATIENT
Start: 2022-02-25 | End: 2022-06-27

## 2022-02-25 RX ORDER — INSULIN ASPART 100 [IU]/ML
INJECTION, SOLUTION INTRAVENOUS; SUBCUTANEOUS
Qty: 60 EACH | Refills: 1 | OUTPATIENT
Start: 2022-02-25 | End: 2022-06-07

## 2022-02-25 RX ORDER — METOLAZONE 5 MG/1
5 TABLET ORAL NIGHTLY
Qty: 90 TABLET | Refills: 2 | Status: SHIPPED | OUTPATIENT
Start: 2022-02-25 | End: 2022-06-27 | Stop reason: SDUPTHER

## 2022-02-25 RX ORDER — INSULIN GLARGINE 100 [IU]/ML
45 INJECTION, SOLUTION SUBCUTANEOUS NIGHTLY
Qty: 3 EACH | Refills: 3
Start: 2022-02-25 | End: 2023-01-04

## 2022-02-25 NOTE — Clinical Note
I am seeing him today.  I reviewed your last vsiti with him and I don't see when he should follow up with you for his ckd 5.

## 2022-02-25 NOTE — PROGRESS NOTES
"  Mr. Luis Daniel Howell is a 51 y.o. male with PMHx of CKD-4, HTN, HLD, T2DM and cocaine abuse who presents to clinic for follow up.  I last saw him 12/17/2021.        He was gong to get a AV graft placed in prep for HD on 11/09/2021 but had some  chest pain so surgery was canceled until after cardiac workup.  EKG was unchanged== troponin was elevated at 0.04.  No more chest pains.  Chest pain he had on 1/9 was " like SOB"  NO actual chest pain, tightnessor sensation.   Echo from 9/24/2021 shoed some severe concentric remodeling of LV with normal EF>  I ordered a stress echo last vsiti but it looks like it has not been scheduled yet.  He has not had any more chest pain.               Cocaine abuse- chronic--: last cocaine use was 1 month ago.  BP today is 124/76.       T2DM: Currently on insulin glargine 45units nightly and insulin novolog 5-10units TID. Pt reported difficulty in performing prandial glucose checks and doesn't always correct insulin dosage. Recent hgb a1c climbed form 7.9 to 10.9.   (9/2021) He is taking his insulin and says he needs a new script.  He checks his glucse off and on and is ususally 150-212.  "not too high".       HTN: Currently on amlodipine 10mg qd, lebetalol 300mg bid, hydralazine 100mg q12h and furosemnide 40mg bid. BP today was 124/76   Patient reported taking furosemide and hydralazine only daily.     CKD-4: GFR has declined from 20 to 15 over the last  year. Patient is aware of the decreased renal functioned and acknowledges that dialysis will most likely be required in the near future. He reported that he is trying to stay on a regular insuling regimen and is staying hydrated with water vice soft drinks. Most recent labs ralph creaiein of 5.5 with gfr 12.9 ml/min.       HLD: taking atovastatin 40mg qd regularly.. lipid panel controled 9/2021.       Review of Systems   Constitutional: Negative for chills and fever.   HENT: Negative for hearing loss.    Eyes: Negative for blurred " "vision and double vision.   Respiratory:  NO SOB.  . Negative for cough, hemoptysis, sputum production and wheezing.    Cardiovascular: Negative for chest pain and palpitations.   Gastrointestinal: Negative for abdominal pain, constipation, diarrhea, nausea and vomiting.   Genitourinary: Positive for frequency. Negative for dysuria, hematuria and urgency.   Musculoskeletal: Negative for back pain, joint pain and neck pain.   Skin: Negative for itching and rash.   Neurological: Negative for dizziness and headaches.         PAST HISTORY:            OBJECTIVE:               Physical Exam  /76 (BP Location: Left arm, Patient Position: Sitting, BP Method: Large (Manual))   Pulse 63   Ht 5' 11" (1.803 m)   Wt 107.6 kg (237 lb 3.4 oz)   SpO2 99%   BMI 33.08 kg/m²     Constitutional:       General: He is awake. He is not in acute distress.     Appearance: Normal appearance. He is not diaphoretic.   HENT:      Head: Normocephalic and atraumatic.      Nose: No rhinorrhea.   Cardiovascular:      Rate and Rhythm: Normal rate and regular rhythm.      Pulses: Normal pulses.           Radial pulses are 2+ on the right side and 2+ on the left side.      Heart sounds: Normal heart sounds.   Pulmonary:      Effort: Pulmonary effort is normal. No respiratory distress.      Breath sounds: No stridor. No wheezing or rales.   Abdominal:      General: Abdomen is flat. Bowel sounds are normal. There is no distension.      Palpations: Abdomen is soft.      Tenderness: There is no abdominal tenderness. There is no right CVA tenderness, left CVA tenderness, guarding or rebound.   Musculoskeletal:         General: No swelling. Normal range of motion.      Cervical back: Normal range of motion. No tenderness.        Feet:     Lymphadenopathy:      Cervical: No cervical adenopathy.   Skin:     General: Skin is warm.      Capillary Refill: Capillary refill takes less than 2 seconds.      Coloration: Skin is not jaundiced or pale. "   Neurological:      General: No focal deficit present.      Mental Status: He is alert and oriented to person, place, and time.   Psychiatric:         Mood and Affect: Mood normal.         Behavior: Behavior normal. Behavior is cooperative.         Thought Content: Thought content normal.         Judgment: Judgment normal.            ASSESSMENT & PLAN:   Mr. Luis Daniel Howell is a 51 y.o. male who was seen today in clinic for annual visit.     Cocaine: recent cocaine use-- need to avoid this--been through rehab several times.  Trying to get job back and had recent drug test.  He does not want to see anybody in addiction medicine          T2DM: Continue insulin glargine 45units nightly and insulin novolog 5-10units TID (sliding scale).  Will get labs today.           HTN: Currently on amlodipine 10mg qd, lebetalol 300mg bid, hydralazine 100mg q12h and furosemnide 40mg bid.BP controled.          CKD-5: Continue insulin treatment for T2DM. Avoid soft drink and reduce glucose intake. Maintain fluid hydration with water. Follow up with Nephrology and vascular for eventual dialysis treatment. I don't see a nephrology nikos villalba-- las visit was 10/2021-- Reviewed note-- no follow up mentioned.  Sent message to Dr Curiel asking about follow up.  .       HLD: Continue atovastatin 40mg qd      No Lab work for this visit.     Chest pain-- will get stress echo        F/U with the clinic scheduled for3 month, post stress echo-- if ok- he can go back to vascular.  He is putting off getting graft due to cash flow problems but is about to start new job.

## 2022-02-28 ENCOUNTER — OFFICE VISIT (OUTPATIENT)
Dept: OPTOMETRY | Facility: CLINIC | Age: 52
End: 2022-02-28
Payer: COMMERCIAL

## 2022-02-28 ENCOUNTER — TELEPHONE (OUTPATIENT)
Dept: INTERNAL MEDICINE | Facility: CLINIC | Age: 52
End: 2022-02-28
Payer: COMMERCIAL

## 2022-02-28 DIAGNOSIS — H47.20 OPTIC NERVE ATROPHY, LEFT: ICD-10-CM

## 2022-02-28 DIAGNOSIS — E10.3593 PROLIFERATIVE DIABETIC RETINOPATHY OF BOTH EYES WITHOUT MACULAR EDEMA ASSOCIATED WITH TYPE 1 DIABETES MELLITUS: Primary | ICD-10-CM

## 2022-02-28 DIAGNOSIS — H52.4 MYOPIA WITH ASTIGMATISM AND PRESBYOPIA, BILATERAL: ICD-10-CM

## 2022-02-28 DIAGNOSIS — H52.13 MYOPIA WITH ASTIGMATISM AND PRESBYOPIA, BILATERAL: ICD-10-CM

## 2022-02-28 DIAGNOSIS — H52.203 MYOPIA WITH ASTIGMATISM AND PRESBYOPIA, BILATERAL: ICD-10-CM

## 2022-02-28 DIAGNOSIS — H25.13 NUCLEAR SCLEROSIS OF BOTH EYES: ICD-10-CM

## 2022-02-28 DIAGNOSIS — R07.9 CHEST PAIN, UNSPECIFIED TYPE: Primary | ICD-10-CM

## 2022-02-28 PROCEDURE — 92015 DETERMINE REFRACTIVE STATE: CPT | Mod: S$GLB,,, | Performed by: OPTOMETRIST

## 2022-02-28 PROCEDURE — 1159F PR MEDICATION LIST DOCUMENTED IN MEDICAL RECORD: ICD-10-PCS | Mod: CPTII,S$GLB,, | Performed by: OPTOMETRIST

## 2022-02-28 PROCEDURE — 99999 PR PBB SHADOW E&M-EST. PATIENT-LVL III: CPT | Mod: PBBFAC,,, | Performed by: OPTOMETRIST

## 2022-02-28 PROCEDURE — 2022F DILAT RTA XM EVC RTNOPTHY: CPT | Mod: CPTII,S$GLB,, | Performed by: OPTOMETRIST

## 2022-02-28 PROCEDURE — 92014 COMPRE OPH EXAM EST PT 1/>: CPT | Mod: S$GLB,,, | Performed by: OPTOMETRIST

## 2022-02-28 PROCEDURE — 3044F PR MOST RECENT HEMOGLOBIN A1C LEVEL <7.0%: ICD-10-PCS | Mod: CPTII,S$GLB,, | Performed by: OPTOMETRIST

## 2022-02-28 PROCEDURE — 92014 PR EYE EXAM, EST PATIENT,COMPREHESV: ICD-10-PCS | Mod: S$GLB,,, | Performed by: OPTOMETRIST

## 2022-02-28 PROCEDURE — 92015 PR REFRACTION: ICD-10-PCS | Mod: S$GLB,,, | Performed by: OPTOMETRIST

## 2022-02-28 PROCEDURE — 3044F HG A1C LEVEL LT 7.0%: CPT | Mod: CPTII,S$GLB,, | Performed by: OPTOMETRIST

## 2022-02-28 PROCEDURE — 99999 PR PBB SHADOW E&M-EST. PATIENT-LVL III: ICD-10-PCS | Mod: PBBFAC,,, | Performed by: OPTOMETRIST

## 2022-02-28 PROCEDURE — 1159F MED LIST DOCD IN RCRD: CPT | Mod: CPTII,S$GLB,, | Performed by: OPTOMETRIST

## 2022-02-28 PROCEDURE — 2022F PR DILATED RETINAL EYE EXAM WITH INTERP/REVIEW: ICD-10-PCS | Mod: CPTII,S$GLB,, | Performed by: OPTOMETRIST

## 2022-02-28 NOTE — PROGRESS NOTES
HPI     CC: Pt here for annual diabetic eye exam. Pt states fluctuating VA OU. Pt   states floaters OU for the past month.   DLS: 04/01/2021 with Dr. Fitzpatrick  NEERU: 10/07/2020 with Dr. Wallis    (+)POHx: (+)PDR without DME OU (+)s/p PRP OU  Gtts: (+)ClearEyes PRN OU    (+)DM  Hemoglobin A1C       Date                     Value               Ref Range             Status                02/25/2022               6.4 (H)             4.0 - 5.6 %           Final              Comment:    ADA Screening Guidelines:  5.7-6.4%  Consistent with   prediabetes  >or=6.5%  Consistent with diabetes    High levels of fetal   hemoglobin interfere with the HbA1C  assay. Heterozygous hemoglobin   variants (HbS, HgC, etc)do  not significantly interfere with this assay.     However, presence of multiple variants may affect accuracy.         09/24/2021               10.9 (H)            4.5 - 6.6 %           Final              Comment:      Normal:               <5.7%  Pre-Diabetic:       5.7% to   6.4%  Diabetic:             >6.4%  Diabetic Goal:     <7%       04/23/2021               7.9 (H)             4.0 - 5.6 %           Final              Comment:    ADA Screening Guidelines:  5.7-6.4%  Consistent with   prediabetes  >or=6.5%  Consistent with diabetes    High levels of fetal   hemoglobin interfere with the HbA1C  assay. Heterozygous hemoglobin   variants (HbS, HgC, etc)do  not significantly interfere with this assay.     However, presence of multiple variants may affect accuracy.         11/30/2020               7.8 (H)             4.0 - 5.6 %           Final              Comment:    ADA Screening Guidelines:  5.7-6.4%  Consistent with   prediabetes  >or=6.5%  Consistent with diabetes  High levels of fetal   hemoglobin interfere with the HbA1C  assay. Heterozygous hemoglobin   variants (HbS, HgC, etc)do  not significantly interfere with this assay.     However, presence of multiple variants may affect accuracy.    ----------            Last edited by Kavita Hewitt on 2/28/2022 10:19 AM. (History)            Assessment /Plan     For exam results, see Encounter Report.    Proliferative diabetic retinopathy of both eyes without macular edema associated with type 1 diabetes mellitus    Optic nerve atrophy, left    Nuclear sclerosis of both eyes    Myopia with astigmatism and presbyopia, bilateral            1.  Overdue for follow-up with Dr. Fitzpatrick--appointment scheduled.  2.  Longstanding--first noted 10/7/2020.  Monitor.  3.  Early-monitor.  4.  Bifocal rx given.   Retina flat and intact OU--no holes, tears, breaks, or RDs.

## 2022-03-10 ENCOUNTER — PATIENT MESSAGE (OUTPATIENT)
Dept: ADMINISTRATIVE | Facility: HOSPITAL | Age: 52
End: 2022-03-10
Payer: COMMERCIAL

## 2022-06-27 ENCOUNTER — OFFICE VISIT (OUTPATIENT)
Dept: INTERNAL MEDICINE | Facility: CLINIC | Age: 52
End: 2022-06-27
Payer: COMMERCIAL

## 2022-06-27 ENCOUNTER — HOSPITAL ENCOUNTER (OUTPATIENT)
Dept: RADIOLOGY | Facility: HOSPITAL | Age: 52
Discharge: HOME | End: 2022-06-27
Attending: INTERNAL MEDICINE
Payer: COMMERCIAL

## 2022-06-27 VITALS
BODY MASS INDEX: 32.01 KG/M2 | HEIGHT: 71 IN | DIASTOLIC BLOOD PRESSURE: 110 MMHG | OXYGEN SATURATION: 98 % | WEIGHT: 228.63 LBS | HEART RATE: 65 BPM | SYSTOLIC BLOOD PRESSURE: 160 MMHG

## 2022-06-27 DIAGNOSIS — R06.09 DOE (DYSPNEA ON EXERTION): ICD-10-CM

## 2022-06-27 DIAGNOSIS — R73.9 HYPERGLYCEMIA: ICD-10-CM

## 2022-06-27 DIAGNOSIS — I10 MALIGNANT HYPERTENSION: ICD-10-CM

## 2022-06-27 DIAGNOSIS — N18.5 CKD (CHRONIC KIDNEY DISEASE) STAGE 5, GFR LESS THAN 15 ML/MIN: ICD-10-CM

## 2022-06-27 DIAGNOSIS — F14.10 DRUG ABUSE, COCAINE TYPE: ICD-10-CM

## 2022-06-27 DIAGNOSIS — L30.9 DERMATITIS: ICD-10-CM

## 2022-06-27 DIAGNOSIS — Z91.199 NONCOMPLIANCE: ICD-10-CM

## 2022-06-27 DIAGNOSIS — I10 ESSENTIAL HYPERTENSION: ICD-10-CM

## 2022-06-27 DIAGNOSIS — F14.90 CRACK COCAINE USE: Primary | ICD-10-CM

## 2022-06-27 DIAGNOSIS — E78.5 HYPERLIPIDEMIA, UNSPECIFIED HYPERLIPIDEMIA TYPE: ICD-10-CM

## 2022-06-27 PROCEDURE — 3008F BODY MASS INDEX DOCD: CPT | Mod: CPTII,S$GLB,, | Performed by: INTERNAL MEDICINE

## 2022-06-27 PROCEDURE — 71046 X-RAY EXAM CHEST 2 VIEWS: CPT | Mod: TC

## 2022-06-27 PROCEDURE — 3044F HG A1C LEVEL LT 7.0%: CPT | Mod: CPTII,S$GLB,, | Performed by: INTERNAL MEDICINE

## 2022-06-27 PROCEDURE — 71046 XR CHEST PA AND LATERAL: ICD-10-PCS | Mod: 26,,, | Performed by: RADIOLOGY

## 2022-06-27 PROCEDURE — 3044F PR MOST RECENT HEMOGLOBIN A1C LEVEL <7.0%: ICD-10-PCS | Mod: CPTII,S$GLB,, | Performed by: INTERNAL MEDICINE

## 2022-06-27 PROCEDURE — 3080F PR MOST RECENT DIASTOLIC BLOOD PRESSURE >= 90 MM HG: ICD-10-PCS | Mod: CPTII,S$GLB,, | Performed by: INTERNAL MEDICINE

## 2022-06-27 PROCEDURE — 99999 PR PBB SHADOW E&M-EST. PATIENT-LVL V: CPT | Mod: PBBFAC,,, | Performed by: INTERNAL MEDICINE

## 2022-06-27 PROCEDURE — 3008F PR BODY MASS INDEX (BMI) DOCUMENTED: ICD-10-PCS | Mod: CPTII,S$GLB,, | Performed by: INTERNAL MEDICINE

## 2022-06-27 PROCEDURE — 3080F DIAST BP >= 90 MM HG: CPT | Mod: CPTII,S$GLB,, | Performed by: INTERNAL MEDICINE

## 2022-06-27 PROCEDURE — 1159F MED LIST DOCD IN RCRD: CPT | Mod: CPTII,S$GLB,, | Performed by: INTERNAL MEDICINE

## 2022-06-27 PROCEDURE — 3077F SYST BP >= 140 MM HG: CPT | Mod: CPTII,S$GLB,, | Performed by: INTERNAL MEDICINE

## 2022-06-27 PROCEDURE — 3077F PR MOST RECENT SYSTOLIC BLOOD PRESSURE >= 140 MM HG: ICD-10-PCS | Mod: CPTII,S$GLB,, | Performed by: INTERNAL MEDICINE

## 2022-06-27 PROCEDURE — 99215 OFFICE O/P EST HI 40 MIN: CPT | Mod: S$GLB,,, | Performed by: INTERNAL MEDICINE

## 2022-06-27 PROCEDURE — 99999 PR PBB SHADOW E&M-EST. PATIENT-LVL V: ICD-10-PCS | Mod: PBBFAC,,, | Performed by: INTERNAL MEDICINE

## 2022-06-27 PROCEDURE — 1159F PR MEDICATION LIST DOCUMENTED IN MEDICAL RECORD: ICD-10-PCS | Mod: CPTII,S$GLB,, | Performed by: INTERNAL MEDICINE

## 2022-06-27 PROCEDURE — 71046 X-RAY EXAM CHEST 2 VIEWS: CPT | Mod: 26,,, | Performed by: RADIOLOGY

## 2022-06-27 PROCEDURE — 99215 PR OFFICE/OUTPT VISIT, EST, LEVL V, 40-54 MIN: ICD-10-PCS | Mod: S$GLB,,, | Performed by: INTERNAL MEDICINE

## 2022-06-27 RX ORDER — AMLODIPINE BESYLATE 10 MG/1
10 TABLET ORAL DAILY
Qty: 90 TABLET | Refills: 3 | Status: SHIPPED | OUTPATIENT
Start: 2022-06-27 | End: 2023-04-03 | Stop reason: SDUPTHER

## 2022-06-27 RX ORDER — LABETALOL 300 MG/1
300 TABLET, FILM COATED ORAL 2 TIMES DAILY
Qty: 180 TABLET | Refills: 3 | Status: SHIPPED | OUTPATIENT
Start: 2022-06-27 | End: 2023-04-03 | Stop reason: SDUPTHER

## 2022-06-27 RX ORDER — METOLAZONE 5 MG/1
5 TABLET ORAL NIGHTLY
Qty: 90 TABLET | Refills: 2 | Status: SHIPPED | OUTPATIENT
Start: 2022-06-27 | End: 2023-04-03 | Stop reason: SDUPTHER

## 2022-06-27 RX ORDER — HYDRALAZINE HYDROCHLORIDE 100 MG/1
100 TABLET, FILM COATED ORAL EVERY 12 HOURS
Qty: 180 TABLET | Refills: 3 | Status: SHIPPED | OUTPATIENT
Start: 2022-06-27 | End: 2022-07-12

## 2022-06-27 RX ORDER — ATORVASTATIN CALCIUM 40 MG/1
40 TABLET, FILM COATED ORAL NIGHTLY
Qty: 90 TABLET | Refills: 3 | Status: SHIPPED | OUTPATIENT
Start: 2022-06-27 | End: 2023-04-03 | Stop reason: SDUPTHER

## 2022-06-27 NOTE — PROGRESS NOTES
"  Mr. Luis Daniel Howell is a 51 y.o. male with PMHx of CKD-4, HTN, HLD, T2DM and cocaine abuse who presents to clinic for follow up. I last saw him 2/25/22 -- Today he is SOB and he has stopped his medicine since he thinks it has been causing him SOB.  He was in the ED needing  his insulin 6/7/2022-- BP then was 165/97.  He was not taking any  medicine at that time either.  He tells me he stopped his bp Thursday but that was 6/23/2022.   He had a 3/3/2022 card appointment for a stress echo that he no showed.  He continues to use cocaine off and on.                   He was gong to get a AV graft placed in prep for HD on 11/09/2021 but had some  chest pain so surgery was canceled until after cardiac workup.  EKG was unchanged== troponin was elevated at 0.04.  No more chest pains.  Chest pain he had on 1/9 was " like SOB"  NO actual chest pain, tightnessor sensation.   Echo from 9/24/2021 shoed some severe concentric remodeling of LV with normal EF>  I ordered a stress echo last 2 visits, but he had not gotten this yet.  He has not had any more chest pain.       Cocaine abuse- chronic--: last cocaine use was 1 month ago.  BP today is 160/110     T2DM: Currently on insulin glargine 45units nightly and insulin novolog 5-10units TID. Pt reported difficulty in performing prandial glucose checks and doesn't always correct insulin dosage. Recent hgb a1c  Was 6.4 in Feb .       HTN: Currently suppose to be on amlodipine 10mg qd, lebetalol 300mg bid, hydralazine 100mg q12h and furosemnide 40mg bid. HE is not taking any of these because he has SO.  Even though he stopped taking these, his SOB has not improved.    BP today was 160/110   He has had significant compliance problems before.         CKD-4: GFR has declined into stage 5 ckd.  As of lab in Feb.  . Patient is aware of the decreased renal functioned and acknowledges that dialysis will most likely be required in the near future. He reported that he is trying to stay on a " "regular insuling regimen and is staying hydrated with water vice soft drinks. He has not see neprology since 10/2021.         HLD: taking atovastatin 40mg qd regularly.. lipid panel controled 9/2021.       Review of Systems   Constitutional: Negative for chills and fever.   HENT: Negative for hearing loss.    Eyes: Negative for blurred vision and double vision.   Respiratory:  +  SOB.  . Negative for cough, hemoptysis, sputum production and wheezing.    Cardiovascular: Negative for chest pain and palpitations.   Gastrointestinal: Negative for abdominal pain, constipation, diarrhea, nausea and vomiting.   Genitourinary: Positive for frequency. Negative for dysuria, hematuria and urgency.   Musculoskeletal: Negative for back pain, joint pain and neck pain.   Skin: Negative for itching and rash.   Neurological: Negative for dizziness and headaches.              OBJECTIVE:               Physical Exam      BP (!) 160/110 (BP Location: Left arm, Patient Position: Sitting, BP Method: Large (Manual))   Pulse 65   Ht 5' 11" (1.803 m)   Wt 103.7 kg (228 lb 9.9 oz)   SpO2 98%   BMI 31.89 kg/m²       Constitutional:       General: He is awake. He is not in acute distress.     Appearance: Normal appearance. He is not diaphoretic.   HENT:      Head: Normocephalic and atraumatic.      Nose: No rhinorrhea.   Cardiovascular:      Rate and Rhythm: Normal rate and regular rhythm.      Pulses: Normal pulses.           Radial pulses are 2+ on the right side and 2+ on the left side.      Heart sounds: Normal heart sounds.   Pulmonary:      Effort: Pulmonary effort is normal. No respiratory distress.      Breath sounds: No stridor. No wheezing or rales.   Abdominal:      General: Abdomen is flat. Bowel sounds are normal. There is no distension.      Palpations: Abdomen is soft.      Tenderness: There is no abdominal tenderness. There is no right CVA tenderness, left CVA tenderness, guarding or rebound.   Musculoskeletal: "         General: No swelling. Normal range of motion.      Cervical back: Normal range of motion. No tenderness.        Feet:     Lymphadenopathy:      Cervical: No cervical adenopathy.   Skin:     General: Skin is warm.      Capillary Refill: Capillary refill takes less than 2 seconds.      Coloration: Skin is not jaundiced or pale.   Neurological:      General: No focal deficit present.      Mental Status: He is alert and oriented to person, place, and time.   Psychiatric:         Mood and Affect: Mood normal.         Behavior: Behavior normal. Behavior is cooperative.         Thought Content: Thought content normal.         Judgment: Judgment normal.            ASSESSMENT & PLAN:   Mr. Luis Daniel Howell is a 52 y.o. male with huge compliance issue.  Despite having cp and SOb, and ckd stage 5, he can't seem to make it to his nephrology or cards visit but still comes back to see me off his meds with the same or similar complaints that he had last visit.  Now he is not having chest pain and the shortness of breath the same time or I was sent in to the emergency room right now.  He has had some shortness of breath  on exertion.  This is not new and we have ordered a stress echo on on 3 times as of this visit.  Stopping his blood pressure medicine coming in with a very elevated blood pressure 160/110 is not a very healthy thing to do.  I discussed this with him at length today.  We are going to restart his blood pressure medications and I want to see him back in a couple weeks.       Cocaine: recent cocaine use-- need to avoid this--been through rehab several times.  Trying to get job back and had recent drug test.  He does not want to see anybody in addiction medicine          T2DM: Continue insulin glargine 45 units nightly and insulin novolog 5-10units TID (sliding scale).  Will get labs today.             HTN: Currently on amlodipine 10mg qd, lebetalol 300mg bid, hydralazine 100mg q12h and furosemnide 40mg bid.BP  controled.          CKD-5: Continue insulin treatment for T2DM. Avoid soft drink and reduce glucose intake. Maintain fluid hydration with water. Follow up with Nephrology and vascular for eventual dialysis treatment. I don't see a nephrology nikos villalba-- las visit was 10/2021-- Reviewed note-- no follow up mentioned.  Sent message to Dr Curiel asking about follow up.  .       HLD: Continue atovastatin 40mg qd            Chest pain--last visit and SOB this visit-- will get stress echo-- will check labs.         Compliance is a huge problem-  will set up test, follow up int Nephrology.  As far as cancer screening-  This is not not needed in him unless he gets his other  co morbidities better taken care of.  -- I will see him back in 2 weeks and recheck bp

## 2022-07-05 PROBLEM — N18.5 CKD (CHRONIC KIDNEY DISEASE), STAGE V: Status: ACTIVE | Noted: 2020-11-30

## 2022-07-12 ENCOUNTER — OFFICE VISIT (OUTPATIENT)
Dept: INTERNAL MEDICINE | Facility: CLINIC | Age: 52
End: 2022-07-12
Payer: COMMERCIAL

## 2022-07-12 VITALS
HEART RATE: 61 BPM | WEIGHT: 227.75 LBS | BODY MASS INDEX: 31.89 KG/M2 | SYSTOLIC BLOOD PRESSURE: 160 MMHG | HEIGHT: 71 IN | OXYGEN SATURATION: 99 % | DIASTOLIC BLOOD PRESSURE: 104 MMHG

## 2022-07-12 DIAGNOSIS — Z79.4 TYPE 2 DIABETES MELLITUS WITH STAGE 5 CHRONIC KIDNEY DISEASE NOT ON CHRONIC DIALYSIS, WITH LONG-TERM CURRENT USE OF INSULIN: ICD-10-CM

## 2022-07-12 DIAGNOSIS — N18.5 TYPE 2 DIABETES MELLITUS WITH STAGE 5 CHRONIC KIDNEY DISEASE NOT ON CHRONIC DIALYSIS, WITH LONG-TERM CURRENT USE OF INSULIN: ICD-10-CM

## 2022-07-12 DIAGNOSIS — F19.10 SUBSTANCE ABUSE: ICD-10-CM

## 2022-07-12 DIAGNOSIS — E78.5 HYPERLIPIDEMIA, UNSPECIFIED HYPERLIPIDEMIA TYPE: ICD-10-CM

## 2022-07-12 DIAGNOSIS — R07.9 CHEST PAIN, UNSPECIFIED TYPE: Primary | ICD-10-CM

## 2022-07-12 DIAGNOSIS — I34.0 MITRAL VALVE INSUFFICIENCY, UNSPECIFIED ETIOLOGY: ICD-10-CM

## 2022-07-12 DIAGNOSIS — F14.90 CRACK COCAINE USE: ICD-10-CM

## 2022-07-12 DIAGNOSIS — N18.5 CKD (CHRONIC KIDNEY DISEASE), STAGE V: ICD-10-CM

## 2022-07-12 DIAGNOSIS — E11.22 TYPE 2 DIABETES MELLITUS WITH STAGE 5 CHRONIC KIDNEY DISEASE NOT ON CHRONIC DIALYSIS, WITH LONG-TERM CURRENT USE OF INSULIN: ICD-10-CM

## 2022-07-12 DIAGNOSIS — I10 MALIGNANT HYPERTENSION: ICD-10-CM

## 2022-07-12 PROCEDURE — 3044F PR MOST RECENT HEMOGLOBIN A1C LEVEL <7.0%: ICD-10-PCS | Mod: CPTII,S$GLB,, | Performed by: INTERNAL MEDICINE

## 2022-07-12 PROCEDURE — 3077F SYST BP >= 140 MM HG: CPT | Mod: CPTII,S$GLB,, | Performed by: INTERNAL MEDICINE

## 2022-07-12 PROCEDURE — 99214 OFFICE O/P EST MOD 30 MIN: CPT | Mod: S$GLB,,, | Performed by: INTERNAL MEDICINE

## 2022-07-12 PROCEDURE — 1159F PR MEDICATION LIST DOCUMENTED IN MEDICAL RECORD: ICD-10-PCS | Mod: CPTII,S$GLB,, | Performed by: INTERNAL MEDICINE

## 2022-07-12 PROCEDURE — 3044F HG A1C LEVEL LT 7.0%: CPT | Mod: CPTII,S$GLB,, | Performed by: INTERNAL MEDICINE

## 2022-07-12 PROCEDURE — 3008F BODY MASS INDEX DOCD: CPT | Mod: CPTII,S$GLB,, | Performed by: INTERNAL MEDICINE

## 2022-07-12 PROCEDURE — 1159F MED LIST DOCD IN RCRD: CPT | Mod: CPTII,S$GLB,, | Performed by: INTERNAL MEDICINE

## 2022-07-12 PROCEDURE — 1111F DSCHRG MED/CURRENT MED MERGE: CPT | Mod: CPTII,S$GLB,, | Performed by: INTERNAL MEDICINE

## 2022-07-12 PROCEDURE — 1111F PR DISCHARGE MEDS RECONCILED W/ CURRENT OUTPATIENT MED LIST: ICD-10-PCS | Mod: CPTII,S$GLB,, | Performed by: INTERNAL MEDICINE

## 2022-07-12 PROCEDURE — 99999 PR PBB SHADOW E&M-EST. PATIENT-LVL IV: ICD-10-PCS | Mod: PBBFAC,,, | Performed by: INTERNAL MEDICINE

## 2022-07-12 PROCEDURE — 99214 PR OFFICE/OUTPT VISIT, EST, LEVL IV, 30-39 MIN: ICD-10-PCS | Mod: S$GLB,,, | Performed by: INTERNAL MEDICINE

## 2022-07-12 PROCEDURE — 3077F PR MOST RECENT SYSTOLIC BLOOD PRESSURE >= 140 MM HG: ICD-10-PCS | Mod: CPTII,S$GLB,, | Performed by: INTERNAL MEDICINE

## 2022-07-12 PROCEDURE — 3080F DIAST BP >= 90 MM HG: CPT | Mod: CPTII,S$GLB,, | Performed by: INTERNAL MEDICINE

## 2022-07-12 PROCEDURE — 99999 PR PBB SHADOW E&M-EST. PATIENT-LVL IV: CPT | Mod: PBBFAC,,, | Performed by: INTERNAL MEDICINE

## 2022-07-12 PROCEDURE — 3008F PR BODY MASS INDEX (BMI) DOCUMENTED: ICD-10-PCS | Mod: CPTII,S$GLB,, | Performed by: INTERNAL MEDICINE

## 2022-07-12 PROCEDURE — 3080F PR MOST RECENT DIASTOLIC BLOOD PRESSURE >= 90 MM HG: ICD-10-PCS | Mod: CPTII,S$GLB,, | Performed by: INTERNAL MEDICINE

## 2022-07-12 NOTE — PROGRESS NOTES
Mr. Luis Daniel Howell is a 51 y.o. male with PMHx of CKD-4, HTN, HLD, T2DM and cocaine abuse who presents to clinic for follow up.I last saw him 6/27/2022-- He was D/c from [facility] last Wednesday.  He was there because of chest pain.      From D/C summary:   Luis Daniel Howell is a 52 y.o.  male who  has a past medical history of Diabetes mellitus, Diabetes mellitus type I, Diabetic retinopathy, Hyperlipidemia, and Hypertension.  The patient presented to [facility] on 7/5/2022 with a primary complaint of Chest Pain (BP check at work read high.  Pt took his BP med Labetol 300 mg bid and Hydralazine 100 mg tid ,  Pt states the Hydralazine made his chest feel tight and lightheaded)     The patient was in their usual state of health until this morning when he noted mid-chest pain and also noted that his BP was high. He states that he has been compliant with his home medications. Has not previously had a stress test. Reports that he has been relapsing with cocaine use. Reports good UOP with diuretics.           Hospital Course By Problem with Pertinent Findings      Chest Pain  Recurrent CP, hasn't had a stress test recently. Will need one prior to AVF placement for HD  98.6F 62 24 121/66 98%  UDS+ cocaine   EKG with STEMI  Trop stable at 0.105<--0.123<--0.132  Cardiology consult  Echo shows Grade II DD, severe LAE, mod-severe MR, elevated CVP and pHTN  Stress test    1.  Scintigraphically negative for ischemia or infarct.  2. the global left ventricular systolic function is reduced with an LV ejection fraction of 46 % and left ventricular chamber.  Wall motion is normal.  Close PCP and Cardiology follow up for BP management      CKD V stable  BUN/Cre 65/7.9  Restart home meds,  Follow up with primary nephrologist, renal function is at baseline and patient reports that he is urinating at baseline             - Today he is SOB and he has not taken his bp medicine this morning.   BP today is 160/104.        " He had a 3/3/2022 card appointment for a stress echo that he no showed.  He continues to use cocaine off and on.                    He was gong to get a AV graft placed in prep for HD on 11/09/2021 but had some  chest pain so surgery was canceled until after cardiac workup.  EKG was unchanged== troponin was elevated at 0.04.  No more chest pains.  Chest pain he had on 1/9 was " like SOB"  NO actual chest pain, tightnessor sensation.   Echo from 9/24/2021 shoed some severe concentric remodeling of LV with normal EF>  I ordered a stress echo last 2 visits, but he had not gotten this yet.  He has not had any more chest pain.       Cocaine abuse- chronic--: last cocaine use was couple weeks ago  BP today is 160/104     T2DM: Currently on insulin glargine 45units nightly and insulin novolog 5-10units TID. Pt reported difficulty in performing prandial glucose checks and doesn't always correct insulin dosage. Recent hgb a1c  Was 6.9  6/27/2022.         HTN: Currently suppose to be on amlodipine 10mg qd, lebetalol 300mg bid, hydralazine 100mg q12h and furosemnide 40mg bid. He is not taking any bp meds last visit and he has not taken any of them today.  ANd his bp is elevated.      BP today was 160/104   He has had significant compliance problems before, and again and again.  .          CKD-4: GFR has declined into stage 5 ckd.  gfr of 8.2.   . Patient is aware of the decreased renal functioned and acknowledges that dialysis will most likely be required in the near future. He reported that he is trying to stay on a regular insuling regimen and is staying hydrated with water vice soft drinks. He has not see neprology since 10/2021.  I made him an appointment last visit, and his back to see me but has still not seen them.          HLD: taking atovastatin 40mg qd regularly.. lipid panel controled 9/2021.       Review of Systems   Constitutional: Negative for chills and fever.   HENT: Negative for hearing loss.    Eyes: " "Negative for blurred vision and double vision.   Respiratory:  +  SOB.  . Negative for cough, hemoptysis, sputum production and wheezing.    Cardiovascular: Negative for chest pain and palpitations.   Gastrointestinal: Negative for abdominal pain, constipation, diarrhea, nausea and vomiting.   Genitourinary: Positive for frequency. Negative for dysuria, hematuria and urgency.   Musculoskeletal: Negative for back pain, joint pain and neck pain.   Skin: Negative for itching and rash.   Neurological: Negative for dizziness and headaches.               OBJECTIVE:               Physical Exam        BP (!) 160/104 (BP Location: Left arm, Patient Position: Sitting, BP Method: Large (Manual))   Pulse 61   Ht 5' 11" (1.803 m)   Wt 103.3 kg (227 lb 11.8 oz)   SpO2 99%   BMI 31.76 kg/m²          Constitutional:       General: He is awake. He is not in acute distress.     Appearance: Normal appearance. He is not diaphoretic.   HENT:      Head: Normocephalic and atraumatic.      Nose: No rhinorrhea.   Cardiovascular:      Rate and Rhythm: Normal rate and regular rhythm.      Pulses: Normal pulses.           Radial pulses are 2+ on the right side and 2+ on the left side.      Heart sounds: Normal heart sounds.   Pulmonary:      Effort: Pulmonary effort is normal. No respiratory distress.      Breath sounds: No stridor. No wheezing or rales.   Abdominal:      General: Abdomen is flat. Bowel sounds are normal. There is no distension.      Palpations: Abdomen is soft.      Tenderness: There is no abdominal tenderness. There is no right CVA tenderness, left CVA tenderness, guarding or rebound.   Musculoskeletal:         General: No swelling. Normal range of motion.      Cervical back: Normal range of motion. No tenderness.        Feet:     Lymphadenopathy:      Cervical: No cervical adenopathy.   Skin:     General: Skin is warm.      Capillary Refill: Capillary refill takes less than 2 seconds.      Coloration: Skin is not " jaundiced or pale.   Neurological:      General: No focal deficit present.      Mental Status: He is alert and oriented to person, place, and time.   Psychiatric:         Mood and Affect: Mood normal.         Behavior: Behavior normal. Behavior is cooperative.         Thought Content: Thought content normal.         Judgment: Judgment normal.            ASSESSMENT & PLAN:   Mr. Luis Daniel Howell is a 52 y.o. male with huge compliance issue.  Despite having cp and SOb, and ckd stage 5, he can't seem to make it to his nephrology or cards visit but still comes back to see me off his meds and usually on cocaine      He has had some shortness of breath  on exertion.  This is not new and we have ordered a stress echo on on 4 times as of this visit, luckily he ended up in the ED and was evaluated for ischemia-- this was negative-- Will refer to cardiology for his MV regurgitation.    Stopping his blood pressure medicine coming in with a very elevated blood pressure 160/110 is not a very healthy thing to do.  I discussed this with him at length today.  We are going to restart his blood pressure medications and I want to see him back in a couple weeks. Also stop cocaine-        Cocaine: recent cocaine use-- need to avoid this--been through rehab several times.  Trying to get job back and had recent drug test.  He does not want to see anybody in addiction medicine          T2DM: Continue insulin glargine 45 units nightly and insulin novolog 5-10units TID (sliding scale).  well controled          HTN: Currently on amlodipine 10mg qd, lebetalol 300mg bid, and furosemnide 40mg bid.BP controled. stop  hydralazine 100mg q12h  ( he isn't taking it anyway so I don't think ths will change very much but may, just maybe we can get him back on his other  BP meds before he has a stroke.)          CKD-5: Continue insulin treatment for T2DM. Avoid soft drink and reduce glucose intake. Maintain fluid hydration with water. Follow up with  Nephrology and vascular for eventual dialysis treatment. I don't see a nephrology followup        HLD: Continue atovastatin 40mg qd            Chest pain--last visit and SOB this visit-- will refer to card to look at MV reg.       Compliance is a huge problem-   follow up int Nephrology.  As far as cancer screening-  This is not not needed in him unless he gets his other  co morbidities better taken care of.  -- I will see him back in 4 weeks and recheck bp

## 2022-07-13 ENCOUNTER — OFFICE VISIT (OUTPATIENT)
Dept: CARDIOLOGY | Facility: CLINIC | Age: 52
End: 2022-07-13
Payer: COMMERCIAL

## 2022-07-13 VITALS
BODY MASS INDEX: 31.79 KG/M2 | HEART RATE: 57 BPM | HEIGHT: 71 IN | DIASTOLIC BLOOD PRESSURE: 99 MMHG | SYSTOLIC BLOOD PRESSURE: 163 MMHG | WEIGHT: 227.06 LBS

## 2022-07-13 DIAGNOSIS — I50.22 CHRONIC SYSTOLIC CONGESTIVE HEART FAILURE, NYHA CLASS 2: ICD-10-CM

## 2022-07-13 DIAGNOSIS — I42.8 NICM (NONISCHEMIC CARDIOMYOPATHY): ICD-10-CM

## 2022-07-13 DIAGNOSIS — E78.5 DYSLIPIDEMIA: ICD-10-CM

## 2022-07-13 DIAGNOSIS — R06.09 DYSPNEA ON EXERTION: ICD-10-CM

## 2022-07-13 DIAGNOSIS — I50.20 SYSTOLIC CONGESTIVE HEART FAILURE, UNSPECIFIED HF CHRONICITY: ICD-10-CM

## 2022-07-13 DIAGNOSIS — F14.90 CRACK COCAINE USE: ICD-10-CM

## 2022-07-13 DIAGNOSIS — I34.0 MITRAL VALVE INSUFFICIENCY, UNSPECIFIED ETIOLOGY: ICD-10-CM

## 2022-07-13 DIAGNOSIS — E66.9 OBESITY (BMI 30-39.9): Chronic | ICD-10-CM

## 2022-07-13 DIAGNOSIS — I10 MALIGNANT HYPERTENSION: ICD-10-CM

## 2022-07-13 DIAGNOSIS — E11.00 TYPE 2 DIABETES MELLITUS WITH HYPEROSMOLARITY WITHOUT COMA, WITH LONG-TERM CURRENT USE OF INSULIN: Primary | ICD-10-CM

## 2022-07-13 DIAGNOSIS — Z79.4 TYPE 2 DIABETES MELLITUS WITH HYPEROSMOLARITY WITHOUT COMA, WITH LONG-TERM CURRENT USE OF INSULIN: Primary | ICD-10-CM

## 2022-07-13 DIAGNOSIS — N18.5 CKD (CHRONIC KIDNEY DISEASE), STAGE V: ICD-10-CM

## 2022-07-13 DIAGNOSIS — I51.89 GRADE I DIASTOLIC DYSFUNCTION: ICD-10-CM

## 2022-07-13 DIAGNOSIS — R07.89 OTHER CHEST PAIN: ICD-10-CM

## 2022-07-13 PROCEDURE — 1159F PR MEDICATION LIST DOCUMENTED IN MEDICAL RECORD: ICD-10-PCS | Mod: CPTII,S$GLB,, | Performed by: INTERNAL MEDICINE

## 2022-07-13 PROCEDURE — 3044F HG A1C LEVEL LT 7.0%: CPT | Mod: CPTII,S$GLB,, | Performed by: INTERNAL MEDICINE

## 2022-07-13 PROCEDURE — 1111F DSCHRG MED/CURRENT MED MERGE: CPT | Mod: CPTII,S$GLB,, | Performed by: INTERNAL MEDICINE

## 2022-07-13 PROCEDURE — 1111F PR DISCHARGE MEDS RECONCILED W/ CURRENT OUTPATIENT MED LIST: ICD-10-PCS | Mod: CPTII,S$GLB,, | Performed by: INTERNAL MEDICINE

## 2022-07-13 PROCEDURE — 1160F RVW MEDS BY RX/DR IN RCRD: CPT | Mod: CPTII,S$GLB,, | Performed by: INTERNAL MEDICINE

## 2022-07-13 PROCEDURE — 3077F PR MOST RECENT SYSTOLIC BLOOD PRESSURE >= 140 MM HG: ICD-10-PCS | Mod: CPTII,S$GLB,, | Performed by: INTERNAL MEDICINE

## 2022-07-13 PROCEDURE — 3008F PR BODY MASS INDEX (BMI) DOCUMENTED: ICD-10-PCS | Mod: CPTII,S$GLB,, | Performed by: INTERNAL MEDICINE

## 2022-07-13 PROCEDURE — 3044F PR MOST RECENT HEMOGLOBIN A1C LEVEL <7.0%: ICD-10-PCS | Mod: CPTII,S$GLB,, | Performed by: INTERNAL MEDICINE

## 2022-07-13 PROCEDURE — 99999 PR PBB SHADOW E&M-EST. PATIENT-LVL IV: CPT | Mod: PBBFAC,,, | Performed by: INTERNAL MEDICINE

## 2022-07-13 PROCEDURE — 99999 PR PBB SHADOW E&M-EST. PATIENT-LVL IV: ICD-10-PCS | Mod: PBBFAC,,, | Performed by: INTERNAL MEDICINE

## 2022-07-13 PROCEDURE — 3080F PR MOST RECENT DIASTOLIC BLOOD PRESSURE >= 90 MM HG: ICD-10-PCS | Mod: CPTII,S$GLB,, | Performed by: INTERNAL MEDICINE

## 2022-07-13 PROCEDURE — 3008F BODY MASS INDEX DOCD: CPT | Mod: CPTII,S$GLB,, | Performed by: INTERNAL MEDICINE

## 2022-07-13 PROCEDURE — 3077F SYST BP >= 140 MM HG: CPT | Mod: CPTII,S$GLB,, | Performed by: INTERNAL MEDICINE

## 2022-07-13 PROCEDURE — 3080F DIAST BP >= 90 MM HG: CPT | Mod: CPTII,S$GLB,, | Performed by: INTERNAL MEDICINE

## 2022-07-13 PROCEDURE — 99214 OFFICE O/P EST MOD 30 MIN: CPT | Mod: S$GLB,,, | Performed by: INTERNAL MEDICINE

## 2022-07-13 PROCEDURE — 99214 PR OFFICE/OUTPT VISIT, EST, LEVL IV, 30-39 MIN: ICD-10-PCS | Mod: S$GLB,,, | Performed by: INTERNAL MEDICINE

## 2022-07-13 PROCEDURE — 1160F PR REVIEW ALL MEDS BY PRESCRIBER/CLIN PHARMACIST DOCUMENTED: ICD-10-PCS | Mod: CPTII,S$GLB,, | Performed by: INTERNAL MEDICINE

## 2022-07-13 PROCEDURE — 1159F MED LIST DOCD IN RCRD: CPT | Mod: CPTII,S$GLB,, | Performed by: INTERNAL MEDICINE

## 2022-07-13 RX ORDER — FUROSEMIDE 40 MG/1
40 TABLET ORAL DAILY
Qty: 90 TABLET | Refills: 3 | Status: SHIPPED | OUTPATIENT
Start: 2022-07-13 | End: 2023-07-06

## 2022-07-13 NOTE — PROGRESS NOTES
Subjective:   Patient ID:  Luis Daniel Howell is a 52 y.o. male is a new patient who presents for evaluation of Hypertension and Hyperlipidemia  Luis Daniel Howell is a 52 y.o.  male who  has a past medical history of Diabetes mellitus, Diabetes mellitus type I, Diabetic retinopathy, Hyperlipidemia, and Hypertension.    Echo 7/6/22  · The left ventricle is normal in size with severe concentric hypertrophy and normal systolic function.  · The estimated ejection fraction is 55%.  · Grade II left ventricular diastolic dysfunction.  · Normal right ventricular size with normal right ventricular systolic function.  · Severe left atrial enlargement.  · Mild tricuspid regurgitation.  · Moderate-to-severe mitral regurgitation.  · Intermediate central venous pressure (8 mmHg).  · The estimated PA systolic pressure is 40 mmHg.    NM stress test 7/22    The EKG portion of this study is positive for ischemia. Specificity is reduced secondary to resting ST abnormalities.    The patient reported no chest pain during the stress test.    There were no arrhythmias during stress.    The nuclear portion of this study will be reported separately.     HPI:   C/o chest pain  Denies palpitations or fluttering in the chest      Patient Active Problem List   Diagnosis    DM (diabetes mellitus) type I uncontrolled with renal manifestation    Hyperlipidemia    Microalbuminuria    Essential hypertension    Drug abuse, cocaine type    Chest pain    Obesity (BMI 30-39.9)    Malignant hypertension    Elevated troponin    Troponin level elevated    Dyspnea on exertion    Grade I diastolic dysfunction    CKD (chronic kidney disease), stage V    Congestive heart failure (CHF)    Dyslipidemia    Vitamin D deficiency    Type 2 diabetes mellitus, with long-term current use of insulin    Noncardiac pulmonary edema    Pre-operative examination    Anemia of chronic kidney failure, unspecified stage    Secondary hyperparathyroidism of  "renal origin    Crack cocaine use    Diabetic retinopathy    Substance abuse     BP (!) 163/99   Pulse (!) 57   Ht 5' 11" (1.803 m)   Wt 103 kg (227 lb 1.2 oz)   BMI 31.67 kg/m²   Body mass index is 31.67 kg/m².  CrCl cannot be calculated (Patient's most recent lab result is older than the maximum 7 days allowed.).    Lab Results   Component Value Date     07/06/2022    K 3.9 07/06/2022     07/06/2022    CO2 19 (L) 07/06/2022    BUN 73 (H) 07/06/2022    CREATININE 7.9 (H) 07/06/2022    GLU 78 07/06/2022    HGBA1C 6.9 (H) 06/27/2022    MG 1.2 (L) 09/23/2021    MG 1.9 06/25/2020    AST 17 07/05/2022    ALT 13 07/05/2022    ALBUMIN 2.9 (L) 07/06/2022    PROT 6.4 07/05/2022    BILITOT 0.3 07/05/2022    WBC 6.38 07/05/2022    HGB 10.1 (L) 07/05/2022    HCT 31.5 (L) 07/05/2022    MCV 86 07/05/2022     (L) 07/05/2022    INR 1.06 09/23/2021    INR 1.0 10/08/2019    TSH 1.090 09/24/2021    TSH 0.374 (L) 04/07/2009    CHOL 135 09/24/2021    CHOL 141 04/23/2021    HDL 53 09/24/2021    HDL 58 04/23/2021    LDLCALC 69 09/24/2021    LDLCALC 75.2 04/23/2021    TRIG 66 09/24/2021    TRIG 39 04/23/2021       Current Outpatient Medications   Medication Sig    amLODIPine (NORVASC) 10 MG tablet Take 1 tablet (10 mg total) by mouth once daily.    atorvastatin (LIPITOR) 40 MG tablet Take 1 tablet (40 mg total) by mouth every evening.    BASAGLAR KWIKPEN U-100 INSULIN glargine 100 units/mL (3mL) SubQ pen Inject 45 Units into the skin every evening.    insulin aspart U-100 (NOVOLOG FLEXPEN U-100 INSULIN) 100 unit/mL (3 mL) InPn pen INJECT 10 UNITS INTO THE SKIN 3 (THREE) TIMES DAILY WITH MEALS.    labetaloL (NORMODYNE) 300 MG tablet Take 1 tablet (300 mg total) by mouth 2 (two) times daily. (Patient taking differently: Take 300 mg by mouth once daily.)    vitamin D (VITAMIN D3) 1000 units Tab Take 2,000 Units by mouth nightly.    aspirin 81 MG Chew Take 1 tablet (81 mg total) by mouth once daily. (Patient " taking differently: Take 81 mg by mouth once daily.)    blood sugar diagnostic Strp To check BG 2 times daily, to use with insurance preferred meter    ferrous gluconate (FERGON) 324 MG tablet Take 1 tablet (324 mg total) by mouth daily with breakfast. (Patient not taking: Reported on 7/13/2022)    fluticasone propionate (FLONASE) 50 mcg/actuation nasal spray 2 sprays. Not taking    furosemide (LASIX) 40 MG tablet Take 1 tablet (40 mg total) by mouth once daily.    metOLazone (ZAROXOLYN) 5 MG tablet Take 1 tablet (5 mg total) by mouth nightly.     No current facility-administered medications for this visit.       ROS    Objective:   Physical Exam    Assessment:     1. Type 2 diabetes mellitus with hyperosmolarity without coma, with long-term current use of insulin    2. Mitral valve insufficiency, unspecified etiology    3. Obesity (BMI 30-39.9)    4. DM (diabetes mellitus) type I uncontrolled with renal manifestation    5. CKD (chronic kidney disease), stage V    6. Malignant hypertension    7. Grade I diastolic dysfunction    8. Crack cocaine use    9. Dyspnea on exertion    10. Dyslipidemia    11. Systolic congestive heart failure, unspecified HF chronicity    12. Other chest pain    13. NICM (nonischemic cardiomyopathy)    14. Chronic systolic congestive heart failure, NYHA class 2        Plan:     Luis Daniel was seen today for hypertension and hyperlipidemia.    Diagnoses and all orders for this visit:    Type 2 diabetes mellitus with hyperosmolarity without coma, with long-term current use of insulin    Mitral valve insufficiency, unspecified etiology  -     Ambulatory referral/consult to Cardiology  -     Echo Saline Bubble? No; Future    Obesity (BMI 30-39.9)    DM (diabetes mellitus) type I uncontrolled with renal manifestation    CKD (chronic kidney disease), stage V    Malignant hypertension  -     Echo Saline Bubble? No; Future    Grade I diastolic dysfunction  -     Echo Saline Bubble? No;  Future    Crack cocaine use  -     Echo Saline Bubble? No; Future    Dyspnea on exertion    Dyslipidemia    Systolic congestive heart failure, unspecified HF chronicity    Other chest pain    NICM (nonischemic cardiomyopathy)  -     Echo Saline Bubble? No; Future    Chronic systolic congestive heart failure, NYHA class 2  -     Echo Saline Bubble? No; Future    Other orders  -     furosemide (LASIX) 40 MG tablet; Take 1 tablet (40 mg total) by mouth once daily.    suspect hypertensive heart disease will add lasix, continue other medications, low salt diet.   Counseled on importance of heart healthy diet low in saturated and trans fat and salt as well gradually starting a regular aerobic exercise regimen with goal of 30min 5x/week. Recommend BP diary. Call if systolic BP > 130 mmHg on checking repeatedly

## 2022-07-26 ENCOUNTER — TELEPHONE (OUTPATIENT)
Dept: INTERNAL MEDICINE | Facility: CLINIC | Age: 52
End: 2022-07-26
Payer: COMMERCIAL

## 2022-07-26 NOTE — TELEPHONE ENCOUNTER
----- Message from Nenita Howell sent at 7/26/2022  9:31 AM CDT -----  Regarding: Rx advice  Type:  Needs Medical Advice    Who Called: Luis Daniel Howell    Would the patient rather a call back or a response via [facility]? Call Back    Best Call Back Number: [phone]    Additional Information: Patient has a few questions about medication he's taking

## 2022-07-31 ENCOUNTER — HOSPITAL ENCOUNTER (INPATIENT)
Facility: OTHER | Age: 52
LOS: 2 days | Discharge: HOME | DRG: 065 | End: 2022-08-02
Attending: HOSPITALIST | Admitting: HOSPITALIST
Payer: COMMERCIAL

## 2022-07-31 DIAGNOSIS — I63.9 CEREBROVASCULAR ACCIDENT (CVA), UNSPECIFIED MECHANISM: ICD-10-CM

## 2022-07-31 DIAGNOSIS — I63.9 STROKE: ICD-10-CM

## 2022-07-31 DIAGNOSIS — I10 ESSENTIAL HYPERTENSION: Chronic | ICD-10-CM

## 2022-07-31 DIAGNOSIS — I63.312 THROMBOTIC STROKE INVOLVING LEFT MIDDLE CEREBRAL ARTERY: Primary | ICD-10-CM

## 2022-07-31 PROBLEM — D63.1 ANEMIA DUE TO STAGE 5 CHRONIC KIDNEY DISEASE, NOT ON CHRONIC DIALYSIS: Status: ACTIVE | Noted: 2022-07-31

## 2022-07-31 PROBLEM — N17.9 ACUTE RENAL FAILURE: Status: ACTIVE | Noted: 2019-09-08

## 2022-07-31 PROBLEM — N18.5 ANEMIA DUE TO STAGE 5 CHRONIC KIDNEY DISEASE, NOT ON CHRONIC DIALYSIS: Status: ACTIVE | Noted: 2022-07-31

## 2022-07-31 LAB — POCT GLUCOSE: 354 MG/DL (ref 70–110)

## 2022-07-31 PROCEDURE — C9399 UNCLASSIFIED DRUGS OR BIOLOG: HCPCS | Performed by: NURSE PRACTITIONER

## 2022-07-31 PROCEDURE — 99223 PR INITIAL HOSPITAL CARE,LEVL III: ICD-10-PCS | Mod: ,,, | Performed by: NURSE PRACTITIONER

## 2022-07-31 PROCEDURE — 21400001 HC TELEMETRY ROOM

## 2022-07-31 PROCEDURE — 99223 1ST HOSP IP/OBS HIGH 75: CPT | Mod: ,,, | Performed by: NURSE PRACTITIONER

## 2022-07-31 PROCEDURE — 25000003 PHARM REV CODE 250: Performed by: NURSE PRACTITIONER

## 2022-07-31 PROCEDURE — 63600175 PHARM REV CODE 636 W HCPCS: Performed by: NURSE PRACTITIONER

## 2022-07-31 RX ORDER — HYDRALAZINE HYDROCHLORIDE 100 MG/1
100 TABLET, FILM COATED ORAL EVERY 12 HOURS
COMMUNITY
End: 2023-10-06 | Stop reason: SDUPTHER

## 2022-07-31 RX ORDER — POLYETHYLENE GLYCOL 3350 17 G/17G
17 POWDER, FOR SOLUTION ORAL 2 TIMES DAILY PRN
Status: DISCONTINUED | OUTPATIENT
Start: 2022-07-31 | End: 2022-08-02 | Stop reason: HOSPADM

## 2022-07-31 RX ORDER — FUROSEMIDE 40 MG/1
40 TABLET ORAL DAILY
Status: DISCONTINUED | OUTPATIENT
Start: 2022-08-01 | End: 2022-08-02 | Stop reason: HOSPADM

## 2022-07-31 RX ORDER — METOLAZONE 5 MG/1
5 TABLET ORAL NIGHTLY
Status: DISCONTINUED | OUTPATIENT
Start: 2022-08-01 | End: 2022-08-01

## 2022-07-31 RX ORDER — CALCITRIOL 0.25 UG/1
0.25 CAPSULE ORAL DAILY
COMMUNITY
End: 2022-10-10

## 2022-07-31 RX ORDER — ASPIRIN 81 MG/1
81 TABLET ORAL DAILY
Status: DISCONTINUED | OUTPATIENT
Start: 2022-08-01 | End: 2022-07-31

## 2022-07-31 RX ORDER — LABETALOL 100 MG/1
300 TABLET, FILM COATED ORAL 2 TIMES DAILY
Status: DISCONTINUED | OUTPATIENT
Start: 2022-08-01 | End: 2022-08-02 | Stop reason: HOSPADM

## 2022-07-31 RX ORDER — IBUPROFEN 200 MG
24 TABLET ORAL
Status: DISCONTINUED | OUTPATIENT
Start: 2022-07-31 | End: 2022-08-02 | Stop reason: HOSPADM

## 2022-07-31 RX ORDER — ASPIRIN 81 MG/1
81 TABLET ORAL DAILY
Status: DISCONTINUED | OUTPATIENT
Start: 2022-07-31 | End: 2022-08-02 | Stop reason: HOSPADM

## 2022-07-31 RX ORDER — HYDRALAZINE HYDROCHLORIDE 25 MG/1
100 TABLET, FILM COATED ORAL EVERY 8 HOURS
Status: DISCONTINUED | OUTPATIENT
Start: 2022-08-01 | End: 2022-08-02 | Stop reason: HOSPADM

## 2022-07-31 RX ORDER — CLOPIDOGREL BISULFATE 75 MG/1
75 TABLET ORAL DAILY
Status: DISCONTINUED | OUTPATIENT
Start: 2022-08-01 | End: 2022-07-31

## 2022-07-31 RX ORDER — CLOPIDOGREL BISULFATE 75 MG/1
75 TABLET ORAL DAILY
Status: DISCONTINUED | OUTPATIENT
Start: 2022-07-31 | End: 2022-08-02 | Stop reason: HOSPADM

## 2022-07-31 RX ORDER — SODIUM CHLORIDE 0.9 % (FLUSH) 0.9 %
10 SYRINGE (ML) INJECTION
Status: DISCONTINUED | OUTPATIENT
Start: 2022-07-31 | End: 2022-08-02 | Stop reason: HOSPADM

## 2022-07-31 RX ORDER — INSULIN ASPART 100 [IU]/ML
1-10 INJECTION, SOLUTION INTRAVENOUS; SUBCUTANEOUS
Status: DISCONTINUED | OUTPATIENT
Start: 2022-07-31 | End: 2022-08-02 | Stop reason: HOSPADM

## 2022-07-31 RX ORDER — GLUCAGON 1 MG
1 KIT INJECTION
Status: DISCONTINUED | OUTPATIENT
Start: 2022-07-31 | End: 2022-08-02 | Stop reason: HOSPADM

## 2022-07-31 RX ORDER — LABETALOL HYDROCHLORIDE 5 MG/ML
10 INJECTION, SOLUTION INTRAVENOUS EVERY 4 HOURS PRN
Status: DISCONTINUED | OUTPATIENT
Start: 2022-07-31 | End: 2022-08-02 | Stop reason: HOSPADM

## 2022-07-31 RX ORDER — HEPARIN SODIUM 5000 [USP'U]/ML
5000 INJECTION, SOLUTION INTRAVENOUS; SUBCUTANEOUS EVERY 8 HOURS
Status: DISCONTINUED | OUTPATIENT
Start: 2022-08-01 | End: 2022-08-02 | Stop reason: HOSPADM

## 2022-07-31 RX ORDER — ATORVASTATIN CALCIUM 20 MG/1
80 TABLET, FILM COATED ORAL NIGHTLY
Status: DISCONTINUED | OUTPATIENT
Start: 2022-07-31 | End: 2022-08-02 | Stop reason: HOSPADM

## 2022-07-31 RX ORDER — ONDANSETRON 8 MG/1
8 TABLET, ORALLY DISINTEGRATING ORAL EVERY 8 HOURS PRN
Status: DISCONTINUED | OUTPATIENT
Start: 2022-07-31 | End: 2022-08-02 | Stop reason: HOSPADM

## 2022-07-31 RX ORDER — IBUPROFEN 200 MG
16 TABLET ORAL
Status: DISCONTINUED | OUTPATIENT
Start: 2022-07-31 | End: 2022-08-02 | Stop reason: HOSPADM

## 2022-07-31 RX ORDER — CALCITRIOL 0.25 UG/1
0.25 CAPSULE ORAL DAILY
Status: DISCONTINUED | OUTPATIENT
Start: 2022-08-01 | End: 2022-08-02 | Stop reason: HOSPADM

## 2022-07-31 RX ORDER — LANOLIN ALCOHOL/MO/W.PET/CERES
1 CREAM (GRAM) TOPICAL DAILY
Status: DISCONTINUED | OUTPATIENT
Start: 2022-08-01 | End: 2022-08-02 | Stop reason: HOSPADM

## 2022-07-31 RX ORDER — TALC
6 POWDER (GRAM) TOPICAL NIGHTLY PRN
Status: DISCONTINUED | OUTPATIENT
Start: 2022-07-31 | End: 2022-08-02 | Stop reason: HOSPADM

## 2022-07-31 RX ORDER — AMLODIPINE BESYLATE 5 MG/1
10 TABLET ORAL DAILY
Status: DISCONTINUED | OUTPATIENT
Start: 2022-08-01 | End: 2022-08-02 | Stop reason: HOSPADM

## 2022-07-31 RX ADMIN — LABETALOL HYDROCHLORIDE 10 MG: 5 INJECTION INTRAVENOUS at 10:07

## 2022-07-31 RX ADMIN — CLOPIDOGREL 75 MG: 75 TABLET, FILM COATED ORAL at 10:07

## 2022-07-31 RX ADMIN — INSULIN ASPART 5 UNITS: 100 INJECTION, SOLUTION INTRAVENOUS; SUBCUTANEOUS at 10:07

## 2022-07-31 RX ADMIN — INSULIN DETEMIR 25 UNITS: 100 INJECTION, SOLUTION SUBCUTANEOUS at 10:07

## 2022-07-31 RX ADMIN — Medication 6 MG: at 10:07

## 2022-07-31 RX ADMIN — ASPIRIN 81 MG: 81 TABLET, COATED ORAL at 10:07

## 2022-07-31 RX ADMIN — ATORVASTATIN CALCIUM 80 MG: 20 TABLET, FILM COATED ORAL at 10:07

## 2022-07-31 NOTE — PROVIDER TRANSFER
Outside Transfer Acceptance Note / Regional Referral Center    Referring facility: [facility]   Referring provider: SORAYA SHAW  Accepting facility: [facility]  Accepting provider: EDGAR MANLEY  Reason for transfer: Higher Level of Care   Transfer diagnosis: stroke  Transfer specialty requested: Neurology  Transfer specialty notified: yes  Transfer level: NUMBER 1-5: 2  Isolation status: No active isolations   Admission class or status: IP- Inpatient    Narrative     52-year-old m with PMH CKD5, CHF, DM1, HTN, and cocaine use presented to [facility] with dizziness, slurred speech and left-sided facial weakness.  LKW about 18 h earlier.  On presentation /110, HR 72, RR 18, SpO2 100% (RA), T 97.7°.  Exam was notable for left lower facial weakness and mildly slurred speech.  GCS 15. Labs WBC 4.7, Hb 10.4, Plts 143, Na 136, K 4.2, CO2 18, BUN 81, Cr 7.9 (BL), Glu 296, LFTs WNL, , troponin 0.102, UTOX pos for cocaine.  CTH pos for small hypo attenuation foci within the left frontal subcortical white matter and left sub insular region which are nonspecific and age indeterminate.  Transfer requested for Neurology and imaging (MRI). [facility] neuology (Dr. Vogel) agreed to consult for  admission. Transfer dx: CVA    Instructions      [facility]  Admit to Hospital Medicine  Upon patient arrival to floor, please contact Hospital Medicine on call.

## 2022-08-01 PROBLEM — I63.312 THROMBOTIC STROKE INVOLVING LEFT MIDDLE CEREBRAL ARTERY: Status: ACTIVE | Noted: 2022-07-31

## 2022-08-01 PROBLEM — I42.9 CARDIOMYOPATHY: Status: ACTIVE | Noted: 2019-08-09

## 2022-08-01 PROBLEM — I51.7 LEFT ATRIAL ENLARGEMENT: Status: ACTIVE | Noted: 2022-08-01

## 2022-08-01 PROBLEM — R47.1 DYSARTHRIA AND ANARTHRIA: Status: ACTIVE | Noted: 2022-08-01

## 2022-08-01 LAB
ALBUMIN SERPL BCP-MCNC: 3.3 G/DL (ref 3.5–5.2)
ALP SERPL-CCNC: 65 U/L (ref 55–135)
ALT SERPL W/O P-5'-P-CCNC: 18 U/L (ref 10–44)
ANION GAP SERPL CALC-SCNC: 13 MMOL/L (ref 8–16)
APTT BLDCRRT: 25.8 SEC (ref 21–32)
AST SERPL-CCNC: 21 U/L (ref 10–40)
BASOPHILS # BLD AUTO: 0.02 K/UL (ref 0–0.2)
BASOPHILS NFR BLD: 0.4 % (ref 0–1.9)
BILIRUB SERPL-MCNC: 0.4 MG/DL (ref 0.1–1)
BUN SERPL-MCNC: 77 MG/DL (ref 6–20)
CALCIUM SERPL-MCNC: 8.8 MG/DL (ref 8.7–10.5)
CHLORIDE SERPL-SCNC: 108 MMOL/L (ref 95–110)
CO2 SERPL-SCNC: 20 MMOL/L (ref 23–29)
CREAT SERPL-MCNC: 7.9 MG/DL (ref 0.5–1.4)
DIFFERENTIAL METHOD: ABNORMAL
EOSINOPHIL # BLD AUTO: 0.2 K/UL (ref 0–0.5)
EOSINOPHIL NFR BLD: 3.9 % (ref 0–8)
ERYTHROCYTE [DISTWIDTH] IN BLOOD BY AUTOMATED COUNT: 12.8 % (ref 11.5–14.5)
EST. GFR  (NO RACE VARIABLE): 8 ML/MIN/1.73 M^2
ESTIMATED AVG GLUCOSE: 169 MG/DL (ref 68–131)
GLUCOSE SERPL-MCNC: 83 MG/DL (ref 70–110)
HBA1C MFR BLD: 7.5 % (ref 4–5.6)
HCT VFR BLD AUTO: 32.8 % (ref 40–54)
HGB BLD-MCNC: 10.6 G/DL (ref 14–18)
IMM GRANULOCYTES # BLD AUTO: 0.01 K/UL (ref 0–0.04)
IMM GRANULOCYTES NFR BLD AUTO: 0.2 % (ref 0–0.5)
INR PPP: 1 (ref 0.8–1.2)
LYMPHOCYTES # BLD AUTO: 1.9 K/UL (ref 1–4.8)
LYMPHOCYTES NFR BLD: 36.7 % (ref 18–48)
MAGNESIUM SERPL-MCNC: 1.5 MG/DL (ref 1.6–2.6)
MCH RBC QN AUTO: 27.2 PG (ref 27–31)
MCHC RBC AUTO-ENTMCNC: 32.3 G/DL (ref 32–36)
MCV RBC AUTO: 84 FL (ref 82–98)
MONOCYTES # BLD AUTO: 0.8 K/UL (ref 0.3–1)
MONOCYTES NFR BLD: 15.4 % (ref 4–15)
NEUTROPHILS # BLD AUTO: 2.3 K/UL (ref 1.8–7.7)
NEUTROPHILS NFR BLD: 43.4 % (ref 38–73)
NRBC BLD-RTO: 0 /100 WBC
PHOSPHATE SERPL-MCNC: 4.7 MG/DL (ref 2.7–4.5)
PLATELET # BLD AUTO: 143 K/UL (ref 150–450)
PMV BLD AUTO: 11.9 FL (ref 9.2–12.9)
POCT GLUCOSE: 117 MG/DL (ref 70–110)
POCT GLUCOSE: 190 MG/DL (ref 70–110)
POCT GLUCOSE: 249 MG/DL (ref 70–110)
POCT GLUCOSE: 369 MG/DL (ref 70–110)
POCT GLUCOSE: 73 MG/DL (ref 70–110)
POTASSIUM SERPL-SCNC: 4.1 MMOL/L (ref 3.5–5.1)
PROT SERPL-MCNC: 6.6 G/DL (ref 6–8.4)
PROTHROMBIN TIME: 10.9 SEC (ref 9–12.5)
RBC # BLD AUTO: 3.9 M/UL (ref 4.6–6.2)
SODIUM SERPL-SCNC: 141 MMOL/L (ref 136–145)
TROPONIN I SERPL DL<=0.01 NG/ML-MCNC: 0.12 NG/ML (ref 0–0.03)
WBC # BLD AUTO: 5.18 K/UL (ref 3.9–12.7)

## 2022-08-01 PROCEDURE — 97161 PT EVAL LOW COMPLEX 20 MIN: CPT

## 2022-08-01 PROCEDURE — 97165 OT EVAL LOW COMPLEX 30 MIN: CPT

## 2022-08-01 PROCEDURE — 85730 THROMBOPLASTIN TIME PARTIAL: CPT | Performed by: NURSE PRACTITIONER

## 2022-08-01 PROCEDURE — 80053 COMPREHEN METABOLIC PANEL: CPT | Performed by: NURSE PRACTITIONER

## 2022-08-01 PROCEDURE — 85025 COMPLETE CBC W/AUTO DIFF WBC: CPT | Performed by: NURSE PRACTITIONER

## 2022-08-01 PROCEDURE — 84484 ASSAY OF TROPONIN QUANT: CPT | Performed by: NURSE PRACTITIONER

## 2022-08-01 PROCEDURE — 85610 PROTHROMBIN TIME: CPT | Performed by: NURSE PRACTITIONER

## 2022-08-01 PROCEDURE — 63600175 PHARM REV CODE 636 W HCPCS: Performed by: NURSE PRACTITIONER

## 2022-08-01 PROCEDURE — 92523 SPEECH SOUND LANG COMPREHEN: CPT

## 2022-08-01 PROCEDURE — 99233 PR SUBSEQUENT HOSPITAL CARE,LEVL III: ICD-10-PCS | Mod: ,,, | Performed by: HOSPITALIST

## 2022-08-01 PROCEDURE — 21400001 HC TELEMETRY ROOM

## 2022-08-01 PROCEDURE — 36415 COLL VENOUS BLD VENIPUNCTURE: CPT | Performed by: NURSE PRACTITIONER

## 2022-08-01 PROCEDURE — 99233 PR SUBSEQUENT HOSPITAL CARE,LEVL III: ICD-10-PCS | Mod: GT,,, | Performed by: NURSE PRACTITIONER

## 2022-08-01 PROCEDURE — 25000003 PHARM REV CODE 250: Performed by: NURSE PRACTITIONER

## 2022-08-01 PROCEDURE — 83036 HEMOGLOBIN GLYCOSYLATED A1C: CPT | Performed by: NURSE PRACTITIONER

## 2022-08-01 PROCEDURE — 92610 EVALUATE SWALLOWING FUNCTION: CPT

## 2022-08-01 PROCEDURE — 99233 SBSQ HOSP IP/OBS HIGH 50: CPT | Mod: ,,, | Performed by: HOSPITALIST

## 2022-08-01 PROCEDURE — 25000003 PHARM REV CODE 250: Performed by: HOSPITALIST

## 2022-08-01 PROCEDURE — 99233 SBSQ HOSP IP/OBS HIGH 50: CPT | Mod: GT,,, | Performed by: NURSE PRACTITIONER

## 2022-08-01 PROCEDURE — 84100 ASSAY OF PHOSPHORUS: CPT | Performed by: NURSE PRACTITIONER

## 2022-08-01 PROCEDURE — 83735 ASSAY OF MAGNESIUM: CPT | Performed by: NURSE PRACTITIONER

## 2022-08-01 RX ORDER — ACETAMINOPHEN 500 MG
1000 TABLET ORAL EVERY 8 HOURS PRN
Status: DISCONTINUED | OUTPATIENT
Start: 2022-08-01 | End: 2022-08-02 | Stop reason: HOSPADM

## 2022-08-01 RX ORDER — DIPHENHYDRAMINE HCL 25 MG
25 CAPSULE ORAL EVERY 8 HOURS PRN
Status: DISCONTINUED | OUTPATIENT
Start: 2022-08-01 | End: 2022-08-02 | Stop reason: HOSPADM

## 2022-08-01 RX ORDER — DOXYLAMINE SUCCINATE 25 MG
TABLET ORAL 2 TIMES DAILY
Status: DISCONTINUED | OUTPATIENT
Start: 2022-08-01 | End: 2022-08-02 | Stop reason: HOSPADM

## 2022-08-01 RX ORDER — ACETAMINOPHEN 500 MG
1000 TABLET ORAL ONCE
Status: COMPLETED | OUTPATIENT
Start: 2022-08-01 | End: 2022-08-01

## 2022-08-01 RX ADMIN — DIPHENHYDRAMINE HYDROCHLORIDE 25 MG: 25 CAPSULE ORAL at 09:08

## 2022-08-01 RX ADMIN — HEPARIN SODIUM 5000 UNITS: 5000 INJECTION INTRAVENOUS; SUBCUTANEOUS at 02:08

## 2022-08-01 RX ADMIN — HYDRALAZINE HYDROCHLORIDE 100 MG: 25 TABLET, FILM COATED ORAL at 09:08

## 2022-08-01 RX ADMIN — CALCITRIOL CAPSULES 0.25 MCG 0.25 MCG: 0.25 CAPSULE ORAL at 08:08

## 2022-08-01 RX ADMIN — AMLODIPINE BESYLATE 10 MG: 5 TABLET ORAL at 08:08

## 2022-08-01 RX ADMIN — LABETALOL HYDROCHLORIDE 300 MG: 100 TABLET, FILM COATED ORAL at 08:08

## 2022-08-01 RX ADMIN — ACETAMINOPHEN 1000 MG: 500 TABLET ORAL at 08:08

## 2022-08-01 RX ADMIN — CLOPIDOGREL 75 MG: 75 TABLET, FILM COATED ORAL at 08:08

## 2022-08-01 RX ADMIN — INSULIN ASPART 2 UNITS: 100 INJECTION, SOLUTION INTRAVENOUS; SUBCUTANEOUS at 01:08

## 2022-08-01 RX ADMIN — MICONAZOLE NITRATE: 20 CREAM TOPICAL at 10:08

## 2022-08-01 RX ADMIN — ATORVASTATIN CALCIUM 80 MG: 20 TABLET, FILM COATED ORAL at 08:08

## 2022-08-01 RX ADMIN — HEPARIN SODIUM 5000 UNITS: 5000 INJECTION INTRAVENOUS; SUBCUTANEOUS at 09:08

## 2022-08-01 RX ADMIN — HYDRALAZINE HYDROCHLORIDE 100 MG: 25 TABLET, FILM COATED ORAL at 05:08

## 2022-08-01 RX ADMIN — HEPARIN SODIUM 5000 UNITS: 5000 INJECTION INTRAVENOUS; SUBCUTANEOUS at 05:08

## 2022-08-01 RX ADMIN — HYDRALAZINE HYDROCHLORIDE 100 MG: 25 TABLET, FILM COATED ORAL at 02:08

## 2022-08-01 RX ADMIN — ASPIRIN 81 MG: 81 TABLET, COATED ORAL at 08:08

## 2022-08-01 RX ADMIN — INSULIN DETEMIR 25 UNITS: 100 INJECTION, SOLUTION SUBCUTANEOUS at 09:08

## 2022-08-01 RX ADMIN — FERROUS SULFATE TAB 325 MG (65 MG ELEMENTAL FE) 1 EACH: 325 (65 FE) TAB at 08:08

## 2022-08-01 RX ADMIN — FUROSEMIDE 40 MG: 40 TABLET ORAL at 08:08

## 2022-08-01 RX ADMIN — INSULIN ASPART 10 UNITS: 100 INJECTION, SOLUTION INTRAVENOUS; SUBCUTANEOUS at 06:08

## 2022-08-01 NOTE — PT/OT/SLP EVAL
Occupational Therapy   Evaluation and Discharge Note    Name: Luis Daniel Howell  MRN: [id number]  Admitting Diagnosis:  Thrombotic stroke involving left middle cerebral artery   Recent Surgery: * No surgery found *      Recommendations:     Discharge Recommendations: home  Discharge Equipment Recommendations:  none  Barriers to discharge:  None    Assessment:     Luis Daniel Howell is a 52 y.o. male with a medical diagnosis of Thrombotic stroke involving left middle cerebral artery. At this time, patient is functioning at Indep <> Supervision level and does not require further acute OT services.  Pt educated on importance of medication compliance, communicating side effects of medications that adversely effect quality of life, taking BP multiple times throughout the day, and possible effects of not taking medications to control BP.  Nursing to provide Supervision for ADL and ADL mobility.    Plan:     During this hospitalization, patient does not require further acute OT services.  Please re-consult if situation changes.    · Plan of Care Reviewed with: patient    Subjective     Chief Complaint: Feeling weak and drowsy due to medications  Patient/Family Comments/goals: To return home and return to work as a .    Occupational Profile:  Living Environment: Lives with mother in [facility], tub/shower  Previous level of function: Indep, non-compliant with taking BP meds  Roles and Routines: Previously working as a , cocaine use  Equipment Used at home:  none  Assistance upon Discharge: Pt lives with his mother    Pain/Comfort:  Pain Rating 1: 0/10  Pain Rating Post-Intervention 1: 0/10    Patients cultural, spiritual, Roman Catholic conflicts given the current situation: no    Objective:     Communicated with: nurse, Magdalene, prior to session.  Patient found HOB elevated with peripheral IV, telemetry upon OT entry to room.    General Precautions: Standard, fall   Orthopedic Precautions:N/A   Braces: N/A  Respiratory Status: Room  air     Occupational Performance:    Bed Mobility:    · Patient completed Supine to Sit with independence  · Patient completed Sit to Supine with independence    Functional Mobility/Transfers:  · Sit to stand: Indep  · Transfers: Supervision for safety  · Functional Mobility: No LOB, pt reporting he is feeling weak and drowsy due to medications    Activities of Daily Living:  · Feeding: Indep  · Grooming: Supervision in standing at sink  · UB Dressing: Set up  · LB Dressing: Set up    Cognitive/Visual Perceptual:  Cognitive/Psychosocial Skills:     -       Oriented to: Person, Place, Time and Situation   -       Follows Commands/attention:Follows multistep  commands  -       Communication: clear/fluent; Dysarthria  -       Memory: No Deficits noted  -       Safety awareness/insight to disability: Impaired, non-compliant with BP medications, cocaine use   -       Mood/Affect/Coping skills/emotional control: Cooperative and Pleasant    Physical Exam:  Sitting Balance: Good; Good  Standing Balance: Good; Good; no LOB  UE AROM: WFL  UE Strength: 5/5 except right hand weaker than left which is long-standing, trigger finger on right   UE Coordination: WFL for self care  Sensation: Denies paresthesias  Skin: None on visible skin    AMPAC 6 Click ADL:  AMPAC Total Score: 19    Treatment & Education:    Role of OT, Safety with ADL and ADL mobility, importance of medication compliance and communicating side effects of medications that are interfering with quality of life  Education:    Patient left HOB elevated with all lines intact and call button in reach    GOALS:   Multidisciplinary Problems     Occupational Therapy Goals     Not on file          Multidisciplinary Problems (Resolved)        Problem: Occupational Therapy    Goal Priority Disciplines Outcome Interventions   Occupational Therapy Goal   (Resolved)     OT, PT/OT Met    Description: Orders received and chart reviewed.  Evaluation complete and pt  Indep<>Supervision level with ADL and ADL mobility.   No LOB during ADL mobility at Supervision level for safety.  Dysarthria but able to communicate clearly.  Pt reporting he limits taking his BP medication due to it impairing his ability to work, causing him to feel weak and drowsy.  Pt lives with his mother and reports currently not working due to his high BP. Pt has history of cocaine use.  Recommend discharge to home.                   History:     Past Medical History:   Diagnosis Date    CKD (chronic kidney disease), stage V     Cocaine abuse     Diabetes mellitus type I     Diabetic retinopathy     Hyperlipidemia     Hypertension          Past Surgical History:   Procedure Laterality Date    CIRCUMCISION      WISDOM TOOTH EXTRACTION         Time Tracking:     OT Date of Treatment: 08/01/22  OT Start Time: 1131  OT Stop Time: 1152  OT Total Time (min): 21 min    Billable Minutes:Evaluation 21    8/1/2022

## 2022-08-01 NOTE — ASSESSMENT & PLAN NOTE
Hemoglobin and hematocrit 10.4/31.9 and platelet count 143.  These are recent baseline levels. Will monitor CBC.

## 2022-08-01 NOTE — SUBJECTIVE & OBJECTIVE
Past Medical History:   Diagnosis Date    Diabetes mellitus     Diabetes mellitus type I     Diabetic retinopathy     Hyperlipidemia     Hypertension        Past Surgical History:   Procedure Laterality Date    CIRCUMCISION      WISDOM TOOTH EXTRACTION         Review of patient's allergies indicates:  No Known Allergies    Current Facility-Administered Medications on File Prior to Encounter   Medication    [COMPLETED] acetaminophen tablet 650 mg    [COMPLETED] aspirin EC tablet 325 mg    [COMPLETED] nitroGLYCERIN 2% TD oint ointment 1 inch     Current Outpatient Medications on File Prior to Encounter   Medication Sig    amLODIPine (NORVASC) 10 MG tablet Take 1 tablet (10 mg total) by mouth once daily.    aspirin 81 MG Chew Take 1 tablet (81 mg total) by mouth once daily. (Patient taking differently: Take 81 mg by mouth once daily.)    atorvastatin (LIPITOR) 40 MG tablet Take 1 tablet (40 mg total) by mouth every evening.    BASAGLAR KWIKPEN U-100 INSULIN glargine 100 units/mL (3mL) SubQ pen Inject 45 Units into the skin every evening.    blood sugar diagnostic Strp To check BG 2 times daily, to use with insurance preferred meter    calcitRIOL (ROCALTROL) 0.25 MCG Cap Take 0.25 mcg by mouth once daily.    furosemide (LASIX) 40 MG tablet Take 1 tablet (40 mg total) by mouth once daily.    hydrALAZINE (APRESOLINE) 100 MG tablet Take 100 mg by mouth every 8 (eight) hours.    insulin aspart U-100 (NOVOLOG FLEXPEN U-100 INSULIN) 100 unit/mL (3 mL) InPn pen INJECT 10 UNITS INTO THE SKIN 3 (THREE) TIMES DAILY WITH MEALS.    labetaloL (NORMODYNE) 300 MG tablet Take 1 tablet (300 mg total) by mouth 2 (two) times daily. (Patient taking differently: Take 300 mg by mouth once daily.)    metOLazone (ZAROXOLYN) 5 MG tablet Take 1 tablet (5 mg total) by mouth nightly.    vitamin D (VITAMIN D3) 1000 units Tab Take 2,000 Units by mouth nightly.    ferrous gluconate (FERGON) 324 MG tablet Take 1 tablet (324 mg total) by mouth daily  "with breakfast. (Patient not taking: No sig reported)    [DISCONTINUED] fluticasone propionate (FLONASE) 50 mcg/actuation nasal spray 2 sprays. Not taking     Family History       Problem Relation (Age of Onset)    Cancer Mother    Cataracts Mother    Diabetes Father, Brother    Hypertension Mother, Father          Tobacco Use    Smoking status: Never Smoker    Smokeless tobacco: Never Used   Substance and Sexual Activity    Alcohol use: No    Drug use: Yes     Types: "Crack" cocaine     Comment: in remission     Sexual activity: Yes     Partners: Female     Review of Systems   Constitutional: Negative.  Negative for activity change, appetite change, chills and fever.   HENT: Negative.  Negative for congestion, mouth sores and trouble swallowing.    Eyes: Negative.  Negative for photophobia, pain and visual disturbance.   Respiratory: Negative.  Negative for cough, choking, chest tightness and shortness of breath.    Cardiovascular: Negative.  Negative for chest pain, palpitations and leg swelling.   Gastrointestinal: Negative.  Negative for abdominal distention, abdominal pain, blood in stool, constipation, diarrhea, nausea and vomiting.   Endocrine: Negative.  Negative for polydipsia, polyphagia and polyuria.   Genitourinary: Negative.  Negative for decreased urine volume, difficulty urinating, dysuria and enuresis.   Musculoskeletal: Negative.  Negative for arthralgias, back pain and gait problem.   Skin: Negative.  Negative for pallor and rash.   Allergic/Immunologic: Negative.  Negative for environmental allergies and food allergies.   Neurological:  Positive for facial asymmetry and speech difficulty. Negative for dizziness, weakness and headaches.   Hematological: Negative.    Psychiatric/Behavioral: Negative.  Negative for agitation, behavioral problems and confusion.    Objective:     Vital Signs (Most Recent):  Temp: 98.5 °F (36.9 °C) (07/31/22 1959)  Pulse: 69 (07/31/22 1959)  Resp: 18 (07/31/22 " 1959)  BP: (!) 222/131 (07/31/22 1959)  SpO2: 100 % (07/31/22 1959)   Vital Signs (24h Range):  Temp:  [97.7 °F (36.5 °C)-98.5 °F (36.9 °C)] 98.5 °F (36.9 °C)  Pulse:  [64-72] 69  Resp:  [18-20] 18  SpO2:  [97 %-100 %] 100 %  BP: (197-226)/(110-132) 222/131     Weight: 102 kg (224 lb 13.9 oz)  Body mass index is 31.36 kg/m².    Physical Exam  Vitals reviewed.   Constitutional:       General: He is not in acute distress.     Appearance: Normal appearance.   HENT:      Head: Normocephalic and atraumatic.      Mouth/Throat:      Mouth: Mucous membranes are moist.      Pharynx: Oropharynx is clear. No posterior oropharyngeal erythema.   Eyes:      Extraocular Movements: Extraocular movements intact and EOM normal.      Pupils: Pupils are equal, round, and reactive to light.   Cardiovascular:      Rate and Rhythm: Normal rate and regular rhythm.      Pulses: Normal pulses.      Heart sounds: Normal heart sounds. No murmur heard.  Pulmonary:      Effort: Pulmonary effort is normal. No respiratory distress.      Breath sounds: Normal breath sounds. No wheezing.   Abdominal:      General: Bowel sounds are normal. There is no distension.      Palpations: Abdomen is soft.      Tenderness: There is no abdominal tenderness. There is no guarding.   Musculoskeletal:         General: No swelling, tenderness or signs of injury. Normal range of motion.      Cervical back: Normal range of motion and neck supple. No rigidity or tenderness.   Skin:     General: Skin is warm and dry.   Neurological:      Mental Status: He is alert and oriented to person, place, and time.      Cranial Nerves: Cranial nerve deficit present.      Sensory: No sensory deficit.      Motor: No weakness.   Psychiatric:         Mood and Affect: Mood normal.         Behavior: Behavior normal.         CRANIAL NERVES     CN II   Visual fields full to confrontation.     CN III, IV, VI   Pupils are equal, round, and reactive to light.  Extraocular motions are normal.      CN V   Facial sensation intact.     CN VII   Left facial weakness: central    CN VIII   CN VIII normal.   Hearing: intact    CN XII   CN XII normal.      Significant Labs: All pertinent labs within the past 24 hours have been reviewed.  A1C:   Recent Labs   Lab 02/25/22  0917 06/27/22  1046   HGBA1C 6.4* 6.9*     BMP:   Recent Labs   Lab 07/31/22  1227   *      K 4.2      CO2 18*   BUN 81*   CREATININE 7.9*   CALCIUM 8.3*     CBC:   Recent Labs   Lab 07/31/22  1227   WBC 4.72   HGB 10.4*   HCT 31.9*   *     CMP:   Recent Labs   Lab 07/31/22  1227      K 4.2      CO2 18*   *   BUN 81*   CREATININE 7.9*   CALCIUM 8.3*   PROT 6.6   ALBUMIN 3.2*   BILITOT 0.4   ALKPHOS 76   AST 22   ALT 16   ANIONGAP 12   EGFRNONAA 7.1*     Cardiac Markers:   Recent Labs   Lab 07/31/22  1227   *     Lipid Panel:   Recent Labs   Lab 07/31/22  1227   CHOL 160   HDL 42   LDLCALC 79.6   TRIG 192*   CHOLHDL 26.3     Magnesium: No results for input(s): MG in the last 48 hours.  POCT Glucose:   Recent Labs   Lab 07/31/22  1156 07/31/22  2212   POCTGLUCOSE 290* 354*     Troponin:   Recent Labs   Lab 07/31/22  1227 07/31/22  1842   TROPONINI 0.102* 0.104*     TSH:   Recent Labs   Lab 07/31/22  1227   TSH 0.709       Significant Imaging: I have reviewed all pertinent imaging results/findings within the past 24 hours.  X-Ray Chest AP Portable  Narrative: EXAMINATION:  XR CHEST AP PORTABLE    CLINICAL HISTORY:  Stroke;    TECHNIQUE:  Two frontal views of the chest    COMPARISON:  07/05/2022    FINDINGS:  No significant change from prior.  No focal lung consolidation.  No large pleural effusion or pneumothorax.  Cardiomediastinal silhouette limited by technique but similar to prior.  Clinical correlation and further evaluation as warranted.  Impression: Please see above    Electronically signed by: Farooq Harris DO  Date:    07/31/2022  Time:    12:45  CT Head Without Contrast  Narrative:  EXAMINATION:  CT HEAD WITHOUT CONTRAST    CLINICAL HISTORY:  Neuro deficit, acute, stroke suspected;    TECHNIQUE:  Multiple sequential 5 mm axial images of the head without contrast.  Coronal and sagittal reformatted imaging from the axial acquisition.    COMPARISON:  07/22/2019    FINDINGS:  There is no evidence for acute intracranial hemorrhage or sulcal effacement.  Small regions of hypoattenuation within the left frontal subcortical white matter and left subinsular white matter which are nonspecific and may represent lacunar type infarcts overall age indeterminate not definitively seen on prior..  The ventricles are normal in size without hydrocephalus.  There is no midline shift or mass effect.  Visualized paranasal sinuses and mastoid air cells are clear.    This report was flagged in Epic as abnormal.  Impression: Small hypoattenuation foci within the left frontal subcortical white matter and left subinsular region which are nonspecific and age-indeterminate lacunar type infarcts to be included in differential.  These are not definitively seen on prior.  There is a small stable hypodensity within the right anterior limb internal capsule.    There is no evidence for acute intracranial hemorrhage or hydrocephalus.  Clinical correlation and further evaluation with MRI if patient compatible.    Electronically signed by: Farooq Harris DO  Date:    07/31/2022  Time:    12:28

## 2022-08-01 NOTE — ASSESSMENT & PLAN NOTE
Pt with left sided facial droop and slurred speech. Symptoms improving. CT head concerning for lacunar infarcts due to small hypodensities in the left frontal and left subinsular regions.  Pt has a previous small, stable infarct in the right internal capsule. Etiology small vessel disease due to hypertension vs embolic. MRI pending. ECHO completed in July reports EF 55%, grade 2 diastolic dysfunction, and severe left atrial enlargement. Lipids pending. Pt takes aspirin and atorvastatin at home. Plavix added for DAPT for secondary stroke prevention. Will monitor platelet count.  Neurology consulted for further recommendations.

## 2022-08-01 NOTE — CONSULTS
"Consult Note  Nephrology    Consult Requested By: Ricardo Sequeira MD  Reason for Consult: CKD V    SUBJECTIVE:     History of Present Illness:  Patient is a 52 y.o. male presents with transfer from [facility] for neuro eval following facial droop/CVA on presentation.  Extensive medical hx as outlined below including CKD V (baseline 5-7) and [place] [facility] [place]  Consulted for evaluation.  Pt attempted to get AVF last year (11/21)  but had CP and cocaine in system so surgery was aborted.  Has not had follow up appt with surgery since or appt with Dr. Curiel but has seen other providers.  Pt seen and examined.  Feels better and less facial drop.  Admits to cocaine 3-4 days ago and not compliant with meds.   Case discussed with team.      Epic reviewed.     No CP/SOB/N/V/D/F/C.      Past Medical History:   Diagnosis Date    CKD (chronic kidney disease), stage V     Cocaine abuse     Diabetes mellitus type I     Diabetic retinopathy     Hyperlipidemia     Hypertension      Past Surgical History:   Procedure Laterality Date    CIRCUMCISION      WISDOM TOOTH EXTRACTION       Family History   Problem Relation Age of Onset    Cancer Mother     Hypertension Mother     Cataracts Mother     Hypertension Father     Diabetes Father     Diabetes Brother     Amblyopia Neg Hx     Blindness Neg Hx     Glaucoma Neg Hx     Macular degeneration Neg Hx     Retinal detachment Neg Hx     Strabismus Neg Hx     Stroke Neg Hx     Thyroid disease Neg Hx      Social History     Tobacco Use    Smoking status: Never Smoker    Smokeless tobacco: Never Used   Substance Use Topics    Alcohol use: No    Drug use: Yes     Types: "Crack" cocaine     Comment: in remission        Review of patient's allergies indicates:  No Known Allergies     Review of Systems:  Constitutional: No fever or chills  Respiratory: No cough or shortness of breath  Cardiovascular: No chest pain or palpitations  Gastrointestinal: " No nausea or vomiting  Neurological: No confusion or weakness    OBJECTIVE:     Vital Signs (Most Recent)  Temp: 97.9 °F (36.6 °C) (08/01/22 0817)  Pulse: 66 (08/01/22 1000)  Resp: 18 (08/01/22 0817)  BP: (!) 180/99 (08/01/22 0817)  SpO2: 100 % (08/01/22 0817)    Vital Signs Range (Last 24H):  Temp:  [97.7 °F (36.5 °C)-98.8 °F (37.1 °C)]   Pulse:  [57-72]   Resp:  [18-20]   BP: (180-226)/()   SpO2:  [97 %-100 %]       Intake/Output Summary (Last 24 hours) at 8/1/2022 1103  Last data filed at 8/1/2022 0400  Gross per 24 hour   Intake 354 ml   Output --   Net 354 ml       Physical Exam:  General appearance: Well developed, well nourished  Eyes:  Conjunctivae/corneas clear. PERRL.  Lungs: Normal respiratory effort,   clear to auscultation bilaterally   Heart: Regular rate and rhythm, S1, S2 normal, no murmur, rub or niels.  Abdomen: Soft, non-tender non-distended; bowel sounds normal; no masses,  no organomegaly  Extremities: No cyanosis or clubbing. No edema.    Skin: Skin color, texture, turgor normal. No rashes or lesions  Neurologic: Normal strength and tone. No focal numbness or weakness.  Resolving mild left facial droop.        Laboratory:  Recent Labs   Lab 08/01/22 0451   WBC 5.18   RBC 3.90*   HGB 10.6*   HCT 32.8*   *   MCV 84   MCH 27.2   MCHC 32.3     BMP:   Recent Labs   Lab 08/01/22 0450   GLU 83      K 4.1      CO2 20*   BUN 77*   CREATININE 7.9*   CALCIUM 8.8   MG 1.5*     Lab Results   Component Value Date    CALCIUM 8.8 08/01/2022    PHOS 4.7 (H) 08/01/2022     BNP  Recent Labs   Lab 07/31/22  1227   *     Lab Results   Component Value Date    URICACID 9.8 (H) 11/20/2021     Lab Results   Component Value Date    IRON 57 10/07/2021    TIBC 266 10/07/2021    FERRITIN 71 09/24/2021     Lab Results   Component Value Date    .50 (H) 09/24/2021    CALCIUM 8.8 08/01/2022    PHOS 4.7 (H) 08/01/2022       Diagnostic Results:  MRA Neck without contrast   Final Result       Normal MRA neck vessels.         Electronically signed by: Lucas Barrientos MD   Date:    08/01/2022   Time:    08:14      MRA Brain   Final Result      Normal MRA intracranial circulation.         Electronically signed by: Lucas Barrientos MD   Date:    08/01/2022   Time:    08:14      MRI BRAIN WITHOUT CONTRAST   Final Result      Acute 1 cm infarct left posterior corona radiata/internal capsule.      Remote infarct with hemosiderin right subinsular region.      Microvascular small vessel ischemic change.         Electronically signed by: Lucas Barrientos MD   Date:    08/01/2022   Time:    08:18          ASSESSMENT/PLAN:     CKD V [place] on [place] [facility] [place]HTN disease:  -rising baseline over past couple of years and now 7 -11/21 had scheduled AVF but cancelled due to CP/Cocaine.  -has been lost in f/up with vascular/renal but seen other providers since.  -now on Plavix and unable to place AVF.  -no immediate need for RRT today.  -[place] [facility] [place]  -Cocaine addiction needs to be ironed out.    -hold ACE/ARB/Metolazone for now.  -ok for lasix.  -defer BP to Neuro.  -Renally dose meds, avoid nephrotoxins, and monitor I/O's closely.      CVA:  -imaging noted.  -neuro note reviewed.  -defer.  -Plavix/ASA      Cocaine abuse:  -poor prognostic indicator.  -defer.  -needs help.    DM:  -uncontrolled.  -no SGLT2/metformin     Anemia of CKD:  -at goal      Thanks for consult  See above  Will follow along.   Ok to DC from Renal  MUST HAVE APPT WITH DR ELLISON 1-2 weeks.

## 2022-08-01 NOTE — PLAN OF CARE
During my shift, pt AAOx4, NAD.  VS stable. Pt remains afebrile. Pt denies pain, SOB, n/v, dizziness or lightheadedness. Neuro assessment intact though pt reports that he feels his speech is a bit slurred. Pt able to communicate, form full sentences and make needs known. Pt passed Roman/bedside swallow study. Pt tolerating oral liquids and food. CBG monitored and managed with insulin. BP monitored and managed with Labetalol via IV. NSR noted on cardiac telemetry monitoring. Pt ambulates in room without difficulty. Pt remains free from falls or injury. Bed is lowered and locked. Call light is within reach. Pt has no known needs at this time.

## 2022-08-01 NOTE — H&P
[facility] Medicine  History & Physical    Patient Name: Luis Daniel Howell  MRN: [id number]  Patient Class: IP- Inpatient  Admission Date: 7/31/2022  Attending Physician: Ricardo Sequeira MD   Primary Care Provider: Janak Wilson MD         Patient information was obtained from patient and ER records.     Subjective:     Principal Problem:CVA (cerebral vascular accident)    Chief Complaint:   Chief Complaint   Patient presents with    left facial droop     Slurred speech          HPI: Mr Howell is a 52 year old male with a PMH that includes hypertension, diabetes mellitus type 1, chronic kidney disease V, heart failure, and crack cocaine use. Pt was previous in the hospital earlier in July for chest pain and had a stress test that was positive for ischemia. Today  He was transferred from [facility] for stroke symptoms of left sided facial droop and slurred speech and a CT head demonstrated some left sided hypo densities concerning for a stroke.  Pt unsure of time symptoms started. He reports last night he was drinking some water and it was going outside of his mouth. Pt reports the facial droop and slurred speech has improved since this morning. He denies numbness, tingling, changes in vision. NIH 3 for mild dysarthria and facial paralysis. Pt has been admitted to hospital medicine for further workup.       Past Medical History:   Diagnosis Date    Diabetes mellitus     Diabetes mellitus type I     Diabetic retinopathy     Hyperlipidemia     Hypertension        Past Surgical History:   Procedure Laterality Date    CIRCUMCISION      WISDOM TOOTH EXTRACTION         Review of patient's allergies indicates:  No Known Allergies    Current Facility-Administered Medications on File Prior to Encounter   Medication    [COMPLETED] acetaminophen tablet 650 mg    [COMPLETED] aspirin EC tablet 325 mg    [COMPLETED] nitroGLYCERIN 2% TD oint ointment 1 inch     Current Outpatient Medications on  "File Prior to Encounter   Medication Sig    amLODIPine (NORVASC) 10 MG tablet Take 1 tablet (10 mg total) by mouth once daily.    aspirin 81 MG Chew Take 1 tablet (81 mg total) by mouth once daily. (Patient taking differently: Take 81 mg by mouth once daily.)    atorvastatin (LIPITOR) 40 MG tablet Take 1 tablet (40 mg total) by mouth every evening.    BASAGLAR KWIKPEN U-100 INSULIN glargine 100 units/mL (3mL) SubQ pen Inject 45 Units into the skin every evening.    blood sugar diagnostic Strp To check BG 2 times daily, to use with insurance preferred meter    calcitRIOL (ROCALTROL) 0.25 MCG Cap Take 0.25 mcg by mouth once daily.    furosemide (LASIX) 40 MG tablet Take 1 tablet (40 mg total) by mouth once daily.    hydrALAZINE (APRESOLINE) 100 MG tablet Take 100 mg by mouth every 8 (eight) hours.    insulin aspart U-100 (NOVOLOG FLEXPEN U-100 INSULIN) 100 unit/mL (3 mL) InPn pen INJECT 10 UNITS INTO THE SKIN 3 (THREE) TIMES DAILY WITH MEALS.    labetaloL (NORMODYNE) 300 MG tablet Take 1 tablet (300 mg total) by mouth 2 (two) times daily. (Patient taking differently: Take 300 mg by mouth once daily.)    metOLazone (ZAROXOLYN) 5 MG tablet Take 1 tablet (5 mg total) by mouth nightly.    vitamin D (VITAMIN D3) 1000 units Tab Take 2,000 Units by mouth nightly.    ferrous gluconate (FERGON) 324 MG tablet Take 1 tablet (324 mg total) by mouth daily with breakfast. (Patient not taking: No sig reported)    [DISCONTINUED] fluticasone propionate (FLONASE) 50 mcg/actuation nasal spray 2 sprays. Not taking     Family History       Problem Relation (Age of Onset)    Cancer Mother    Cataracts Mother    Diabetes Father, Brother    Hypertension Mother, Father          Tobacco Use    Smoking status: Never Smoker    Smokeless tobacco: Never Used   Substance and Sexual Activity    Alcohol use: No    Drug use: Yes     Types: "Crack" cocaine     Comment: in remission     Sexual activity: Yes     Partners: Female "     Review of Systems   Constitutional: Negative.  Negative for activity change, appetite change, chills and fever.   HENT: Negative.  Negative for congestion, mouth sores and trouble swallowing.    Eyes: Negative.  Negative for photophobia, pain and visual disturbance.   Respiratory: Negative.  Negative for cough, choking, chest tightness and shortness of breath.    Cardiovascular: Negative.  Negative for chest pain, palpitations and leg swelling.   Gastrointestinal: Negative.  Negative for abdominal distention, abdominal pain, blood in stool, constipation, diarrhea, nausea and vomiting.   Endocrine: Negative.  Negative for polydipsia, polyphagia and polyuria.   Genitourinary: Negative.  Negative for decreased urine volume, difficulty urinating, dysuria and enuresis.   Musculoskeletal: Negative.  Negative for arthralgias, back pain and gait problem.   Skin: Negative.  Negative for pallor and rash.   Allergic/Immunologic: Negative.  Negative for environmental allergies and food allergies.   Neurological:  Positive for facial asymmetry and speech difficulty. Negative for dizziness, weakness and headaches.   Hematological: Negative.    Psychiatric/Behavioral: Negative.  Negative for agitation, behavioral problems and confusion.    Objective:     Vital Signs (Most Recent):  Temp: 98.5 °F (36.9 °C) (07/31/22 1959)  Pulse: 69 (07/31/22 1959)  Resp: 18 (07/31/22 1959)  BP: (!) 222/131 (07/31/22 1959)  SpO2: 100 % (07/31/22 1959)   Vital Signs (24h Range):  Temp:  [97.7 °F (36.5 °C)-98.5 °F (36.9 °C)] 98.5 °F (36.9 °C)  Pulse:  [64-72] 69  Resp:  [18-20] 18  SpO2:  [97 %-100 %] 100 %  BP: (197-226)/(110-132) 222/131     Weight: 102 kg (224 lb 13.9 oz)  Body mass index is 31.36 kg/m².    Physical Exam  Vitals reviewed.   Constitutional:       General: He is not in acute distress.     Appearance: Normal appearance.   HENT:      Head: Normocephalic and atraumatic.      Mouth/Throat:      Mouth: Mucous membranes are moist.       Pharynx: Oropharynx is clear. No posterior oropharyngeal erythema.   Eyes:      Extraocular Movements: Extraocular movements intact and EOM normal.      Pupils: Pupils are equal, round, and reactive to light.   Cardiovascular:      Rate and Rhythm: Normal rate and regular rhythm.      Pulses: Normal pulses.      Heart sounds: Normal heart sounds. No murmur heard.  Pulmonary:      Effort: Pulmonary effort is normal. No respiratory distress.      Breath sounds: Normal breath sounds. No wheezing.   Abdominal:      General: Bowel sounds are normal. There is no distension.      Palpations: Abdomen is soft.      Tenderness: There is no abdominal tenderness. There is no guarding.   Musculoskeletal:         General: No swelling, tenderness or signs of injury. Normal range of motion.      Cervical back: Normal range of motion and neck supple. No rigidity or tenderness.   Skin:     General: Skin is warm and dry.   Neurological:      Mental Status: He is alert and oriented to person, place, and time.      Cranial Nerves: Cranial nerve deficit present.      Sensory: No sensory deficit.      Motor: No weakness.   Psychiatric:         Mood and Affect: Mood normal.         Behavior: Behavior normal.         CRANIAL NERVES     CN II   Visual fields full to confrontation.     CN III, IV, VI   Pupils are equal, round, and reactive to light.  Extraocular motions are normal.     CN V   Facial sensation intact.     CN VII   Left facial weakness: central    CN VIII   CN VIII normal.   Hearing: intact    CN XII   CN XII normal.      Significant Labs: All pertinent labs within the past 24 hours have been reviewed.  A1C:   Recent Labs   Lab 02/25/22  0917 06/27/22  1046   HGBA1C 6.4* 6.9*     BMP:   Recent Labs   Lab 07/31/22  1227   *      K 4.2      CO2 18*   BUN 81*   CREATININE 7.9*   CALCIUM 8.3*     CBC:   Recent Labs   Lab 07/31/22  1227   WBC 4.72   HGB 10.4*   HCT 31.9*   *     CMP:   Recent Labs   Lab  07/31/22  1227      K 4.2      CO2 18*   *   BUN 81*   CREATININE 7.9*   CALCIUM 8.3*   PROT 6.6   ALBUMIN 3.2*   BILITOT 0.4   ALKPHOS 76   AST 22   ALT 16   ANIONGAP 12   EGFRNONAA 7.1*     Cardiac Markers:   Recent Labs   Lab 07/31/22  1227   *     Lipid Panel:   Recent Labs   Lab 07/31/22  1227   CHOL 160   HDL 42   LDLCALC 79.6   TRIG 192*   CHOLHDL 26.3     Magnesium: No results for input(s): MG in the last 48 hours.  POCT Glucose:   Recent Labs   Lab 07/31/22  1156 07/31/22  2212   POCTGLUCOSE 290* 354*     Troponin:   Recent Labs   Lab 07/31/22  1227 07/31/22  1842   TROPONINI 0.102* 0.104*     TSH:   Recent Labs   Lab 07/31/22  1227   TSH 0.709       Significant Imaging: I have reviewed all pertinent imaging results/findings within the past 24 hours.  X-Ray Chest AP Portable  Narrative: EXAMINATION:  XR CHEST AP PORTABLE    CLINICAL HISTORY:  Stroke;    TECHNIQUE:  Two frontal views of the chest    COMPARISON:  07/05/2022    FINDINGS:  No significant change from prior.  No focal lung consolidation.  No large pleural effusion or pneumothorax.  Cardiomediastinal silhouette limited by technique but similar to prior.  Clinical correlation and further evaluation as warranted.  Impression: Please see above    Electronically signed by: Farooq Harris DO  Date:    07/31/2022  Time:    12:45  CT Head Without Contrast  Narrative: EXAMINATION:  CT HEAD WITHOUT CONTRAST    CLINICAL HISTORY:  Neuro deficit, acute, stroke suspected;    TECHNIQUE:  Multiple sequential 5 mm axial images of the head without contrast.  Coronal and sagittal reformatted imaging from the axial acquisition.    COMPARISON:  07/22/2019    FINDINGS:  There is no evidence for acute intracranial hemorrhage or sulcal effacement.  Small regions of hypoattenuation within the left frontal subcortical white matter and left subinsular white matter which are nonspecific and may represent lacunar type infarcts overall age  indeterminate not definitively seen on prior..  The ventricles are normal in size without hydrocephalus.  There is no midline shift or mass effect.  Visualized paranasal sinuses and mastoid air cells are clear.    This report was flagged in Epic as abnormal.  Impression: Small hypoattenuation foci within the left frontal subcortical white matter and left subinsular region which are nonspecific and age-indeterminate lacunar type infarcts to be included in differential.  These are not definitively seen on prior.  There is a small stable hypodensity within the right anterior limb internal capsule.    There is no evidence for acute intracranial hemorrhage or hydrocephalus.  Clinical correlation and further evaluation with MRI if patient compatible.    Electronically signed by: Farooq Harris DO  Date:    07/31/2022  Time:    12:28      Assessment/Plan:     * CVA (cerebral vascular accident)  Pt with left sided facial droop and slurred speech. Symptoms improving. CT head concerning for lacunar infarcts due to small hypodensities in the left frontal and left subinsular regions.  Pt has a previous small, stable infarct in the right internal capsule. Etiology small vessel disease due to hypertension vs embolic. MRI pending. ECHO completed in July reports EF 55%, grade 2 diastolic dysfunction, and severe left atrial enlargement. Lipids pending. Pt takes aspirin and atorvastatin at home. Plavix added for DAPT for secondary stroke prevention. Will monitor platelet count.  Neurology consulted for further recommendations.           CKD (chronic kidney disease), stage V  Pt sees Dr Curiel outpatient. Last visit was in October 2021.His gfr continues to decline. Current Cr 7.9, BUN 81, and gfr 8.2.  Pt does not exhibit any signs of uremia or fluid overload at this time. He reports polyuria. He takes lasix 40 mg and metolazone 5 mg daily. Nephrology has been consulted.      Essential hypertension  Pt reports that he only takes his  blood pressure medications once a day. He says the medications make him weak and unable to work. His high blood pressure is also exacerbated by his crack cocaine use.  Home medications include labetolol 300 mg BID, amlodpine 10 mg daily, hydralazine 100 mg BID, and lasix/metolazone recently have been added. Will resume blood pressure medications tomorrow.      Anemia due to stage 5 chronic kidney disease, not on chronic dialysis  Hemoglobin and hematocrit 10.4/31.9 and platelet count 143.  These are recent baseline levels. Will monitor CBC.       Drug abuse, cocaine type  Pt reports he does use crack cocaine. Last use 4 days ago. Importance of cessation discussed.    DM (diabetes mellitus) type I uncontrolled with renal manifestation  A1c is 6.9. Pt takes basaglar 45 units at night and aspart 10 units TID at home. Will decrease dose while inpatient due to decreased intake and adjust as needed.     Grade I diastolic dysfunction  Last ECHO was in July 2022.         VTE Risk Mitigation (From admission, onward)         Ordered     heparin (porcine) injection 5,000 Units  Every 8 hours         07/31/22 2212     IP VTE HIGH RISK PATIENT  Once         07/31/22 2212     Place sequential compression device  Until discontinued         07/31/22 2212                   Jessenia Love DNP  Department of Hospital Medicine   [facility] ([place])

## 2022-08-01 NOTE — ASSESSMENT & PLAN NOTE
Patient reports he has underwent substance abuse treatment program 3 times in the past.  Longest period of abstaining from cocaine use has been about 6 months.  Recommend substance abuse treatment program upon discharge.

## 2022-08-01 NOTE — ASSESSMENT & PLAN NOTE
Most likely ischemic.  Patient previously advised to have defibrillator placed however failed to follow-up in clinic.  Recommend focusing on efforts to abstain from further substance abuse but otherwise continue medical therapy for now.

## 2022-08-01 NOTE — HPI
"Nehemias Love, DNP:    "Mr. Howell is a 52 year old male with a PMH that includes hypertension, diabetes mellitus type 1, chronic kidney disease V, heart failure, and crack cocaine use. Patient was previous in the hospital earlier in July for chest pain and had a stress test that was positive for ischemia. Today he was transferred from [facility] for stroke symptoms of left sided facial droop and slurred speech.  CT head demonstrated some left sided hyposensitives concerning for new stroke.  Patient unsure what time symptoms started. He reports last night he was drinking some water and it was going outside of his mouth. Patient reports the facial droop and slurred speech has improved since this morning. He denies numbness, tingling, changes in vision. NIH 3 for mild dysarthria and facial paralysis."  "

## 2022-08-01 NOTE — PT/OT/SLP EVAL
"Speech Language Pathology Evaluation  Cognitive/Bedside Swallow    Patient Name:  Luis Daniel Howell   MRN:  [id number]  Admitting Diagnosis: Thrombotic stroke involving left middle cerebral artery    Recommendations:                  General Recommendations:    1. Speech pathology to follow 2-3x/week for ongoing assessment and treatment of R facial/labial weakness and mild dysarthria 2/2 stroke.     Diet recommendations:   Solids: Regular,  Liquids: Thin     Aspiration Precautions:   1. Sit upright at 90 degrees  2. Alternate bites/sips to reduce oral residue  3. Frequent oral care    General Precautions: Standard,      Communication strategies:  Cue pt to slow rate of speech    History:     Past Medical History:   Diagnosis Date    CKD (chronic kidney disease), stage V     Cocaine abuse     Diabetes mellitus type I     Diabetic retinopathy     Hyperlipidemia     Hypertension        Past Surgical History:   Procedure Laterality Date    CIRCUMCISION      WISDOM TOOTH EXTRACTION         Per chart review:  "* Thrombotic stroke involving left middle cerebral artery  51 y/o male with HTN, HLD, DM Type I, CKD (not on dialysis), heart failure, crack cocaine use now with new left subcortical MCA infarct involving part of the internal capsule and corona radiata.  Currently with dysarthria and right lower facial weakness.  Arms and legs spared.  MRA with no high-grade stenosis or occlusion.  Possible narrowing left M2 segment. TTE performed 7/5 unremarkable with exception of severe LAE.  Etiology most likely small vessel disease."    MRI 7/31/22:  "Impression:     Acute 1 cm infarct left posterior corona radiata/internal capsule.     Remote infarct with hemosiderin right subinsular region.     Microvascular small vessel ischemic change."    Subjective     · Pt upright in bed, agreeable to SLP evaluation.     Respiratory Status: Room air    Objective:     Cognitive-Communicative Status:  Pt awake/alert, oriented x4. Pt able " to sustain attention without distraction throughout evaluation. Divergent and convergent reasoning WFL. Able to correctly state problem and solution to everyday problem solving scenarios with 100% acc. Information processing and executive functioning skills seem to be intact.     Language:  Able to follow simple and complex commands 1-3 step commands with 100% acc. Able to correctly respond to y/n and open-ended questions without difficulty. Confrontation naming WFL. Able to generate x16 concrete category members within 1-minute time frame.    Oral Musculature Evaluation  · Oral Musculature: right weakness, gross asymmetry present  · Dentition: present and adequate  · Secretion Management: adequate  · Mucosal Quality: good  · Mandibular Strength and Mobility: WFL  · Oral Labial Strength and Mobility: impaired seal, impaired coordination, impaired retraction (R labial weakness and asymmetry; R nasolabial flattenting)  · Lingual Strength and Mobility: impaired right lateral movement  · Velar Elevation: WFL  · Buccal Strength and Mobility: decreased tone (On R)  · Volitional Swallow: WFL  · Voice Prior to PO Intake: Clear  · Oral Musculature Comments: R facial asymmetry at rest and with movement; Dcr R labial retraction with nasolabial flattening; Functional lingual protrusion, no deviation present; Slight R sided lingual weakness; Speech mildly dysarthric, 80-90% intelligible in known/unknown context.    Bedside Swallow Eval:   Consistencies Assessed:  · Thin liquids 1/3tsp, 1-3ml via straw  · Puree 1tsp boluses  · Solids small pieces of dry solids     Oral Phase:   · Functional labial seal on straw and cup edge, no anterior spillage  · However, pt does report some R sided drooling when drinking recently  · Bolus prep/mastication WFL  · Mild R sided residue on lingual surface, cleared with liquid wash    Pharyngeal Phase:   · Adequate laryngeal elevation/excursion on subjective palpation  · No overt s/s of aspiration  or airway threat during 100% of po intake- no coughing, choking, changes in breath stream or vocal quality    Treatment: SLP provided education on dysarthria and recommendation to continue speech tx. SLP discussed OP vs. HH. Pt stated he would prefer HH due to unreliable means of transportation. Notified Dr. Sequeira, RN, and case management.     Assessment:     Luis Daniel Howell is a 52 y.o. male with an SLP diagnosis of mild Dysarthria and mild oral phase dysphagia.      Goals:   Multidisciplinary Problems     SLP Goals        Problem: SLP    Goal Priority Disciplines Outcome   SLP Goal     SLP Ongoing, Progressing   Description: 1. Pt will consume a regular solid diet with thin liquids without overt s/s of aspiration or airway threat during 100% of po intake without assistance.   2. Pt will incr speech intelligibility to 100% in known/unknown context at the simple conversation level given min assist for utilization of speech strategies of slow rate, incr volume, and over articulate.   3. Pt will complete Arabella Oral Motor exercise program targeting R facial and labial weakness given MAX assist.                    Plan:     · Patient to be seen:  2 x/week, 3 x/week   · Plan of Care expires:  08/15/22  · Plan of Care reviewed with:  patient   · SLP Follow-Up:  Yes       Discharge recommendations:  Discharge Facility/Level of Care Needs: home health speech therapy       Time Tracking:     SLP Treatment Date:   08/01/22  Speech Start Time:  1450  Speech Stop Time:  1508     Speech Total Time (min):  18 min    Billable Minutes: Eval 10 min  and Eval Swallow and Oral Function 8 min    08/01/2022

## 2022-08-01 NOTE — ASSESSMENT & PLAN NOTE
-Stroke risk factor  -SBP < 220 - Can slowly reduce BP but avoid sudden drops in BP which can decrease cerebral perfusion pressure and lead to stroke extension  -Long term goal < 130/80 - this can be achieved over the next 3-4 weeks  -Patient expressed inability to take home BP meds as prescribed.  He states he only takes them once a day because they make him feel bad and he is unable to work.  Currently complaining of dizziness.  -Will need close follow up with PCP to work with BP meds to find appropriate medication and frequency that will lead to improved BP complaince

## 2022-08-01 NOTE — PLAN OF CARE
Problem: SLP  Goal: SLP Goal  Description: 1. Pt will consume a regular solid diet with thin liquids without overt s/s of aspiration or airway threat during 100% of po intake without assistance.   2. Pt will incr speech intelligibility to 100% in known/unknown context at the simple conversation level given min assist for utilization of speech strategies of slow rate, incr volume, and over articulate.   3. Pt will complete Arabella Oral Motor exercise program targeting R facial and labial weakness given MAX assist.   Outcome: Ongoing, Progressing    SLP to continue to follow 2-3x/week.

## 2022-08-01 NOTE — HOSPITAL COURSE
Patient is a 52-year-old man with history of hypertension, diabetes mellitus type 1, chronic kidney disease stage 5, chronic diastolic heart failure, ongoing substance abuse with cocaine who was transferred from [facility] for treatment of acute stroke.  Patient with facial droop and slurred speech.  Noncontrast head CT shows evidence of acute stroke.  Patient reports slurred speech persists but otherwise neurologic deficits improving.  Vascular Neurology service consulted.  See below

## 2022-08-01 NOTE — ASSESSMENT & PLAN NOTE
Pt sees Dr Curiel outpatient. Last visit was in October 2021.His gfr continues to decline. Current Cr 7.9, BUN 81, and gfr 8.2.  Pt does not exhibit any signs of uremia or fluid overload at this time. He reports polyuria. He takes lasix 40 mg and metolazone 5 mg daily. Nephrology has been consulted.

## 2022-08-01 NOTE — PLAN OF CARE
Problem: Occupational Therapy  Goal: Occupational Therapy Goal  Description: Orders received and chart reviewed.  Evaluation complete and pt Indep<>Supervision level with ADL and ADL mobility.   No LOB during ADL mobility at Supervision level for safety.  Dysarthria but able to communicate clearly.  Pt reporting he limits taking his BP medication due to it impairing his ability to work, causing him to feel weak and drowsy.  Pt lives with his mother and reports currently not working due to his high BP. Pt has history of cocaine use.  Recommend discharge to home.  Outcome: Met

## 2022-08-01 NOTE — SUBJECTIVE & OBJECTIVE
"    Past Medical History:   Diagnosis Date    Diabetes mellitus     Diabetes mellitus type I     Diabetic retinopathy     Hyperlipidemia     Hypertension      Past Surgical History:   Procedure Laterality Date    CIRCUMCISION      WISDOM TOOTH EXTRACTION       Family History   Problem Relation Age of Onset    Cancer Mother     Hypertension Mother     Cataracts Mother     Hypertension Father     Diabetes Father     Diabetes Brother     Amblyopia Neg Hx     Blindness Neg Hx     Glaucoma Neg Hx     Macular degeneration Neg Hx     Retinal detachment Neg Hx     Strabismus Neg Hx     Stroke Neg Hx     Thyroid disease Neg Hx      Social History     Tobacco Use    Smoking status: Never Smoker    Smokeless tobacco: Never Used   Substance Use Topics    Alcohol use: No    Drug use: Yes     Types: "Crack" cocaine     Comment: in remission      Review of patient's allergies indicates:  No Known Allergies    Medications: I have reviewed the current medication administration record.    Medications Prior to Admission   Medication Sig Dispense Refill Last Dose    amLODIPine (NORVASC) 10 MG tablet Take 1 tablet (10 mg total) by mouth once daily. 90 tablet 3 Past Week at Unknown time    aspirin 81 MG Chew Take 1 tablet (81 mg total) by mouth once daily. (Patient taking differently: Take 81 mg by mouth once daily.) 30 tablet 11 Past Week at Unknown time    atorvastatin (LIPITOR) 40 MG tablet Take 1 tablet (40 mg total) by mouth every evening. 90 tablet 3 Past Week at Unknown time    BASAGLAR KWIKPEN U-100 INSULIN glargine 100 units/mL (3mL) SubQ pen Inject 45 Units into the skin every evening. 3 each 3 Past Week at Unknown time    blood sugar diagnostic Strp To check BG 2 times daily, to use with insurance preferred meter 200 each 3 Past Month at Unknown time    calcitRIOL (ROCALTROL) 0.25 MCG Cap Take 0.25 mcg by mouth once daily.   Past Week at Unknown time    furosemide (LASIX) 40 MG tablet Take 1 tablet (40 mg total) by mouth once " daily. 90 tablet 3 Past Week at Unknown time    hydrALAZINE (APRESOLINE) 100 MG tablet Take 100 mg by mouth every 8 (eight) hours.   Past Week at Unknown time    insulin aspart U-100 (NOVOLOG FLEXPEN U-100 INSULIN) 100 unit/mL (3 mL) InPn pen INJECT 10 UNITS INTO THE SKIN 3 (THREE) TIMES DAILY WITH MEALS. 60 each 0 Past Week at Unknown time    labetaloL (NORMODYNE) 300 MG tablet Take 1 tablet (300 mg total) by mouth 2 (two) times daily. (Patient taking differently: Take 300 mg by mouth once daily.) 180 tablet 3 Past Week at Unknown time    metOLazone (ZAROXOLYN) 5 MG tablet Take 1 tablet (5 mg total) by mouth nightly. 90 tablet 2 Past Week at Unknown time    vitamin D (VITAMIN D3) 1000 units Tab Take 2,000 Units by mouth nightly.   Past Week at Unknown time    ferrous gluconate (FERGON) 324 MG tablet Take 1 tablet (324 mg total) by mouth daily with breakfast. (Patient not taking: No sig reported)   More than a month at Unknown time       Review of Systems   Constitutional:  Negative for chills and fever.   HENT:  Positive for drooling. Negative for congestion and trouble swallowing.    Eyes:  Negative for photophobia and visual disturbance.   Respiratory:  Negative for cough and shortness of breath.    Cardiovascular:  Negative for chest pain, palpitations and leg swelling.   Gastrointestinal:  Negative for abdominal pain, diarrhea, nausea and vomiting.   Genitourinary:  Negative for difficulty urinating and dysuria.   Musculoskeletal:  Negative for gait problem.   Skin:  Negative for color change and rash.   Neurological:  Positive for dizziness, facial asymmetry, speech difficulty and headaches. Negative for weakness, light-headedness and numbness.   Psychiatric/Behavioral:  Negative for agitation and confusion.    Objective:     Vital Signs (Most Recent):  Temp: 97.9 °F (36.6 °C) (08/01/22 0817)  Pulse: 66 (08/01/22 1000)  Resp: 18 (08/01/22 0817)  BP: (!) 180/99 (08/01/22 0817)  SpO2: 100 % (08/01/22  0817)    Vital Signs Range (Last 24H):  Temp:  [97.7 °F (36.5 °C)-98.8 °F (37.1 °C)]   Pulse:  [57-72]   Resp:  [18-20]   BP: (180-226)/()   SpO2:  [97 %-100 %]     Physical Exam  Constitutional:       General: He is not in acute distress.  HENT:      Head: Normocephalic.      Right Ear: External ear normal.      Left Ear: External ear normal.      Nose: Nose normal.   Pulmonary:      Effort: Pulmonary effort is normal. No respiratory distress.   Abdominal:      General: There is no distension.      Tenderness: There is no guarding.   Musculoskeletal:      Cervical back: Normal range of motion.      Right lower leg: No edema.      Left lower leg: No edema.   Skin:     General: Skin is dry.   Neurological:      Mental Status: He is alert.   Psychiatric:         Mood and Affect: Mood normal.         Behavior: Behavior normal.       Neurological Exam:   LOC: alert  Attention Span: Good   Language: No aphasia  Articulation: Dysarthria  Orientation: Person, Place, Time   Visual Fields: Full  EOM (CN III, IV, VI): Full/intact  Facial Sensation (CN V): Normal  Facial Movement (CN VII): Lower facial weakness on the Right  Motor: Arm left    Full antigravity; Full power noted with nurse assistance  Leg left    Full antigravity; Full power noted with nurse assistance  Arm right    Full antigravity; Full power noted with nurse assistance  Leg right   Full antigravity; Full power noted with nurse assistance  Cerebellum: No evidence of appendicular or axial ataxia  Sensation: Intact to light touch      Laboratory:  CMP:   Recent Labs   Lab 08/01/22  0450   CALCIUM 8.8   ALBUMIN 3.3*   PROT 6.6      K 4.1   CO2 20*      BUN 77*   CREATININE 7.9*   ALKPHOS 65   ALT 18   AST 21   BILITOT 0.4     CBC:   Recent Labs   Lab 08/01/22  0451   WBC 5.18   RBC 3.90*   HGB 10.6*   HCT 32.8*   *   MCV 84   MCH 27.2   MCHC 32.3     Lipid Panel:   Recent Labs   Lab 07/31/22  1227   CHOL 160   LDLCALC 79.6   HDL 42    TRIG 192*     Coagulation:   Recent Labs   Lab 22  0450   INR 1.0   APTT 25.8     Hgb A1C:   Recent Labs   Lab 22  0451   HGBA1C 7.5*     TSH:   Recent Labs   Lab 22  1227   TSH 0.709       Diagnostic Results:      Brain imagin2022 MRI Brain  Diffusion restriction left internal capsule extending up to corona radiata. Linear area of susceptibility right subinsular. Chronic white matter disease.     Vessel Imagin2022 MRA brain and neck  No high-grade stenosis or occlusion.  Possible narrowing within the left M2 segment.    Cardiac Evaluation:   2022 TTE  EF 55%; no wall motion abnormality; No thrombus/vegetation; severe left atrial enlargement

## 2022-08-01 NOTE — ASSESSMENT & PLAN NOTE
51 y/o male with HTN, HLD, DM Type I, CKD (not on dialysis), heart failure, crack cocaine use now with new left subcortical MCA infarct involving part of the internal capsule and corona radiata.  Currently with dysarthria and right lower facial weakness.  Arms and legs spared.  MRA with no high-grade stenosis or occlusion.  Possible narrowing left M2 segment. TTE performed 7/5 unremarkable with exception of severe LAE.  Etiology most likely small vessel disease.    Could consider 30 day event monitor to further evaluate LAE and possible underlying PAF, but this is not felt to be etiology of current event.        Antithrombotics for secondary stroke prevention: Antiplatelets: Aspirin: 81 mg daily  Clopidogrel: 75 mg daily x 21 days then monotherapy with ASA    Statins for secondary stroke prevention and hyperlipidemia, if present:   Statins: Atorvastatin- 40 mg daily for goal LDL < 70    Aggressive risk factor modification: HTN, DM, HLD     Rehab efforts: The patient has been evaluated by a stroke team provider and the therapy needs have been fully considered based off the presenting complaints and exam findings. The following therapy evaluations are needed: PT evaluate and treat, OT evaluate and treat, SLP evaluate and treat    Diagnostics ordered/pending: None     VTE prophylaxis: None: Reason for No Pharmacological VTE Prophylaxis: Ambulating with or without assistance      -No further inpatient stroke up needed; can dispo with therapy recommendations once medically ready  -Consider 30 day event monitor  -Ambulatory referral to Vascular Neurology in 4-6 weeks (Consult Order [id number])

## 2022-08-01 NOTE — CONSULTS
[facility] - Med Surg ([place])  Vascular Neurology  Comprehensive Stroke Center  TeleVascular Neurology Consult Note    Inpatient Consult Tele-Vascular Neurology  Consult performed by: Magalis Obrien NP  Consult ordered by: Ricardo Sequeira MD    Inpatient consult to Vascular (Stroke) Neurology  Consult performed by: Magalis Obrien NP  Consult ordered by: Jessenia Love DNP        Assessment/Plan:     Patient is a 52 y.o. year old male with:    * Thrombotic stroke involving left middle cerebral artery  51 y/o male with HTN, HLD, DM Type I, CKD (not on dialysis), heart failure, crack cocaine use now with new left subcortical MCA infarct involving part of the internal capsule and corona radiata.  Currently with dysarthria and right lower facial weakness.  Arms and legs spared.  MRA with no high-grade stenosis or occlusion.  Possible narrowing left M2 segment. TTE performed 7/5 unremarkable with exception of severe LAE.  Etiology most likely small vessel disease.    Could consider 30 day event monitor to further evaluate LAE and possible underlying PAF, but this is not felt to be etiology of current event.        Antithrombotics for secondary stroke prevention: Antiplatelets: Aspirin: 81 mg daily  Clopidogrel: 75 mg daily x 21 days then monotherapy with ASA    Statins for secondary stroke prevention and hyperlipidemia, if present:   Statins: Atorvastatin- 40 mg daily for goal LDL < 70    Aggressive risk factor modification: HTN, DM, HLD     Rehab efforts: The patient has been evaluated by a stroke team provider and the therapy needs have been fully considered based off the presenting complaints and exam findings. The following therapy evaluations are needed: PT evaluate and treat, OT evaluate and treat, SLP evaluate and treat    Diagnostics ordered/pending: None     VTE prophylaxis: None: Reason for No Pharmacological VTE Prophylaxis: Ambulating with or without assistance      -No further inpatient stroke up  needed; can dispo with therapy recommendations once medically ready  -Consider 30 day event monitor  -Ambulatory referral to Vascular Neurology in 4-6 weeks (Consult Order REF46)        Left atrial enlargement  -Severe left atrial enlargement noted on TTE 7/5/2022  -Etiology of stroke not felt to be embolic but patient at high risk for underlying PAF   -Reasonable to consider 30 day event monitor to investigate for PAF to offer primary prevention if found    Dysarthria and anarthria  -Due to stroke  -SLP to evaluate and treat    Chronic kidney disease, stage IV (severe)  -Creatinine 7.9 currently no on dialysis  -Avoid nephrotoxins      Drug abuse, cocaine type  -Stroke risk factor  -Discussed complications associated with crack/cocaine use and need for cessation  -Encourage cessation    Essential hypertension  -Stroke risk factor  -SBP < 220 - Can slowly reduce BP but avoid sudden drops in BP which can decrease cerebral perfusion pressure and lead to stroke extension  -Long term goal < 130/80 - this can be achieved over the next 3-4 weeks  -Patient expressed inability to take home BP meds as prescribed.  He states he only takes them once a day because they make him feel bad and he is unable to work.  Currently complaining of dizziness.  -Will need close follow up with PCP to work with BP meds to find appropriate medication and frequency that will lead to improved BP complaince    Hyperlipidemia  -Stroke risk factor  -LDL 79.6  -Atorvastatin 40mg for goal LDL < 70      DM (diabetes mellitus) type I uncontrolled with renal manifestation  -Stroke risk factor  -A1c 7.5  -Goal glucose 140-180 while hospitalized  -Encourage continued compliance with diet, activity, and medications        STROKE DOCUMENTATION          NIH Scale:  1a. Level of Consciousness: 0-->Alert, keenly responsive  1b. LOC Questions: 0-->Answers both questions correctly  1c. LOC Commands: 0-->Performs both tasks correctly  2. Best Gaze:  0-->Normal  3. Visual: 0-->No visual loss  4. Facial Palsy: 1-->Minor paralysis (flattened nasolabial fold, asymmetry on smiling)  5a. Motor Arm, Left: 0-->No drift, limb holds 90 (or 45) degrees for full 10 secs  5b. Motor Arm, Right: 0-->No drift, limb holds 90 (or 45) degrees for full 10 secs  6a. Motor Leg, Left: 0-->No drift, leg holds 30 degree position for full 5 secs  6b. Motor Leg, Right: 0-->No drift, leg holds 30 degree position for full 5 secs  7. Limb Ataxia: 0-->Absent  8. Sensory: 0-->Normal, no sensory loss  9. Best Language: 0-->No aphasia, normal  10. Dysarthria: 1-->Mild-to-moderate dysarthria, patient slurs at least some words and, at worst, can be understood with some difficulty  11. Extinction and Inattention (formerly Neglect): 0-->No abnormality  Total (NIH Stroke Scale): 2    Modified Dawes Score: 0  Lorenza Coma Scale:    ABCD2 Score:    [id number] Score:   HAS -BLED Score:   ICH Score:   Hunt & Segura Classification:       Thrombolysis Candidate? No, Out of window     Delays to Thrombolysis?  Not Applicable    Interventional Revascularization Candidate?   Is the patient eligible for mechanical endovascular reperfusion (GEOVANNA)?  No; No large vessel occlusion identified on imaging     Delays to Thrombectomy? Not Applicable    Hemorrhagic change of an Ischemic Stroke: Does this patient have an ischemic stroke with hemorrhagic changes? No     Subjective:     History of Present Illness:  51 y/o male with HTN, HLD, DM Type I, CKD (not on dialysis), heart failure, crack cocaine use who presented with left face droop and slurred speech.  Symptoms were first noticed Saturday.  Denies associated vision changes, double vision, unilateral weakness, numbness, aguilar imbalance.  He did notice liquid leaking out of left mouth but states this is better now.  He reports no known triggers.  He has never had these symptoms before.  He has no history of stroke or TIA.  Today he continues with facial droop and  "slurred speech but states better than it initially was.          Past Medical History:   Diagnosis Date    Diabetes mellitus     Diabetes mellitus type I     Diabetic retinopathy     Hyperlipidemia     Hypertension      Past Surgical History:   Procedure Laterality Date    CIRCUMCISION      WISDOM TOOTH EXTRACTION       Family History   Problem Relation Age of Onset    Cancer Mother     Hypertension Mother     Cataracts Mother     Hypertension Father     Diabetes Father     Diabetes Brother     Amblyopia Neg Hx     Blindness Neg Hx     Glaucoma Neg Hx     Macular degeneration Neg Hx     Retinal detachment Neg Hx     Strabismus Neg Hx     Stroke Neg Hx     Thyroid disease Neg Hx      Social History     Tobacco Use    Smoking status: Never Smoker    Smokeless tobacco: Never Used   Substance Use Topics    Alcohol use: No    Drug use: Yes     Types: "Crack" cocaine     Comment: in remission      Review of patient's allergies indicates:  No Known Allergies    Medications: I have reviewed the current medication administration record.    Medications Prior to Admission   Medication Sig Dispense Refill Last Dose    amLODIPine (NORVASC) 10 MG tablet Take 1 tablet (10 mg total) by mouth once daily. 90 tablet 3 Past Week at Unknown time    aspirin 81 MG Chew Take 1 tablet (81 mg total) by mouth once daily. (Patient taking differently: Take 81 mg by mouth once daily.) 30 tablet 11 Past Week at Unknown time    atorvastatin (LIPITOR) 40 MG tablet Take 1 tablet (40 mg total) by mouth every evening. 90 tablet 3 Past Week at Unknown time    BASAGLAR KWIKPEN U-100 INSULIN glargine 100 units/mL (3mL) SubQ pen Inject 45 Units into the skin every evening. 3 each 3 Past Week at Unknown time    blood sugar diagnostic Strp To check BG 2 times daily, to use with insurance preferred meter 200 each 3 Past Month at Unknown time    calcitRIOL (ROCALTROL) 0.25 MCG Cap Take 0.25 mcg by mouth once daily.   Past Week " at Unknown time    furosemide (LASIX) 40 MG tablet Take 1 tablet (40 mg total) by mouth once daily. 90 tablet 3 Past Week at Unknown time    hydrALAZINE (APRESOLINE) 100 MG tablet Take 100 mg by mouth every 8 (eight) hours.   Past Week at Unknown time    insulin aspart U-100 (NOVOLOG FLEXPEN U-100 INSULIN) 100 unit/mL (3 mL) InPn pen INJECT 10 UNITS INTO THE SKIN 3 (THREE) TIMES DAILY WITH MEALS. 60 each 0 Past Week at Unknown time    labetaloL (NORMODYNE) 300 MG tablet Take 1 tablet (300 mg total) by mouth 2 (two) times daily. (Patient taking differently: Take 300 mg by mouth once daily.) 180 tablet 3 Past Week at Unknown time    metOLazone (ZAROXOLYN) 5 MG tablet Take 1 tablet (5 mg total) by mouth nightly. 90 tablet 2 Past Week at Unknown time    vitamin D (VITAMIN D3) 1000 units Tab Take 2,000 Units by mouth nightly.   Past Week at Unknown time    ferrous gluconate (FERGON) 324 MG tablet Take 1 tablet (324 mg total) by mouth daily with breakfast. (Patient not taking: No sig reported)   More than a month at Unknown time       Review of Systems   Constitutional:  Negative for chills and fever.   HENT:  Positive for drooling. Negative for congestion and trouble swallowing.    Eyes:  Negative for photophobia and visual disturbance.   Respiratory:  Negative for cough and shortness of breath.    Cardiovascular:  Negative for chest pain, palpitations and leg swelling.   Gastrointestinal:  Negative for abdominal pain, diarrhea, nausea and vomiting.   Genitourinary:  Negative for difficulty urinating and dysuria.   Musculoskeletal:  Negative for gait problem.   Skin:  Negative for color change and rash.   Neurological:  Positive for dizziness, facial asymmetry, speech difficulty and headaches. Negative for weakness, light-headedness and numbness.   Psychiatric/Behavioral:  Negative for agitation and confusion.    Objective:     Vital Signs (Most Recent):  Temp: 97.9 °F (36.6 °C) (08/01/22 0817)  Pulse: 66  (08/01/22 1000)  Resp: 18 (08/01/22 0817)  BP: (!) 180/99 (08/01/22 0817)  SpO2: 100 % (08/01/22 0817)    Vital Signs Range (Last 24H):  Temp:  [97.7 °F (36.5 °C)-98.8 °F (37.1 °C)]   Pulse:  [57-72]   Resp:  [18-20]   BP: (180-226)/()   SpO2:  [97 %-100 %]     Physical Exam  Constitutional:       General: He is not in acute distress.  HENT:      Head: Normocephalic.      Right Ear: External ear normal.      Left Ear: External ear normal.      Nose: Nose normal.   Pulmonary:      Effort: Pulmonary effort is normal. No respiratory distress.   Abdominal:      General: There is no distension.      Tenderness: There is no guarding.   Musculoskeletal:      Cervical back: Normal range of motion.      Right lower leg: No edema.      Left lower leg: No edema.   Skin:     General: Skin is dry.   Neurological:      Mental Status: He is alert.   Psychiatric:         Mood and Affect: Mood normal.         Behavior: Behavior normal.       Neurological Exam:   LOC: alert  Attention Span: Good   Language: No aphasia  Articulation: Dysarthria  Orientation: Person, Place, Time   Visual Fields: Full  EOM (CN III, IV, VI): Full/intact  Facial Sensation (CN V): Normal  Facial Movement (CN VII): Lower facial weakness on the Right  Motor: Arm left    Full antigravity; Full power noted with nurse assistance  Leg left    Full antigravity; Full power noted with nurse assistance  Arm right    Full antigravity; Full power noted with nurse assistance  Leg right   Full antigravity; Full power noted with nurse assistance  Cerebellum: No evidence of appendicular or axial ataxia  Sensation: Intact to light touch      Laboratory:  CMP:   Recent Labs   Lab 08/01/22  0450   CALCIUM 8.8   ALBUMIN 3.3*   PROT 6.6      K 4.1   CO2 20*      BUN 77*   CREATININE 7.9*   ALKPHOS 65   ALT 18   AST 21   BILITOT 0.4     CBC:   Recent Labs   Lab 08/01/22  0451   WBC 5.18   RBC 3.90*   HGB 10.6*   HCT 32.8*   *   MCV 84   MCH 27.2   MCHC  32.3     Lipid Panel:   Recent Labs   Lab 22  1227   CHOL 160   LDLCALC 79.6   HDL 42   TRIG 192*     Coagulation:   Recent Labs   Lab 22  0450   INR 1.0   APTT 25.8     Hgb A1C:   Recent Labs   Lab 22  0451   HGBA1C 7.5*     TSH:   Recent Labs   Lab 22  1227   TSH 0.709       Diagnostic Results:      Brain imagin2022 MRI Brain  Diffusion restriction left internal capsule extending up to corona radiata. Linear area of susceptibility right subinsular. Chronic white matter disease.     Vessel Imagin2022 MRA brain and neck  No high-grade stenosis or occlusion.  Possible narrowing within the left M2 segment.    Cardiac Evaluation:   2022 TTE  EF 55%; no wall motion abnormality; No thrombus/vegetation; severe left atrial enlargement        IP Unit  Spoke hospital nurse at bedside with patient assisting consultant.     Patient information was obtained from patient.     The attending portion of this evaluation, treatment, and documentation was performed per Magalis Obrien NP via audiovisual.    Billing code:  (moderate to severe stroke, large areas of edema, some mimics)    · This patient has a critical neurological condition/illness, with high morbidity and mortality.  · There is a high probability for acute neurological change leading to clinical and possibly life-threatening deterioration requiring highest level of physician preparedness for urgent intervention.  · Care was coordinated with other physicians involved in the patient's care.  · Radiologic studies and laboratory data were reviewed and interpreted, and plan of care was re-assessed based on the results.  · Diagnosis, treatment options and prognosis may have been discussed with the patient and/or family members or caregiver.  · Further advanced medical management and further evaluation is warranted for his care.      Consult End Time: 10:59 AM       Magalis Obrien NP  Comprehensive Stroke  [place]  Department of Vascular Neurology   [facility] - Med Surg ([place])

## 2022-08-01 NOTE — ASSESSMENT & PLAN NOTE
Suspect small-vessel disease exacerbated by cocaine use.  Continue with high-dose statin, dual antiplatelet therapy, and will allow for permissive hypertension.  MRI, echocardiogram, imaging of the vessels of the neck ordered.  Patient counseled critical importance of staying from further cocaine use.  Consult physical, occupational, and speech therapy.  Vascular neurology service consulted.

## 2022-08-01 NOTE — ASSESSMENT & PLAN NOTE
-Severe left atrial enlargement noted on TTE 7/5/2022  -Etiology of stroke not felt to be embolic but patient at high risk for underlying PAF   -Reasonable to consider 30 day event monitor to investigate for PAF to offer primary prevention if found

## 2022-08-01 NOTE — ASSESSMENT & PLAN NOTE
-Stroke risk factor  -A1c 7.5  -Goal glucose 140-180 while hospitalized  -Encourage continued compliance with diet, activity, and medications

## 2022-08-01 NOTE — HPI
53 y/o male with HTN, HLD, DM Type I, CKD (not on dialysis), heart failure, crack cocaine use who presented with left face droop and slurred speech.  Symptoms were first noticed Saturday.  Denies associated vision changes, double vision, unilateral weakness, numbness, aguilar imbalance.  He did notice liquid leaking out of left mouth but states this is better now.  He reports no known triggers.  He has never had these symptoms before.  He has no history of stroke or TIA.  Today he continues with facial droop and slurred speech but states better than it initially was.

## 2022-08-01 NOTE — ASSESSMENT & PLAN NOTE
A1c is 6.9. Pt takes basaglar 45 units at night and aspart 10 units TID at home. Will decrease dose while inpatient due to decreased intake and adjust as needed.

## 2022-08-01 NOTE — ASSESSMENT & PLAN NOTE
53 y/o male with HTN, HLD, DM Type I, CKD (not on dialysis), heart failure, crack cocaine use now with new left subcortical MCA infarct involving part of the internal capsule and corona radiata.  Currently with dysarthria and right lower facial weakness.  Arms and legs spared.  MRA with no high-grade stenosis or occlusion.  Possible narrowing left M2 segment. TTE performed 7/5 unremarkable with exception of severe LAE.  Etiology most likely small vessel disease.    Could consider 30 day event monitor to further evaluate LAE and possible underlying PAF, but this is not felt to be etiology of current event.        Antithrombotics for secondary stroke prevention: Antiplatelets: Aspirin: 81 mg daily  Clopidogrel: 75 mg daily x 21 days then monotherapy with ASA    Statins for secondary stroke prevention and hyperlipidemia, if present:   Statins: Atorvastatin- 40 mg daily for goal LDL < 70    Aggressive risk factor modification: HTN, DM, HLD     Rehab efforts: The patient has been evaluated by a stroke team provider and the therapy needs have been fully considered based off the presenting complaints and exam findings. The following therapy evaluations are needed: PT evaluate and treat, OT evaluate and treat, SLP evaluate and treat    Diagnostics ordered/pending: None     VTE prophylaxis: None: Reason for No Pharmacological VTE Prophylaxis: Ambulating with or without assistance

## 2022-08-01 NOTE — PROGRESS NOTES
"[facility], [place], operated by [facility]  Progress Note    Patient Name: Luis Daniel Howell  MRN: [id number]  Patient Class: IP- Inpatient   Admission Date: 7/31/2022  Length of Stay: 1 days  Attending Physician: Ricardo Sequeira MD  Primary Care Provider: Janak Wilson MD        Subjective:     Principal Problem:Acute ischemic stroke        HPI:  Nehemias Love, DNP:    "Mr. Howell is a 52 year old male with a PMH that includes hypertension, diabetes mellitus type 1, chronic kidney disease V, heart failure, and crack cocaine use. Patient was previous in the hospital earlier in July for chest pain and had a stress test that was positive for ischemia. Today he was transferred from [facility] for stroke symptoms of left sided facial droop and slurred speech.  CT head demonstrated some left sided hyposensitives concerning for new stroke.  Patient unsure what time symptoms started. He reports last night he was drinking some water and it was going outside of his mouth. Patient reports the facial droop and slurred speech has improved since this morning. He denies numbness, tingling, changes in vision. NIH 3 for mild dysarthria and facial paralysis."      Overview/Hospital Course:  Patient is a 52-year-old man with history of hypertension, diabetes mellitus type 1, chronic kidney disease stage 5, chronic diastolic heart failure, ongoing substance abuse with cocaine who was transferred from [facility] for treatment of acute stroke.  Patient with facial droop and slurred speech.  Noncontrast head CT shows evidence of acute stroke.  Patient reports slurred speech persists but otherwise neurologic deficits improving.  Vascular Neurology service consulted.      Interval History: Minimal facial drop, slurred speech persists.    Review of Systems   Constitutional:  Negative for chills and fever.   Respiratory:  Negative for shortness of breath.    Cardiovascular:  Negative for chest pain. "   Gastrointestinal:  Negative for abdominal pain, constipation, diarrhea, nausea and vomiting.   Neurological:  Positive for facial asymmetry and speech difficulty.     Objective:     Vital Signs (Most Recent):  Temp: 97.9 °F (36.6 °C) (08/01/22 0817)  Pulse: 66 (08/01/22 1000)  Resp: 18 (08/01/22 0817)  BP: (!) 180/99 (08/01/22 0817)  SpO2: 100 % (08/01/22 0817)   Vital Signs (24h Range):  Temp:  [97.7 °F (36.5 °C)-98.8 °F (37.1 °C)] 97.9 °F (36.6 °C)  Pulse:  [57-72] 66  Resp:  [18-20] 18  SpO2:  [97 %-100 %] 100 %  BP: (180-226)/() 180/99     Weight: 102 kg (224 lb 13.9 oz)  Body mass index is 31.36 kg/m².    Intake/Output Summary (Last 24 hours) at 8/1/2022 1018  Last data filed at 8/1/2022 0400  Gross per 24 hour   Intake 354 ml   Output --   Net 354 ml      Physical Exam  Cardiovascular:      Rate and Rhythm: Normal rate and regular rhythm.      Heart sounds: Normal heart sounds. No murmur heard.    No friction rub. No gallop.   Pulmonary:      Effort: Pulmonary effort is normal. No respiratory distress.      Breath sounds: Normal breath sounds. No wheezing or rales.   Abdominal:      General: Bowel sounds are normal. There is no distension.      Palpations: Abdomen is soft.      Tenderness: There is no abdominal tenderness.   Genitourinary:     Comments: Scaly erythematous rash in his groins bilaterally.  Musculoskeletal:         General: No tenderness. Normal range of motion.   Skin:     General: Skin is warm and dry.      Coloration: Skin is not pale.      Findings: No erythema or rash.   Neurological:      Mental Status: He is alert and oriented to person, place, and time.      Comments: Patient with minimal facial asymmetry this morning.  Minimum left upper extremity weakness noted compared right-sided however still 5/5 strength.  Slurred speech noted.       Significant Labs: All pertinent labs within the past 24 hours have been reviewed.    Significant Imaging: I have reviewed all pertinent imaging  results/findings within the past 24 hours.      Assessment/Plan:      * Acute ischemic stroke  Suspect small-vessel disease exacerbated by cocaine use.  Continue with high-dose statin, dual antiplatelet therapy, and will allow for permissive hypertension.  MRI, echocardiogram, imaging of the vessels of the neck ordered.  Patient counseled critical importance of staying from further cocaine use.  Consult physical, occupational, and speech therapy.  Vascular neurology service consulted.    Chronic kidney disease, stage IV (severe)  No urgent indication for hemodialysis at the moment.  However likely would need dialysis in near future.  Consult Nephrology for evaluation further treatment recommendations.    DM (diabetes mellitus) type I uncontrolled with renal manifestation  Patient takes about 45 units at night of long-acting insulin along with 10 units of preprandial insulin.  Continue with reduced dose for now till able to clear for oral diet.  Hemoglobin A1c is 7.5%.    Cardiomyopathy  Most likely ischemic.  Patient previously advised to have defibrillator placed however failed to follow-up in clinic.  Recommend focusing on efforts to abstain from further substance abuse but otherwise continue medical therapy for now.    Drug abuse, cocaine type  Patient reports he has underwent substance abuse treatment program 3 times in the past.  Longest period of abstaining from cocaine use has been about 6 months.  Recommend substance abuse treatment program upon discharge.    Essential hypertension  Allow for permissive hypertension with plan to slowly bring down blood pressure.      VTE Risk Mitigation (From admission, onward)         Ordered     heparin (porcine) injection 5,000 Units  Every 8 hours         07/31/22 2212     IP VTE HIGH RISK PATIENT  Once         07/31/22 2212     Place sequential compression device  Until discontinued         07/31/22 2212                Ricardo Sequeira MD  Department of Hospital  [facility] ([place])

## 2022-08-01 NOTE — PT/OT/SLP EVAL
Physical Therapy Evaluation    Patient Name:  Luis Daniel Howell   MRN:  [id number]    Recommendations:     Discharge Recommendations:  home   Discharge Equipment Recommendations: none   Barriers to discharge: medical treatment    Assessment:     Luis Daniel Howell is a 52 y.o. male admitted with a medical diagnosis of CVA (cerebral vascular accident). He has a past medical history that includes but is not limited to HTN, DM, HLD, CKD, FARLEY, CHF, T2DM, drug abuse and anemia.  He presents with the following impairments/functional limitations:  weakness, impaired endurance, impaired self care skills, impaired functional mobility, gait instability, impaired balance.    PT orders received. PT evaluation completed and goals/POC established. Pt tolerated evaluation well with no adverse reactions. Pt performed in-room ambulation. PT will continue to follow and progress goals as tolerated. Recommend discharge to home.    Rehab Prognosis: Good; patient would benefit from acute skilled PT services to address these deficits and reach maximum level of function.    Recent Surgery: * No surgery found *      Plan:     During this hospitalization, patient to be seen 3 x/week to address the identified rehab impairments via gait training, therapeutic activities, therapeutic exercises, neuromuscular re-education and progress toward the following goals:    · Plan of Care Expires:  08/31/22    Subjective     Chief Complaint: weakness; pt explaining the medication he was given makes him feel weak.   Patient/Family Comments/goals: Pt wanting to get medication figured out so he doesn't feel this way.   Pain/Comfort:  · Pain Rating 1: 0/10  · Pain Rating Post-Intervention 1: 0/10    Patients cultural, spiritual, Worship conflicts given the current situation: no    Living Environment:  Pt lives with mother in a [facility] with no steps to enter. He has access to a tub-shower combination in the home. Prior to admission, patients level of function was  independent and ambulatory.  Equipment used at home: none.  DME owned (not currently used): none.  Upon discharge, patient will have assistance from mother.    Objective:     Communicated with LAVON Del Castillo prior to session.  Patient found sitting edge of bed with peripheral IV, telemetry  upon PT entry to room.    General Precautions: Standard, fall   Orthopedic Precautions:N/A   Braces: N/A  Respiratory Status: Room air    Exams:  · Cognition:   · Patient is oriented to person, place, time, and situation.  · Pt follows approximately 100% of multiple-step commands.    · Mood: Pleasant and cooperative.  · Musculoskeletal:  · Posture:    · In sitting: WNL  · In standing: WNL  · LE ROM/Strength:   · R ROM: No deficits  · L ROM: No deficits  · R Strength:   · Hip flexion: 5/5  · Knee extension: 5/5  · Dorsiflexion: 5/5   · L Strength:   · Hip flexion: 5/5  · Knee extension: 5/5  · Dorsiflexion: 5/5   · Neuromuscular:  · Sensation: Intact to light touch bilateral LEs.   · Tone/Reflexes: No impairments identified with functional mobility. No formal testing performed.  · Coordination:  · Heel to shin: WNL bilaterally  · Toe tapping: WNL bilaterally   · Balance:   · Static sitting: Independent  · Dynamic sitting: Independent  · Static standing: Independent  · Dynamic standing: Independent  · Visual-vestibular: No impairments identified with functional mobility. No formal testing performed.  · Integument: Visible skin intact    Functional Mobility:  · Transfers:     · Sit to Stand:  stand by assistance with no AD  · Gait: x20 feet with contact guard assistance with no AD. Pt with decreased speed, jean paul, and bilateral step length. Decreased stability, however, no LOB noted. Wide IRVING also noted.     Therapeutic Activities and Exercises:  Transfer and gait training as described above.    PT educated patient re:   · PT plan of care/role of PT  · Fall and safety precautions  · Gait deviations  · Use of call light (don't get up  without assistance)  Pt verbalized understanding     The patient is safe to transfer with RN assist  Patient encouraged to sit up in chair throughout the day to prevent deconditioning.     AM-PAC 6 CLICK MOBILITY  Total Score:20     Patient left sitting edge of bed with all lines intact and call button in reach.    GOALS:   Multidisciplinary Problems     Physical Therapy Goals        Problem: Physical Therapy    Goal Priority Disciplines Outcome Goal Variances Interventions   Physical Therapy Goal     PT, PT/OT Ongoing, Progressing     Description: Goals to be met by: 8/31/22    Patient will perform the following to increase strength, improve mobility, and return to prior level of function:    1. Supine <> sit with independence.  2. Sit<>stand with independence with least restrictive assistive device.  3. Gait x 150 feet with independence with least restrictive assistive device.                       History:     Past Medical History:   Diagnosis Date    Diabetes mellitus     Diabetes mellitus type I     Diabetic retinopathy     Hyperlipidemia     Hypertension        Past Surgical History:   Procedure Laterality Date    CIRCUMCISION      WISDOM TOOTH EXTRACTION         Time Tracking:     PT Received On: 08/01/22  PT Start Time: 1000     PT Stop Time: 1008  PT Total Time (min): 8 min     Billable Minutes: Evaluation 8      08/01/2022

## 2022-08-01 NOTE — PLAN OF CARE
Initial Discharge Planning Assessment:8/1/22    Patient admitted on:7/31/22    Chart reviewed, Care plan discussed with treatment team, attending     PCP updated in Epic: correct in [facility], Dr Wilson    Pharmacy updated in [facility]    Current Dispo:home    Transportation:has reliable    POA/LW:none on file      Anticipated DC needs from CM perspecrive:none    Case management to follow for plans and arrangements     Pt with consults today, Neuro, therapy, dispo pending results of tests, possibly later today or tomorrow, 8/2/22. Pt has residual slight speech impediment, all other symptoms from CVA resolved at this time.    CM to follow.    [facility] ([place]  Initial Discharge Assessment       Primary Care Provider: Janak Wilson MD    Admission Diagnosis: Stroke [I63.9]    Admission Date: 7/31/2022  Expected Discharge Date:     Discharge Barriers Identified: (P) None    Payor: CIGNA / Plan: CIGNA OPEN ACCESS PLUS / Product Type: Commercial /     Extended Emergency Contact Information  Primary Emergency Contact: DanteHaley  Address: [address] [place] [address]  Home Phone: [phone]  Mobile Phone: [phone]  Relation: Mother    Discharge Plan A: (P) Home  Discharge Plan B: (P) Home      [id number] - [place], [place] - [address]  [address] [address]  Phone: [phone] Fax: [phone]    [id number] - [place], [place] - [address] AT [facility]  [address]  [facility] [address]  Phone: [phone] Fax: [phone]      Initial Assessment (most recent)       Adult Discharge Assessment - 08/01/22 1000          Discharge Assessment    Assessment Type Discharge Planning Assessment (P)      Confirmed/corrected address, phone number and insurance Yes (P)      Confirmed Demographics Correct on Facesheet (P)      Source of Information patient (P)      When was your last doctors  appointment? 07/07/22 (P)      Communicated LOREE with patient/caregiver Date not available/Unable to determine (P)      Reason For Admission CVA (P)      Lives With parent(s) (P)      Facility Arrived From: Transferred in from [facility] (P)      Do you expect to return to your current living situation? Yes (P)      Do you have help at home or someone to help you manage your care at home? Yes (P)      Who are your caregiver(s) and their phone number(s)? Queen Dante, mother, [phone] (P)      Prior to hospitilization cognitive status: Alert/Oriented;No Deficits (P)      Current cognitive status: Alert/Oriented;No Deficits (P)      Walking or Climbing Stairs Difficulty none (P)      Dressing/Bathing Difficulty none (P)      Equipment Currently Used at Home none (P)      Readmission within 30 days? No (P)      Patient currently being followed by outpatient case management? No (P)      Do you currently have service(s) that help you manage your care at home? No (P)      Do you take prescription medications? Yes (P)      Do you have any problems affording any of your prescribed medications? No (P)      Is the patient taking medications as prescribed? yes (P)      Who is going to help you get home at discharge? Family (P)      How do you get to doctors appointments? family or friend will provide;car, drives self (P)      Are you on dialysis? No (P)      Do you take coumadin? No (P)      Discharge Plan A Home (P)      Discharge Plan B Home (P)      DME Needed Upon Discharge  none (P)      Discharge Plan discussed with: Patient (P)      Discharge Barriers Identified None (P)         Relationship/Environment    Name(s) of Who Lives With Patient Queen Dante (P)

## 2022-08-01 NOTE — SUBJECTIVE & OBJECTIVE
Interval History: Minimal facial drop, slurred speech persists.    Review of Systems   Constitutional:  Negative for chills and fever.   Respiratory:  Negative for shortness of breath.    Cardiovascular:  Negative for chest pain.   Gastrointestinal:  Negative for abdominal pain, constipation, diarrhea, nausea and vomiting.   Neurological:  Positive for facial asymmetry and speech difficulty.     Objective:     Vital Signs (Most Recent):  Temp: 97.9 °F (36.6 °C) (08/01/22 0817)  Pulse: 66 (08/01/22 1000)  Resp: 18 (08/01/22 0817)  BP: (!) 180/99 (08/01/22 0817)  SpO2: 100 % (08/01/22 0817)   Vital Signs (24h Range):  Temp:  [97.7 °F (36.5 °C)-98.8 °F (37.1 °C)] 97.9 °F (36.6 °C)  Pulse:  [57-72] 66  Resp:  [18-20] 18  SpO2:  [97 %-100 %] 100 %  BP: (180-226)/() 180/99     Weight: 102 kg (224 lb 13.9 oz)  Body mass index is 31.36 kg/m².    Intake/Output Summary (Last 24 hours) at 8/1/2022 1018  Last data filed at 8/1/2022 0400  Gross per 24 hour   Intake 354 ml   Output --   Net 354 ml      Physical Exam  Cardiovascular:      Rate and Rhythm: Normal rate and regular rhythm.      Heart sounds: Normal heart sounds. No murmur heard.    No friction rub. No gallop.   Pulmonary:      Effort: Pulmonary effort is normal. No respiratory distress.      Breath sounds: Normal breath sounds. No wheezing or rales.   Abdominal:      General: Bowel sounds are normal. There is no distension.      Palpations: Abdomen is soft.      Tenderness: There is no abdominal tenderness.   Genitourinary:     Comments: Scaly erythematous rash in his groins bilaterally.  Musculoskeletal:         General: No tenderness. Normal range of motion.   Skin:     General: Skin is warm and dry.      Coloration: Skin is not pale.      Findings: No erythema or rash.   Neurological:      Mental Status: He is alert and oriented to person, place, and time.      Comments: Patient with minimal facial asymmetry this morning.  Minimum left upper extremity  weakness noted compared right-sided however still 5/5 strength.  Slurred speech noted.       Significant Labs: All pertinent labs within the past 24 hours have been reviewed.    Significant Imaging: I have reviewed all pertinent imaging results/findings within the past 24 hours.

## 2022-08-01 NOTE — PLAN OF CARE
Problem: Physical Therapy  Goal: Physical Therapy Goal  Description: Goals to be met by: 8/31/22    Patient will perform the following to increase strength, improve mobility, and return to prior level of function:    1. Supine <> sit with independence.  2. Sit<>stand with independence with least restrictive assistive device.  3. Gait x 150 feet with independence with least restrictive assistive device.      Outcome: Ongoing, Progressing     PT orders received. PT evaluation completed and goals/POC established. Pt tolerated evaluation well with no adverse reactions. Pt performed in-room ambulation. PT will continue to follow and progress goals as tolerated. Recommend discharge to home.

## 2022-08-01 NOTE — ASSESSMENT & PLAN NOTE
-Stroke risk factor  -Discussed complications associated with crack/cocaine use and need for cessation  -Encourage cessation

## 2022-08-01 NOTE — ASSESSMENT & PLAN NOTE
Patient takes about 45 units at night of long-acting insulin along with 10 units of preprandial insulin.  Continue with reduced dose for now till able to clear for oral diet.  Hemoglobin A1c is 7.5%.

## 2022-08-01 NOTE — ASSESSMENT & PLAN NOTE
Pt reports that he only takes his blood pressure medications once a day. He says the medications make him weak and unable to work. His high blood pressure is also exacerbated by his crack cocaine use.  Home medications include labetolol 300 mg BID, amlodpine 10 mg daily, hydralazine 100 mg BID, and lasix/metolazone recently have been added. Will resume blood pressure medications tomorrow.

## 2022-08-01 NOTE — PLAN OF CARE
POC reviewed w/ pt. AAOx4. Cardiac monitor maintained. VSS on RA. C/o HA treated w/ PRN medication per MAR. Pt voids and shifts in bed independently. No injuries, falls, or trauma occurred during shift. Purposeful rounding completed. Bed low and locked, side rails up x3, call light within reach.

## 2022-08-02 VITALS
DIASTOLIC BLOOD PRESSURE: 78 MMHG | HEART RATE: 63 BPM | OXYGEN SATURATION: 98 % | WEIGHT: 224.88 LBS | HEIGHT: 71 IN | RESPIRATION RATE: 18 BRPM | BODY MASS INDEX: 31.48 KG/M2 | SYSTOLIC BLOOD PRESSURE: 119 MMHG | TEMPERATURE: 98 F

## 2022-08-02 PROBLEM — R47.1 DYSARTHRIA AND ANARTHRIA: Status: RESOLVED | Noted: 2022-08-01 | Resolved: 2022-08-02

## 2022-08-02 PROBLEM — I51.7 LEFT ATRIAL ENLARGEMENT: Status: RESOLVED | Noted: 2022-08-01 | Resolved: 2022-08-02

## 2022-08-02 LAB
ALBUMIN SERPL BCP-MCNC: 3.1 G/DL (ref 3.5–5.2)
ALP SERPL-CCNC: 62 U/L (ref 55–135)
ALT SERPL W/O P-5'-P-CCNC: 17 U/L (ref 10–44)
ANION GAP SERPL CALC-SCNC: 14 MMOL/L (ref 8–16)
AST SERPL-CCNC: 17 U/L (ref 10–40)
BASOPHILS # BLD AUTO: 0.03 K/UL (ref 0–0.2)
BASOPHILS NFR BLD: 0.6 % (ref 0–1.9)
BILIRUB SERPL-MCNC: 0.4 MG/DL (ref 0.1–1)
BUN SERPL-MCNC: 78 MG/DL (ref 6–20)
CALCIUM SERPL-MCNC: 8.4 MG/DL (ref 8.7–10.5)
CHLORIDE SERPL-SCNC: 109 MMOL/L (ref 95–110)
CO2 SERPL-SCNC: 19 MMOL/L (ref 23–29)
CREAT SERPL-MCNC: 7.6 MG/DL (ref 0.5–1.4)
DIFFERENTIAL METHOD: ABNORMAL
EOSINOPHIL # BLD AUTO: 0.2 K/UL (ref 0–0.5)
EOSINOPHIL NFR BLD: 3.8 % (ref 0–8)
ERYTHROCYTE [DISTWIDTH] IN BLOOD BY AUTOMATED COUNT: 12.8 % (ref 11.5–14.5)
EST. GFR  (NO RACE VARIABLE): 8 ML/MIN/1.73 M^2
GLUCOSE SERPL-MCNC: 64 MG/DL (ref 70–110)
HCT VFR BLD AUTO: 31.4 % (ref 40–54)
HGB BLD-MCNC: 10.2 G/DL (ref 14–18)
IMM GRANULOCYTES # BLD AUTO: 0.01 K/UL (ref 0–0.04)
IMM GRANULOCYTES NFR BLD AUTO: 0.2 % (ref 0–0.5)
LYMPHOCYTES # BLD AUTO: 1.7 K/UL (ref 1–4.8)
LYMPHOCYTES NFR BLD: 37.1 % (ref 18–48)
MCH RBC QN AUTO: 27.3 PG (ref 27–31)
MCHC RBC AUTO-ENTMCNC: 32.5 G/DL (ref 32–36)
MCV RBC AUTO: 84 FL (ref 82–98)
MONOCYTES # BLD AUTO: 0.5 K/UL (ref 0.3–1)
MONOCYTES NFR BLD: 11.1 % (ref 4–15)
NEUTROPHILS # BLD AUTO: 2.2 K/UL (ref 1.8–7.7)
NEUTROPHILS NFR BLD: 47.2 % (ref 38–73)
NRBC BLD-RTO: 0 /100 WBC
PLATELET # BLD AUTO: 149 K/UL (ref 150–450)
PMV BLD AUTO: 12.7 FL (ref 9.2–12.9)
POCT GLUCOSE: 108 MG/DL (ref 70–110)
POCT GLUCOSE: 158 MG/DL (ref 70–110)
POCT GLUCOSE: 59 MG/DL (ref 70–110)
POTASSIUM SERPL-SCNC: 4.3 MMOL/L (ref 3.5–5.1)
PROT SERPL-MCNC: 6.3 G/DL (ref 6–8.4)
RBC # BLD AUTO: 3.74 M/UL (ref 4.6–6.2)
SODIUM SERPL-SCNC: 142 MMOL/L (ref 136–145)
WBC # BLD AUTO: 4.69 K/UL (ref 3.9–12.7)

## 2022-08-02 PROCEDURE — 80053 COMPREHEN METABOLIC PANEL: CPT | Performed by: NURSE PRACTITIONER

## 2022-08-02 PROCEDURE — 25000003 PHARM REV CODE 250: Performed by: NURSE PRACTITIONER

## 2022-08-02 PROCEDURE — 36415 COLL VENOUS BLD VENIPUNCTURE: CPT | Performed by: NURSE PRACTITIONER

## 2022-08-02 PROCEDURE — 63600175 PHARM REV CODE 636 W HCPCS: Performed by: NURSE PRACTITIONER

## 2022-08-02 PROCEDURE — 99239 PR HOSPITAL DISCHARGE DAY,>30 MIN: ICD-10-PCS | Mod: ,,, | Performed by: INTERNAL MEDICINE

## 2022-08-02 PROCEDURE — 99239 HOSP IP/OBS DSCHRG MGMT >30: CPT | Mod: ,,, | Performed by: INTERNAL MEDICINE

## 2022-08-02 PROCEDURE — 85025 COMPLETE CBC W/AUTO DIFF WBC: CPT | Performed by: NURSE PRACTITIONER

## 2022-08-02 RX ORDER — CLOPIDOGREL BISULFATE 75 MG/1
75 TABLET ORAL DAILY
Qty: 21 TABLET | Refills: 0 | Status: SHIPPED | OUTPATIENT
Start: 2022-08-03 | End: 2023-01-04

## 2022-08-02 RX ADMIN — FERROUS SULFATE TAB 325 MG (65 MG ELEMENTAL FE) 1 EACH: 325 (65 FE) TAB at 08:08

## 2022-08-02 RX ADMIN — MICONAZOLE NITRATE: 20 CREAM TOPICAL at 08:08

## 2022-08-02 RX ADMIN — CALCITRIOL CAPSULES 0.25 MCG 0.25 MCG: 0.25 CAPSULE ORAL at 08:08

## 2022-08-02 RX ADMIN — INSULIN ASPART 2 UNITS: 100 INJECTION, SOLUTION INTRAVENOUS; SUBCUTANEOUS at 01:08

## 2022-08-02 RX ADMIN — HEPARIN SODIUM 5000 UNITS: 5000 INJECTION INTRAVENOUS; SUBCUTANEOUS at 05:08

## 2022-08-02 RX ADMIN — CLOPIDOGREL 75 MG: 75 TABLET, FILM COATED ORAL at 08:08

## 2022-08-02 RX ADMIN — AMLODIPINE BESYLATE 10 MG: 5 TABLET ORAL at 08:08

## 2022-08-02 RX ADMIN — ASPIRIN 81 MG: 81 TABLET, COATED ORAL at 08:08

## 2022-08-02 RX ADMIN — HYDRALAZINE HYDROCHLORIDE 100 MG: 25 TABLET, FILM COATED ORAL at 05:08

## 2022-08-02 RX ADMIN — LABETALOL HYDROCHLORIDE 300 MG: 100 TABLET, FILM COATED ORAL at 08:08

## 2022-08-02 RX ADMIN — FUROSEMIDE 40 MG: 40 TABLET ORAL at 08:08

## 2022-08-02 NOTE — ASSESSMENT & PLAN NOTE
Patient takes about 45 units at night of long-acting insulin along with 10 units of preprandial insulin.  Continue with reduced dose for now till able to clear for oral diet.  Hemoglobin A1c is 7.5%.  -Resume home meds on discharge

## 2022-08-02 NOTE — PLAN OF CARE
Please see previous note   08/02/22 1210   Final Note   Assessment Type Final Discharge Note   Anticipated Discharge Disposition Home   What phone number can be called within the next 1-3 days to see how you are doing after discharge? [phone]   Hospital Resources/Appts/Education Provided Provided patient/caregiver with written discharge plan information;Appointments scheduled by Navigator/Coordinator;Appointments scheduled and added to AVS   Post-Acute Status   Discharge Delays None known at this time

## 2022-08-02 NOTE — DISCHARGE SUMMARY
"[facility], [place] Medicine  Discharge Summary      Patient Name: Luis Daniel Howell  MRN: [id number]  Patient Class: IP- Inpatient  Admission Date: 7/31/2022  Hospital Length of Stay: 2 days  Discharge Date and Time:  08/02/2022 11:16 AM  Attending Physician: Ryan Castanon MD   Discharging Provider: Ryan Castanon MD  Primary Care Provider: Janak Wilson MD      HPI:   Nehemias Love, DNP:    "Mr. Howell is a 52 year old male with a PMH that includes hypertension, diabetes mellitus type 1, chronic kidney disease V, heart failure, and crack cocaine use. Patient was previous in the hospital earlier in July for chest pain and had a stress test that was positive for ischemia. Today he was transferred from [facility] for stroke symptoms of left sided facial droop and slurred speech.  CT head demonstrated some left sided hyposensitives concerning for new stroke.  Patient unsure what time symptoms started. He reports last night he was drinking some water and it was going outside of his mouth. Patient reports the facial droop and slurred speech has improved since this morning. He denies numbness, tingling, changes in vision. NIH 3 for mild dysarthria and facial paralysis."      Hospital Course:   Patient is a 52-year-old man with history of hypertension, diabetes mellitus type 1, chronic kidney disease stage 5, chronic diastolic heart failure, ongoing substance abuse with cocaine who was transferred from [facility] for treatment of acute stroke.  Patient with facial droop and slurred speech.  Noncontrast head CT shows evidence of acute stroke.  Patient reports slurred speech persists but otherwise neurologic deficits improving.  Vascular Neurology service consulted.  See below       Goals of Care Treatment Preferences:  Code Status: Full Code      Consults:   Consults (From admission, onward)        Status Ordering Provider     Inpatient Consult Tele-Vascular " "Neurology  Once        Provider:  Magalis Obrien NP    Completed EDGAR MANLEY     Inpatient consult to Nephrology-[facility]  Once        Provider:  Jason Cervantes NP    Completed GEORGINA SAMANIEGO     Inpatient consult to Vascular (Stroke) Neurology  Once        Provider:  Magalis Obrien NP    Completed GEORGINA SAMANIEGO     Inpatient consult to Registered Dietitian/Nutritionist  Once        Provider:  (Not yet assigned)    Acknowledged GEORGINA SAMANIEGO     IP consult to case management/social work  Once        Provider:  (Not yet assigned)    Acknowledged GEORGINA SAMANIEGO          * Thrombotic stroke involving left middle cerebral artery  Suspect small-vessel disease exacerbated by cocaine use.  Continue with high-dose statin, dual antiplatelet therapy, and will allow for permissive hypertension.  MRI, echocardiogram, imaging of the vessels of the neck ordered.  Patient counseled critical importance of staying from further cocaine use.  Consult physical, occupational, and speech therapy.  Vascular neurology service consulted.    Seen by Neurology - "53 y/o male with HTN, HLD, DM Type I, CKD (not on dialysis), heart failure, crack cocaine use now with new left subcortical MCA infarct involving part of the internal capsule and corona radiata.  Currently with dysarthria and right lower facial weakness.  Arms and legs spared.  MRA with no high-grade stenosis or occlusion.  Possible narrowing left M2 segment. TTE performed 7/5 unremarkable with exception of severe LAE.  Etiology most likely small vessel disease.  Could consider 30 day event monitor to further evaluate LAE and possible underlying PAF, but this is not felt to be etiology of current event.    Antithrombotics for secondary stroke prevention: Antiplatelets: Aspirin: 81 mg daily  Clopidogrel: 75 mg daily x 21 days then monotherapy with ASA  Statins for secondary stroke prevention and hyperlipidemia, if present:   Statins: Atorvastatin- 40 " "mg daily for goal LDL < 70  Aggressive risk factor modification: HTN, DM, HLD  Rehab efforts: The patient has been evaluated by a stroke team provider and the therapy needs have been fully considered based off the presenting complaints and exam findings. The following therapy evaluations are needed: PT evaluate and treat, OT evaluate and treat, SLP evaluate and treat  Diagnostics ordered/pending: None   VTE prophylaxis: None: Reason for No Pharmacological VTE Prophylaxis: Ambulating with or without assistance".    Plan:  Continue ASA and statin  Continue Plavix for 3 weeks, then stop.  Follow up with Neurology  Outpatient SLP    Chronic kidney disease, stage IV (severe)  No urgent indication for hemodialysis at the moment.  However likely would need dialysis in near future.  Consulted Nephrology for evaluation further treatment recommendations.    -Follow up with [facility] on discharge    Cardiomyopathy  Most likely ischemic.  Patient previously advised to have defibrillator placed however failed to follow-up in clinic.  Recommend focusing on efforts to abstain from further substance abuse but otherwise continue medical therapy for now.    Drug abuse, cocaine type  Patient reports he has underwent substance abuse treatment program 3 times in the past.  Longest period of abstaining from cocaine use has been about 6 months.  Recommend substance abuse treatment program upon discharge.    Essential hypertension  Allow for permissive hypertension with plan to slowly bring down blood pressure.  -Resume home meds    Hyperlipidemia  Continue statin      DM (diabetes mellitus) type I uncontrolled with renal manifestation  Patient takes about 45 units at night of long-acting insulin along with 10 units of preprandial insulin.  Continue with reduced dose for now till able to clear for oral diet.  Hemoglobin A1c is 7.5%.  -Resume home meds on discharge      Final Active Diagnoses:    Diagnosis Date Noted POA    PRINCIPAL " PROBLEM:  Thrombotic stroke involving left middle cerebral artery [I63.312] 07/31/2022 Yes    Chronic kidney disease, stage IV (severe) [N18.4] 11/30/2020 Yes    Cardiomyopathy [I42.9] 08/09/2019 Yes    Drug abuse, cocaine type [F14.10] 03/17/2016 Yes    Essential hypertension [I10] 08/29/2014 Yes    DM (diabetes mellitus) type I uncontrolled with renal manifestation [E10.29, E10.65]  Yes    Hyperlipidemia [E78.5]  Yes      Problems Resolved During this Admission:    Diagnosis Date Noted Date Resolved POA    Dysarthria and anarthria [R47.1] 08/01/2022 08/02/2022 Yes    Left atrial enlargement [I51.7] 08/01/2022 08/02/2022 Yes       Discharged Condition: fair    Disposition: Home or Self Care    Follow Up:   Follow-up Information     Janak Wilson MD Follow up in 1 week(s).    Specialty: Internal Medicine  Contact information:  [address]  [place] [address]  [phone]                       Patient Instructions:      Ambulatory referral/consult to Neurology   Standing Status: Future   Referral Priority: Routine Referral Type: Consultation   Referral Reason: Specialty Services Required   Requested Specialty: Vascular Neurology   Number of Visits Requested: 1     Diet renal     Diet diabetic     Diet Cardiac     Notify your health care provider if you experience any of the following:  temperature >100.4     Notify your health care provider if you experience any of the following:  difficulty breathing or increased cough     Notify your health care provider if you experience any of the following:  increased confusion or weakness     Activity as tolerated       Significant Diagnostic Studies: Labs:   BMP:   Recent Labs   Lab 07/31/22  1227 08/01/22  0450 08/02/22  0534   * 83 64*    141 142   K 4.2 4.1 4.3    108 109   CO2 18* 20* 19*   BUN 81* 77* 78*   CREATININE 7.9* 7.9* 7.6*   CALCIUM 8.3* 8.8 8.4*   MG  --  1.5*  --     and CBC   Recent Labs   Lab 07/31/22  1227  08/01/22  0451 08/02/22  0535   WBC 4.72 5.18 4.69   HGB 10.4* 10.6* 10.2*   HCT 31.9* 32.8* 31.4*   * 143* 149*       Pending Diagnostic Studies:     None         Medications:  Reconciled Home Medications:      Medication List      START taking these medications    clopidogreL 75 mg tablet  Commonly known as: PLAVIX  Take 1 tablet (75 mg total) by mouth once daily.  Start taking on: August 3, 2022        CHANGE how you take these medications    labetaloL 300 MG tablet  Commonly known as: NORMODYNE  Take 1 tablet (300 mg total) by mouth 2 (two) times daily.  What changed: when to take this        CONTINUE taking these medications    amLODIPine 10 MG tablet  Commonly known as: NORVASC  Take 1 tablet (10 mg total) by mouth once daily.     aspirin 81 MG Chew  Take 1 tablet (81 mg total) by mouth once daily.     atorvastatin 40 MG tablet  Commonly known as: LIPITOR  Take 1 tablet (40 mg total) by mouth every evening.     BASAGLAR KWIKPEN U-100 INSULIN glargine 100 units/mL SubQ pen  Generic drug: insulin  Inject 45 Units into the skin every evening.     blood sugar diagnostic Strp  To check BG 2 times daily, to use with insurance preferred meter     calcitRIOL 0.25 MCG Cap  Commonly known as: ROCALTROL  Take 0.25 mcg by mouth once daily.     ferrous gluconate 324 MG tablet  Commonly known as: FERGON  Take 1 tablet (324 mg total) by mouth daily with breakfast.     furosemide 40 MG tablet  Commonly known as: LASIX  Take 1 tablet (40 mg total) by mouth once daily.     hydrALAZINE 100 MG tablet  Commonly known as: APRESOLINE  Take 100 mg by mouth every 8 (eight) hours.     insulin aspart U-100 100 unit/mL (3 mL) Inpn pen  Commonly known as: NovoLOG Flexpen U-100 Insulin  INJECT 10 UNITS INTO THE SKIN 3 (THREE) TIMES DAILY WITH MEALS.     metOLazone 5 MG tablet  Commonly known as: ZAROXOLYN  Take 1 tablet (5 mg total) by mouth nightly.     vitamin D 1000 units Tab  Commonly known as: VITAMIN D3  Take 2,000 Units by  mouth nightly.            Indwelling Lines/Drains at time of discharge:   Lines/Drains/Airways     None                 Time spent on the discharge of patient: 35 minutes         Ryna Castanon MD  Department of Hospital Medicine  [facility])

## 2022-08-02 NOTE — PLAN OF CARE
During my shift, pt AAOx4, NAD.  VS stable. Pt remains afebrile. Pt denies pain, SOB, n/v, dizziness or lightheadedness. Neuro assessment intact other than mild slurred speech. Pt able to communicate, form full sentences and make needs known. CBG monitored and managed with insulin. BP monitored and managed with oral antihypertensives. NSR noted on cardiac telemetry monitoring. Pt ambulates in room without difficulty. Pt remains free from falls or injury. Bed is lowered and locked. Call light is within reach. Pt has no known needs at this time.

## 2022-08-02 NOTE — ASSESSMENT & PLAN NOTE
"Suspect small-vessel disease exacerbated by cocaine use.  Continue with high-dose statin, dual antiplatelet therapy, and will allow for permissive hypertension.  MRI, echocardiogram, imaging of the vessels of the neck ordered.  Patient counseled critical importance of staying from further cocaine use.  Consult physical, occupational, and speech therapy.  Vascular neurology service consulted.    Seen by Neurology - "51 y/o male with HTN, HLD, DM Type I, CKD (not on dialysis), heart failure, crack cocaine use now with new left subcortical MCA infarct involving part of the internal capsule and corona radiata.  Currently with dysarthria and right lower facial weakness.  Arms and legs spared.  MRA with no high-grade stenosis or occlusion.  Possible narrowing left M2 segment. TTE performed 7/5 unremarkable with exception of severe LAE.  Etiology most likely small vessel disease.  Could consider 30 day event monitor to further evaluate LAE and possible underlying PAF, but this is not felt to be etiology of current event.    Antithrombotics for secondary stroke prevention: Antiplatelets: Aspirin: 81 mg daily  Clopidogrel: 75 mg daily x 21 days then monotherapy with ASA  Statins for secondary stroke prevention and hyperlipidemia, if present:   Statins: Atorvastatin- 40 mg daily for goal LDL < 70  Aggressive risk factor modification: HTN, DM, HLD  Rehab efforts: The patient has been evaluated by a stroke team provider and the therapy needs have been fully considered based off the presenting complaints and exam findings. The following therapy evaluations are needed: PT evaluate and treat, OT evaluate and treat, SLP evaluate and treat  Diagnostics ordered/pending: None   VTE prophylaxis: None: Reason for No Pharmacological VTE Prophylaxis: Ambulating with or without assistance".    Plan:  Continue ASA and statin  Continue Plavix for 3 weeks, then stop.  Follow up with Neurology  Outpatient SLP  "

## 2022-08-02 NOTE — CONSULTS
Food & Nutrition                                                           Education     Diet Education: Cardiac, Diabetic Diet  Time Spent: 5 minutes  Learners: Patient        Nutrition Education provided with handouts: no        Comments: Pt admitted with stroke. PMH DM1, cocaine use, HTN, HLD, CKDV, CHF. RD working remotely today, called pt's phone and left a message. I left verbal information discussing a heart health diet and what foods to avoid, sodium goal, and the importance of blood sugar control- encouraged checking blood sugars and taking insulin daily. Left contact information in message.        All questions and concerns answered. Dietitian's contact information provided.         Follow-Up: yes     Please Re-consult as needed           Thanks!

## 2022-08-02 NOTE — PROGRESS NOTES
"Nephrology  Progress Note    Admit Date: 7/31/2022   LOS: 2 days     SUBJECTIVE:     Follow-up For:  Thrombotic stroke involving left middle cerebral artery    Interval History:     Uneventful night.  Labs about the same.  Discussed with team.  Doing well with therapies.     Review of Systems:  Constitutional: No fever or chills  Respiratory: No cough or shortness of breath  Cardiovascular: No chest pain or palpitations  Gastrointestinal: No nausea or vomiting  Neurological: No confusion or weakness    OBJECTIVE:     Vital Signs Range (Last 24H):  /78 (BP Location: Left arm, Patient Position: Lying)   Pulse 66   Temp 98.1 °F (36.7 °C) (Oral)   Resp 18   Ht 5' 11" (1.803 m)   Wt 102 kg (224 lb 13.9 oz)   SpO2 99%   BMI 31.36 kg/m²     Temp:  [97.9 °F (36.6 °C)-98.9 °F (37.2 °C)]   Pulse:  [58-78]   Resp:  [18]   BP: (117-171)/()   SpO2:  [98 %-100 %]     I & O (Last 24H):    Intake/Output Summary (Last 24 hours) at 8/2/2022 1035  Last data filed at 8/2/2022 0000  Gross per 24 hour   Intake 836 ml   Output 200 ml   Net 636 ml       Physical Exam:  General appearance: Well developed, well nourished  Eyes:  Conjunctivae/corneas clear. PERRL.  Lungs: Normal respiratory effort,   clear to auscultation bilaterally   Heart: Regular rate and rhythm, S1, S2 normal, no murmur, rub or niels.  Abdomen: Soft, non-tender non-distended; bowel sounds normal; no masses,  no organomegaly  Extremities: No cyanosis or clubbing. No edema.    Skin: Skin color, texture, turgor normal. No rashes or lesions  Neurologic: Normal strength and tone. No focal numbness or weakness. Resolving mild left facial droop..     Laboratory Data:  Recent Labs   Lab 08/02/22  0535   WBC 4.69   RBC 3.74*   HGB 10.2*   HCT 31.4*   *   MCV 84   MCH 27.3   MCHC 32.5       BMP:   Recent Labs   Lab 08/01/22  0450 08/02/22  0534   GLU 83 64*    142   K 4.1 4.3    109   CO2 20* 19*   BUN 77* 78*   CREATININE 7.9* 7.6*   CALCIUM " 8.8 8.4*   MG 1.5*  --      Lab Results   Component Value Date    CALCIUM 8.4 (L) 08/02/2022    PHOS 4.7 (H) 08/01/2022       Lab Results   Component Value Date    .50 (H) 09/24/2021    CALCIUM 8.4 (L) 08/02/2022    PHOS 4.7 (H) 08/01/2022       Lab Results   Component Value Date    URICACID 9.8 (H) 11/20/2021       BNP  Recent Labs   Lab 07/31/22  1227   *       Medications:  Medication list was reviewed and changes noted under Assessment/Plan.    Diagnostic Results:        ASSESSMENT/PLAN:     CKD V Not on HD [place] [facility] [place]N disease:  -rising baseline over past couple of years and now 7 -11/21 had scheduled AVF but cancelled due to CP/Cocaine.  -has been lost in f/up with vascular/renal but seen other providers since.  -now on Plavix and unable to place AVF.  -no immediate need for RRT but will need in next weeks/month  -needs very close f/[place] [facility]  -Cocaine addiction needs to be ironed out.    -hold ACE/ARB/Metolazone for now.  -ok for lasix.  -defer BP to Neuro.  -Renally dose meds, avoid nephrotoxins, and monitor I/O's closely.        CVA:  -imaging noted.  -neuro note reviewed.  -defer.  -Plavix/ASA        Cocaine abuse:  -poor prognostic indicator.  -defer.  -needs help.     DM:  -uncontrolled.  -no SGLT2/metformin      Anemia of CKD:  -at goal        Thanks for consult  See above  Nothing else to offer  Ok to DC from Renal  MUST HAVE APPT WITH DR ELLISON 1-2 weeks.

## 2022-08-02 NOTE — PLAN OF CARE
CM met with pt for final discharge planning assessment.    Pts meds sent to [facility] pharmacy.  Family to transport home.    CM scheduled follow-up apts with PCP, Neurology and Nephrology will call pt with apt.    Pt ready for discharge from CM perspective.    [facility] ([place])  Discharge Final Note    Primary Care Provider: Janak Wilson MD    Expected Discharge Date: 8/2/2022    Final Discharge Note (most recent)     Final Note - 08/02/22 1029        Final Note    Assessment Type Final Discharge Note (P)      Anticipated Discharge Disposition Home or Self Care (P)      What phone number can be called within the next 1-3 days to see how you are doing after discharge? [phone] (P)      Hospital Resources/Appts/Education Provided Appointments scheduled by Navigator/Coordinator;Appointments scheduled and added to AVS;Provided patient/caregiver with written discharge plan information (P)                  Important Message from Medicare

## 2022-08-02 NOTE — ASSESSMENT & PLAN NOTE
Allow for permissive hypertension with plan to slowly bring down blood pressure.  -Resume home meds

## 2022-08-02 NOTE — DISCHARGE INSTRUCTIONS
For your stroke, please continue the following medications:  Atorvastatin 40mg once a day  Aspirin 81mg once a day  Clopidogrel 75mg once a day - take for 3 weeks, then stop  Do not take anti-inflammatory medications (NSAIDs), such as ibuprofen, naproxen.  A referral for Vascular Neurology and Speech Therapy was placed for you.    Please avoid cocaine use.      PLEASE EXPECT A CALL FROM [facility], DR ROLDAN, TO SCHEDULE AN APT.   RX sent

## 2022-08-02 NOTE — ASSESSMENT & PLAN NOTE
No urgent indication for hemodialysis at the moment.  However likely would need dialysis in near future.  Consulted Nephrology for evaluation further treatment recommendations.    -Follow up with [facility] on discharge

## 2022-08-03 ENCOUNTER — PATIENT OUTREACH (OUTPATIENT)
Dept: ADMINISTRATIVE | Facility: CLINIC | Age: 52
End: 2022-08-03
Payer: COMMERCIAL

## 2022-08-03 ENCOUNTER — TELEPHONE (OUTPATIENT)
Dept: INTERNAL MEDICINE | Facility: CLINIC | Age: 52
End: 2022-08-03
Payer: COMMERCIAL

## 2022-08-03 NOTE — TELEPHONE ENCOUNTER
"----- Message from Jessenia Pedro RN sent at 8/3/2022  9:23 AM CDT -----  Regarding: depression screen and HOSFU  Please forward this important TCC information to your provider in order to maximize the post discharge care delivery of this patient.    C3 nurse spoke with Luis Daniel Dante for a TCC post hospital discharge follow up call. The patient has a scheduled appointment with Janak Wilson MD on 8/11/2022 @ 1040.  This appointment is not within 5-7 days of discharge date 8/2/2022 from [facility].  Please schedule with patient a  sooner appointment if available - Hospital Follow Up visit type.      Patient states that he has been depressed "about half the days," of the last two weeks. This is FYI for this appointment.    Respectfully,  Jessenia Pedro RN  [facility]    [email or web address]       Please do not reply to this message, as this inbox is not routinely monitored.             "

## 2022-08-03 NOTE — PROGRESS NOTES
C3 nurse spoke with Luis Daniel Howell for a TCC post hospital discharge follow up call. The patient has a scheduled appointment with Janak Wilson MD on 8/11/2022 @ 1040.  Message to PCP staff - not routed.

## 2022-08-04 ENCOUNTER — OFFICE VISIT (OUTPATIENT)
Dept: INTERNAL MEDICINE | Facility: CLINIC | Age: 52
End: 2022-08-04
Payer: COMMERCIAL

## 2022-08-04 ENCOUNTER — TELEPHONE (OUTPATIENT)
Dept: NEPHROLOGY | Facility: CLINIC | Age: 52
End: 2022-08-04
Payer: COMMERCIAL

## 2022-08-04 VITALS
BODY MASS INDEX: 31.91 KG/M2 | OXYGEN SATURATION: 99 % | SYSTOLIC BLOOD PRESSURE: 138 MMHG | DIASTOLIC BLOOD PRESSURE: 84 MMHG | HEART RATE: 65 BPM | HEIGHT: 71 IN | WEIGHT: 227.94 LBS

## 2022-08-04 DIAGNOSIS — Z79.4 TYPE 2 DIABETES MELLITUS WITH HYPEROSMOLARITY WITHOUT COMA, WITH LONG-TERM CURRENT USE OF INSULIN: ICD-10-CM

## 2022-08-04 DIAGNOSIS — I10 MALIGNANT HYPERTENSION: ICD-10-CM

## 2022-08-04 DIAGNOSIS — I10 ESSENTIAL HYPERTENSION: ICD-10-CM

## 2022-08-04 DIAGNOSIS — I63.312 THROMBOTIC STROKE INVOLVING LEFT MIDDLE CEREBRAL ARTERY: Primary | ICD-10-CM

## 2022-08-04 DIAGNOSIS — F19.10 SUBSTANCE ABUSE: ICD-10-CM

## 2022-08-04 DIAGNOSIS — N18.4 CKD (CHRONIC KIDNEY DISEASE) STAGE 4, GFR 15-29 ML/MIN: Primary | ICD-10-CM

## 2022-08-04 DIAGNOSIS — E11.00 TYPE 2 DIABETES MELLITUS WITH HYPEROSMOLARITY WITHOUT COMA, WITH LONG-TERM CURRENT USE OF INSULIN: ICD-10-CM

## 2022-08-04 DIAGNOSIS — N18.5 CKD (CHRONIC KIDNEY DISEASE), STAGE V: ICD-10-CM

## 2022-08-04 PROCEDURE — 3079F DIAST BP 80-89 MM HG: CPT | Mod: CPTII,S$GLB,, | Performed by: INTERNAL MEDICINE

## 2022-08-04 PROCEDURE — 1159F PR MEDICATION LIST DOCUMENTED IN MEDICAL RECORD: ICD-10-PCS | Mod: CPTII,S$GLB,, | Performed by: INTERNAL MEDICINE

## 2022-08-04 PROCEDURE — 99999 PR PBB SHADOW E&M-EST. PATIENT-LVL V: ICD-10-PCS | Mod: PBBFAC,,, | Performed by: INTERNAL MEDICINE

## 2022-08-04 PROCEDURE — 99214 OFFICE O/P EST MOD 30 MIN: CPT | Mod: S$GLB,,, | Performed by: INTERNAL MEDICINE

## 2022-08-04 PROCEDURE — 3075F PR MOST RECENT SYSTOLIC BLOOD PRESS GE 130-139MM HG: ICD-10-PCS | Mod: CPTII,S$GLB,, | Performed by: INTERNAL MEDICINE

## 2022-08-04 PROCEDURE — 3051F PR MOST RECENT HEMOGLOBIN A1C LEVEL 7.0 - < 8.0%: ICD-10-PCS | Mod: CPTII,S$GLB,, | Performed by: INTERNAL MEDICINE

## 2022-08-04 PROCEDURE — 3008F BODY MASS INDEX DOCD: CPT | Mod: CPTII,S$GLB,, | Performed by: INTERNAL MEDICINE

## 2022-08-04 PROCEDURE — 3075F SYST BP GE 130 - 139MM HG: CPT | Mod: CPTII,S$GLB,, | Performed by: INTERNAL MEDICINE

## 2022-08-04 PROCEDURE — 3051F HG A1C>EQUAL 7.0%<8.0%: CPT | Mod: CPTII,S$GLB,, | Performed by: INTERNAL MEDICINE

## 2022-08-04 PROCEDURE — 99999 PR PBB SHADOW E&M-EST. PATIENT-LVL V: CPT | Mod: PBBFAC,,, | Performed by: INTERNAL MEDICINE

## 2022-08-04 PROCEDURE — 99214 PR OFFICE/OUTPT VISIT, EST, LEVL IV, 30-39 MIN: ICD-10-PCS | Mod: S$GLB,,, | Performed by: INTERNAL MEDICINE

## 2022-08-04 PROCEDURE — 1159F MED LIST DOCD IN RCRD: CPT | Mod: CPTII,S$GLB,, | Performed by: INTERNAL MEDICINE

## 2022-08-04 PROCEDURE — 3008F PR BODY MASS INDEX (BMI) DOCUMENTED: ICD-10-PCS | Mod: CPTII,S$GLB,, | Performed by: INTERNAL MEDICINE

## 2022-08-04 PROCEDURE — 3079F PR MOST RECENT DIASTOLIC BLOOD PRESSURE 80-89 MM HG: ICD-10-PCS | Mod: CPTII,S$GLB,, | Performed by: INTERNAL MEDICINE

## 2022-08-04 NOTE — PROGRESS NOTES
Transitional Care Note  Subjective:       Patient ID: Luis Daniel Howell is a 52 y.o. male.  Chief Complaint: Follow-up    Family and/or Caretaker present at visit?  No.  Diagnostic tests reviewed/disposition: No diagnosic tests pending after this hospitalization.  Disease/illness education: stroke, htn, cocaine abuse and noncompliance.    Home health/community services discussion/referrals: Patient has home health established at [facility].   Establishment or re-establishment of referral orders for community resources: No other necessary community resources.   Discussion with other health care providers: Nephrology.   HPI   He is a 52-year-old time noncompliance gentleman with history of cocaine abuse, end-stage renal disease but not yet on dialysis, uncontrolled hypertension, and diabetes mellitus type 2. I last saw him last month and on that note I mentioned that he needs to take his blood pressure medicine before he had his a stroke.  A couple weeks after that visit he had a stroke.  He had right-sided facial weakness, even though he tells me he has left-sided facial weakness, weight takes his mask off and bowels at me he obviously has right-sided facial weakness.  He was at home on the 30th of last month and remembers water pouring out of his mouth when he drinks something and saliva pouring out of the side of his mouth.  He still says it was the left side but he has obviously right-sided facial weakness and droop.  The note from the discharge mention the left side but the neurology note confirms that it was indeed a right-sided facial droop.  He also has dysarthria.  He was brought into the emergency room on the 31st.  He denies using cocaine prior to his visit to the emergency room but cocaine was still in his system on the drug screen.  MRI showed a left subcortical MCA noncritical stenosis and stroke in in her capsule and corona radii.  In the hospital he also had a MRA of his neck vessels which were  unremarkable.  He was started on Plavix for 21 days and aspirin.  He is going to stay on aspirin after he stops his Plavix.  He says he has speech therapy   that is going to come to his house  with home health but they have not  come yet.    He has uncontrolled hypertension.  Blood pressure today is 138/84 which is by far the best blood pressure he has ever had with me.  I am not sure what medications he is taking but he tells me he is taking all of his medications but is not taking anything like it is prescribed.  Last visit ice tried to stop his a trial is eating because he was not taking it and I thought maybe if we stop that 1 May be he would take his other blood pressure medications.  Hydralazine is back on his med list today as is his labetalol which is was taking twice a day and he tells me takes at least once a day most of the time,.  He also has amlodipine 10 mg a day.    He has diabetes mellitus type 2 which he takes his Basaglar insulin.  Most recent lab shows hemoglobin A1c is 7.5 which is much improved for him also.  He is not having any low glucoses.    He has end-stage renal disease based on dialysis yet.  Most recent creatinine clearance was estimated at 8 mL/min.  With a creatinine of 7.6.  He last saw Nephrology back in October last year and despite every time I see him I instructed him that he needs to go back in follow-up with him, he has not seen them.  He does not even have an appointment scheduled for them.  Potassium is doing well, but he does have a metabolic acidosis.    His other medical problems include cocaine abuse which he says that this stroke is taught him that he is not going to do cocaine anymore.  He has hyperparathyroidism secondary to his chronic kidney disease, diabetic retinopathy, noncardiac chest pain, cardiomyopathy, anemia secondary chronic kidney disease, and his most recent stroke with right facial weakness and dysarthria.            Review of Systems      ROS : Gen - no  "fatigue or significant weight change  Eyes - no eye pain or visual changes  ENT - no hoarseness or sore throat  CV - No chest pain or SOB.  NO palpitations.  Pulm - no cough or wheezing  GI - no N/V/D   no dysuria or incontinence  MS - no joint pain or muscle pain  Skin - no rash, or c/o of skin lesions  Neuro - no HA, dizziness--- memory is doing well.  He says is no longer have any facial weakness but when he talks for a long time his tongue feels numb.  Heme - no abnormal bleeding or bruising  Endo - no polydipsia, or temperature changes  Psych - no anxiety or depression  Objective:      Physical Exam      /84   Pulse 65   Ht 5' 11" (1.803 m)   Wt 103.4 kg (227 lb 15.3 oz)   SpO2 99%   BMI 31.79 kg/m²   He is a well-appearing gentleman in no acute distress.  He has a right-sided facial droop that is sparing his forehead.  He has absolutely no insight into his medical problems.  He is quick to volunteer that he is taking his medicines but further questions shows that he is still not taking his medicines appropriately.  Also the Plavix he is on he has been taking that today, but he plans to think about taking it.  His chest are clear bilaterally.  Lungs are without any abnormal breath sounds.  Cardiovascular exam is unremarkable.  Abdomen soft and nontender.  He is walking without any difficulty and his upper lower extremities have  Normal strength.  His speech is mumbled.  He is not having any trouble with word finding.  He does not have any trouble understanding words or sentences either.    Assessment and plan:    Assessment:       1. Thrombotic stroke involving left middle cerebral artery    2. Substance abuse    3. Type 2 diabetes mellitus with hyperosmolarity without coma, with long-term current use of insulin    4. Malignant hypertension    5. Essential hypertension    6. CKD (chronic kidney disease), stage V        Plan:       I am very worried about this gentleman.  He has very poor insight.  " First thing offered was to help him gets into rehab for his cocaine abuse.  He says that he has learned his lesson he is going to stay off the cocaine.  A left the door open to get him back in for to rehab if we need to.  He needs to understand that uncontrolled blood pressure and cocaine use is shortening his life expectancy.  The way he is going up be very surprised if he still here in 2 years.  I told him this and also showed him my last no where I said if he did not take his blood pressure medicines he would have a stroke.    Hypertension.  He needs to take his blood pressure medicines.  Stopping the hydralazine did not have and attended effects.  He still did take all his other medicines.  I told him that he had a stroke because his blood pressure is too high partly from the cocaine and partly from his hypertension.  He is going to have another stroke he does not take his medicines.  I do not think he is actively trying to hurt himself.    End-stage renal disease--he saw Nephrology 10 months ago at which time they were trying to get a graft put in so he can get a access for hemodialysis.  I have spoken with him multiple times about making a follow-up with Nephrology.  I have also made a referral to vascular surgery since we have her negative stress echo.  He needs to get his breath placement.  Today comes to me after his stroke with no nephrology appointment in no vascular surgery appointment.  I spoke to him about this and he says he is going to get an appointment.  I strongly suggest making an appointment on the way out today.  The schedulers can help him with this.  Plus I am going to send  a note to his nephrologist through secure chat.    Recent stroke have affecting his right side of his face.  This he does can fuse and concern me that he thinks the normal side of his face is the 1 that has the weakness.  I reviewed this with him and also reviewed the fact that left-sided stroke will cause a right-sided  facial weakness.  He is going to be dual anti-platelet for next 21 days when he can go back to aspirin if we can get him to take even that much medication.  I will see him back in a month.

## 2022-08-04 NOTE — TELEPHONE ENCOUNTER
----- Message from Vicky Meadows LPN sent at 8/4/2022 11:58 AM CDT -----  Regarding: FW: Scheduling    ----- Message -----  From: Little Merrill  Sent: 8/4/2022  11:44 AM CDT  To: [facility] Nephro Clinical Staff  Subject: Scheduling                                       Please assist with scheduling appointment

## 2022-08-25 ENCOUNTER — PATIENT OUTREACH (OUTPATIENT)
Dept: ADMINISTRATIVE | Facility: HOSPITAL | Age: 52
End: 2022-08-25
Payer: COMMERCIAL

## 2022-08-25 NOTE — PROGRESS NOTES
Health Maintenance Due   Topic Date Due    Shingles Vaccine (1 of 2) Never done    Pneumococcal Vaccines (Age 0-64) (2 - PCV) 03/14/2015    Colorectal Cancer Screening  Never done    TETANUS VACCINE  02/15/2018    Diabetes Urine Screening  05/30/2018    COVID-19 Vaccine (3 - Booster for Pfizer series) 11/03/2021    Foot Exam  09/29/2022     Triggered LINKS. Updated Care Everywhere. Checked for outside lab results in [employer] and [employer]; no results found. Pt has lab appt scheduled for 8/26/2022. Chart review completed.

## 2022-08-26 ENCOUNTER — PATIENT MESSAGE (OUTPATIENT)
Dept: ADMINISTRATIVE | Facility: HOSPITAL | Age: 52
End: 2022-08-26
Payer: COMMERCIAL

## 2022-08-30 ENCOUNTER — LAB VISIT (OUTPATIENT)
Dept: LAB | Facility: HOSPITAL | Age: 52
End: 2022-08-30
Attending: INTERNAL MEDICINE
Payer: COMMERCIAL

## 2022-08-30 ENCOUNTER — OFFICE VISIT (OUTPATIENT)
Dept: NEPHROLOGY | Facility: CLINIC | Age: 52
End: 2022-08-30
Payer: COMMERCIAL

## 2022-08-30 VITALS
OXYGEN SATURATION: 97 % | HEART RATE: 68 BPM | SYSTOLIC BLOOD PRESSURE: 170 MMHG | WEIGHT: 230.63 LBS | BODY MASS INDEX: 32.29 KG/M2 | HEIGHT: 71 IN | DIASTOLIC BLOOD PRESSURE: 92 MMHG

## 2022-08-30 DIAGNOSIS — N18.5 CKD (CHRONIC KIDNEY DISEASE), STAGE V: Primary | ICD-10-CM

## 2022-08-30 DIAGNOSIS — E11.22 TYPE 2 DIABETES MELLITUS WITH STAGE 5 CHRONIC KIDNEY DISEASE NOT ON CHRONIC DIALYSIS, UNSPECIFIED WHETHER LONG TERM INSULIN USE: ICD-10-CM

## 2022-08-30 DIAGNOSIS — N18.5 TYPE 2 DIABETES MELLITUS WITH STAGE 5 CHRONIC KIDNEY DISEASE NOT ON CHRONIC DIALYSIS, UNSPECIFIED WHETHER LONG TERM INSULIN USE: ICD-10-CM

## 2022-08-30 DIAGNOSIS — N25.81 SECONDARY HYPERPARATHYROIDISM OF RENAL ORIGIN: ICD-10-CM

## 2022-08-30 DIAGNOSIS — N18.5 CKD (CHRONIC KIDNEY DISEASE), STAGE V: ICD-10-CM

## 2022-08-30 DIAGNOSIS — I10 PRIMARY HYPERTENSION: ICD-10-CM

## 2022-08-30 LAB
25(OH)D3+25(OH)D2 SERPL-MCNC: 27 NG/ML (ref 30–96)
ANION GAP SERPL CALC-SCNC: 10 MMOL/L (ref 8–16)
BASOPHILS # BLD AUTO: 0.02 K/UL (ref 0–0.2)
BASOPHILS NFR BLD: 0.4 % (ref 0–1.9)
BUN SERPL-MCNC: 72 MG/DL (ref 6–20)
CALCIUM SERPL-MCNC: 8 MG/DL (ref 8.7–10.5)
CHLORIDE SERPL-SCNC: 110 MMOL/L (ref 95–110)
CO2 SERPL-SCNC: 22 MMOL/L (ref 23–29)
CREAT SERPL-MCNC: 7.9 MG/DL (ref 0.5–1.4)
DIFFERENTIAL METHOD: ABNORMAL
EOSINOPHIL # BLD AUTO: 0.2 K/UL (ref 0–0.5)
EOSINOPHIL NFR BLD: 3 % (ref 0–8)
ERYTHROCYTE [DISTWIDTH] IN BLOOD BY AUTOMATED COUNT: 13.2 % (ref 11.5–14.5)
EST. GFR  (NO RACE VARIABLE): 7.6 ML/MIN/1.73 M^2
GLUCOSE SERPL-MCNC: 90 MG/DL (ref 70–110)
HCT VFR BLD AUTO: 30.4 % (ref 40–54)
HGB BLD-MCNC: 9.9 G/DL (ref 14–18)
IMM GRANULOCYTES # BLD AUTO: 0.01 K/UL (ref 0–0.04)
IMM GRANULOCYTES NFR BLD AUTO: 0.2 % (ref 0–0.5)
IRON SERPL-MCNC: 55 UG/DL (ref 45–160)
LYMPHOCYTES # BLD AUTO: 1.5 K/UL (ref 1–4.8)
LYMPHOCYTES NFR BLD: 27.6 % (ref 18–48)
MCH RBC QN AUTO: 27.3 PG (ref 27–31)
MCHC RBC AUTO-ENTMCNC: 32.6 G/DL (ref 32–36)
MCV RBC AUTO: 84 FL (ref 82–98)
MONOCYTES # BLD AUTO: 0.8 K/UL (ref 0.3–1)
MONOCYTES NFR BLD: 15.1 % (ref 4–15)
NEUTROPHILS # BLD AUTO: 2.9 K/UL (ref 1.8–7.7)
NEUTROPHILS NFR BLD: 53.7 % (ref 38–73)
NRBC BLD-RTO: 0 /100 WBC
PHOSPHATE SERPL-MCNC: 5.3 MG/DL (ref 2.7–4.5)
PLATELET # BLD AUTO: 153 K/UL (ref 150–450)
PMV BLD AUTO: 12.8 FL (ref 9.2–12.9)
POTASSIUM SERPL-SCNC: 4.1 MMOL/L (ref 3.5–5.1)
PTH-INTACT SERPL-MCNC: 623.1 PG/ML (ref 9–77)
RBC # BLD AUTO: 3.63 M/UL (ref 4.6–6.2)
SATURATED IRON: 20 % (ref 20–50)
SODIUM SERPL-SCNC: 142 MMOL/L (ref 136–145)
TOTAL IRON BINDING CAPACITY: 281 UG/DL (ref 250–450)
TRANSFERRIN SERPL-MCNC: 190 MG/DL (ref 200–375)
WBC # BLD AUTO: 5.36 K/UL (ref 3.9–12.7)

## 2022-08-30 PROCEDURE — 3051F HG A1C>EQUAL 7.0%<8.0%: CPT | Mod: CPTII,S$GLB,, | Performed by: INTERNAL MEDICINE

## 2022-08-30 PROCEDURE — 3066F PR DOCUMENTATION OF TREATMENT FOR NEPHROPATHY: ICD-10-PCS | Mod: CPTII,S$GLB,, | Performed by: INTERNAL MEDICINE

## 2022-08-30 PROCEDURE — 1111F PR DISCHARGE MEDS RECONCILED W/ CURRENT OUTPATIENT MED LIST: ICD-10-PCS | Mod: CPTII,S$GLB,, | Performed by: INTERNAL MEDICINE

## 2022-08-30 PROCEDURE — 84100 ASSAY OF PHOSPHORUS: CPT | Performed by: INTERNAL MEDICINE

## 2022-08-30 PROCEDURE — 3077F SYST BP >= 140 MM HG: CPT | Mod: CPTII,S$GLB,, | Performed by: INTERNAL MEDICINE

## 2022-08-30 PROCEDURE — 99999 PR PBB SHADOW E&M-EST. PATIENT-LVL IV: CPT | Mod: PBBFAC,,, | Performed by: INTERNAL MEDICINE

## 2022-08-30 PROCEDURE — 3080F DIAST BP >= 90 MM HG: CPT | Mod: CPTII,S$GLB,, | Performed by: INTERNAL MEDICINE

## 2022-08-30 PROCEDURE — 99999 PR PBB SHADOW E&M-EST. PATIENT-LVL IV: ICD-10-PCS | Mod: PBBFAC,,, | Performed by: INTERNAL MEDICINE

## 2022-08-30 PROCEDURE — 99215 OFFICE O/P EST HI 40 MIN: CPT | Mod: S$GLB,,, | Performed by: INTERNAL MEDICINE

## 2022-08-30 PROCEDURE — 99215 PR OFFICE/OUTPT VISIT, EST, LEVL V, 40-54 MIN: ICD-10-PCS | Mod: S$GLB,,, | Performed by: INTERNAL MEDICINE

## 2022-08-30 PROCEDURE — 1159F MED LIST DOCD IN RCRD: CPT | Mod: CPTII,S$GLB,, | Performed by: INTERNAL MEDICINE

## 2022-08-30 PROCEDURE — 3008F BODY MASS INDEX DOCD: CPT | Mod: CPTII,S$GLB,, | Performed by: INTERNAL MEDICINE

## 2022-08-30 PROCEDURE — 80048 BASIC METABOLIC PNL TOTAL CA: CPT | Performed by: INTERNAL MEDICINE

## 2022-08-30 PROCEDURE — 82306 VITAMIN D 25 HYDROXY: CPT | Performed by: INTERNAL MEDICINE

## 2022-08-30 PROCEDURE — 36415 COLL VENOUS BLD VENIPUNCTURE: CPT | Performed by: INTERNAL MEDICINE

## 2022-08-30 PROCEDURE — 85025 COMPLETE CBC W/AUTO DIFF WBC: CPT | Performed by: INTERNAL MEDICINE

## 2022-08-30 PROCEDURE — 1111F DSCHRG MED/CURRENT MED MERGE: CPT | Mod: CPTII,S$GLB,, | Performed by: INTERNAL MEDICINE

## 2022-08-30 PROCEDURE — 84466 ASSAY OF TRANSFERRIN: CPT | Performed by: INTERNAL MEDICINE

## 2022-08-30 PROCEDURE — 3066F NEPHROPATHY DOC TX: CPT | Mod: CPTII,S$GLB,, | Performed by: INTERNAL MEDICINE

## 2022-08-30 PROCEDURE — 3051F PR MOST RECENT HEMOGLOBIN A1C LEVEL 7.0 - < 8.0%: ICD-10-PCS | Mod: CPTII,S$GLB,, | Performed by: INTERNAL MEDICINE

## 2022-08-30 PROCEDURE — 3008F PR BODY MASS INDEX (BMI) DOCUMENTED: ICD-10-PCS | Mod: CPTII,S$GLB,, | Performed by: INTERNAL MEDICINE

## 2022-08-30 PROCEDURE — 1159F PR MEDICATION LIST DOCUMENTED IN MEDICAL RECORD: ICD-10-PCS | Mod: CPTII,S$GLB,, | Performed by: INTERNAL MEDICINE

## 2022-08-30 PROCEDURE — 3080F PR MOST RECENT DIASTOLIC BLOOD PRESSURE >= 90 MM HG: ICD-10-PCS | Mod: CPTII,S$GLB,, | Performed by: INTERNAL MEDICINE

## 2022-08-30 PROCEDURE — 83970 ASSAY OF PARATHORMONE: CPT | Performed by: INTERNAL MEDICINE

## 2022-08-30 PROCEDURE — 3077F PR MOST RECENT SYSTOLIC BLOOD PRESSURE >= 140 MM HG: ICD-10-PCS | Mod: CPTII,S$GLB,, | Performed by: INTERNAL MEDICINE

## 2022-08-30 RX ORDER — SODIUM BICARBONATE 650 MG/1
650 TABLET ORAL 3 TIMES DAILY
Qty: 90 TABLET | Refills: 11 | Status: ON HOLD | OUTPATIENT
Start: 2022-08-30 | End: 2023-07-17 | Stop reason: HOSPADM

## 2022-08-30 NOTE — PROGRESS NOTES
Progress Note  Nephrology      Referring physician: Janak Wilson MD    Reason for visit: CKD     SUBJECTIVE:   52 y.o. male  has a past medical history of CKD (chronic kidney disease), stage V, Cocaine abuse, Diabetes mellitus type I, Diabetic retinopathy, Hyperlipidemia, and Hypertension. who has been following up in renal clinic for ckd management   The patient denies taking NSAIDs or new antibiotics, recreational drugs, recent episode of dehydration, diarrhea, nausea or vomiting, acute illness, hospitalization or exposure to IV radiocontrast.     ROS:  General: negative for chills, or fatigue  ENT: headaches  Hematological and Lymphatic: No bleeding problems or blood clots.  Endocrine: No skin changes or temperature intolerance  Respiratory: No cough, shortness of breath, or wheezing  Cardiovascular: No chest pain or dyspnea   Gastrointestinal: No abdominal pain, change in bowel habits  Genito-Urinary: No dysuria, trouble voiding, or hematuria  Musculoskeletal: ROS: negative for - joint pain, joint stiffness, joint swelling, muscle pain or muscular weakness  Neurological: No focal weakness, no numbness  Dermatological: No rash or ulcers    OBJECTIVE:     Vitals:    08/30/22 1053   BP: (!) 170/92   Pulse: 68          Physical Exam:  General: no distress, well nourished  HENT: PERRLA, Normal mouth nose and ears.  Neck: no JVD and thyroid not enlarged, symmetric, no tenderness/mass/nodules  Lungs: clear to auscultation bilaterally and normal respiratory effort  Cardiovascular: regular rate and rhythm, S1, S2 normal, no murmur, click, rub or gallop.   Abdomen: soft, non-tender non-distented; bowel sounds normal  Skin: No rashes or lesions  Musculoskeletal:no edema in LE, no deformities.   Lymph Nodes: No cervical or supraclavicular adenopathy  Neurologic: Normal strength and tone. No focal numbness or weakness          Medications:    Current Outpatient Medications:     amLODIPine (NORVASC) 10 MG tablet, Take 1  tablet (10 mg total) by mouth once daily., Disp: 90 tablet, Rfl: 3    aspirin 81 MG Chew, Take 1 tablet (81 mg total) by mouth once daily. (Patient taking differently: Take 81 mg by mouth once daily.), Disp: 30 tablet, Rfl: 11    atorvastatin (LIPITOR) 40 MG tablet, Take 1 tablet (40 mg total) by mouth every evening., Disp: 90 tablet, Rfl: 3    BASAGLAR KWIKPEN U-100 INSULIN glargine 100 units/mL (3mL) SubQ pen, Inject 45 Units into the skin every evening., Disp: 3 each, Rfl: 3    blood sugar diagnostic Strp, To check BG 2 times daily, to use with insurance preferred meter, Disp: 200 each, Rfl: 3    calcitRIOL (ROCALTROL) 0.25 MCG Cap, Take 0.25 mcg by mouth once daily., Disp: , Rfl:     clopidogreL (PLAVIX) 75 mg tablet, Take 1 tablet (75 mg total) by mouth once daily., Disp: 21 tablet, Rfl: 0    ferrous gluconate (FERGON) 324 MG tablet, Take 1 tablet (324 mg total) by mouth daily with breakfast., Disp: , Rfl:     furosemide (LASIX) 40 MG tablet, Take 1 tablet (40 mg total) by mouth once daily., Disp: 90 tablet, Rfl: 3    hydrALAZINE (APRESOLINE) 100 MG tablet, Take 100 mg by mouth every 8 (eight) hours., Disp: , Rfl:     insulin aspart U-100 (NOVOLOG FLEXPEN U-100 INSULIN) 100 unit/mL (3 mL) InPn pen, INJECT 10 UNITS INTO THE SKIN 3 (THREE) TIMES DAILY WITH MEALS., Disp: 60 each, Rfl: 0    labetaloL (NORMODYNE) 300 MG tablet, Take 1 tablet (300 mg total) by mouth 2 (two) times daily. (Patient taking differently: Take 300 mg by mouth once daily.), Disp: 180 tablet, Rfl: 3    metOLazone (ZAROXOLYN) 5 MG tablet, Take 1 tablet (5 mg total) by mouth nightly., Disp: 90 tablet, Rfl: 2    vitamin D (VITAMIN D3) 1000 units Tab, Take 2,000 Units by mouth nightly., Disp: , Rfl:     sodium bicarbonate 650 MG tablet, Take 1 tablet (650 mg total) by mouth 3 (three) times daily., Disp: 90 tablet, Rfl: 11         Laboratory:  Lab Results   Component Value Date    CREATININE 7.5 (H) 08/26/2022       Prot/Creat Ratio, Urine   Date  Value Ref Range Status   08/26/2022 2.05 (H) 0.00 - 0.20 Final   05/26/2021 1.63 (H) 0.00 - 0.20 Final   10/07/2020 1.10 (H) 0.00 - 0.20 Final       Lab Results   Component Value Date     08/26/2022    K 4.0 08/26/2022    CO2 21 (L) 08/26/2022       last PTH   Lab Results   Component Value Date    .50 (H) 09/24/2021    CALCIUM 8.1 (L) 08/26/2022    PHOS 4.7 (H) 08/01/2022       Lab Results   Component Value Date    HGB 9.7 (L) 08/26/2022        Lab Results   Component Value Date    HGBA1C 7.5 (H) 08/01/2022       Lab Results   Component Value Date    LDLCALC 79.6 07/31/2022       Other Labs were reviewed      ASSESSMENT/PLAN:     1-CKD stage V, 2/2 diabetic and hypertensive nephropathy and cocaine abuse   - kidney function is worsening  - no urgent indication for dialysis  - pt agrees to start getting the AVF, will refer to vascular surgeon   -Avoid NSAIDs intake  - UPCR 2    2-DM type I uncontrolled   - last a1c 7.5    3.-HTN uncontrolled :  - pt did not take his meds this am , he is going to take them     4-h/o cocaine abuse     5-AOCD :  - will get iron sat     6-CHF ( no acute issues )    7- CKD-MBD  - will repeat PTH, VIT D, PO4           RTC in 2m      TERRA ROLDAN MD  NEPHROLOGY ATTENDING

## 2022-09-19 DIAGNOSIS — Z01.818 PREOP EXAMINATION: Primary | ICD-10-CM

## 2022-09-28 ENCOUNTER — TELEPHONE (OUTPATIENT)
Dept: NEUROLOGY | Facility: CLINIC | Age: 52
End: 2022-09-28

## 2022-09-28 ENCOUNTER — OFFICE VISIT (OUTPATIENT)
Dept: NEUROLOGY | Facility: CLINIC | Age: 52
End: 2022-09-28
Payer: COMMERCIAL

## 2022-09-28 VITALS
HEIGHT: 71 IN | SYSTOLIC BLOOD PRESSURE: 136 MMHG | HEART RATE: 61 BPM | WEIGHT: 234.13 LBS | BODY MASS INDEX: 32.78 KG/M2 | DIASTOLIC BLOOD PRESSURE: 78 MMHG

## 2022-09-28 DIAGNOSIS — Z79.4 TYPE 2 DIABETES MELLITUS WITH DIABETIC NEUROPATHY, WITH LONG-TERM CURRENT USE OF INSULIN: ICD-10-CM

## 2022-09-28 DIAGNOSIS — F14.10 DRUG ABUSE, COCAINE TYPE: ICD-10-CM

## 2022-09-28 DIAGNOSIS — I42.9 CARDIOMYOPATHY, UNSPECIFIED TYPE: ICD-10-CM

## 2022-09-28 DIAGNOSIS — E11.40 TYPE 2 DIABETES MELLITUS WITH DIABETIC NEUROPATHY, WITH LONG-TERM CURRENT USE OF INSULIN: ICD-10-CM

## 2022-09-28 DIAGNOSIS — F19.10 SUBSTANCE ABUSE: ICD-10-CM

## 2022-09-28 DIAGNOSIS — I63.312 THROMBOTIC STROKE INVOLVING LEFT MIDDLE CEREBRAL ARTERY: Primary | ICD-10-CM

## 2022-09-28 DIAGNOSIS — I10 ESSENTIAL HYPERTENSION: ICD-10-CM

## 2022-09-28 DIAGNOSIS — E66.9 OBESITY (BMI 30-39.9): Chronic | ICD-10-CM

## 2022-09-28 DIAGNOSIS — F14.90 CRACK COCAINE USE: ICD-10-CM

## 2022-09-28 DIAGNOSIS — E78.2 MIXED HYPERLIPIDEMIA: ICD-10-CM

## 2022-09-28 PROCEDURE — 99999 PR PBB SHADOW E&M-EST. PATIENT-LVL V: ICD-10-PCS | Mod: PBBFAC,,, | Performed by: STUDENT IN AN ORGANIZED HEALTH CARE EDUCATION/TRAINING PROGRAM

## 2022-09-28 PROCEDURE — 3051F PR MOST RECENT HEMOGLOBIN A1C LEVEL 7.0 - < 8.0%: ICD-10-PCS | Mod: CPTII,S$GLB,, | Performed by: STUDENT IN AN ORGANIZED HEALTH CARE EDUCATION/TRAINING PROGRAM

## 2022-09-28 PROCEDURE — 99999 PR PBB SHADOW E&M-EST. PATIENT-LVL V: CPT | Mod: PBBFAC,,, | Performed by: STUDENT IN AN ORGANIZED HEALTH CARE EDUCATION/TRAINING PROGRAM

## 2022-09-28 PROCEDURE — 3008F BODY MASS INDEX DOCD: CPT | Mod: CPTII,S$GLB,, | Performed by: STUDENT IN AN ORGANIZED HEALTH CARE EDUCATION/TRAINING PROGRAM

## 2022-09-28 PROCEDURE — 1160F RVW MEDS BY RX/DR IN RCRD: CPT | Mod: CPTII,S$GLB,, | Performed by: STUDENT IN AN ORGANIZED HEALTH CARE EDUCATION/TRAINING PROGRAM

## 2022-09-28 PROCEDURE — 3066F PR DOCUMENTATION OF TREATMENT FOR NEPHROPATHY: ICD-10-PCS | Mod: CPTII,S$GLB,, | Performed by: STUDENT IN AN ORGANIZED HEALTH CARE EDUCATION/TRAINING PROGRAM

## 2022-09-28 PROCEDURE — 99215 PR OFFICE/OUTPT VISIT, EST, LEVL V, 40-54 MIN: ICD-10-PCS | Mod: S$GLB,,, | Performed by: STUDENT IN AN ORGANIZED HEALTH CARE EDUCATION/TRAINING PROGRAM

## 2022-09-28 PROCEDURE — 3078F PR MOST RECENT DIASTOLIC BLOOD PRESSURE < 80 MM HG: ICD-10-PCS | Mod: CPTII,S$GLB,, | Performed by: STUDENT IN AN ORGANIZED HEALTH CARE EDUCATION/TRAINING PROGRAM

## 2022-09-28 PROCEDURE — 99215 OFFICE O/P EST HI 40 MIN: CPT | Mod: S$GLB,,, | Performed by: STUDENT IN AN ORGANIZED HEALTH CARE EDUCATION/TRAINING PROGRAM

## 2022-09-28 PROCEDURE — 3066F NEPHROPATHY DOC TX: CPT | Mod: CPTII,S$GLB,, | Performed by: STUDENT IN AN ORGANIZED HEALTH CARE EDUCATION/TRAINING PROGRAM

## 2022-09-28 PROCEDURE — 1160F PR REVIEW ALL MEDS BY PRESCRIBER/CLIN PHARMACIST DOCUMENTED: ICD-10-PCS | Mod: CPTII,S$GLB,, | Performed by: STUDENT IN AN ORGANIZED HEALTH CARE EDUCATION/TRAINING PROGRAM

## 2022-09-28 PROCEDURE — 1159F MED LIST DOCD IN RCRD: CPT | Mod: CPTII,S$GLB,, | Performed by: STUDENT IN AN ORGANIZED HEALTH CARE EDUCATION/TRAINING PROGRAM

## 2022-09-28 PROCEDURE — 1159F PR MEDICATION LIST DOCUMENTED IN MEDICAL RECORD: ICD-10-PCS | Mod: CPTII,S$GLB,, | Performed by: STUDENT IN AN ORGANIZED HEALTH CARE EDUCATION/TRAINING PROGRAM

## 2022-09-28 PROCEDURE — 3051F HG A1C>EQUAL 7.0%<8.0%: CPT | Mod: CPTII,S$GLB,, | Performed by: STUDENT IN AN ORGANIZED HEALTH CARE EDUCATION/TRAINING PROGRAM

## 2022-09-28 PROCEDURE — 3078F DIAST BP <80 MM HG: CPT | Mod: CPTII,S$GLB,, | Performed by: STUDENT IN AN ORGANIZED HEALTH CARE EDUCATION/TRAINING PROGRAM

## 2022-09-28 PROCEDURE — 3075F SYST BP GE 130 - 139MM HG: CPT | Mod: CPTII,S$GLB,, | Performed by: STUDENT IN AN ORGANIZED HEALTH CARE EDUCATION/TRAINING PROGRAM

## 2022-09-28 PROCEDURE — 3075F PR MOST RECENT SYSTOLIC BLOOD PRESS GE 130-139MM HG: ICD-10-PCS | Mod: CPTII,S$GLB,, | Performed by: STUDENT IN AN ORGANIZED HEALTH CARE EDUCATION/TRAINING PROGRAM

## 2022-09-28 PROCEDURE — 3008F PR BODY MASS INDEX (BMI) DOCUMENTED: ICD-10-PCS | Mod: CPTII,S$GLB,, | Performed by: STUDENT IN AN ORGANIZED HEALTH CARE EDUCATION/TRAINING PROGRAM

## 2022-09-28 NOTE — PROGRESS NOTES
"Vascular Neurology Clinic  Initial Consult    Patient Name: Luis Daniel Howell  MRN: [id number]    CC: Slurred speech, RFD    HPI: Luis Daniel Howell is a 52 y.o. R-handed male w/ PMH significant for Obesity, DM, HLD, HTN, Cocaine abuse, Cardiomyopathy, CKD presenting as referral status-post hospital admission on 7/31/2022.   Patient arrives to the appointment alone and is somewhat a poor historian.   As per previous note from 08/01/2022   53 y/o male with HTN, HLD, DM Type I, CKD (not on dialysis), heart failure, crack cocaine use who presented with left face droop (right) and slurred speech.  Symptoms were first noticed Saturday.  Denies associated vision changes, double vision, unilateral weakness, numbness, aguilar imbalance.  He did notice liquid leaking out of left mouth but states this is better now.  He reports no known triggers.  He has never had these symptoms before.  He has no history of stroke or TIA.  Today he continues with facial droop and slurred speech but states better than it initially was.  Patient reports that he went to the hospital because of the slurred speech that was noted by him and his family members   - He was also noted to have RFD on arrival to the ED  - Denies any other associated symptoms or previous episodes such as this       Discharged to Home w/ OP therapyw/ NIHSS of 2, mrS of 1.    Since he was discharge he states that he has completely stopped using any illicit substances, including cocaine  He feels that he is back to baseline now and the only deficit that he notices is somewhat slurred speech if he talks "a lot".   States that his other deficits had resolved  BP at home   - 199/111 at times   -  He is not compliant with all of the BP medications that are prescribed as he feels that they are "too strong"   - He states that at times the medication makes him feel "too tired" but he is unable to specify which medication he is referring to     - States that he did not bring this up to his " PCP    Brain imagin2022 MRI Brain  Diffusion restriction left internal capsule extending up to corona radiata. Linear area of susceptibility right subinsular. Chronic white matter disease.   Vessel Imagin2022 MRA brain and neck  No high-grade stenosis or occlusion.  Possible narrowing within the left M2 segment.   Cardiac Evaluation:   2022 TTE  EF 55%; no wall motion abnormality; No thrombus/vegetation; severe left atrial enlargement    Relevant Lab work   Recent Labs   Lab 22  1227 22  0451   Hemoglobin A1C  --  7.5 H   LDL Cholesterol 79.6  --    HDL 42  --    Triglycerides 192 H  --    Cholesterol 160  --        NIH Stroke Scale:    Level of Consciousness: 0 - alert  LOC Questions: 0 - answers both correctly  LOC Commands: 0 - performs both correctly  Best Gaze: 0 - normal  Visual: 0 - no visual loss  Facial Palsy: 1 - minor  Motor Left Arm: 0 - no drift  Motor Right Arm: 0 - no drift (pronator drift)  Motor Left Le - no drift  Motor Right Le - no drift  Limb Ataxia: 0 - absent  Sensory: 0 - normal  Best Language: 0 - no aphasia  Dysarthria: 0 - normal articulation  Extinction and Inattention: 0 - no neglect  NIH Stroke Scale Total: 1       Review of Systems:  General: No fevers, chills  Eyes: No changes in vision  ENT: No changes in hearing  Respiratory: No SOB  CV: No chest pain, palpitations  GI: No diarrhea, blood in stool  Urinary: No dysuria, hematuria  Skin: No rashes  Neurological: No weakness, confusion  Psychiatric: No auditory nor visual hallucinations      Past Medical History  Past Medical History:   Diagnosis Date    CKD (chronic kidney disease), stage V     Cocaine abuse     Diabetes mellitus type I     Diabetic retinopathy     Hyperlipidemia     Hypertension        Medications    Current Outpatient Medications:     amLODIPine (NORVASC) 10 MG tablet, Take 1 tablet (10 mg total) by mouth once daily., Disp: 90 tablet, Rfl: 3    atorvastatin (LIPITOR) 40 MG  tablet, Take 1 tablet (40 mg total) by mouth every evening., Disp: 90 tablet, Rfl: 3    BASAGLAR KWIKPEN U-100 INSULIN glargine 100 units/mL (3mL) SubQ pen, Inject 45 Units into the skin every evening., Disp: 3 each, Rfl: 3    blood sugar diagnostic Strp, To check BG 2 times daily, to use with insurance preferred meter, Disp: 200 each, Rfl: 3    clopidogreL (PLAVIX) 75 mg tablet, Take 1 tablet (75 mg total) by mouth once daily., Disp: 21 tablet, Rfl: 0    ferrous gluconate (FERGON) 324 MG tablet, Take 1 tablet (324 mg total) by mouth daily with breakfast., Disp: , Rfl:     furosemide (LASIX) 40 MG tablet, Take 1 tablet (40 mg total) by mouth once daily., Disp: 90 tablet, Rfl: 3    hydrALAZINE (APRESOLINE) 100 MG tablet, Take 100 mg by mouth every 8 (eight) hours., Disp: , Rfl:     insulin aspart U-100 (NOVOLOG FLEXPEN U-100 INSULIN) 100 unit/mL (3 mL) InPn pen, INJECT 10 UNITS INTO THE SKIN 3 (THREE) TIMES DAILY WITH MEALS., Disp: 60 each, Rfl: 0    labetaloL (NORMODYNE) 300 MG tablet, Take 1 tablet (300 mg total) by mouth 2 (two) times daily. (Patient taking differently: Take 300 mg by mouth once daily.), Disp: 180 tablet, Rfl: 3    metOLazone (ZAROXOLYN) 5 MG tablet, Take 1 tablet (5 mg total) by mouth nightly., Disp: 90 tablet, Rfl: 2    sodium bicarbonate 650 MG tablet, Take 1 tablet (650 mg total) by mouth 3 (three) times daily., Disp: 90 tablet, Rfl: 11    vitamin D (VITAMIN D3) 1000 units Tab, Take 2,000 Units by mouth nightly., Disp: , Rfl:     aspirin 81 MG Chew, Take 1 tablet (81 mg total) by mouth once daily. (Patient taking differently: Take 81 mg by mouth once daily.), Disp: 30 tablet, Rfl: 11    calcitRIOL (ROCALTROL) 0.25 MCG Cap, TAKE 1 CAPSULE (0.25 MCG TOTAL) BY MOUTH ONCE DAILY., Disp: 90 capsule, Rfl: 1  Any other notable medications as documented in HPI    Allergies  Review of patient's allergies indicates:  No Known Allergies    Social History  Social History     Socioeconomic History     "Marital status: Single    Number of children: 1   Occupational History     Employer: [employer]   Tobacco Use    Smoking status: Never    Smokeless tobacco: Never   Substance and Sexual Activity    Alcohol use: No    Drug use: Yes     Types: "Crack" cocaine     Comment: in remission     Sexual activity: Yes     Partners: Female     Social Determinants of Health     Financial Resource Strain: Low Risk     Difficulty of Paying Living Expenses: Not very hard   Food Insecurity: No Food Insecurity    Worried About Running Out of Food in the Last Year: Never true    Ran Out of Food in the Last Year: Never true   Transportation Needs: No Transportation Needs    Lack of Transportation (Medical): No    Lack of Transportation (Non-Medical): No   Physical Activity: Insufficiently Active    Days of Exercise per Week: 2 days    Minutes of Exercise per Session: 30 min   Stress: No Stress Concern Present    Feeling of Stress : Only a little   Social Connections: Unknown    Frequency of Communication with Friends and Family: More than three times a week    Frequency of Social Gatherings with Friends and Family: More than three times a week    Attends Caoda[employer] Services: 1 to 4 times per year    Active Member of Clubs or Organizations: No    Attends Club or Organization Meetings: Never   Housing Stability: Low Risk     Unable to Pay for Housing in the Last Year: No    Number of Places Lived in the Last Year: 2    Unstable Housing in the Last Year: No     Any other notable Social History as documented in HPI.    Family History  Family History   Problem Relation Age of Onset    Cancer Mother     Hypertension Mother     Cataracts Mother     Hypertension Father     Diabetes Father     Diabetes Brother     Amblyopia Neg Hx     Blindness Neg Hx     Glaucoma Neg Hx     Macular degeneration Neg Hx     Retinal detachment Neg Hx     Strabismus Neg Hx     Stroke Neg Hx     Thyroid disease Neg Hx      Any other notable FMH as " "documented in HPI.    Physical Exam  /78   Pulse 61   Ht 5' 11" (1.803 m)   Wt 106.2 kg (234 lb 2.1 oz)   BMI 32.65 kg/m²     General: Well-developed, well-groomed. No apparent distress  HENT: Normocephalic, atraumatic.    Cardiovascular: Regular rate and rhythm  Chest: No audible wheezes, stridor, ronchi appreciated.  Musculoskeletal: No peripheral edema    Neurologic Exam: The patient is awake, alert and oriented to person, place, time and situation.  Language is fluent. Naming & repetition intact, +2-step commands. He is a poor historian and appears to have issues w/ understanding what medications are for what and the way he needs to take them.    Cranial nerves:   CN II: Visual fields are full to confrontation. Pupils are 4 mm and briskly reactive to light.  CN III, IV, VI: EOMI, no nystagmus, no ptosis  CN V: Facial sensation is intact in all 3 divisions bilaterally.  CN VII: Mild R facial weakness noted at rest and w/ activation. Lower face only.   CN VIII: Hearing is normal bilaterally  CN IX, X: Palate elevates symmetrically. Phonation is normal.  CN XI: Head turning and shoulder shrug are intact  CN XII: Tongue is midline with normal movements and no atrophy.    Motor examination of all extremities demonstrates normal bulk and tone in all four limbs. There are no atrophy or fasciculations.      Left Right   Left Right   Deltoid 5/5 5/5  Hip Flexion 5/5 5/5   Biceps 5/5 5/5  Hip Extension 5/5 5/5   Triceps 5/5 5/5  Knee Flexion 5/5 5/5   Wrist Ext 5/5 5/5  Knee Extension 5/5 5/5   Finger Abd 5/5 5/5  Ankle dorsiflex 5/5 5/5       Ankle plantar flex 5/5 5/5       Sensory examination is normal light touch in BUE and BLE.  Romberg is negative.  Sensation to light touch: intact in BUE and BLE    Deep tendon reflexes are 2+ and symmetric in the upper and lower extremities bilaterally.     Gait: Normal tandem, and casual gait.     Coordination: No dysmetria with finger-to-nose. Rapid alternating " "movements and fine finger movements are intact.     Assessment and Plan  Cardiac Event Monitor ordered   Lengthy discussion about medication compliance    - Did go over his BP medication and highlighted the medication in question as well as appropriate dosing and frequency   BP monitoring at home w/ BP diary to bring to PCP the next visit    - If BP truly is "too low" when he is taking all of his prescribed medications, adjustments can be made  Illicit substance counseling    - States that he has not been using any illicit drugs since his stroke     Diagnosis/Etiology: Thrombotic stroke of the L MCA  Stroke Risk Factors: Obesity, DM, HLD, HTN, Cocaine abuse, Cardiomyopathy, CKD      Recommendations:   Antiplatelet/Anticoagulation: ASA 81 mg QD  Lipid Management: Atorvastatin 40 mg QHS (long-term goal LDL < 70).   - Monitoring for liver dysfunction and myopathy is suggested for statins. To be addressed by PCP  Diabetes: Target hemoglobin A1c <7%, measured 2X/year or quarterly if not meeting goals  Hypertension: Long term goal is normotension w/ target BP of less than 130/80 mmHg  Sleep: Denies any symptoms of JOEL. Consider Sleep Medicine follow up in the future if suspicion for JOEL occurs.   Smoking: Goal is smoking cessation and encouragement not to start smoking in the future.   Diet: Discussed Mediterranean Diet recommendations (Adopted from Nicolette et al, NEJ, 2018.)  - Eat primarily plant-based foods, such as fruits and vegetables, whole grains, legumes (beans) and nuts  - Limit refined carbohydrates (white pasta, bread, rice).  - Replace butter with healthy fats such as olive oil.  - Use herbs and spices instead of salt to flavor foods.  - Limit red meat and processed meats to no more than a few times a month.  - Avoid sugary sodas, bakery goods, and sweets.  - Eat fish and poultry at least twice a week.  - Get plenty of exercise (150 minutes per week).    RTC in PRN    Problem List Items Addressed This Visit  "         Neuro    Thrombotic stroke involving left middle cerebral artery - Primary    Relevant Orders    Cardiac event monitor       Psychiatric    Drug abuse, cocaine type    Crack cocaine use    Overview     Reports stopping since stroke on 7/31/2022         Substance abuse       Cardiac/Vascular    Mixed hyperlipidemia    Essential hypertension    Cardiomyopathy       Endocrine    Obesity (BMI 30-39.9) (Chronic)    Type 2 diabetes mellitus with neurologic complication, with long-term current use of insulin           Livier Heredia MD  Vascular Neurology  [facility] [address] [place] [address]

## 2022-09-28 NOTE — PATIENT INSTRUCTIONS
- Aspirin 81 mg once day - indefinitely   - Continue Atorvastatin 40 mg nightly  - You need to take all of your BP medications as prescribed and take your blood pressure measurement in the morning and at night   - Keep a log of this and bring to your PCP and/or Cardiology   - Cardiac event monitor will be sent to your house to evaluate for an irregular heart rhythm   - Continue  taking your insulin as prescribed   - Goal is for HbA1c to be less than 7 and you are at 7.5   - Avoid illicit drug use  - Mediterranean Diet Recommendations    Eat primarily plant-based foods, such as fruits and vegetables, whole grains, legumes (beans) and nuts.  Limit refined carbohydrates (white pasta, bread, rice).  Replace butter with healthy fats such as olive oil.  Use herbs and spices instead of salt to flavor foods.  Limit red meat and processed meats to no more than a few times a month.  Avoid sugary sodas, bakery goods, and sweets.  Eat fish and poultry at least twice a week.  Get plenty of exercise (150 minutes per week).    Adopted from Nicolette shields al, NEJ, 2018.

## 2022-09-28 NOTE — TELEPHONE ENCOUNTER
Shay Flores,     A Cardiac event monitor has been ordered for this pt and will need to be scheduled, please.     Thank you!  Hugh

## 2022-09-30 ENCOUNTER — CLINICAL SUPPORT (OUTPATIENT)
Dept: CARDIOLOGY | Facility: HOSPITAL | Age: 52
End: 2022-09-30
Attending: STUDENT IN AN ORGANIZED HEALTH CARE EDUCATION/TRAINING PROGRAM
Payer: COMMERCIAL

## 2022-09-30 DIAGNOSIS — I63.312 THROMBOTIC STROKE INVOLVING LEFT MIDDLE CEREBRAL ARTERY: ICD-10-CM

## 2022-09-30 PROCEDURE — 93272 ECG/REVIEW INTERPRET ONLY: CPT | Mod: ,,, | Performed by: INTERNAL MEDICINE

## 2022-09-30 PROCEDURE — 93270 REMOTE 30 DAY ECG REV/REPORT: CPT

## 2022-09-30 PROCEDURE — 93272 CARDIAC EVENT MONITOR (CUPID ONLY): ICD-10-PCS | Mod: ,,, | Performed by: INTERNAL MEDICINE

## 2022-10-26 ENCOUNTER — OFFICE VISIT (OUTPATIENT)
Dept: NEPHROLOGY | Facility: CLINIC | Age: 52
End: 2022-10-26
Payer: COMMERCIAL

## 2022-10-26 VITALS
HEART RATE: 66 BPM | DIASTOLIC BLOOD PRESSURE: 74 MMHG | SYSTOLIC BLOOD PRESSURE: 140 MMHG | WEIGHT: 230.63 LBS | OXYGEN SATURATION: 98 % | BODY MASS INDEX: 32.29 KG/M2 | HEIGHT: 71 IN

## 2022-10-26 DIAGNOSIS — D63.1 ANEMIA OF CHRONIC KIDNEY FAILURE, UNSPECIFIED STAGE: ICD-10-CM

## 2022-10-26 DIAGNOSIS — N18.5 CKD (CHRONIC KIDNEY DISEASE), STAGE V: Primary | ICD-10-CM

## 2022-10-26 DIAGNOSIS — N18.9 ANEMIA OF CHRONIC KIDNEY FAILURE, UNSPECIFIED STAGE: ICD-10-CM

## 2022-10-26 DIAGNOSIS — I10 HYPERTENSION, UNSPECIFIED TYPE: ICD-10-CM

## 2022-10-26 PROCEDURE — 3077F PR MOST RECENT SYSTOLIC BLOOD PRESSURE >= 140 MM HG: ICD-10-PCS | Mod: CPTII,S$GLB,, | Performed by: INTERNAL MEDICINE

## 2022-10-26 PROCEDURE — 1159F MED LIST DOCD IN RCRD: CPT | Mod: CPTII,S$GLB,, | Performed by: INTERNAL MEDICINE

## 2022-10-26 PROCEDURE — 3051F PR MOST RECENT HEMOGLOBIN A1C LEVEL 7.0 - < 8.0%: ICD-10-PCS | Mod: CPTII,S$GLB,, | Performed by: INTERNAL MEDICINE

## 2022-10-26 PROCEDURE — 3077F SYST BP >= 140 MM HG: CPT | Mod: CPTII,S$GLB,, | Performed by: INTERNAL MEDICINE

## 2022-10-26 PROCEDURE — 99999 PR PBB SHADOW E&M-EST. PATIENT-LVL IV: CPT | Mod: PBBFAC,,, | Performed by: INTERNAL MEDICINE

## 2022-10-26 PROCEDURE — 3051F HG A1C>EQUAL 7.0%<8.0%: CPT | Mod: CPTII,S$GLB,, | Performed by: INTERNAL MEDICINE

## 2022-10-26 PROCEDURE — 99999 PR PBB SHADOW E&M-EST. PATIENT-LVL IV: ICD-10-PCS | Mod: PBBFAC,,, | Performed by: INTERNAL MEDICINE

## 2022-10-26 PROCEDURE — 99215 PR OFFICE/OUTPT VISIT, EST, LEVL V, 40-54 MIN: ICD-10-PCS | Mod: S$GLB,,, | Performed by: INTERNAL MEDICINE

## 2022-10-26 PROCEDURE — 99215 OFFICE O/P EST HI 40 MIN: CPT | Mod: S$GLB,,, | Performed by: INTERNAL MEDICINE

## 2022-10-26 PROCEDURE — 1159F PR MEDICATION LIST DOCUMENTED IN MEDICAL RECORD: ICD-10-PCS | Mod: CPTII,S$GLB,, | Performed by: INTERNAL MEDICINE

## 2022-10-26 PROCEDURE — 3066F PR DOCUMENTATION OF TREATMENT FOR NEPHROPATHY: ICD-10-PCS | Mod: CPTII,S$GLB,, | Performed by: INTERNAL MEDICINE

## 2022-10-26 PROCEDURE — 3066F NEPHROPATHY DOC TX: CPT | Mod: CPTII,S$GLB,, | Performed by: INTERNAL MEDICINE

## 2022-10-26 PROCEDURE — 3078F PR MOST RECENT DIASTOLIC BLOOD PRESSURE < 80 MM HG: ICD-10-PCS | Mod: CPTII,S$GLB,, | Performed by: INTERNAL MEDICINE

## 2022-10-26 PROCEDURE — 3078F DIAST BP <80 MM HG: CPT | Mod: CPTII,S$GLB,, | Performed by: INTERNAL MEDICINE

## 2022-10-26 RX ORDER — SEVELAMER CARBONATE 800 MG/1
800 TABLET, FILM COATED ORAL
Qty: 90 TABLET | Refills: 11 | Status: SHIPPED | OUTPATIENT
Start: 2022-10-26 | End: 2023-01-04

## 2022-10-26 NOTE — PROGRESS NOTES
Progress Note  Nephrology      Referring physician: Janak Wilson MD    Reason for visit: CKD     SUBJECTIVE:   52 y.o. male  has a past medical history of CKD (chronic kidney disease), stage V, Cocaine abuse, Diabetes mellitus type I, Diabetic retinopathy, Hyperlipidemia, and Hypertension. who has been following up in renal clinic for ckd management   The patient denies taking NSAIDs or new antibiotics, recreational drugs, recent episode of dehydration, diarrhea, nausea or vomiting, acute illness, hospitalization or exposure to IV radiocontrast.     ROS:  General: negative for chills, or fatigue  ENT: No epistaxis or headaches  Hematological and Lymphatic: No bleeding problems or blood clots.  Endocrine: No skin changes or temperature intolerance  Respiratory: No cough, shortness of breath, or wheezing  Cardiovascular: No chest pain or dyspnea   Gastrointestinal: No abdominal pain, change in bowel habits  Genito-Urinary: No dysuria, trouble voiding, or hematuria  Musculoskeletal: ROS: negative for - joint pain, joint stiffness, joint swelling, muscle pain or muscular weakness  Neurological: No focal weakness, no numbness  Dermatological: No rash or ulcers    OBJECTIVE:     There were no vitals filed for this visit.       Physical Exam:  General: no distress, well nourished  HENT: PERRLA, Normal mouth nose and ears.  Neck: no JVD and thyroid not enlarged, symmetric, no tenderness/mass/nodules  Lungs: clear to auscultation bilaterally and normal respiratory effort  Cardiovascular: regular rate and rhythm, S1, S2 normal, no murmur, click, rub or gallop.   Abdomen: soft, non-tender non-distented; bowel sounds normal  Skin: No rashes or lesions  Musculoskeletal:no edema in LE, no deformities.   Lymph Nodes: No cervical or supraclavicular adenopathy  Neurologic: Normal strength and tone. No focal numbness or weakness          Medications:    Current Outpatient Medications:     amLODIPine (NORVASC) 10 MG tablet, Take 1  tablet (10 mg total) by mouth once daily., Disp: 90 tablet, Rfl: 3    aspirin 81 MG Chew, Take 1 tablet (81 mg total) by mouth once daily. (Patient taking differently: Take 81 mg by mouth once daily.), Disp: 30 tablet, Rfl: 11    atorvastatin (LIPITOR) 40 MG tablet, Take 1 tablet (40 mg total) by mouth every evening., Disp: 90 tablet, Rfl: 3    BASAGLAR KWIKPEN U-100 INSULIN glargine 100 units/mL (3mL) SubQ pen, Inject 45 Units into the skin every evening., Disp: 3 each, Rfl: 3    benzocaine-menthoL (CHLORASEPTIC SORE THROAT) 6-10 mg lozenge, Take 1 lozenge by mouth every 2 (two) hours as needed for Pain., Disp: 18 tablet, Rfl: 0    blood sugar diagnostic Strp, To check BG 2 times daily, to use with insurance preferred meter, Disp: 200 each, Rfl: 3    butalbital-acetaminophen-caffeine -40 mg (FIORICET, ESGIC) -40 mg per tablet, Take 1 tablet by mouth every 4 (four) hours as needed for Pain., Disp: 20 tablet, Rfl: 0    calcitRIOL (ROCALTROL) 0.25 MCG Cap, TAKE 1 CAPSULE (0.25 MCG TOTAL) BY MOUTH ONCE DAILY., Disp: 90 capsule, Rfl: 1    cephALEXin (KEFLEX) 500 MG capsule, Take 1 capsule (500 mg total) by mouth every 8 (eight) hours. for 7 days, Disp: 21 capsule, Rfl: 0    clopidogreL (PLAVIX) 75 mg tablet, Take 1 tablet (75 mg total) by mouth once daily., Disp: 21 tablet, Rfl: 0    ferrous gluconate (FERGON) 324 MG tablet, Take 1 tablet (324 mg total) by mouth daily with breakfast., Disp: , Rfl:     furosemide (LASIX) 40 MG tablet, Take 1 tablet (40 mg total) by mouth once daily., Disp: 90 tablet, Rfl: 3    hydrALAZINE (APRESOLINE) 100 MG tablet, Take 100 mg by mouth every 8 (eight) hours., Disp: , Rfl:     insulin aspart U-100 (NOVOLOG FLEXPEN U-100 INSULIN) 100 unit/mL (3 mL) InPn pen, INJECT 10 UNITS INTO THE SKIN 3 (THREE) TIMES DAILY WITH MEALS., Disp: 60 each, Rfl: 0    labetaloL (NORMODYNE) 300 MG tablet, Take 1 tablet (300 mg total) by mouth 2 (two) times daily. (Patient taking differently: Take  300 mg by mouth once daily.), Disp: 180 tablet, Rfl: 3    metOLazone (ZAROXOLYN) 5 MG tablet, Take 1 tablet (5 mg total) by mouth nightly., Disp: 90 tablet, Rfl: 2    sodium bicarbonate 650 MG tablet, Take 1 tablet (650 mg total) by mouth 3 (three) times daily., Disp: 90 tablet, Rfl: 11    traMADoL (ULTRAM) 50 mg tablet, Take 1 tablet (50 mg total) by mouth every 8 (eight) hours as needed for Pain., Disp: 12 tablet, Rfl: 0    vitamin D (VITAMIN D3) 1000 units Tab, Take 2,000 Units by mouth nightly., Disp: , Rfl:          Laboratory:  Lab Results   Component Value Date    CREATININE 7.8 (H) 10/19/2022       Prot/Creat Ratio, Urine   Date Value Ref Range Status   08/26/2022 2.05 (H) 0.00 - 0.20 Final   05/26/2021 1.63 (H) 0.00 - 0.20 Final   10/07/2020 1.10 (H) 0.00 - 0.20 Final       Lab Results   Component Value Date     10/19/2022    K 4.1 10/19/2022    CO2 20 (L) 10/19/2022       last PTH   Lab Results   Component Value Date    .1 (H) 08/30/2022    CALCIUM 8.4 (L) 10/19/2022    PHOS 5.3 (H) 08/30/2022       Lab Results   Component Value Date    HGB 9.7 (L) 10/19/2022        Lab Results   Component Value Date    HGBA1C 7.5 (H) 08/01/2022       Lab Results   Component Value Date    LDLCALC 79.6 07/31/2022       Other Labs were reviewed      ASSESSMENT/PLAN:   1-CKD stage V, 2/2 diabetic and hypertensive nephropathy and cocaine abuse   - kidney function is worsening bun/cr 83/7.8  - no urgent indication for dialysis  - pt agrees to start getting the AVF, will refer to vascular surgeon   -Avoid NSAIDs intake  - UPCR 2     2-DM type I uncontrolled   - last a1c 7.5     3.-HTN uncontrolled :  - pt did not take his meds this am , he is going to take them      4-h/o cocaine abuse      5-AOCD : hbg 9.7  - iron sat 20     6-CHF ( no acute issues )     7- CKD-MBD  - on calcitriol   -  , PO4  5.3  - stressed on low po4 diet     8- metabolic acidosis : co2 20  - on bicarb              RTC in 2m      TERRA  MD YULI  NEPHROLOGY ATTENDING

## 2022-10-27 ENCOUNTER — OFFICE VISIT (OUTPATIENT)
Dept: VASCULAR SURGERY | Facility: CLINIC | Age: 52
End: 2022-10-27
Attending: SURGERY
Payer: COMMERCIAL

## 2022-10-27 ENCOUNTER — HOSPITAL ENCOUNTER (OUTPATIENT)
Dept: VASCULAR SURGERY | Facility: CLINIC | Age: 52
Discharge: HOME | End: 2022-10-27
Attending: SURGERY
Payer: COMMERCIAL

## 2022-10-27 VITALS
SYSTOLIC BLOOD PRESSURE: 142 MMHG | DIASTOLIC BLOOD PRESSURE: 80 MMHG | HEART RATE: 59 BPM | BODY MASS INDEX: 32.1 KG/M2 | HEIGHT: 71 IN | WEIGHT: 229.25 LBS | TEMPERATURE: 98 F

## 2022-10-27 DIAGNOSIS — N18.4 CKD (CHRONIC KIDNEY DISEASE) STAGE 4, GFR 15-29 ML/MIN: ICD-10-CM

## 2022-10-27 DIAGNOSIS — Z01.818 PREOP EXAMINATION: ICD-10-CM

## 2022-10-27 PROCEDURE — 3079F PR MOST RECENT DIASTOLIC BLOOD PRESSURE 80-89 MM HG: ICD-10-PCS | Mod: CPTII,S$GLB,, | Performed by: SURGERY

## 2022-10-27 PROCEDURE — 3077F PR MOST RECENT SYSTOLIC BLOOD PRESSURE >= 140 MM HG: ICD-10-PCS | Mod: CPTII,S$GLB,, | Performed by: SURGERY

## 2022-10-27 PROCEDURE — 1159F PR MEDICATION LIST DOCUMENTED IN MEDICAL RECORD: ICD-10-PCS | Mod: CPTII,S$GLB,, | Performed by: SURGERY

## 2022-10-27 PROCEDURE — 3066F NEPHROPATHY DOC TX: CPT | Mod: CPTII,S$GLB,, | Performed by: SURGERY

## 2022-10-27 PROCEDURE — 3077F SYST BP >= 140 MM HG: CPT | Mod: CPTII,S$GLB,, | Performed by: SURGERY

## 2022-10-27 PROCEDURE — 3079F DIAST BP 80-89 MM HG: CPT | Mod: CPTII,S$GLB,, | Performed by: SURGERY

## 2022-10-27 PROCEDURE — 99214 OFFICE O/P EST MOD 30 MIN: CPT | Mod: S$GLB,,, | Performed by: SURGERY

## 2022-10-27 PROCEDURE — 3066F PR DOCUMENTATION OF TREATMENT FOR NEPHROPATHY: ICD-10-PCS | Mod: CPTII,S$GLB,, | Performed by: SURGERY

## 2022-10-27 PROCEDURE — 99999 PR PBB SHADOW E&M-EST. PATIENT-LVL V: CPT | Mod: PBBFAC,,, | Performed by: SURGERY

## 2022-10-27 PROCEDURE — 99214 PR OFFICE/OUTPT VISIT, EST, LEVL IV, 30-39 MIN: ICD-10-PCS | Mod: S$GLB,,, | Performed by: SURGERY

## 2022-10-27 PROCEDURE — 3051F HG A1C>EQUAL 7.0%<8.0%: CPT | Mod: CPTII,S$GLB,, | Performed by: SURGERY

## 2022-10-27 PROCEDURE — 93970 PR US DUPLEX, UPPER OR LOWER EXT VENOUS,COMPLETE BILAT: ICD-10-PCS | Mod: S$GLB,,, | Performed by: SURGERY

## 2022-10-27 PROCEDURE — 1159F MED LIST DOCD IN RCRD: CPT | Mod: CPTII,S$GLB,, | Performed by: SURGERY

## 2022-10-27 PROCEDURE — 93970 EXTREMITY STUDY: CPT | Mod: S$GLB,,, | Performed by: SURGERY

## 2022-10-27 PROCEDURE — 99999 PR PBB SHADOW E&M-EST. PATIENT-LVL V: ICD-10-PCS | Mod: PBBFAC,,, | Performed by: SURGERY

## 2022-10-27 PROCEDURE — 3051F PR MOST RECENT HEMOGLOBIN A1C LEVEL 7.0 - < 8.0%: ICD-10-PCS | Mod: CPTII,S$GLB,, | Performed by: SURGERY

## 2022-10-27 NOTE — LETTER
October 27, 2022        Janak Wilson Jr., MD  [address]  [facility] [address]             [facility] - Vascular Surg 5th Fl  [address]  [facility] [address]  Phone: [phone]  Fax: [phone]   Patient: Luis Daniel Howell   MR Number: [id number]   YOB: 1970   Date of Visit: 10/27/2022       Dear Dr. Wilson:    Thank you for referring Luis Daniel Howell to me for evaluation. Below are the relevant portions of my assessment and plan of care.            If you have questions, please do not hesitate to call me. I look forward to following Luis Daniel along with you.    Sincerely,      Amos Harper MD           CC    No Recipients

## 2022-10-27 NOTE — PROGRESS NOTES
"Luis Daniel Howell  10/27/2022    HPI:  Luis Daniel Howell is a 52 y.o. male with a h/o CKD4, HTN, HLD, and T2DM who is here today for evaluation for hemodialysis access. His most recent serum Cr/GFR were 5.4/15. He has no prior history of central vascular access or hemodialysis access.     Interval HPI:   Patient returns to clinic for evaluation of HD access. In 7/30/2022 patient presented to ED due to left sided facial droop. After workup he was found to have Acute 1 cm infarct left posterior corona radiata/internal capsule. Since last visit patient has been seeing a cardiologist.  Echo on 7/2022 revealed EF 55% and diastolic dysfunction and is currently on a monitor. Patient is taking ASA, plavix, and statin.      Past Medical History:   Diagnosis Date    CKD (chronic kidney disease), stage V     Cocaine abuse     Diabetes mellitus type I     Diabetic retinopathy     Hyperlipidemia     Hypertension      Past Surgical History:   Procedure Laterality Date    CIRCUMCISION      WISDOM TOOTH EXTRACTION       Family History   Problem Relation Age of Onset    Cancer Mother     Hypertension Mother     Cataracts Mother     Hypertension Father     Diabetes Father     Diabetes Brother     Amblyopia Neg Hx     Blindness Neg Hx     Glaucoma Neg Hx     Macular degeneration Neg Hx     Retinal detachment Neg Hx     Strabismus Neg Hx     Stroke Neg Hx     Thyroid disease Neg Hx      Social History     Socioeconomic History    Marital status: Single    Number of children: 1   Occupational History     Employer: [employer]   Tobacco Use    Smoking status: Never    Smokeless tobacco: Never   Substance and Sexual Activity    Alcohol use: No    Drug use: Yes     Types: "Crack" cocaine     Comment: in remission     Sexual activity: Yes     Partners: Female     Social Determinants of Health     Financial Resource Strain: Low Risk     Difficulty of Paying Living Expenses: Not very hard   Food Insecurity: No Food Insecurity    Worried " About Running Out of Food in the Last Year: Never true    Ran Out of Food in the Last Year: Never true   Transportation Needs: No Transportation Needs    Lack of Transportation (Medical): No    Lack of Transportation (Non-Medical): No   Physical Activity: Insufficiently Active    Days of Exercise per Week: 2 days    Minutes of Exercise per Session: 30 min   Stress: No Stress Concern Present    Feeling of Stress : Only a little   Social Connections: Unknown    Frequency of Communication with Friends and Family: More than three times a week    Frequency of Social Gatherings with Friends and Family: More than three times a week    Attends Mu-ism Services: 1 to 4 times per year    Active Member of Clubs or Organizations: No    Attends Club or Organization Meetings: Never   Housing Stability: Low Risk     Unable to Pay for Housing in the Last Year: No    Number of Places Lived in the Last Year: 2    Unstable Housing in the Last Year: No       Current Outpatient Medications:     amLODIPine (NORVASC) 10 MG tablet, Take 1 tablet (10 mg total) by mouth once daily., Disp: 90 tablet, Rfl: 3    atorvastatin (LIPITOR) 40 MG tablet, Take 1 tablet (40 mg total) by mouth every evening., Disp: 90 tablet, Rfl: 3    BASAGLAR KWIKPEN U-100 INSULIN glargine 100 units/mL (3mL) SubQ pen, Inject 45 Units into the skin every evening., Disp: 3 each, Rfl: 3    benzocaine-menthoL (CHLORASEPTIC SORE THROAT) 6-10 mg lozenge, Take 1 lozenge by mouth every 2 (two) hours as needed for Pain., Disp: 18 tablet, Rfl: 0    blood sugar diagnostic Strp, To check BG 2 times daily, to use with insurance preferred meter, Disp: 200 each, Rfl: 3    butalbital-acetaminophen-caffeine -40 mg (FIORICET, ESGIC) -40 mg per tablet, Take 1 tablet by mouth every 4 (four) hours as needed for Pain., Disp: 20 tablet, Rfl: 0    calcitRIOL (ROCALTROL) 0.25 MCG Cap, TAKE 1 CAPSULE (0.25 MCG TOTAL) BY MOUTH ONCE DAILY., Disp: 90 capsule, Rfl: 1    clopidogreL  (PLAVIX) 75 mg tablet, Take 1 tablet (75 mg total) by mouth once daily., Disp: 21 tablet, Rfl: 0    ferrous gluconate (FERGON) 324 MG tablet, Take 1 tablet (324 mg total) by mouth daily with breakfast., Disp: , Rfl:     furosemide (LASIX) 40 MG tablet, Take 1 tablet (40 mg total) by mouth once daily., Disp: 90 tablet, Rfl: 3    hydrALAZINE (APRESOLINE) 100 MG tablet, Take 100 mg by mouth every 8 (eight) hours., Disp: , Rfl:     insulin aspart U-100 (NOVOLOG FLEXPEN U-100 INSULIN) 100 unit/mL (3 mL) InPn pen, INJECT 10 UNITS INTO THE SKIN 3 (THREE) TIMES DAILY WITH MEALS., Disp: 60 each, Rfl: 0    labetaloL (NORMODYNE) 300 MG tablet, Take 1 tablet (300 mg total) by mouth 2 (two) times daily. (Patient taking differently: Take 300 mg by mouth once daily.), Disp: 180 tablet, Rfl: 3    metOLazone (ZAROXOLYN) 5 MG tablet, Take 1 tablet (5 mg total) by mouth nightly., Disp: 90 tablet, Rfl: 2    sevelamer carbonate (RENVELA) 800 mg Tab, Take 1 tablet (800 mg total) by mouth 3 (three) times daily with meals., Disp: 90 tablet, Rfl: 11    sodium bicarbonate 650 MG tablet, Take 1 tablet (650 mg total) by mouth 3 (three) times daily., Disp: 90 tablet, Rfl: 11    traMADoL (ULTRAM) 50 mg tablet, Take 1 tablet (50 mg total) by mouth every 8 (eight) hours as needed for Pain., Disp: 12 tablet, Rfl: 0    vitamin D (VITAMIN D3) 1000 units Tab, Take 2,000 Units by mouth nightly., Disp: , Rfl:     aspirin 81 MG Chew, Take 1 tablet (81 mg total) by mouth once daily. (Patient taking differently: Take 81 mg by mouth once daily.), Disp: 30 tablet, Rfl: 11     REVIEW OF SYSTEMS:  General: negative; Respiratory: no cough, shortness of breath, or wheezing; Cardiovascular: no chest pain or dyspnea on exertion; Gastrointestinal: no abdominal pain, change in bowel habits, or bloody stools; Genito-Urinary: no dysuria, trouble voiding, or hematuria; Musculoskeletal: no weakness or decreased range of motion, Neurological: no TIA or stroke symptoms;  Psychiatric: no nervousness, anxiety or depression.    PHYSICAL EXAM:   Right Arm BP - Sittin/90 (10/27/22 1328)  Left Arm BP - Sittin/80 (10/27/22 1328)    General appearance: Alert, well-appearing, and in no distress.  Oriented to person, place, and time  Neurological: Normal speech, no focal findings noted; CN II - XII grossly intact  Musculoskeletal:Digits/nail without cyanosis/clubbing.  Normal muscle strength/tone.  Neck: Supple, no significant adenopathy, no carotid bruit can be auscultated  Chest:Clear to auscultation, no wheezes, rales or rhonchi, symmetric air entry, No use of accessory muscles   Cardiac:Normal rate and regular rhythm, S1 and S2 normal  Abdomen:Soft, nontender, nondistended, no masses or organomegaly, No rebound tenderness noted; bowel sounds normal, Pulsatile aortic mass is not palpable. No groin adenopathy   Extremities: 2+ femoral pulses bilaterally, 2+ bilateral brachial and radial pulses    LAB RESULTS:  Lab Results   Component Value Date    K 4.1 10/19/2022    K 4.1 2022    K 4.0 2022    CREATININE 7.8 (H) 10/19/2022    CREATININE 7.9 (H) 2022    CREATININE 7.5 (H) 2022     Lab Results   Component Value Date    WBC 8.19 10/19/2022    WBC 5.36 2022    WBC 5.68 2022    HCT 29.9 (L) 10/19/2022    HCT 30.4 (L) 2022    HCT 31.1 (L) 2022     (L) 10/19/2022     2022     (L) 2022     Lab Results   Component Value Date    HGBA1C 7.5 (H) 2022    HGBA1C 6.9 (H) 2022    HGBA1C 6.4 (H) 2022     IMAGING:      IMP/PLAN:  52 y.o. male with CKD4, HTN, HLD, and T2DM who presents for evaluation for HD access.  Patient with positive cardiac stress test recently with no cardiology follow-up documented - will need clearance prior to HD access surgery.  Also, will finish with loop recorder  after recent embolic stroke - will need resolution/plan/results prior to OR.     1) cardiology  clearance given stress test  2) loop recorder result  3) RTC for AVF v AVG planning     Amos Harper MD  Vascular & Endovascular Surgery

## 2022-10-27 NOTE — PROGRESS NOTES
"Luis Daniel Howell  10/27/2022    HPI:  Luis Daniel Howell is a 52 y.o. male with a h/o CKD4, HTN, HLD, and T2DM who is here today for evaluation for hemodialysis access. His most recent serum Cr/GFR were 5.4/15. He has no prior history of central vascular access or hemodialysis access.       Past Medical History:   Diagnosis Date    CKD (chronic kidney disease), stage V     Cocaine abuse     Diabetes mellitus type I     Diabetic retinopathy     Hyperlipidemia     Hypertension      Past Surgical History:   Procedure Laterality Date    CIRCUMCISION      WISDOM TOOTH EXTRACTION       Family History   Problem Relation Age of Onset    Cancer Mother     Hypertension Mother     Cataracts Mother     Hypertension Father     Diabetes Father     Diabetes Brother     Amblyopia Neg Hx     Blindness Neg Hx     Glaucoma Neg Hx     Macular degeneration Neg Hx     Retinal detachment Neg Hx     Strabismus Neg Hx     Stroke Neg Hx     Thyroid disease Neg Hx      Social History     Socioeconomic History    Marital status: Single    Number of children: 1   Occupational History     Employer: [employer]   Tobacco Use    Smoking status: Never    Smokeless tobacco: Never   Substance and Sexual Activity    Alcohol use: No    Drug use: Yes     Types: "Crack" cocaine     Comment: in remission     Sexual activity: Yes     Partners: Female     Social Determinants of Health     Financial Resource Strain: Low Risk     Difficulty of Paying Living Expenses: Not very hard   Food Insecurity: No Food Insecurity    Worried About Running Out of Food in the Last Year: Never true    Ran Out of Food in the Last Year: Never true   Transportation Needs: No Transportation Needs    Lack of Transportation (Medical): No    Lack of Transportation (Non-Medical): No   Physical Activity: Insufficiently Active    Days of Exercise per Week: 2 days    Minutes of Exercise per Session: 30 min   Stress: No Stress Concern Present    Feeling of Stress : Only a little "   Social Connections: Unknown    Frequency of Communication with Friends and Family: More than three times a week    Frequency of Social Gatherings with Friends and Family: More than three times a week    Attends Judaism Services: 1 to 4 times per year    Active Member of Clubs or Organizations: No    Attends Club or Organization Meetings: Never   Housing Stability: Low Risk     Unable to Pay for Housing in the Last Year: No    Number of Places Lived in the Last Year: 2    Unstable Housing in the Last Year: No       Current Outpatient Medications:     amLODIPine (NORVASC) 10 MG tablet, Take 1 tablet (10 mg total) by mouth once daily., Disp: 90 tablet, Rfl: 3    atorvastatin (LIPITOR) 40 MG tablet, Take 1 tablet (40 mg total) by mouth every evening., Disp: 90 tablet, Rfl: 3    BASAGLAR KWIKPEN U-100 INSULIN glargine 100 units/mL (3mL) SubQ pen, Inject 45 Units into the skin every evening., Disp: 3 each, Rfl: 3    benzocaine-menthoL (CHLORASEPTIC SORE THROAT) 6-10 mg lozenge, Take 1 lozenge by mouth every 2 (two) hours as needed for Pain., Disp: 18 tablet, Rfl: 0    blood sugar diagnostic Strp, To check BG 2 times daily, to use with insurance preferred meter, Disp: 200 each, Rfl: 3    butalbital-acetaminophen-caffeine -40 mg (FIORICET, ESGIC) -40 mg per tablet, Take 1 tablet by mouth every 4 (four) hours as needed for Pain., Disp: 20 tablet, Rfl: 0    calcitRIOL (ROCALTROL) 0.25 MCG Cap, TAKE 1 CAPSULE (0.25 MCG TOTAL) BY MOUTH ONCE DAILY., Disp: 90 capsule, Rfl: 1    clopidogreL (PLAVIX) 75 mg tablet, Take 1 tablet (75 mg total) by mouth once daily., Disp: 21 tablet, Rfl: 0    ferrous gluconate (FERGON) 324 MG tablet, Take 1 tablet (324 mg total) by mouth daily with breakfast., Disp: , Rfl:     furosemide (LASIX) 40 MG tablet, Take 1 tablet (40 mg total) by mouth once daily., Disp: 90 tablet, Rfl: 3    hydrALAZINE (APRESOLINE) 100 MG tablet, Take 100 mg by mouth every 8 (eight) hours., Disp: , Rfl:      insulin aspart U-100 (NOVOLOG FLEXPEN U-100 INSULIN) 100 unit/mL (3 mL) InPn pen, INJECT 10 UNITS INTO THE SKIN 3 (THREE) TIMES DAILY WITH MEALS., Disp: 60 each, Rfl: 0    labetaloL (NORMODYNE) 300 MG tablet, Take 1 tablet (300 mg total) by mouth 2 (two) times daily. (Patient taking differently: Take 300 mg by mouth once daily.), Disp: 180 tablet, Rfl: 3    metOLazone (ZAROXOLYN) 5 MG tablet, Take 1 tablet (5 mg total) by mouth nightly., Disp: 90 tablet, Rfl: 2    sevelamer carbonate (RENVELA) 800 mg Tab, Take 1 tablet (800 mg total) by mouth 3 (three) times daily with meals., Disp: 90 tablet, Rfl: 11    sodium bicarbonate 650 MG tablet, Take 1 tablet (650 mg total) by mouth 3 (three) times daily., Disp: 90 tablet, Rfl: 11    traMADoL (ULTRAM) 50 mg tablet, Take 1 tablet (50 mg total) by mouth every 8 (eight) hours as needed for Pain., Disp: 12 tablet, Rfl: 0    vitamin D (VITAMIN D3) 1000 units Tab, Take 2,000 Units by mouth nightly., Disp: , Rfl:     aspirin 81 MG Chew, Take 1 tablet (81 mg total) by mouth once daily. (Patient taking differently: Take 81 mg by mouth once daily.), Disp: 30 tablet, Rfl: 11     REVIEW OF SYSTEMS:  General: negative; Respiratory: no cough, shortness of breath, or wheezing; Cardiovascular: no chest pain or dyspnea on exertion; Gastrointestinal: no abdominal pain, change in bowel habits, or bloody stools; Genito-Urinary: no dysuria, trouble voiding, or hematuria; Musculoskeletal: no weakness or decreased range of motion, Neurological: no TIA or stroke symptoms; Psychiatric: no nervousness, anxiety or depression.    PHYSICAL EXAM:   Right Arm BP - Sittin/90 (10/27/22 1328)  Left Arm BP - Sittin/80 (10/27/22 1328)    General appearance: Alert, well-appearing, and in no distress.  Oriented to person, place, and time  Neurological: Normal speech, no focal findings noted; CN II - XII grossly intact  Musculoskeletal:Digits/nail without cyanosis/clubbing.  Normal muscle  strength/tone.  Neck: Supple, no significant adenopathy, no carotid bruit can be auscultated  Chest:Clear to auscultation, no wheezes, rales or rhonchi, symmetric air entry, No use of accessory muscles   Cardiac:Normal rate and regular rhythm, S1 and S2 normal  Abdomen:Soft, nontender, nondistended, no masses or organomegaly, No rebound tenderness noted; bowel sounds normal, Pulsatile aortic mass is not palpable. No groin adenopathy   Extremities: 2+ femoral pulses bilaterally, 2+ bilateral brachial and radial pulses    LAB RESULTS:  Lab Results   Component Value Date    K 4.1 10/19/2022    K 4.1 08/30/2022    K 4.0 08/26/2022    CREATININE 7.8 (H) 10/19/2022    CREATININE 7.9 (H) 08/30/2022    CREATININE 7.5 (H) 08/26/2022     Lab Results   Component Value Date    WBC 8.19 10/19/2022    WBC 5.36 08/30/2022    WBC 5.68 08/26/2022    HCT 29.9 (L) 10/19/2022    HCT 30.4 (L) 08/30/2022    HCT 31.1 (L) 08/26/2022     (L) 10/19/2022     08/30/2022     (L) 08/26/2022     Lab Results   Component Value Date    HGBA1C 7.5 (H) 08/01/2022    HGBA1C 6.9 (H) 06/27/2022    HGBA1C 6.4 (H) 02/25/2022     IMAGING:  TTE 9/24/21:  The left ventricle is normal in size with severe concentric hypertrophy and normal systolic function.  The estimated ejection fraction is 60%.  Indeterminate left ventricular diastolic function.  Normal right ventricular size with normal right ventricular systolic function.  Mild mitral regurgitation.  Normal central venous pressure (3 mmHg).    Vein Mapping 10/14/21:  Upper Extremity Vein Mapping   =======================     Rt prox cephalic V arm AP diam 5.9 mm   Rt mid cephalic V arm AP diam  3.6 mm   Rt distal cephalic V arm AP diam   3.8 mm   Rt cephalic V antecub fossa AP diam    5.0 mm   Rt prox cephalic V forearm AP diam 5.5 mm   Rt mid cephalic V forearm AP diam  4.2 mm   Rt distal cephalic V forearm AP diam   3.7 mm   Rt cephalic V wrist AP diam    3.9 mm   Rt basilic V  shoulder AP diam  6.4 mm   Rt prox basilic V arm AP diam  3.8 mm   Rt basilic V antecub fossa AP diam 3.7 mm   Rt prox basilic V forearm AP diam  2.7 mm   Lt prox cephalic V arm AP diam 5.0 mm   Lt cephalic V antecub fossa AP diam    5.7 mm   Lt prox cephalic V forearm AP diam 4.0 mm   Lt mid cephalic V forearm AP diam  5.9 mm   Lt distal cephalic V forearm AP diam   5.3 mm   Lt cephalic V wrist AP diam    5.0 mm   Lt basilic V shoulder AP diam  8.1 mm   Lt prox basilic V arm AP diam  4.4 mm   Lt basilic V antecub fossa AP diam 4.6 mm   Lt prox basilic V forearm AP diam  2.1 mm   Vein map upper extr other findings:   Brachial Vein measurements: in mm   Right: Prox- 6.4 , ACF- 4.4 ; Left: Prox- 4.4 , ACF- 5.0     Native ARTERIAL Vessel Measurements and Velocities:       Rt. Distal Brachial A: 6.4 mm,76 cm/sec   Rt. Proximal Radial A: 3.6 mm, 76 cm/sec   Rt. Distal Radial A: 3.7 mm, 86cm/sec     Lt. Distal Brachial A: 6.2 mm, 83 cm/sec   Lt. Distal Proxima Radial A: 2.8 mm, 69 cm/sec   Lt. Distal Radial A: 3.3 mm, 112 cm/sec     Upper Extremity Veins   =================     Rt axillary V: complete compression, complete color fill, no thrombosis, Reflux Present   Lt axillary V: complete compression, complete color fill, no thrombosis     Impression   =========     Upper Vein Mapping: Mapped and measured veins of the bilateral upper extremities. Tourniquet applied for cephalic vein. Duplex   imaging reveals patent and compressible vessels with no evidence of deep venous thrombosis. Significant reflux is noted in the Rt.   Axillary vein.     Arterial Mapping: Mapped and measured the bilateral brachial and radial arteries appropriately. Duplex imaging reveals no evidence of   a hemodynamically significant stenosis. Minimal atherosclerotic plaquing is noted in the right radial artery.      IMP/PLAN:  52 y.o. male with CKD4, HTN, HLD, and T2DM who presents for evaluation for HD access.    -Plan for LUE AV autogenous fistula  creation on 11/9 - risks and benefits discussed in detail   -Consent obtained in clinic    Amos Harper MD  Vascular & Endovascular Surgery

## 2022-11-25 PROBLEM — J10.1 INFLUENZA A: Status: ACTIVE | Noted: 2022-11-25

## 2022-11-25 PROBLEM — R03.0 ELEVATED BLOOD PRESSURE READING: Status: ACTIVE | Noted: 2022-11-25

## 2022-11-28 ENCOUNTER — TELEPHONE (OUTPATIENT)
Dept: NEPHROLOGY | Facility: CLINIC | Age: 52
End: 2022-11-28
Payer: COMMERCIAL

## 2022-11-28 NOTE — TELEPHONE ENCOUNTER
Attempted to call Teresa back no answer               ----- Message from Aisha Meadows sent at 11/28/2022 11:55 AM CST -----  Contact: Teresa Moore from [facility] requesting call back RE: Theres no orders and the appt is scheduled for labs today        Contact @ [phone]

## 2022-12-02 DIAGNOSIS — N18.5 CKD (CHRONIC KIDNEY DISEASE), STAGE V: Primary | ICD-10-CM

## 2022-12-06 ENCOUNTER — OFFICE VISIT (OUTPATIENT)
Dept: NEPHROLOGY | Facility: CLINIC | Age: 52
End: 2022-12-06
Payer: COMMERCIAL

## 2022-12-06 VITALS
OXYGEN SATURATION: 98 % | HEIGHT: 71 IN | WEIGHT: 228.19 LBS | BODY MASS INDEX: 31.95 KG/M2 | SYSTOLIC BLOOD PRESSURE: 110 MMHG | DIASTOLIC BLOOD PRESSURE: 64 MMHG | HEART RATE: 67 BPM

## 2022-12-06 DIAGNOSIS — I10 HYPERTENSION, UNSPECIFIED TYPE: ICD-10-CM

## 2022-12-06 DIAGNOSIS — N18.5 CKD (CHRONIC KIDNEY DISEASE), STAGE V: Primary | ICD-10-CM

## 2022-12-06 DIAGNOSIS — D63.1 ANEMIA OF CHRONIC KIDNEY FAILURE, UNSPECIFIED STAGE: ICD-10-CM

## 2022-12-06 DIAGNOSIS — N18.9 ANEMIA OF CHRONIC KIDNEY FAILURE, UNSPECIFIED STAGE: ICD-10-CM

## 2022-12-06 PROCEDURE — 99999 PR PBB SHADOW E&M-EST. PATIENT-LVL III: CPT | Mod: PBBFAC,,, | Performed by: INTERNAL MEDICINE

## 2022-12-06 PROCEDURE — 3078F PR MOST RECENT DIASTOLIC BLOOD PRESSURE < 80 MM HG: ICD-10-PCS | Mod: CPTII,S$GLB,, | Performed by: INTERNAL MEDICINE

## 2022-12-06 PROCEDURE — 1159F MED LIST DOCD IN RCRD: CPT | Mod: CPTII,S$GLB,, | Performed by: INTERNAL MEDICINE

## 2022-12-06 PROCEDURE — 3008F BODY MASS INDEX DOCD: CPT | Mod: CPTII,S$GLB,, | Performed by: INTERNAL MEDICINE

## 2022-12-06 PROCEDURE — 3051F PR MOST RECENT HEMOGLOBIN A1C LEVEL 7.0 - < 8.0%: ICD-10-PCS | Mod: CPTII,S$GLB,, | Performed by: INTERNAL MEDICINE

## 2022-12-06 PROCEDURE — 99215 OFFICE O/P EST HI 40 MIN: CPT | Mod: S$GLB,,, | Performed by: INTERNAL MEDICINE

## 2022-12-06 PROCEDURE — 3051F HG A1C>EQUAL 7.0%<8.0%: CPT | Mod: CPTII,S$GLB,, | Performed by: INTERNAL MEDICINE

## 2022-12-06 PROCEDURE — 1159F PR MEDICATION LIST DOCUMENTED IN MEDICAL RECORD: ICD-10-PCS | Mod: CPTII,S$GLB,, | Performed by: INTERNAL MEDICINE

## 2022-12-06 PROCEDURE — 99999 PR PBB SHADOW E&M-EST. PATIENT-LVL III: ICD-10-PCS | Mod: PBBFAC,,, | Performed by: INTERNAL MEDICINE

## 2022-12-06 PROCEDURE — 3078F DIAST BP <80 MM HG: CPT | Mod: CPTII,S$GLB,, | Performed by: INTERNAL MEDICINE

## 2022-12-06 PROCEDURE — 3066F NEPHROPATHY DOC TX: CPT | Mod: CPTII,S$GLB,, | Performed by: INTERNAL MEDICINE

## 2022-12-06 PROCEDURE — 3074F PR MOST RECENT SYSTOLIC BLOOD PRESSURE < 130 MM HG: ICD-10-PCS | Mod: CPTII,S$GLB,, | Performed by: INTERNAL MEDICINE

## 2022-12-06 PROCEDURE — 99215 PR OFFICE/OUTPT VISIT, EST, LEVL V, 40-54 MIN: ICD-10-PCS | Mod: S$GLB,,, | Performed by: INTERNAL MEDICINE

## 2022-12-06 PROCEDURE — 3008F PR BODY MASS INDEX (BMI) DOCUMENTED: ICD-10-PCS | Mod: CPTII,S$GLB,, | Performed by: INTERNAL MEDICINE

## 2022-12-06 PROCEDURE — 3074F SYST BP LT 130 MM HG: CPT | Mod: CPTII,S$GLB,, | Performed by: INTERNAL MEDICINE

## 2022-12-06 PROCEDURE — 3066F PR DOCUMENTATION OF TREATMENT FOR NEPHROPATHY: ICD-10-PCS | Mod: CPTII,S$GLB,, | Performed by: INTERNAL MEDICINE

## 2022-12-06 NOTE — PROGRESS NOTES
Progress Note  Nephrology      Referring physician: Janak Wilson MD    Reason for visit: CKD     SUBJECTIVE:   52 y.o. male  has a past medical history of CKD (chronic kidney disease), stage V, Cocaine abuse, Diabetes mellitus type I, Diabetic retinopathy, Hyperlipidemia, and Hypertension. who has been following up in renal clinic for ckd management   The patient denies taking NSAIDs or new antibiotics, recreational drugs, recent episode of dehydration, diarrhea, nausea or vomiting, acute illness, hospitalization or exposure to IV radiocontrast.     ROS:  General:  fatigue  ENT: No epistaxis or headaches  Hematological and Lymphatic: No bleeding problems or blood clots.  Endocrine: No skin changes or temperature intolerance  Respiratory: No cough, shortness of breath, or wheezing  Cardiovascular: No chest pain or dyspnea   Gastrointestinal: No abdominal pain, change in bowel habits  Genito-Urinary: No dysuria, trouble voiding, or hematuria  Musculoskeletal: ROS: negative for - joint pain, joint stiffness, joint swelling, muscle pain or muscular weakness  Neurological: No focal weakness, no numbness  Dermatological: No rash or ulcers    OBJECTIVE:     Vitals:    12/06/22 1004   BP: 110/64   Pulse: 67          Physical Exam:  General: no distress, well nourished  HENT: PERRLA, Normal mouth nose and ears.  Neck: no JVD and thyroid not enlarged, symmetric, no tenderness/mass/nodules  Lungs: clear to auscultation bilaterally and normal respiratory effort  Cardiovascular: regular rate and rhythm, S1, S2 normal, no murmur, click, rub or gallop.   Abdomen: soft, non-tender non-distented; bowel sounds normal  Skin: No rashes or lesions  Musculoskeletal:no edema in LE, no deformities.   Lymph Nodes: No cervical or supraclavicular adenopathy  Neurologic: Normal strength and tone. No focal numbness or weakness          Medications:    Current Outpatient Medications:     amLODIPine (NORVASC) 10 MG tablet, Take 1 tablet (10 mg  total) by mouth once daily., Disp: 90 tablet, Rfl: 3    aspirin 81 MG Chew, Take 1 tablet (81 mg total) by mouth once daily. (Patient taking differently: Take 81 mg by mouth once daily.), Disp: 30 tablet, Rfl: 11    atorvastatin (LIPITOR) 40 MG tablet, Take 1 tablet (40 mg total) by mouth every evening., Disp: 90 tablet, Rfl: 3    BASAGLAR KWIKPEN U-100 INSULIN glargine 100 units/mL (3mL) SubQ pen, Inject 45 Units into the skin every evening., Disp: 3 each, Rfl: 3    benzocaine-menthoL (CHLORASEPTIC SORE THROAT) 6-10 mg lozenge, Take 1 lozenge by mouth every 2 (two) hours as needed for Pain., Disp: 18 tablet, Rfl: 0    blood sugar diagnostic Strp, To check BG 2 times daily, to use with insurance preferred meter, Disp: 200 each, Rfl: 3    calcitRIOL (ROCALTROL) 0.25 MCG Cap, TAKE 1 CAPSULE (0.25 MCG TOTAL) BY MOUTH ONCE DAILY., Disp: 90 capsule, Rfl: 1    clopidogreL (PLAVIX) 75 mg tablet, Take 1 tablet (75 mg total) by mouth once daily., Disp: 21 tablet, Rfl: 0    ferrous gluconate (FERGON) 324 MG tablet, Take 1 tablet (324 mg total) by mouth daily with breakfast., Disp: , Rfl:     furosemide (LASIX) 40 MG tablet, Take 1 tablet (40 mg total) by mouth once daily., Disp: 90 tablet, Rfl: 3    hydrALAZINE (APRESOLINE) 100 MG tablet, Take 100 mg by mouth every 8 (eight) hours., Disp: , Rfl:     ibuprofen (ADVIL,MOTRIN) 800 MG tablet, Take 1 tablet (800 mg total) by mouth every 6 (six) hours as needed for Pain., Disp: 20 tablet, Rfl: 0    insulin aspart U-100 (NOVOLOG FLEXPEN U-100 INSULIN) 100 unit/mL (3 mL) InPn pen, INJECT 10 UNITS INTO THE SKIN 3 (THREE) TIMES DAILY WITH MEALS., Disp: 60 each, Rfl: 0    labetaloL (NORMODYNE) 300 MG tablet, Take 1 tablet (300 mg total) by mouth 2 (two) times daily. (Patient taking differently: Take 300 mg by mouth once daily.), Disp: 180 tablet, Rfl: 3    metOLazone (ZAROXOLYN) 5 MG tablet, Take 1 tablet (5 mg total) by mouth nightly., Disp: 90 tablet, Rfl: 2    ondansetron (ZOFRAN) 4  MG tablet, Take 1 tablet (4 mg total) by mouth every 6 (six) hours., Disp: 12 tablet, Rfl: 0    sevelamer carbonate (RENVELA) 800 mg Tab, Take 1 tablet (800 mg total) by mouth 3 (three) times daily with meals., Disp: 90 tablet, Rfl: 11    sodium bicarbonate 650 MG tablet, Take 1 tablet (650 mg total) by mouth 3 (three) times daily., Disp: 90 tablet, Rfl: 11    traMADoL (ULTRAM) 50 mg tablet, Take 1 tablet (50 mg total) by mouth every 8 (eight) hours as needed for Pain., Disp: 12 tablet, Rfl: 0    vitamin D (VITAMIN D3) 1000 units Tab, Take 2,000 Units by mouth nightly., Disp: , Rfl:          Laboratory:  Lab Results   Component Value Date    CREATININE 7.8 (H) 12/05/2022       Prot/Creat Ratio, Urine   Date Value Ref Range Status   12/05/2022 0.93 (H) 0.00 - 0.20 Final   08/26/2022 2.05 (H) 0.00 - 0.20 Final   05/26/2021 1.63 (H) 0.00 - 0.20 Final       Lab Results   Component Value Date     12/05/2022    K 4.6 12/05/2022    CO2 21 (L) 12/05/2022       last PTH   Lab Results   Component Value Date    .1 (H) 08/30/2022    CALCIUM 8.1 (L) 12/05/2022    PHOS 5.3 (H) 08/30/2022       Lab Results   Component Value Date    HGB 8.9 (L) 12/05/2022        Lab Results   Component Value Date    HGBA1C 7.5 (H) 08/01/2022       Lab Results   Component Value Date    LDLCALC 79.6 07/31/2022       Other Labs were reviewed      ASSESSMENT/PLAN:     1-CKD stage V, 2/2 diabetic and hypertensive nephropathy and cocaine abuse   - kidney function is worsening bun/cr 72/7.8  - no urgent indication for dialysis  - pt agrees to start getting the AVF, seen by vascular surgeon   -Avoid NSAIDs intake  - UPCR 0.9     2-DM type I uncontrolled   - last a1c 7.5     3.-HTN controlled :  - pt did not take his meds this am , he is going to take them      4-h/o cocaine abuse      5-AOCD : hbg 8.9  - iron sat 20  - will start epo      6-CHF ( no acute issues )     7- CKD-MBD  - on calcitriol   -  , PO4  5.3  - stressed on low po4  diet      8- metabolic acidosis : co2 21  - on bicarb            RTC in 2m      TERRA ROLDAN MD  NEPHROLOGY ATTENDING

## 2022-12-07 ENCOUNTER — TELEPHONE (OUTPATIENT)
Dept: INTERNAL MEDICINE | Facility: CLINIC | Age: 52
End: 2022-12-07
Payer: COMMERCIAL

## 2022-12-07 DIAGNOSIS — E11.40 TYPE 2 DIABETES MELLITUS WITH DIABETIC NEUROPATHY, WITH LONG-TERM CURRENT USE OF INSULIN: Primary | ICD-10-CM

## 2022-12-07 DIAGNOSIS — Z79.4 TYPE 2 DIABETES MELLITUS WITH DIABETIC NEUROPATHY, WITH LONG-TERM CURRENT USE OF INSULIN: Primary | ICD-10-CM

## 2022-12-07 NOTE — TELEPHONE ENCOUNTER
----- Message from Feliciano Brooks sent at 12/7/2022 10:20 AM CST -----  Name Of Caller: Luis Daniel        Provider Name: Janak Wilson        Does patient feel the need to be seen today? no        Relationship to the Pt?: patient        Contact Preference?: [phone]        What is the nature of the call?:  Patient states that he would like to speak with someone in the office regarding his anemia

## 2022-12-07 NOTE — TELEPHONE ENCOUNTER
Spoke with pt pt was seen yesterday by a different provider. Pt stated he was diagnosed with Anemia, also pt said he was told the office with set up biweekly injections for the Anemia. Pt would like to know if he can take pills and the injection for the Anemia. Please, advise. Thanks.

## 2022-12-07 NOTE — TELEPHONE ENCOUNTER
Yes.  He was seen by Nephrology and they recommended shots to help with the anemia.  So I would definitely follow-up with him and take the shots.  He is not been seen by me since this summer and I do not see a follow-up coming up.  Please get a chemistry and hemoglobin A1c on him the next few weeks and let me follow with him in the next 4-5 weeks.

## 2022-12-16 DIAGNOSIS — E11.9 TYPE 2 DIABETES MELLITUS WITHOUT COMPLICATION: ICD-10-CM

## 2022-12-19 ENCOUNTER — PATIENT MESSAGE (OUTPATIENT)
Dept: ADMINISTRATIVE | Facility: HOSPITAL | Age: 52
End: 2022-12-19
Payer: COMMERCIAL

## 2022-12-30 ENCOUNTER — TELEPHONE (OUTPATIENT)
Dept: INTERNAL MEDICINE | Facility: CLINIC | Age: 52
End: 2022-12-30
Payer: COMMERCIAL
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
## 2022-12-30 NOTE — TELEPHONE ENCOUNTER
Offered pt appt on 1/12 in the afternoon, he stated he wants to see you sooner than that date if you can squeeze him in.     Asif MIKE

## 2023-01-04 ENCOUNTER — OFFICE VISIT (OUTPATIENT)
Dept: INTERNAL MEDICINE | Facility: CLINIC | Age: 53
End: 2023-01-04
Payer: MEDICAID

## 2023-01-04 VITALS — SYSTOLIC BLOOD PRESSURE: 172 MMHG | DIASTOLIC BLOOD PRESSURE: 96 MMHG

## 2023-01-04 DIAGNOSIS — N18.5 CKD (CHRONIC KIDNEY DISEASE), STAGE V: ICD-10-CM

## 2023-01-04 DIAGNOSIS — Z91.199 H/O NONCOMPLIANCE WITH MEDICAL TREATMENT, PRESENTING HAZARDS TO HEALTH: Primary | ICD-10-CM

## 2023-01-04 DIAGNOSIS — I63.312 THROMBOTIC STROKE INVOLVING LEFT MIDDLE CEREBRAL ARTERY: ICD-10-CM

## 2023-01-04 DIAGNOSIS — F14.90 CRACK COCAINE USE: ICD-10-CM

## 2023-01-04 DIAGNOSIS — D63.1 ANEMIA OF CHRONIC KIDNEY FAILURE, UNSPECIFIED STAGE: ICD-10-CM

## 2023-01-04 DIAGNOSIS — N18.9 ANEMIA OF CHRONIC KIDNEY FAILURE, UNSPECIFIED STAGE: ICD-10-CM

## 2023-01-04 DIAGNOSIS — E11.40 TYPE 2 DIABETES MELLITUS WITH DIABETIC NEUROPATHY, WITH LONG-TERM CURRENT USE OF INSULIN: ICD-10-CM

## 2023-01-04 DIAGNOSIS — Z79.4 TYPE 2 DIABETES MELLITUS WITH DIABETIC NEUROPATHY, WITH LONG-TERM CURRENT USE OF INSULIN: ICD-10-CM

## 2023-01-04 PROBLEM — N17.9 ACUTE RENAL FAILURE: Status: RESOLVED | Noted: 2019-09-08 | Resolved: 2023-01-04

## 2023-01-04 PROBLEM — R03.0 ELEVATED BLOOD PRESSURE READING: Status: RESOLVED | Noted: 2022-11-25 | Resolved: 2023-01-04

## 2023-01-04 PROBLEM — R79.89 ELEVATED TROPONIN: Status: RESOLVED | Noted: 2019-07-23 | Resolved: 2023-01-04

## 2023-01-04 PROBLEM — J10.1 INFLUENZA A: Status: RESOLVED | Noted: 2022-11-25 | Resolved: 2023-01-04

## 2023-01-04 PROCEDURE — 99214 OFFICE O/P EST MOD 30 MIN: CPT | Mod: S$PBB,,, | Performed by: INTERNAL MEDICINE

## 2023-01-04 PROCEDURE — 99213 OFFICE O/P EST LOW 20 MIN: CPT | Mod: PBBFAC | Performed by: INTERNAL MEDICINE

## 2023-01-04 PROCEDURE — 99214 PR OFFICE/OUTPT VISIT, EST, LEVL IV, 30-39 MIN: ICD-10-PCS | Mod: S$PBB,,, | Performed by: INTERNAL MEDICINE

## 2023-01-04 PROCEDURE — 99999 PR PBB SHADOW E&M-EST. PATIENT-LVL III: CPT | Mod: PBBFAC,,, | Performed by: INTERNAL MEDICINE

## 2023-01-04 PROCEDURE — 99999 PR PBB SHADOW E&M-EST. PATIENT-LVL III: ICD-10-PCS | Mod: PBBFAC,,, | Performed by: INTERNAL MEDICINE

## 2023-01-04 NOTE — PROGRESS NOTES
"  He is a 52-year-old time noncompliance gentleman with history of cocaine abuse, end-stage renal disease but not yet on dialysis, uncontrolled hypertension, and diabetes mellitus type 2. I last saw him last  in August 2022.      He horribly noncompliant.  All meds listed on med list were reconciled but he does not take jenny meds multiple times a day.  IF HE TAKE HIS MEDS, he take all of them 1 time a day.  He did not take  his meds today because, something about breakfast.  I have oftenI mentioned that he needs to take his blood pressure medicine before he had his a stroke.  He finally had his stroke in August of last year.    He had right-sided facial weakness, even though he tells me he has left-sided facial weakness.  It was  right-sided facial weakness at his visit .  He says it is doing better know.  .He was not able to take his meds because he was rushing to his 1 pm appointment       He has changed his diet and is not drinking cold drinks " back to back to back", which is good because he is diabetic.  HGB A1c was 8.0 which is good considering he has not had any insulin in the last 4 moths due to price.  He has not contacted my office to get anything cheaper.  It seems he was driven to contact me because he has a disability form to that he needs filled out.        He has been out of work and has " no money" which has helped with the cocaine.  He denies using cocaine due to cost.  He did have cocaine in his system at the time of his stroke.  prior to his visit to the emergency room but cocaine was still in his system on the drug screen.      IN August he had his stroke,. MRI showed a left subcortical MCA noncritical stenosis and stroke in in her capsule and corona radii.  In the hospital he also had a MRA of his neck vessels which were unremarkable.  He was started on Plavix for 21 days and aspirin.  He is still  on aspirin after    He had  speech therapy                 He has end-stage renal disease based on " "dialysis yet.  Most recent creatinine clearance was estimated at 8 mL/min.  With a creatinine of 7.4 ml/min.  He last saw Nephrology back in DEC  last year .  Potassium is doing well, but he does have a metabolic acidosis.                Review of Systems       ROS : Gen - no fatigue or significant weight change  Eyes - no eye pain or visual changes  ENT - no hoarseness or sore throat  CV - No chest pain or SOB.  NO palpitations.  Pulm - no cough or wheezing  GI - no N/V/D   no dysuria or incontinence  MS - no joint pain or muscle pain  Skin - no rash, or c/o of skin lesions  Neuro - he has some hand pain but it is on the right side.    Heme - no abnormal bleeding or bruising  Endo - no polydipsia, or temperature changes  Psych - no anxiety or depression      Objective:   Physical Exam       BP (!) 172/96 (BP Location: Right arm, Patient Position: Sitting, BP Method: Medium (Manual))   Pulse (P) 69   Ht (P) 5' 11" (1.803 m)   Wt (P) 102.7 kg (226 lb 6.6 oz)   SpO2 (P) 97%   BMI (P) 31.58 kg/m²     He is a well-appearing gentleman in no acute distress.  He is well dressed and by himself today.  He has a right-sided facial droop that is sparing his forehead.  He still has absolutely no insight into his medical problems.  He is very apologetic about not taking his medication and ensures me this is a 1 time 0 fence, but today I reviewed notes with him and he has some really horrible blood pressures during some of his other visits and those notes stated that he did not take his blood pressure those days..    His chest are clear bilaterally.  Lungs are without any abnormal breath sounds.  Cardiovascular exam is unremarkable.  Abdomen soft and nontender.  He is walking without any difficulty and his upper lower extremities have  Normal strength.  His speech is normal He is not having any trouble with word finding.  He does not have any trouble understanding words or sentences either.     Assessment and plan:   "   Assessment:       1. Thrombotic stroke involving left middle cerebral artery    2. Substance abuse    3. Type 2 diabetes mellitus with hyperosmolarity without coma, with long-term current use of insulin    4. Malignant hypertension    5. Essential hypertension    6.  7.  CKD (chronic kidney disease), stage V   Some of the worst medical noncompliance high have ever seen.         Plan:       I am very worried about this gentleman.  He has very poor insight.  I am going to fill out his paperwork for his disability.  I went over what we need to do for him, but the most important thing is what he needs to do for himself.  He needs to take his medicines as prescribed daily.  He is not doing this.  We reviewed his renal function which he will need to start dialysis soon due to his stage 5 renal disease.  He needs to follow-up with nephrology and vascular surgery.  Also he needs to take his blood pressure so he does not have a another stroke.  He needs to stay off his cocaine.  He has been through rehab multiple times and he does say the fact that he is not having any money come coming in he is no longer able to for cocaine.         He is going to have another stroke he does not take his medicines.  I do not think he is actively trying to hurt himself.     We will continue or start his medications until I see him back in 3 months.  I do not think I am going to see him back in 3 months because he does not come in unless he needs something from me such as a disability paperwork filled out.  My new appointment schedules are far out right now and I do not feel like sticking him in would benefit him because I doubt he is going to show up for that appointment anyway and I discussed that with them.  We have multiple appointments that he just does not come to or canceled at the last minute.  I will be happy to help Mr. Howell out any way I can but he needs to at least make some minimal effort in taking care of himself.  I will be  happy to see him sooner if he would like.

## 2023-02-13 DIAGNOSIS — N18.5 CKD (CHRONIC KIDNEY DISEASE), STAGE V: Primary | ICD-10-CM

## 2023-03-21 ENCOUNTER — TELEPHONE (OUTPATIENT)
Dept: INTERNAL MEDICINE | Facility: CLINIC | Age: 53
End: 2023-03-21
Payer: MEDICAID

## 2023-03-21 NOTE — TELEPHONE ENCOUNTER
----- Message from Telma Cole sent at 3/21/2023  1:07 PM CDT -----    Patient Returning Call        Who Called:pt  Does the patient know what this is regarding?:need a appt f/u from stroke  Would the patient rather a call back or a response via [facility]? call  Best Call Back Number:[phone]  Additional Information: call back

## 2023-03-22 DIAGNOSIS — E11.9 TYPE 2 DIABETES MELLITUS WITHOUT COMPLICATION: ICD-10-CM

## 2023-03-27 ENCOUNTER — PATIENT MESSAGE (OUTPATIENT)
Dept: ADMINISTRATIVE | Facility: HOSPITAL | Age: 53
End: 2023-03-27
Payer: MEDICAID

## 2023-03-28 ENCOUNTER — TELEPHONE (OUTPATIENT)
Dept: INTERNAL MEDICINE | Facility: CLINIC | Age: 53
End: 2023-03-28
Payer: MEDICAID

## 2023-03-28 NOTE — TELEPHONE ENCOUNTER
----- Message from Tim Doty sent at 3/28/2023 12:16 PM CDT -----  Contact: [phone]  Pt said he thought is appt was for this afternoon and would like to r/s his [facility] appt. Please call pt back.

## 2023-04-03 ENCOUNTER — OFFICE VISIT (OUTPATIENT)
Dept: INTERNAL MEDICINE | Facility: CLINIC | Age: 53
End: 2023-04-03
Payer: COMMERCIAL

## 2023-04-03 ENCOUNTER — PATIENT MESSAGE (OUTPATIENT)
Dept: ADMINISTRATIVE | Facility: HOSPITAL | Age: 53
End: 2023-04-03
Payer: MEDICAID

## 2023-04-03 ENCOUNTER — TELEPHONE (OUTPATIENT)
Dept: INTERNAL MEDICINE | Facility: CLINIC | Age: 53
End: 2023-04-03

## 2023-04-03 VITALS
WEIGHT: 224.63 LBS | HEART RATE: 71 BPM | HEIGHT: 71 IN | SYSTOLIC BLOOD PRESSURE: 114 MMHG | BODY MASS INDEX: 31.45 KG/M2 | OXYGEN SATURATION: 98 % | DIASTOLIC BLOOD PRESSURE: 82 MMHG

## 2023-04-03 DIAGNOSIS — D63.1 ANEMIA OF CHRONIC KIDNEY FAILURE, UNSPECIFIED STAGE: Primary | ICD-10-CM

## 2023-04-03 DIAGNOSIS — Z79.4 TYPE 2 DIABETES MELLITUS WITH DIABETIC NEUROPATHY, WITH LONG-TERM CURRENT USE OF INSULIN: ICD-10-CM

## 2023-04-03 DIAGNOSIS — I10 ESSENTIAL HYPERTENSION: ICD-10-CM

## 2023-04-03 DIAGNOSIS — N25.81 SECONDARY HYPERPARATHYROIDISM OF RENAL ORIGIN: ICD-10-CM

## 2023-04-03 DIAGNOSIS — F14.90 CRACK COCAINE USE: ICD-10-CM

## 2023-04-03 DIAGNOSIS — E11.40 TYPE 2 DIABETES MELLITUS WITH DIABETIC NEUROPATHY, WITH LONG-TERM CURRENT USE OF INSULIN: ICD-10-CM

## 2023-04-03 DIAGNOSIS — N18.9 ANEMIA OF CHRONIC KIDNEY FAILURE, UNSPECIFIED STAGE: Primary | ICD-10-CM

## 2023-04-03 DIAGNOSIS — R06.09 DYSPNEA ON EXERTION: ICD-10-CM

## 2023-04-03 DIAGNOSIS — Z79.4 TYPE 2 DIABETES MELLITUS WITH HYPEROSMOLARITY WITHOUT COMA, WITH LONG-TERM CURRENT USE OF INSULIN: ICD-10-CM

## 2023-04-03 DIAGNOSIS — N18.5 CKD (CHRONIC KIDNEY DISEASE), STAGE V: ICD-10-CM

## 2023-04-03 DIAGNOSIS — E10.319 DIABETIC RETINOPATHY ASSOCIATED WITH TYPE 1 DIABETES MELLITUS, MACULAR EDEMA PRESENCE UNSPECIFIED, UNSPECIFIED LATERALITY, UNSPECIFIED RETINOPATHY SEVERITY: ICD-10-CM

## 2023-04-03 DIAGNOSIS — I63.312 THROMBOTIC STROKE INVOLVING LEFT MIDDLE CEREBRAL ARTERY: ICD-10-CM

## 2023-04-03 DIAGNOSIS — E11.00 TYPE 2 DIABETES MELLITUS WITH HYPEROSMOLARITY WITHOUT COMA, WITH LONG-TERM CURRENT USE OF INSULIN: ICD-10-CM

## 2023-04-03 DIAGNOSIS — E78.5 DYSLIPIDEMIA: ICD-10-CM

## 2023-04-03 DIAGNOSIS — R73.9 HYPERGLYCEMIA: ICD-10-CM

## 2023-04-03 DIAGNOSIS — L30.9 DERMATITIS: ICD-10-CM

## 2023-04-03 PROBLEM — F19.10 SUBSTANCE ABUSE: Status: RESOLVED | Noted: 2019-09-08 | Resolved: 2023-04-03

## 2023-04-03 PROBLEM — J81.1 NONCARDIAC PULMONARY EDEMA: Status: RESOLVED | Noted: 2021-09-24 | Resolved: 2023-04-03

## 2023-04-03 PROBLEM — R79.89 TROPONIN LEVEL ELEVATED: Status: RESOLVED | Noted: 2019-07-23 | Resolved: 2023-04-03

## 2023-04-03 PROCEDURE — 99214 PR OFFICE/OUTPT VISIT, EST, LEVL IV, 30-39 MIN: ICD-10-PCS | Mod: S$PBB,,, | Performed by: INTERNAL MEDICINE

## 2023-04-03 PROCEDURE — 99214 OFFICE O/P EST MOD 30 MIN: CPT | Mod: S$PBB,,, | Performed by: INTERNAL MEDICINE

## 2023-04-03 PROCEDURE — 99999 PR PBB SHADOW E&M-EST. PATIENT-LVL III: ICD-10-PCS | Mod: PBBFAC,,, | Performed by: INTERNAL MEDICINE

## 2023-04-03 PROCEDURE — 99999 PR PBB SHADOW E&M-EST. PATIENT-LVL III: CPT | Mod: PBBFAC,,, | Performed by: INTERNAL MEDICINE

## 2023-04-03 PROCEDURE — 99213 OFFICE O/P EST LOW 20 MIN: CPT | Mod: PBBFAC | Performed by: INTERNAL MEDICINE

## 2023-04-03 RX ORDER — METOLAZONE 5 MG/1
5 TABLET ORAL NIGHTLY
Qty: 90 TABLET | Refills: 2 | Status: SHIPPED | OUTPATIENT
Start: 2023-04-03 | End: 2023-05-12

## 2023-04-03 RX ORDER — LABETALOL 300 MG/1
300 TABLET, FILM COATED ORAL 2 TIMES DAILY
Qty: 180 TABLET | Refills: 3 | Status: SHIPPED | OUTPATIENT
Start: 2023-04-03 | End: 2023-10-06 | Stop reason: SDUPTHER

## 2023-04-03 RX ORDER — HUMAN INSULIN 100 [IU]/ML
INJECTION, SOLUTION SUBCUTANEOUS
Qty: 9 ML | Refills: 12 | Status: SHIPPED | OUTPATIENT
Start: 2023-04-03 | End: 2023-07-06

## 2023-04-03 RX ORDER — AMLODIPINE BESYLATE 10 MG/1
10 TABLET ORAL DAILY
Qty: 90 TABLET | Refills: 3 | Status: SHIPPED | OUTPATIENT
Start: 2023-04-03 | End: 2023-10-06 | Stop reason: SDUPTHER

## 2023-04-03 RX ORDER — INSULIN DETEMIR 100 [IU]/ML
45 INJECTION, SOLUTION SUBCUTANEOUS NIGHTLY
Qty: 15 ML | Refills: 4 | Status: SHIPPED | OUTPATIENT
Start: 2023-04-03 | End: 2023-06-16 | Stop reason: SDUPTHER

## 2023-04-03 RX ORDER — ATORVASTATIN CALCIUM 40 MG/1
40 TABLET, FILM COATED ORAL NIGHTLY
Qty: 90 TABLET | Refills: 3 | Status: SHIPPED | OUTPATIENT
Start: 2023-04-03 | End: 2023-10-06 | Stop reason: SDUPTHER

## 2023-04-03 NOTE — TELEPHONE ENCOUNTER
----- Message from Luly Arizmendi sent at 4/3/2023 11:15 AM CDT -----  Regarding: SCheduling  Message sent to Nephrology, provider staff for scheduling, nothing available to me

## 2023-04-03 NOTE — PROGRESS NOTES
Transitional Care Note  Subjective:       Patient ID: Luis Daniel Howell is a 52 y.o. male.  Chief Complaint: Follow-up    Family and/or Caretaker present at visit?  No.  Diagnostic tests reviewed/disposition: No diagnosic tests pending after this hospitalization.  Disease/illness education: htn-- medicine complaince   Home health/community services discussion/referrals: Patient does not have home health established from hospital visit.  They do not need home health.  If needed, we will set up home health for the patient.   Establishment or re-establishment of referral orders for community resources: No other necessary community resources.   Discussion with other health care providers: No discussion with other health care providers necessary.   HPI  Review of Systems    Objective:      Physical Exam    Assessment:       1. Anemia of chronic kidney failure, unspecified stage    2. CKD (chronic kidney disease), stage V    3. Crack cocaine use    4. Diabetic retinopathy associated with type 1 diabetes mellitus, macular edema presence unspecified, unspecified laterality, unspecified retinopathy severity    5. Dyslipidemia    6. Dyspnea on exertion    7. Essential hypertension    8. Secondary hyperparathyroidism of renal origin    9. Thrombotic stroke involving left middle cerebral artery    10. Type 2 diabetes mellitus with diabetic neuropathy, with long-term current use of insulin    11. Type 2 diabetes mellitus with hyperosmolarity without coma, with long-term current use of insulin    12. Hyperglycemia    13. Dermatitis        Plan:

## 2023-04-03 NOTE — PROGRESS NOTES
"       He is a 52-year-old time noncompliance gentleman with history of cocaine abuse, end-stage renal disease but not yet on dialysis, uncontrolled hypertension, and diabetes mellitus type 2. I last saw him last  in Jan 2023       He was in the ED with hand pain due to his stroke and was set up for pt.  His bp in  the ED was 200/108 but he did not take his bp medicine      He horribly noncompliant.  But today he has taken all his meds and his bp is 114/82.  Often in the past he has not taken  his meds as prescribed.       He has changed his diet and is not drinking cold drinks " back to back to back", which is good because he is diabetic.  HGB A1c was 8.0 in Dec 2022.    Since last visit he has gotten medicaid and can afford his insulin          He has cocaine addictions-- when asked he admits to "good days and bad days"       IN August @022 he had his stroke,. MRI showed a left subcortical MCA noncritical stenosis and stroke in in her capsule and corona radii.  In the hospital he also had a MRA of his neck vessels which were unremarkable.  He was started on Plavix for 21 days and aspirin.  He is still  on aspirin after    He had  speech therapy.  He iis about to start therapy on his right hand for right hand therapy.  .                He has end-stage renal disease based on dialysis yet.  Most recent creatinine clearance was estimated at 8 mL/min.  With a creatinine of 8.1  ml/min.  He last saw Nephrology back in DEC 2022  last year.  Was suppose to follow up in 2 months  .  Potassium is doing well, but he does have a metabolic acidosis.He also has anemia.                   Review of Systems       ROS : Gen - no fatigue or significant weight change  Eyes - no eye pain or visual changes  ENT - no hoarseness or sore throat  CV - No chest pain or SOB.  NO palpitations.  Pulm - no cough or wheezing  GI - no N/V/D   no dysuria or incontinence  MS - no joint pain or muscle pain  Skin - no rash, or c/o of skin " "lesions  Neuro - he has some hand pain but it is on the right side.    Heme - no abnormal bleeding or bruising  Endo - no polydipsia, or temperature changes  Psych - no anxiety or depression        Objective:   Physical Exam         /82 (BP Location: Left arm, Patient Position: Sitting, BP Method: Large (Manual))   Pulse 71   Ht 5' 11" (1.803 m)   Wt 101.9 kg (224 lb 10.4 oz)   SpO2 98%   BMI 31.33 kg/m²        He is a well-appearing gentleman in no acute distress.  He is well dressed and by himself today.  He has a right-sided facial droop that is sparing his forehead.  He still has absolutely no insight into his medical problems.  He is very apologetic about not taking his medication and ensures me this is a 1 time 0 fence, but today I reviewed notes with him and he has some really horrible blood pressures during some of his other visits and those notes stated that he did not take his blood pressure those days..    His chest are clear bilaterally.  Lungs are without any abnormal breath sounds.  Cardiovascular exam is unremarkable.  Abdomen soft and nontender.  He is walking without any difficulty and his upper lower extremities have  Normal strength.  His speech is normal He is not having any trouble with word finding.  He does not have any trouble understanding words or sentences either.     Assessment and plan:     Assessment:       1. Thrombotic stroke involving left middle cerebral artery    2. Substance abuse    3. Type 1 diabetes mellitus with hyperosmolarity without coma, with long-term current use of insulin    4. Malignant hypertension    5. Essential hypertension    6.  7.  CKD (chronic kidney disease), stage V   Some of the worst medical noncompliance high have ever seen. Much better today--           Plan:       I am very worried about this gentleman.  He has very poor insight.  doing better since on medicaid and disability-- will put him back on insulin.   Will refer to optho and get him " back to nephro.  I ema see him back in 3 months with labs        w

## 2023-04-06 ENCOUNTER — TELEPHONE (OUTPATIENT)
Dept: NEPHROLOGY | Facility: CLINIC | Age: 53
End: 2023-04-06
Payer: MEDICAID

## 2023-04-21 ENCOUNTER — PATIENT MESSAGE (OUTPATIENT)
Dept: ADMINISTRATIVE | Facility: HOSPITAL | Age: 53
End: 2023-04-21
Payer: MEDICAID

## 2023-05-03 ENCOUNTER — TELEPHONE (OUTPATIENT)
Dept: NEPHROLOGY | Facility: CLINIC | Age: 53
End: 2023-05-03
Payer: MEDICAID

## 2023-05-03 DIAGNOSIS — N18.5 CKD (CHRONIC KIDNEY DISEASE), STAGE V: Primary | ICD-10-CM

## 2023-05-04 DIAGNOSIS — N18.9 ANEMIA OF CHRONIC KIDNEY FAILURE, UNSPECIFIED STAGE: Primary | ICD-10-CM

## 2023-05-04 DIAGNOSIS — D63.1 ANEMIA OF CHRONIC KIDNEY FAILURE, UNSPECIFIED STAGE: Primary | ICD-10-CM

## 2023-05-11 ENCOUNTER — TELEPHONE (OUTPATIENT)
Dept: ORTHOPEDICS | Facility: CLINIC | Age: 53
End: 2023-05-11
Payer: MEDICAID

## 2023-05-11 RX ORDER — CALCITRIOL 0.25 UG/1
CAPSULE ORAL
Qty: 90 CAPSULE | Refills: 0 | Status: SHIPPED | OUTPATIENT
Start: 2023-05-11

## 2023-05-11 NOTE — TELEPHONE ENCOUNTER
----- Message from Taisha Ocasio LPN sent at 5/10/2023  3:12 PM CDT -----  Regarding: FW: Sooner appt needed  Contact: @[phone] and @[phone]  Patient was seen in ED on 03/30/2023. Alex was on call. Patient is medicaid.   ----- Message -----  From: Laverne Singh  Sent: 5/10/2023   3:02 PM CDT  To: [facility] Staff  Subject: Sooner appt needed                               Pt is calling physical therapist recommended the Dr, has pain right hand had stroke and needs helps, fingers are unable to close and open @[phone] and [phone] if unable to

## 2023-05-16 DIAGNOSIS — D63.1 ANEMIA OF CHRONIC KIDNEY FAILURE, UNSPECIFIED STAGE: Primary | ICD-10-CM

## 2023-05-16 DIAGNOSIS — N18.9 ANEMIA OF CHRONIC KIDNEY FAILURE, UNSPECIFIED STAGE: Primary | ICD-10-CM

## 2023-06-15 ENCOUNTER — OFFICE VISIT (OUTPATIENT)
Dept: OPHTHALMOLOGY | Facility: CLINIC | Age: 53
End: 2023-06-15
Payer: MEDICAID

## 2023-06-15 DIAGNOSIS — E11.3593 TYPE 2 DIABETES MELLITUS WITH PROLIFERATIVE DIABETIC RETINOPATHY OF BOTH EYES WITHOUT MACULAR EDEMA, UNSPECIFIED WHETHER LONG TERM INSULIN USE: Primary | ICD-10-CM

## 2023-06-15 DIAGNOSIS — H47.20 OPTIC ATROPHY, LEFT EYE: ICD-10-CM

## 2023-06-15 PROCEDURE — 99213 OFFICE O/P EST LOW 20 MIN: CPT | Mod: PBBFAC | Performed by: OPHTHALMOLOGY

## 2023-06-15 PROCEDURE — 1159F MED LIST DOCD IN RCRD: CPT | Mod: CPTII,,, | Performed by: OPHTHALMOLOGY

## 2023-06-15 PROCEDURE — 99999 PR PBB SHADOW E&M-EST. PATIENT-LVL III: CPT | Mod: PBBFAC,,, | Performed by: OPHTHALMOLOGY

## 2023-06-15 PROCEDURE — 99999 PR PBB SHADOW E&M-EST. PATIENT-LVL III: ICD-10-PCS | Mod: PBBFAC,,, | Performed by: OPHTHALMOLOGY

## 2023-06-15 PROCEDURE — 92134 POSTERIOR SEGMENT OCT RETINA (OCULAR COHERENCE TOMOGRAPHY)-BOTH EYES: ICD-10-PCS | Mod: 26,S$PBB,, | Performed by: OPHTHALMOLOGY

## 2023-06-15 PROCEDURE — 92014 COMPRE OPH EXAM EST PT 1/>: CPT | Mod: S$PBB,,, | Performed by: OPHTHALMOLOGY

## 2023-06-15 PROCEDURE — 92014 PR EYE EXAM, EST PATIENT,COMPREHESV: ICD-10-PCS | Mod: S$PBB,,, | Performed by: OPHTHALMOLOGY

## 2023-06-15 PROCEDURE — 1160F RVW MEDS BY RX/DR IN RCRD: CPT | Mod: CPTII,,, | Performed by: OPHTHALMOLOGY

## 2023-06-15 PROCEDURE — 1159F PR MEDICATION LIST DOCUMENTED IN MEDICAL RECORD: ICD-10-PCS | Mod: CPTII,,, | Performed by: OPHTHALMOLOGY

## 2023-06-15 PROCEDURE — 1160F PR REVIEW ALL MEDS BY PRESCRIBER/CLIN PHARMACIST DOCUMENTED: ICD-10-PCS | Mod: CPTII,,, | Performed by: OPHTHALMOLOGY

## 2023-06-15 PROCEDURE — 92134 CPTRZ OPH DX IMG PST SGM RTA: CPT | Mod: PBBFAC | Performed by: OPHTHALMOLOGY

## 2023-06-15 NOTE — PROGRESS NOTES
Subjective:       Patient ID: Luis Daniel Howell is a 53 y.o. male      Chief Complaint   Patient presents with    Diabetes     History of Present Illness  HPI    Overdue DFE/OCT   DLS- 2021 Dr. Fitzpatrick     Pt sts no change noticed in vision since last visit.   Has been getting headaches more frequently but thinks it could be due to   HTN  Denies pain     (-)Flashes (+)Floaters  (+)Photophobia  (+)Glare    Occasional OTC gtts     Last edited by Nora Woods on 6/15/2023  9:20 AM.        Imaging:    See report    Assessment/Plan:     1. 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 Optic atrophy, left eye  Longstanding  Etiology unclear  Recommend eval by neuroopth to see if any recommendations    Follow up in about 6 months (around 12/15/2023), or if symptoms worsen or fail to improve, for Comprehensive Examination, OCT Mac.

## 2023-06-16 RX ORDER — INSULIN DETEMIR 100 [IU]/ML
45 INJECTION, SOLUTION SUBCUTANEOUS NIGHTLY
Qty: 15 ML | Refills: 0 | Status: ON HOLD | OUTPATIENT
Start: 2023-06-16 | End: 2023-07-17 | Stop reason: HOSPADM

## 2023-06-16 NOTE — TELEPHONE ENCOUNTER
----- Message from Edelmira Pinonrufus sent at 6/16/2023 12:03 PM CDT -----  Contact: Patient @ [phone]  Requesting an RX refill or new RX.  Is this a refill or new RX:   RX name and strength insulin detemir U-100, Levemir, (LEVEMIR FLEXTOUCH U-100 INSULN) 100 unit/mL (3 mL) InPn pen  Is this a 30 day or 90 day RX:   Pharmacy name and phone #   [id number] - [place] - [address]  [address] [place] [address]  Phone: [phone] Fax: [phone]          The doctors have asked that we provide their patients with the following 2 reminders -- prescription refills can take up to 72 hours, and a friendly reminder that in the future you can use your [facility] account to request refills: yes

## 2023-06-16 NOTE — TELEPHONE ENCOUNTER
No care due was identified.  [facility] Embedded Care Due Messages. Reference number: [id number].   6/16/2023 1:27:27 PM CDT

## 2023-07-06 ENCOUNTER — OFFICE VISIT (OUTPATIENT)
Dept: INTERNAL MEDICINE | Facility: CLINIC | Age: 53
End: 2023-07-06
Payer: MEDICAID

## 2023-07-06 VITALS
HEART RATE: 67 BPM | BODY MASS INDEX: 31.82 KG/M2 | WEIGHT: 227.31 LBS | DIASTOLIC BLOOD PRESSURE: 85 MMHG | SYSTOLIC BLOOD PRESSURE: 160 MMHG | HEIGHT: 71 IN | OXYGEN SATURATION: 99 %

## 2023-07-06 DIAGNOSIS — Z79.4 TYPE 2 DIABETES MELLITUS WITH DIABETIC NEUROPATHY, WITH LONG-TERM CURRENT USE OF INSULIN: Primary | ICD-10-CM

## 2023-07-06 DIAGNOSIS — E11.40 TYPE 2 DIABETES MELLITUS WITH DIABETIC NEUROPATHY, WITH LONG-TERM CURRENT USE OF INSULIN: Primary | ICD-10-CM

## 2023-07-06 DIAGNOSIS — D63.1 ANEMIA OF CHRONIC KIDNEY FAILURE, UNSPECIFIED STAGE: ICD-10-CM

## 2023-07-06 DIAGNOSIS — E11.00 TYPE 2 DIABETES MELLITUS WITH HYPEROSMOLARITY WITHOUT COMA, WITH LONG-TERM CURRENT USE OF INSULIN: ICD-10-CM

## 2023-07-06 DIAGNOSIS — N52.9 ERECTILE DYSFUNCTION, UNSPECIFIED ERECTILE DYSFUNCTION TYPE: ICD-10-CM

## 2023-07-06 DIAGNOSIS — N18.9 ANEMIA OF CHRONIC KIDNEY FAILURE, UNSPECIFIED STAGE: ICD-10-CM

## 2023-07-06 DIAGNOSIS — I10 ESSENTIAL HYPERTENSION: ICD-10-CM

## 2023-07-06 DIAGNOSIS — I42.9 CARDIOMYOPATHY, UNSPECIFIED TYPE: ICD-10-CM

## 2023-07-06 DIAGNOSIS — N18.5 CKD (CHRONIC KIDNEY DISEASE), STAGE V: ICD-10-CM

## 2023-07-06 DIAGNOSIS — R06.09 DYSPNEA ON EXERTION: ICD-10-CM

## 2023-07-06 DIAGNOSIS — Z79.4 TYPE 2 DIABETES MELLITUS WITH HYPEROSMOLARITY WITHOUT COMA, WITH LONG-TERM CURRENT USE OF INSULIN: ICD-10-CM

## 2023-07-06 DIAGNOSIS — F14.90 CRACK COCAINE USE: ICD-10-CM

## 2023-07-06 PROCEDURE — 99213 OFFICE O/P EST LOW 20 MIN: CPT | Mod: PBBFAC | Performed by: INTERNAL MEDICINE

## 2023-07-06 PROCEDURE — 3008F BODY MASS INDEX DOCD: CPT | Mod: CPTII,,, | Performed by: INTERNAL MEDICINE

## 2023-07-06 PROCEDURE — 3044F PR MOST RECENT HEMOGLOBIN A1C LEVEL <7.0%: ICD-10-PCS | Mod: CPTII,,, | Performed by: INTERNAL MEDICINE

## 2023-07-06 PROCEDURE — 3008F PR BODY MASS INDEX (BMI) DOCUMENTED: ICD-10-PCS | Mod: CPTII,,, | Performed by: INTERNAL MEDICINE

## 2023-07-06 PROCEDURE — 99999 PR PBB SHADOW E&M-EST. PATIENT-LVL III: ICD-10-PCS | Mod: PBBFAC,,, | Performed by: INTERNAL MEDICINE

## 2023-07-06 PROCEDURE — 99999 PR PBB SHADOW E&M-EST. PATIENT-LVL III: CPT | Mod: PBBFAC,,, | Performed by: INTERNAL MEDICINE

## 2023-07-06 PROCEDURE — 3044F HG A1C LEVEL LT 7.0%: CPT | Mod: CPTII,,, | Performed by: INTERNAL MEDICINE

## 2023-07-06 PROCEDURE — 99214 OFFICE O/P EST MOD 30 MIN: CPT | Mod: S$PBB,,, | Performed by: INTERNAL MEDICINE

## 2023-07-06 PROCEDURE — 1159F PR MEDICATION LIST DOCUMENTED IN MEDICAL RECORD: ICD-10-PCS | Mod: CPTII,,, | Performed by: INTERNAL MEDICINE

## 2023-07-06 PROCEDURE — 3079F PR MOST RECENT DIASTOLIC BLOOD PRESSURE 80-89 MM HG: ICD-10-PCS | Mod: CPTII,,, | Performed by: INTERNAL MEDICINE

## 2023-07-06 PROCEDURE — 3077F PR MOST RECENT SYSTOLIC BLOOD PRESSURE >= 140 MM HG: ICD-10-PCS | Mod: CPTII,,, | Performed by: INTERNAL MEDICINE

## 2023-07-06 PROCEDURE — 99214 PR OFFICE/OUTPT VISIT, EST, LEVL IV, 30-39 MIN: ICD-10-PCS | Mod: S$PBB,,, | Performed by: INTERNAL MEDICINE

## 2023-07-06 PROCEDURE — 3077F SYST BP >= 140 MM HG: CPT | Mod: CPTII,,, | Performed by: INTERNAL MEDICINE

## 2023-07-06 PROCEDURE — 1159F MED LIST DOCD IN RCRD: CPT | Mod: CPTII,,, | Performed by: INTERNAL MEDICINE

## 2023-07-06 PROCEDURE — 3079F DIAST BP 80-89 MM HG: CPT | Mod: CPTII,,, | Performed by: INTERNAL MEDICINE

## 2023-07-06 RX ORDER — SILDENAFIL 100 MG/1
100 TABLET, FILM COATED ORAL DAILY PRN
Qty: 10 TABLET | Refills: 3 | Status: SHIPPED | OUTPATIENT
Start: 2023-07-06 | End: 2023-12-13

## 2023-07-06 RX ORDER — FUROSEMIDE 40 MG/1
40 TABLET ORAL DAILY
Qty: 90 TABLET | Refills: 3 | Status: SHIPPED | OUTPATIENT
Start: 2023-07-06 | End: 2024-01-13 | Stop reason: SDUPTHER

## 2023-07-06 NOTE — PROGRESS NOTES
"  He is a 53-year-old time noncompliance gentleman with history of cocaine abuse, end-stage renal disease but not yet on dialysis, uncontrolled hypertension, and diabetes mellitus type 2. I last saw him last  in  April 2023            He horribly noncompliant.  But today he has taken all his meds and his bp is 156/94.  Often in the past he has not taken  his meds as prescribed.       He has changed his diet and is not drinking cold drinks " back to back to back", which is good because he is diabetic.  HGB A1c was 6.3 in 6/29/2023.  .    He has gotten medicaid and can afford his insulin          He has cocaine addictions-- when asked he admits to "good days and bad days"  .  He says since he has been not working , he does have the money so it has been doing pretty good.       IN August 2022 he had his stroke,. MRI showed a left subcortical MCA noncritical stenosis and stroke in in her capsule and corona radii.  In the hospital he also had a MRA of his neck vessels which were unremarkable.  He was started on Plavix for 21 days and aspirin.  He is still  on aspirin after    He had  speech therapy.  He iis about to start therapy on his right hand for right hand therapy.  .                He has end-stage renal disease based on dialysis yet.  Most recent creatinine clearance was estimated at 6.4 mL/min.  With a creatinine of 9.1 ml/min.  He last saw Nephrology back in DEC 2022  last year.  Was suppose to follow up in 2 months  .  Potassium is doing well (4.6)  but he does have a metabolic acidosis.He also has anemia.  He needs to have his fistula placed in prep for hd-- had a nuclear stress test and it was nective on the Nuclear med part.  The EKG portion was positive last year.                   Review of Systems       ROS : Gen - no fatigue or significant weight change    Eyes - no eye pain or visual changes  ENT - no hoarseness or sore throat  CV - No chest pain or SOB.  NO palpitations.  Pulm - no cough or " "wheezing  GI - no N/V/D   no dysuria or incontinence  MS - no joint pain or muscle pain  Skin - no rash, or c/o of skin lesions  Neuro - he has some hand pain but it is on the right side.    Heme - no abnormal bleeding or bruising  Endo - no polydipsia, or temperature changes  Psych - no anxiety or depression        Objective:   Physical Exam            BP (!) 156/94 (BP Location: Left arm, Patient Position: Sitting, BP Method: Large (Manual))   Pulse 67   Ht 5' 11" (1.803 m)   Wt 103.1 kg (227 lb 4.7 oz)   SpO2 99%   BMI 31.70 kg/m²           He is a well-appearing gentleman in no acute distress.  He is well dressed and by himself today.  He has a right-sided facial droop that is sparing his forehead.  He still has absolutely no insight into his medical problems.  He is very apologetic about not taking his medication and ensures me this is a 1 time 0 fence, but today I reviewed notes with him and he has some really horrible blood pressures during some of his other visits and those notes stated that he did not take his blood pressure those days..    His chest are clear bilaterally.  Lungs are without any abnormal breath sounds.  Cardiovascular exam is unremarkable.  Abdomen soft and nontender.  He is walking without any difficulty and his upper lower extremities have  Normal strength.  His speech is normal He is not having any trouble with word finding.  He does not have any trouble understanding words or sentences either.    Protective Sensation (w/ 10 gram monofilament):  Right: Intact  Left: Intact    Visual Inspection:  Normal -  Bilateral    Pedal Pulses:   Right: Present  Left: Present    Posterior Tibialis Pulses:   Right:Present  Left: Present      Assessment and plan:     Assessment:       1. Thrombotic stroke involving left middle cerebral artery    2. Substance abuse    3. Type 1 diabetes mellitus with hyperosmolarity without coma, with long-term current use of insulin    4. Malignant hypertension  "   5. Essential hypertension    6.  7.  CKD (chronic kidney disease), stage V   Some of the worst medical noncompliance I have ever seen.  better today--but still not taking his bp meds -- discussed  role of htn in his renal disease and recent stroke.            Plan:       I am very worried about this gentleman.  He has very poor insight.  doing better since on medicaid and disability-- will put him back on insulin.   DM si doing much better even though he stopped his novolog.   Seeing nephro next week.  Needs HD graph.  Needs to take his bp meds.   N   I ema see him back in 3 months with labs        Needs CR cancer screening but not indicated unless bp is better controled.

## 2023-07-11 ENCOUNTER — OFFICE VISIT (OUTPATIENT)
Dept: NEPHROLOGY | Facility: CLINIC | Age: 53
DRG: 291 | End: 2023-07-11
Payer: MEDICAID

## 2023-07-11 VITALS
HEART RATE: 68 BPM | DIASTOLIC BLOOD PRESSURE: 89 MMHG | WEIGHT: 227.06 LBS | BODY MASS INDEX: 31.79 KG/M2 | SYSTOLIC BLOOD PRESSURE: 162 MMHG | HEIGHT: 71 IN | OXYGEN SATURATION: 98 %

## 2023-07-11 DIAGNOSIS — N18.9 ANEMIA OF CHRONIC KIDNEY FAILURE, UNSPECIFIED STAGE: ICD-10-CM

## 2023-07-11 DIAGNOSIS — N18.5 CKD (CHRONIC KIDNEY DISEASE), STAGE V: Primary | ICD-10-CM

## 2023-07-11 DIAGNOSIS — E11.22 TYPE 2 DIABETES MELLITUS WITH STAGE 5 CHRONIC KIDNEY DISEASE NOT ON CHRONIC DIALYSIS, UNSPECIFIED WHETHER LONG TERM INSULIN USE: ICD-10-CM

## 2023-07-11 DIAGNOSIS — I10 HYPERTENSION, UNSPECIFIED TYPE: ICD-10-CM

## 2023-07-11 DIAGNOSIS — D63.1 ANEMIA OF CHRONIC KIDNEY FAILURE, UNSPECIFIED STAGE: ICD-10-CM

## 2023-07-11 DIAGNOSIS — N25.81 SECONDARY HYPERPARATHYROIDISM OF RENAL ORIGIN: ICD-10-CM

## 2023-07-11 DIAGNOSIS — N18.5 TYPE 2 DIABETES MELLITUS WITH STAGE 5 CHRONIC KIDNEY DISEASE NOT ON CHRONIC DIALYSIS, UNSPECIFIED WHETHER LONG TERM INSULIN USE: ICD-10-CM

## 2023-07-11 PROCEDURE — 99213 OFFICE O/P EST LOW 20 MIN: CPT | Mod: PBBFAC | Performed by: INTERNAL MEDICINE

## 2023-07-11 PROCEDURE — 99215 PR OFFICE/OUTPT VISIT, EST, LEVL V, 40-54 MIN: ICD-10-PCS | Mod: S$PBB,,, | Performed by: INTERNAL MEDICINE

## 2023-07-11 PROCEDURE — 1159F PR MEDICATION LIST DOCUMENTED IN MEDICAL RECORD: ICD-10-PCS | Mod: CPTII,,, | Performed by: INTERNAL MEDICINE

## 2023-07-11 PROCEDURE — 3008F BODY MASS INDEX DOCD: CPT | Mod: CPTII,,, | Performed by: INTERNAL MEDICINE

## 2023-07-11 PROCEDURE — 99999 PR PBB SHADOW E&M-EST. PATIENT-LVL III: CPT | Mod: PBBFAC,,, | Performed by: INTERNAL MEDICINE

## 2023-07-11 PROCEDURE — 3066F PR DOCUMENTATION OF TREATMENT FOR NEPHROPATHY: ICD-10-PCS | Mod: CPTII,,, | Performed by: INTERNAL MEDICINE

## 2023-07-11 PROCEDURE — 3079F DIAST BP 80-89 MM HG: CPT | Mod: CPTII,,, | Performed by: INTERNAL MEDICINE

## 2023-07-11 PROCEDURE — 99999 PR PBB SHADOW E&M-EST. PATIENT-LVL III: ICD-10-PCS | Mod: PBBFAC,,, | Performed by: INTERNAL MEDICINE

## 2023-07-11 PROCEDURE — 3008F PR BODY MASS INDEX (BMI) DOCUMENTED: ICD-10-PCS | Mod: CPTII,,, | Performed by: INTERNAL MEDICINE

## 2023-07-11 PROCEDURE — 99215 OFFICE O/P EST HI 40 MIN: CPT | Mod: S$PBB,,, | Performed by: INTERNAL MEDICINE

## 2023-07-11 PROCEDURE — 3044F PR MOST RECENT HEMOGLOBIN A1C LEVEL <7.0%: ICD-10-PCS | Mod: CPTII,,, | Performed by: INTERNAL MEDICINE

## 2023-07-11 PROCEDURE — 3079F PR MOST RECENT DIASTOLIC BLOOD PRESSURE 80-89 MM HG: ICD-10-PCS | Mod: CPTII,,, | Performed by: INTERNAL MEDICINE

## 2023-07-11 PROCEDURE — 3066F NEPHROPATHY DOC TX: CPT | Mod: CPTII,,, | Performed by: INTERNAL MEDICINE

## 2023-07-11 PROCEDURE — 3077F SYST BP >= 140 MM HG: CPT | Mod: CPTII,,, | Performed by: INTERNAL MEDICINE

## 2023-07-11 PROCEDURE — 1159F MED LIST DOCD IN RCRD: CPT | Mod: CPTII,,, | Performed by: INTERNAL MEDICINE

## 2023-07-11 PROCEDURE — 3044F HG A1C LEVEL LT 7.0%: CPT | Mod: CPTII,,, | Performed by: INTERNAL MEDICINE

## 2023-07-11 PROCEDURE — 3077F PR MOST RECENT SYSTOLIC BLOOD PRESSURE >= 140 MM HG: ICD-10-PCS | Mod: CPTII,,, | Performed by: INTERNAL MEDICINE

## 2023-07-11 NOTE — PROGRESS NOTES
Progress Note  Nephrology      Referring physician: Janak Wilson MD    Reason for visit: CKD     SUBJECTIVE:   53 y.o. male  has a past medical history of CKD (chronic kidney disease), stage V, Cocaine abuse, Diabetes mellitus type I, Diabetic retinopathy, Hyperlipidemia, and Hypertension. who has been following up in renal clinic for ckd management   The patient denies taking NSAIDs or new antibiotics, recreational drugs, recent episode of dehydration, diarrhea, nausea or vomiting, acute illness, hospitalization or exposure to IV radiocontrast.     ROS:  General: negative for chills, or fatigue  ENT: No epistaxis or headaches  Hematological and Lymphatic: No bleeding problems or blood clots.  Endocrine: No skin changes or temperature intolerance  Respiratory: No cough, shortness of breath, or wheezing  Cardiovascular: No chest pain or dyspnea   Gastrointestinal: No abdominal pain, change in bowel habits  Genito-Urinary: No dysuria, trouble voiding, or hematuria  Musculoskeletal: ROS: negative for - joint pain, joint stiffness, joint swelling, muscle pain or muscular weakness  Neurological: No focal weakness, no numbness  Dermatological: No rash or ulcers    OBJECTIVE:     Vitals:    07/11/23 1321   BP: (!) 162/89   Pulse: 68          Physical Exam:  General: no distress, well nourished  HENT: PERRLA, Normal mouth nose and ears.  Neck: no JVD and thyroid not enlarged, symmetric, no tenderness/mass/nodules  Lungs: clear to auscultation bilaterally and normal respiratory effort  Cardiovascular: regular rate and rhythm, S1, S2 normal, no murmur, click, rub or gallop.   Abdomen: soft, non-tender non-distented; bowel sounds normal  Skin: No rashes or lesions  Musculoskeletal:+2edema in LE, no deformities.   Lymph Nodes: No cervical or supraclavicular adenopathy  Neurologic: Normal strength and tone. No focal numbness or weakness          Medications:    Current Outpatient Medications:     amLODIPine (NORVASC) 10 MG  tablet, Take 1 tablet (10 mg total) by mouth once daily., Disp: 90 tablet, Rfl: 3    atorvastatin (LIPITOR) 40 MG tablet, Take 1 tablet (40 mg total) by mouth every evening., Disp: 90 tablet, Rfl: 3    blood sugar diagnostic Strp, To check BG 2 times daily, to use with insurance preferred meter, Disp: 200 each, Rfl: 3    calcitRIOL (ROCALTROL) 0.25 MCG Cap, TAKE 1 CAPSULE (0.25 MCG TOTAL) BY MOUTH ONCE DAILY., Disp: 90 capsule, Rfl: 0    furosemide (LASIX) 40 MG tablet, Take 1 tablet (40 mg total) by mouth once daily., Disp: 90 tablet, Rfl: 3    hydrALAZINE (APRESOLINE) 100 MG tablet, Take 100 mg by mouth every 8 (eight) hours., Disp: , Rfl:     insulin detemir U-100, Levemir, (LEVEMIR FLEXTOUCH U100 INSULIN) 100 unit/mL (3 mL) InPn pen, Inject 45 Units into the skin every evening., Disp: 15 mL, Rfl: 0    labetaloL (NORMODYNE) 300 MG tablet, Take 1 tablet (300 mg total) by mouth 2 (two) times daily., Disp: 180 tablet, Rfl: 3    metOLazone (ZAROXOLYN) 5 MG tablet, TAKE 1 TABLET BY MOUTH EVERY DAY AT NIGHT, Disp: 90 tablet, Rfl: 3    sildenafiL (VIAGRA) 100 MG tablet, Take 1 tablet (100 mg total) by mouth daily as needed for Erectile Dysfunction., Disp: 10 tablet, Rfl: 3    sodium bicarbonate 650 MG tablet, Take 1 tablet (650 mg total) by mouth 3 (three) times daily., Disp: 90 tablet, Rfl: 11    aspirin 81 MG Chew, Take 1 tablet (81 mg total) by mouth once daily. (Patient taking differently: Take 81 mg by mouth once daily.), Disp: 30 tablet, Rfl: 11         Laboratory:  Lab Results   Component Value Date    CREATININE 9.1 (H) 06/29/2023    CREATININE 9.1 (H) 06/29/2023       Prot/Creat Ratio, Urine   Date Value Ref Range Status   06/29/2023 0.75 (H) 0.00 - 0.20 Final   02/15/2023 0.97 (H) 0.00 - 0.20 Final   12/05/2022 0.93 (H) 0.00 - 0.20 Final       Lab Results   Component Value Date     06/29/2023     06/29/2023    K 4.6 06/29/2023    K 4.6 06/29/2023    CO2 17 (L) 06/29/2023    CO2 17 (L) 06/29/2023        last PTH   Lab Results   Component Value Date    .1 (H) 08/30/2022    CALCIUM 8.2 (L) 06/29/2023    CALCIUM 8.2 (L) 06/29/2023    PHOS 5.3 (H) 08/30/2022       Lab Results   Component Value Date    HGB 9.2 (L) 06/29/2023    HGB 9.2 (L) 06/29/2023        Lab Results   Component Value Date    HGBA1C 6.3 (H) 06/29/2023       Lab Results   Component Value Date    LDLCALC 74.4 06/29/2023       Other Labs were reviewed      ASSESSMENT/PLAN:     1-CKD stage V, 2/2 diabetic and hypertensive nephropathy and cocaine abuse   - kidney function is worsening bun/cr 90/9.1  - pt is feeling tired with more swelling  - I advised him to go to ED to get admitted for permcath and to start HD   - no urgent indication for dialysis  - seen by vascular surgeon for AVF  had a nuclear stress test and it was nective on the Nuclear med part  -Avoid NSAIDs intake  - UPCR 0.9     2-DM type I uncontrolled   - last a1c 6.3     3.-HTN uncontrolled :  - pt did not take his meds this am , he is going to take them      4-h/o cocaine abuse      5-AOCD : hbg 9.2  - iron sat 21       6-CHF   - on lasix     7- CKD-MBD  - on calcitriol   - stressed on low po4 diet      8- metabolic acidosis : co2 17  - on bicarb         RTC in 1m      TERRA ROLDAN MD  NEPHROLOGY ATTENDING

## 2023-07-12 ENCOUNTER — HOSPITAL ENCOUNTER (INPATIENT)
Facility: HOSPITAL | Age: 53
LOS: 5 days | Discharge: HOME | DRG: 291 | End: 2023-07-17
Attending: EMERGENCY MEDICINE | Admitting: HOSPITALIST
Payer: MEDICAID

## 2023-07-12 DIAGNOSIS — Z99.2 ESRD NEEDING DIALYSIS: Primary | ICD-10-CM

## 2023-07-12 DIAGNOSIS — N18.6 ESRD (END STAGE RENAL DISEASE): ICD-10-CM

## 2023-07-12 DIAGNOSIS — Z79.4 TYPE 2 DIABETES MELLITUS WITH HYPERGLYCEMIA, WITH LONG-TERM CURRENT USE OF INSULIN: ICD-10-CM

## 2023-07-12 DIAGNOSIS — N18.6 ESRD NEEDING DIALYSIS: Primary | ICD-10-CM

## 2023-07-12 DIAGNOSIS — E11.65 TYPE 2 DIABETES MELLITUS WITH HYPERGLYCEMIA, WITH LONG-TERM CURRENT USE OF INSULIN: ICD-10-CM

## 2023-07-12 DIAGNOSIS — R07.9 CHEST PAIN: ICD-10-CM

## 2023-07-12 DIAGNOSIS — N19 RENAL FAILURE, UNSPECIFIED CHRONICITY: ICD-10-CM

## 2023-07-12 DIAGNOSIS — S37.009A KIDNEY INJURY: ICD-10-CM

## 2023-07-12 LAB
ALBUMIN SERPL BCP-MCNC: 3.5 G/DL (ref 3.5–5.2)
ALP SERPL-CCNC: 65 U/L (ref 55–135)
ALT SERPL W/O P-5'-P-CCNC: 15 U/L (ref 10–44)
ANION GAP SERPL CALC-SCNC: 10 MMOL/L (ref 8–16)
APTT PPP: 27 SEC (ref 21–32)
AST SERPL-CCNC: 20 U/L (ref 10–40)
BACTERIA #/AREA URNS AUTO: NORMAL /HPF
BASOPHILS # BLD AUTO: 0.01 K/UL (ref 0–0.2)
BASOPHILS NFR BLD: 0.2 % (ref 0–1.9)
BILIRUB SERPL-MCNC: 0.4 MG/DL (ref 0.1–1)
BILIRUB UR QL STRIP: NEGATIVE
BUN SERPL-MCNC: 84 MG/DL (ref 6–20)
CALCIUM SERPL-MCNC: 8.3 MG/DL (ref 8.7–10.5)
CHLORIDE SERPL-SCNC: 109 MMOL/L (ref 95–110)
CLARITY UR REFRACT.AUTO: CLEAR
CO2 SERPL-SCNC: 20 MMOL/L (ref 23–29)
COLOR UR AUTO: ABNORMAL
CREAT SERPL-MCNC: 9.3 MG/DL (ref 0.5–1.4)
DIFFERENTIAL METHOD: ABNORMAL
EOSINOPHIL # BLD AUTO: 0.1 K/UL (ref 0–0.5)
EOSINOPHIL NFR BLD: 2.1 % (ref 0–8)
ERYTHROCYTE [DISTWIDTH] IN BLOOD BY AUTOMATED COUNT: 14.1 % (ref 11.5–14.5)
EST. GFR  (NO RACE VARIABLE): 6.2 ML/MIN/1.73 M^2
ESTIMATED AVG GLUCOSE: 137 MG/DL (ref 68–131)
FERRITIN SERPL-MCNC: 34 NG/ML (ref 20–300)
GLUCOSE SERPL-MCNC: 226 MG/DL (ref 70–110)
GLUCOSE UR QL STRIP: ABNORMAL
HBA1C MFR BLD: 6.4 % (ref 4–5.6)
HCT VFR BLD AUTO: 27.9 % (ref 40–54)
HCV AB SERPL QL IA: NORMAL
HGB BLD-MCNC: 8.7 G/DL (ref 14–18)
HGB UR QL STRIP: ABNORMAL
HIV 1+2 AB+HIV1 P24 AG SERPL QL IA: NORMAL
HYALINE CASTS UR QL AUTO: 0 /LPF
IMM GRANULOCYTES # BLD AUTO: 0.01 K/UL (ref 0–0.04)
IMM GRANULOCYTES NFR BLD AUTO: 0.2 % (ref 0–0.5)
INR PPP: 1.1 (ref 0.8–1.2)
IRON SERPL-MCNC: 76 UG/DL (ref 45–160)
KETONES UR QL STRIP: NEGATIVE
LEUKOCYTE ESTERASE UR QL STRIP: NEGATIVE
LYMPHOCYTES # BLD AUTO: 1 K/UL (ref 1–4.8)
LYMPHOCYTES NFR BLD: 19.8 % (ref 18–48)
MAGNESIUM SERPL-MCNC: 1.2 MG/DL (ref 1.6–2.6)
MCH RBC QN AUTO: 26.4 PG (ref 27–31)
MCHC RBC AUTO-ENTMCNC: 31.2 G/DL (ref 32–36)
MCV RBC AUTO: 85 FL (ref 82–98)
MICROSCOPIC COMMENT: NORMAL
MONOCYTES # BLD AUTO: 0.5 K/UL (ref 0.3–1)
MONOCYTES NFR BLD: 10.3 % (ref 4–15)
NEUTROPHILS # BLD AUTO: 3.3 K/UL (ref 1.8–7.7)
NEUTROPHILS NFR BLD: 67.4 % (ref 38–73)
NITRITE UR QL STRIP: NEGATIVE
NRBC BLD-RTO: 0 /100 WBC
PH UR STRIP: 5 [PH] (ref 5–8)
PHOSPHATE SERPL-MCNC: 5.1 MG/DL (ref 2.7–4.5)
PLATELET # BLD AUTO: 129 K/UL (ref 150–450)
PMV BLD AUTO: 12.7 FL (ref 9.2–12.9)
POCT GLUCOSE: 90 MG/DL (ref 70–110)
POTASSIUM SERPL-SCNC: 4.7 MMOL/L (ref 3.5–5.1)
PROT SERPL-MCNC: 6.7 G/DL (ref 6–8.4)
PROT UR QL STRIP: ABNORMAL
PROTHROMBIN TIME: 11.4 SEC (ref 9–12.5)
RBC # BLD AUTO: 3.3 M/UL (ref 4.6–6.2)
RBC #/AREA URNS AUTO: 1 /HPF (ref 0–4)
SATURATED IRON: 27 % (ref 20–50)
SODIUM SERPL-SCNC: 139 MMOL/L (ref 136–145)
SP GR UR STRIP: 1.01 (ref 1–1.03)
SQUAMOUS #/AREA URNS AUTO: 0 /HPF
TOTAL IRON BINDING CAPACITY: 280 UG/DL (ref 250–450)
TRANSFERRIN SERPL-MCNC: 189 MG/DL (ref 200–375)
URN SPEC COLLECT METH UR: ABNORMAL
WBC # BLD AUTO: 4.86 K/UL (ref 3.9–12.7)
WBC #/AREA URNS AUTO: 2 /HPF (ref 0–5)

## 2023-07-12 PROCEDURE — 87389 HIV-1 AG W/HIV-1&-2 AB AG IA: CPT | Performed by: PHYSICIAN ASSISTANT

## 2023-07-12 PROCEDURE — 99222 PR INITIAL HOSPITAL CARE,LEVL II: ICD-10-PCS | Mod: ,,, | Performed by: HOSPITALIST

## 2023-07-12 PROCEDURE — 11000001 HC ACUTE MED/SURG PRIVATE ROOM

## 2023-07-12 PROCEDURE — 99222 1ST HOSP IP/OBS MODERATE 55: CPT | Mod: ,,, | Performed by: HOSPITALIST

## 2023-07-12 PROCEDURE — 84466 ASSAY OF TRANSFERRIN: CPT | Performed by: HOSPITALIST

## 2023-07-12 PROCEDURE — 93010 EKG 12-LEAD: ICD-10-PCS | Mod: ,,, | Performed by: INTERNAL MEDICINE

## 2023-07-12 PROCEDURE — 93010 ELECTROCARDIOGRAM REPORT: CPT | Mod: ,,, | Performed by: INTERNAL MEDICINE

## 2023-07-12 PROCEDURE — 82728 ASSAY OF FERRITIN: CPT | Performed by: HOSPITALIST

## 2023-07-12 PROCEDURE — 63600175 PHARM REV CODE 636 W HCPCS: Performed by: HOSPITALIST

## 2023-07-12 PROCEDURE — 85610 PROTHROMBIN TIME: CPT | Performed by: HOSPITALIST

## 2023-07-12 PROCEDURE — 99285 EMERGENCY DEPT VISIT HI MDM: CPT | Mod: 25

## 2023-07-12 PROCEDURE — 21400001 HC TELEMETRY ROOM

## 2023-07-12 PROCEDURE — 80053 COMPREHEN METABOLIC PANEL: CPT | Performed by: PHYSICIAN ASSISTANT

## 2023-07-12 PROCEDURE — 99223 1ST HOSP IP/OBS HIGH 75: CPT | Mod: ,,, | Performed by: INTERNAL MEDICINE

## 2023-07-12 PROCEDURE — 85025 COMPLETE CBC W/AUTO DIFF WBC: CPT | Performed by: PHYSICIAN ASSISTANT

## 2023-07-12 PROCEDURE — 83036 HEMOGLOBIN GLYCOSYLATED A1C: CPT | Performed by: HOSPITALIST

## 2023-07-12 PROCEDURE — 25000003 PHARM REV CODE 250: Performed by: HOSPITALIST

## 2023-07-12 PROCEDURE — 99223 1ST HOSP IP/OBS HIGH 75: CPT | Mod: ,,, | Performed by: PHYSICIAN ASSISTANT

## 2023-07-12 PROCEDURE — 99223 PR INITIAL HOSPITAL CARE,LEVL III: ICD-10-PCS | Mod: ,,, | Performed by: PHYSICIAN ASSISTANT

## 2023-07-12 PROCEDURE — 86803 HEPATITIS C AB TEST: CPT | Performed by: PHYSICIAN ASSISTANT

## 2023-07-12 PROCEDURE — 93005 ELECTROCARDIOGRAM TRACING: CPT

## 2023-07-12 PROCEDURE — 81001 URINALYSIS AUTO W/SCOPE: CPT | Performed by: PHYSICIAN ASSISTANT

## 2023-07-12 PROCEDURE — 83735 ASSAY OF MAGNESIUM: CPT | Performed by: PHYSICIAN ASSISTANT

## 2023-07-12 PROCEDURE — 99223 PR INITIAL HOSPITAL CARE,LEVL III: ICD-10-PCS | Mod: ,,, | Performed by: INTERNAL MEDICINE

## 2023-07-12 PROCEDURE — 85730 THROMBOPLASTIN TIME PARTIAL: CPT | Performed by: HOSPITALIST

## 2023-07-12 PROCEDURE — 84100 ASSAY OF PHOSPHORUS: CPT | Performed by: PHYSICIAN ASSISTANT

## 2023-07-12 RX ORDER — TALC
6 POWDER (GRAM) TOPICAL NIGHTLY PRN
Status: DISCONTINUED | OUTPATIENT
Start: 2023-07-12 | End: 2023-07-12

## 2023-07-12 RX ORDER — ACETAMINOPHEN 500 MG
2000 TABLET ORAL DAILY
COMMUNITY

## 2023-07-12 RX ORDER — SODIUM CHLORIDE 0.9 % (FLUSH) 0.9 %
10 SYRINGE (ML) INJECTION EVERY 12 HOURS PRN
Status: DISCONTINUED | OUTPATIENT
Start: 2023-07-12 | End: 2023-07-12

## 2023-07-12 RX ORDER — SODIUM BICARBONATE 325 MG/1
650 TABLET ORAL 3 TIMES DAILY
Status: DISCONTINUED | OUTPATIENT
Start: 2023-07-12 | End: 2023-07-15

## 2023-07-12 RX ORDER — DEXTROSE 40 %
15 GEL (GRAM) ORAL
Status: DISCONTINUED | OUTPATIENT
Start: 2023-07-12 | End: 2023-07-17 | Stop reason: HOSPADM

## 2023-07-12 RX ORDER — ATORVASTATIN CALCIUM 40 MG/1
40 TABLET, FILM COATED ORAL NIGHTLY
Status: DISCONTINUED | OUTPATIENT
Start: 2023-07-12 | End: 2023-07-17 | Stop reason: HOSPADM

## 2023-07-12 RX ORDER — IBUPROFEN 200 MG
16 TABLET ORAL
Status: DISCONTINUED | OUTPATIENT
Start: 2023-07-12 | End: 2023-07-12

## 2023-07-12 RX ORDER — HEPARIN SODIUM 5000 [USP'U]/ML
5000 INJECTION, SOLUTION INTRAVENOUS; SUBCUTANEOUS EVERY 8 HOURS
Status: DISCONTINUED | OUTPATIENT
Start: 2023-07-12 | End: 2023-07-17 | Stop reason: HOSPADM

## 2023-07-12 RX ORDER — IBUPROFEN 200 MG
24 TABLET ORAL
Status: DISCONTINUED | OUTPATIENT
Start: 2023-07-12 | End: 2023-07-12

## 2023-07-12 RX ORDER — NALOXONE HCL 0.4 MG/ML
0.02 VIAL (ML) INJECTION
Status: DISCONTINUED | OUTPATIENT
Start: 2023-07-12 | End: 2023-07-17 | Stop reason: HOSPADM

## 2023-07-12 RX ORDER — INSULIN ASPART 100 [IU]/ML
0-5 INJECTION, SOLUTION INTRAVENOUS; SUBCUTANEOUS
Status: DISCONTINUED | OUTPATIENT
Start: 2023-07-12 | End: 2023-07-13

## 2023-07-12 RX ORDER — DEXTROSE 40 %
30 GEL (GRAM) ORAL
Status: DISCONTINUED | OUTPATIENT
Start: 2023-07-12 | End: 2023-07-17 | Stop reason: HOSPADM

## 2023-07-12 RX ORDER — ASPIRIN 81 MG/1
81 TABLET ORAL DAILY
Status: DISCONTINUED | OUTPATIENT
Start: 2023-07-12 | End: 2023-07-17 | Stop reason: HOSPADM

## 2023-07-12 RX ORDER — HYDRALAZINE HYDROCHLORIDE 50 MG/1
100 TABLET, FILM COATED ORAL EVERY 8 HOURS
Status: DISCONTINUED | OUTPATIENT
Start: 2023-07-12 | End: 2023-07-17 | Stop reason: HOSPADM

## 2023-07-12 RX ORDER — ALBUTEROL SULFATE 90 UG/1
2 AEROSOL, METERED RESPIRATORY (INHALATION) EVERY 4 HOURS PRN
Status: DISCONTINUED | OUTPATIENT
Start: 2023-07-12 | End: 2023-07-17 | Stop reason: HOSPADM

## 2023-07-12 RX ORDER — FUROSEMIDE 40 MG/1
40 TABLET ORAL DAILY
Status: DISCONTINUED | OUTPATIENT
Start: 2023-07-12 | End: 2023-07-17 | Stop reason: HOSPADM

## 2023-07-12 RX ORDER — SODIUM CHLORIDE 0.9 % (FLUSH) 0.9 %
10 SYRINGE (ML) INJECTION
Status: DISCONTINUED | OUTPATIENT
Start: 2023-07-12 | End: 2023-07-17 | Stop reason: HOSPADM

## 2023-07-12 RX ORDER — TALC
6 POWDER (GRAM) TOPICAL NIGHTLY PRN
Status: DISCONTINUED | OUTPATIENT
Start: 2023-07-12 | End: 2023-07-17 | Stop reason: HOSPADM

## 2023-07-12 RX ORDER — AMLODIPINE BESYLATE 10 MG/1
10 TABLET ORAL DAILY
Status: DISCONTINUED | OUTPATIENT
Start: 2023-07-12 | End: 2023-07-17 | Stop reason: HOSPADM

## 2023-07-12 RX ORDER — GLUCAGON 1 MG
1 KIT INJECTION
Status: DISCONTINUED | OUTPATIENT
Start: 2023-07-12 | End: 2023-07-16

## 2023-07-12 RX ORDER — CALCITRIOL 0.25 UG/1
0.25 CAPSULE ORAL DAILY
Status: DISCONTINUED | OUTPATIENT
Start: 2023-07-13 | End: 2023-07-17 | Stop reason: HOSPADM

## 2023-07-12 RX ORDER — ONDANSETRON 2 MG/ML
4 INJECTION INTRAMUSCULAR; INTRAVENOUS EVERY 8 HOURS PRN
Status: DISCONTINUED | OUTPATIENT
Start: 2023-07-12 | End: 2023-07-17 | Stop reason: HOSPADM

## 2023-07-12 RX ORDER — SEVELAMER CARBONATE 800 MG/1
TABLET, FILM COATED ORAL
COMMUNITY
Start: 2023-04-20 | End: 2023-10-06 | Stop reason: SDUPTHER

## 2023-07-12 RX ORDER — SEVELAMER CARBONATE 800 MG/1
800 TABLET, FILM COATED ORAL
Status: DISCONTINUED | OUTPATIENT
Start: 2023-07-12 | End: 2023-07-17 | Stop reason: HOSPADM

## 2023-07-12 RX ADMIN — SODIUM BICARBONATE 650 MG TABLET 650 MG: at 02:07

## 2023-07-12 RX ADMIN — SODIUM BICARBONATE 650 MG TABLET 650 MG: at 08:07

## 2023-07-12 RX ADMIN — HYDRALAZINE HYDROCHLORIDE 100 MG: 50 TABLET ORAL at 10:07

## 2023-07-12 RX ADMIN — ASPIRIN 81 MG: 81 TABLET, COATED ORAL at 02:07

## 2023-07-12 RX ADMIN — HEPARIN SODIUM 5000 UNITS: 5000 INJECTION INTRAVENOUS; SUBCUTANEOUS at 10:07

## 2023-07-12 RX ADMIN — HYDRALAZINE HYDROCHLORIDE 100 MG: 50 TABLET ORAL at 02:07

## 2023-07-12 RX ADMIN — FUROSEMIDE 40 MG: 40 TABLET ORAL at 02:07

## 2023-07-12 RX ADMIN — INSULIN DETEMIR 35 UNITS: 100 INJECTION, SOLUTION SUBCUTANEOUS at 08:07

## 2023-07-12 RX ADMIN — ATORVASTATIN CALCIUM 40 MG: 40 TABLET, FILM COATED ORAL at 08:07

## 2023-07-12 RX ADMIN — AMLODIPINE BESYLATE 10 MG: 10 TABLET ORAL at 02:07

## 2023-07-12 RX ADMIN — LABETALOL HYDROCHLORIDE 300 MG: 100 TABLET, FILM COATED ORAL at 08:07

## 2023-07-12 RX ADMIN — SEVELAMER CARBONATE 800 MG: 800 TABLET, FILM COATED ORAL at 05:07

## 2023-07-12 NOTE — ED TRIAGE NOTES
Pt had some labs drawn at his PCP, was told that he needed to come into the ER immediately for elevated kidney labs. He is urinating frequently but the urine is clear

## 2023-07-12 NOTE — ED NOTES
LOC: Patient name and date of birth verified. The patient is awake, alert and aware of environment with an appropriate affect, the patient is oriented x 3 and speaking appropriately.   APPEARANCE: Patient resting comfortably, patient is clean and well groomed, patient's clothing is properly fastened.  SKIN: The skin is warm and dry, color consistent with ethnicity, patient has normal skin turgor and moist mucus membranes, skin intact, no breakdown or bruising noted.  MUSCULOSKELETAL: Patient moving all extremities well, no obvious swelling or deformities noted.   RESPIRATORY: Respirations are spontaneous, patient has a normal effort and rate, no accessory muscle use noted.  CARDIAC: Patient has a normal rate and rhythm, no periphreal edema noted, capillary refill < 3 seconds.  ABDOMEN: Soft and non tender to palpation, no distention noted. Bowel sounds present in all four quadrants.  NEUROLOGIC: Eyes open spontaneously, behavior appropriate to situation, follows commands, facial expression symmetrical, bilateral hand grasp equal and even, purposeful motor response noted, normal sensation in all extremities when touched with a finger.     Pt endorses frequent urination

## 2023-07-12 NOTE — ASSESSMENT & PLAN NOTE
CKD 5 due to longstanding diabetes and history of cocaine abuse; hx of HTN as well    UPCR 6/29/23 with 0.75  Last renal US 2019 - repeat pending    Plan/Recommendation  -Initiating dialysis when access is placed. Hopeful for better support structure after initation  -Continue lasix 40 QD.  -Renal US.  -Will get 24 hour urine creatinine clearance.  -IR consult for permacath placement  -Increase bicarb to 1300 bid  -Consent obtained 7/12/23  -Will speak with case management to find dialysis clinic  -Keep MAP > 65  -Keep hemoglobin > 7  -Strict ins and outs  -Avoid nephrotoxic agents if possible and renally dose medications  -Avoid drastic hemodynamic changes if possible

## 2023-07-12 NOTE — SUBJECTIVE & OBJECTIVE
Past Medical History:   Diagnosis Date    CKD (chronic kidney disease), stage V     Cocaine abuse     Diabetes mellitus type I     Diabetic retinopathy     Hyperlipidemia     Hypertension        Past Surgical History:   Procedure Laterality Date    CIRCUMCISION      WISDOM TOOTH EXTRACTION         Review of patient's allergies indicates:  No Known Allergies  Current Facility-Administered Medications   Medication Frequency    albuterol inhaler 2 puff Q4H PRN    amLODIPine tablet 10 mg Daily    aspirin EC tablet 81 mg Daily    atorvastatin tablet 40 mg QHS    [START ON 7/13/2023] calcitRIOL capsule 0.25 mcg Daily    dextrose 10% bolus 125 mL 125 mL PRN    dextrose 10% bolus 250 mL 250 mL PRN    dextrose 40 % gel 15,000 mg PRN    dextrose 40 % gel 30,000 mg PRN    furosemide tablet 40 mg Daily    glucagon (human recombinant) injection 1 mg PRN    heparin (porcine) injection 5,000 Units Q8H    hydrALAZINE tablet 100 mg Q8H    insulin aspart U-100 pen 0-5 Units QID (AC + HS) PRN    insulin detemir U-100 (Levemir) pen 35 Units QHS    labetaloL tablet 300 mg BID    melatonin tablet 6 mg Nightly PRN    naloxone 0.4 mg/mL injection 0.02 mg PRN    ondansetron injection 4 mg Q8H PRN    sevelamer carbonate tablet 800 mg TID WM    sodium bicarbonate tablet 650 mg TID    sodium chloride 0.9% flush 10 mL PRN     Current Outpatient Medications   Medication    amLODIPine (NORVASC) 10 MG tablet    atorvastatin (LIPITOR) 40 MG tablet    calcitRIOL (ROCALTROL) 0.25 MCG Cap    furosemide (LASIX) 40 MG tablet    hydrALAZINE (APRESOLINE) 100 MG tablet    insulin detemir U-100, Levemir, (LEVEMIR FLEXTOUCH U100 INSULIN) 100 unit/mL (3 mL) InPn pen    labetaloL (NORMODYNE) 300 MG tablet    metOLazone (ZAROXOLYN) 5 MG tablet    sodium bicarbonate 650 MG tablet    aspirin 81 MG Chew    blood sugar diagnostic Strp    sevelamer carbonate (RENVELA) 800 mg Tab    sildenafiL (VIAGRA) 100 MG tablet     Family History       Problem Relation (Age of  "Onset)    Cancer Mother    Cataracts Mother    Diabetes Father, Brother    Hypertension Mother, Father          Tobacco Use    Smoking status: Never    Smokeless tobacco: Never   Substance and Sexual Activity    Alcohol use: No    Drug use: Yes     Types: "Crack" cocaine     Comment: in remission     Sexual activity: Yes     Partners: Female     Review of Systems   Constitutional:  Negative for chills and fever.   HENT:  Negative for rhinorrhea and sore throat.    Eyes:  Negative for pain and visual disturbance.   Respiratory:  Negative for cough and shortness of breath.    Cardiovascular:  Negative for chest pain and palpitations.   Gastrointestinal:  Negative for abdominal pain, constipation, diarrhea and nausea.   Endocrine: Negative for cold intolerance, heat intolerance and polyuria.   Genitourinary:  Negative for dysuria and flank pain.   Musculoskeletal:  Negative for back pain, gait problem and joint swelling.   Allergic/Immunologic: Negative for immunocompromised state.   Neurological:  Negative for light-headedness, numbness and headaches.   Objective:     Vital Signs (Most Recent):  Temp: 98 °F (36.7 °C) (07/12/23 1411)  Pulse: 64 (07/12/23 1411)  Resp: 16 (07/12/23 1411)  BP: (!) 160/100 (07/12/23 1411)  SpO2: 99 % (07/12/23 1411) Vital Signs (24h Range):  Temp:  [97.5 °F (36.4 °C)-98 °F (36.7 °C)] 98 °F (36.7 °C)  Pulse:  [61-66] 64  Resp:  [16-20] 16  SpO2:  [98 %-100 %] 99 %  BP: (140-160)/() 160/100     Weight: 103 kg (227 lb) (07/12/23 1128)  Body mass index is 31.66 kg/m².  Body surface area is 2.27 meters squared.    No intake/output data recorded.     Physical Exam  Constitutional:       General: He is not in acute distress.     Appearance: Normal appearance. He is obese. He is not ill-appearing or toxic-appearing.   HENT:      Head: Normocephalic and atraumatic.      Nose: Nose normal. No congestion.      Mouth/Throat:      Mouth: Mucous membranes are moist.   Eyes:      Pupils: Pupils are " equal, round, and reactive to light.   Cardiovascular:      Rate and Rhythm: Normal rate.      Heart sounds: No murmur heard.  Pulmonary:      Effort: Pulmonary effort is normal. No respiratory distress.      Breath sounds: No wheezing or rales.   Abdominal:      General: Abdomen is flat. There is no distension.      Tenderness: There is no abdominal tenderness.   Musculoskeletal:      Cervical back: Normal range of motion. No rigidity.      Right lower leg: Edema (trace to midshin) present.      Left lower leg: Edema (trace to midshin) present.   Lymphadenopathy:      Cervical: No cervical adenopathy.   Neurological:      Mental Status: He is alert.        Significant Labs:  All labs within the past 24 hours have been reviewed.    Significant Imaging:  Labs: Reviewed

## 2023-07-12 NOTE — ASSESSMENT & PLAN NOTE
Goal hemoglobin in CKD 5 is 10-12  Hemoglobin   Date Value Ref Range Status   07/12/2023 8.7 (L) 14.0 - 18.0 g/dL Final     Iron   Date Value Ref Range Status   07/12/2023 76 45 - 160 ug/dL Final     Transferrin   Date Value Ref Range Status   07/12/2023 189 (L) 200 - 375 mg/dL Final     TIBC   Date Value Ref Range Status   07/12/2023 280 250 - 450 ug/dL Final     Saturated Iron   Date Value Ref Range Status   07/12/2023 27 20 - 50 % Final     Ferritin   Date Value Ref Range Status   07/12/2023 34 20.0 - 300.0 ng/mL Final       Adequate iron stores  EPO once weekly

## 2023-07-12 NOTE — HPI
53 year old man with a history of CKD 5 due to cocaine abuse and poorly controlled diabetes, hypertension and hyperlipidemia admitted from nephrology clinic for concerns of worsening CKD 5. He states that he is of his usual health, but presented 7/12 for his usual nephrology appointment and was told to come to the ED for admission. He states that he has a little swelling in both his legs, but he states he is constantly urinating. He is on a home dose of lasix 40. Furthermore, he denies any confusion, nausea, vomiting, metallic taste or change in his ability to do his daily activity. He is able to walk 2 blocks before needing to stop.    He has attended dialysis education and is aware that he is on the verge of needing dialysis, however has not met with the  just yet. He was supposed to have an appointment for AV fistula placement, but has not yet made his appointments and has somewhat been lost to follow up.     On admission to [facility], he has a hemoglobin of 8.7, Tsat of 27, bicarb of 20, BUN of 84 with creatinine of 9.3. UA shows 2+ protein, 1+ glucose.     Nephrology consulted for initiation of HD

## 2023-07-12 NOTE — PHARMACY MED REC
"Admission Medication History     The home medication history was taken by Ramona De La Cruz.    You may go to "Admission" then "Reconcile Home Medications" tabs to review and/or act upon these items.     The home medication list has been updated by the Pharmacy department.   Please read ALL comments highlighted in yellow.   Please address this information as you see fit.    Feel free to contact us if you have any questions or require assistance.        Medications listed below were obtained from: Patient/family, Medications brought from home, and Patient's pharmacy  Current Outpatient Medications on File Prior to Encounter   Medication Sig    amLODIPine (NORVASC) 10 MG tablet Take 1 tablet (10 mg total) by mouth once daily.    aspirin 81 MG Chew Take 81 mg by mouth once daily.    atorvastatin (LIPITOR) 40 MG tablet Take 1 tablet (40 mg total) by mouth every evening.    calcitRIOL (ROCALTROL) 0.25 MCG Cap TAKE 1 CAPSULE (0.25 MCG TOTAL) BY MOUTH ONCE DAILY.    cholecalciferol, vitamin D3, (VITAMIN D3) 50 mcg (2,000 unit) Cap capsule Take 2,000 Units by mouth once daily.    furosemide (LASIX) 40 MG tablet Take 1 tablet (40 mg total) by mouth once daily.    hydrALAZINE (APRESOLINE) 100 MG tablet Take 100 mg by mouth every 12 (twelve) hours.   Last filled 12-    insulin detemir U-100, Levemir, (LEVEMIR FLEXTOUCH U100 INSULIN) 100 unit/mL (3 mL) InPn pen Inject 47 Units into the skin every evening.    labetaloL (NORMODYNE) 300 MG tablet Take 1 tablet (300 mg total) by mouth 2 (two) times daily.    metOLazone (ZAROXOLYN) 5 MG tablet TAKE 1 TABLET BY MOUTH EVERY DAY AT NIGHT    sildenafiL (VIAGRA) 100 MG tablet Take 1 tablet (100 mg total) by mouth daily as needed for Erectile Dysfunction.    sodium bicarbonate 650 MG tablet Take 650 mg by mouth once daily.    blood sugar diagnostic Strp To check BG 2 times daily, to use with insurance preferred meter    sevelamer carbonate (RENVELA) 800 mg Tab TAKE 1 TABLET BY MOUTH 3 " TIMES DAILY WITH MEALS.   NOT TAKING. Pt took one dose, made him feel bad, had diarrhea, and has not taken since           Potential issues to be addressed PRIOR TO DISCHARGE  Please discuss with the patient barriers to adherence with medication treatment plans    Ramona De La Cruz  EXT [phone]                  .

## 2023-07-12 NOTE — FIRST PROVIDER EVALUATION
" Emergency Department TeleTriage Encounter Note      CHIEF COMPLAINT    Chief Complaint   Patient presents with    Abnormal Lab     BUN&Cr 90&9; PCP told pt "go to ED to get admitted for permcath and to start HD "       VITAL SIGNS   Initial Vitals [07/12/23 1128]   BP Pulse Resp Temp SpO2   (!) 147/77 61 20 97.5 °F (36.4 °C) 100 %      MAP       --            ALLERGIES    Review of patient's allergies indicates:  No Known Allergies    PROVIDER TRIAGE NOTE  Patient presents with complaint of worsening renal function on outpatient labs. Reports fatigue and swelling. Urinary frequency. No CP or SOB      Phy:   Constitutional: well nourished, well developed, appearing stated age, NAD   HEENT: NCAT, symmetrical lids, No obvious facial deformity.  Normal phonation. Normal Conjunctiva   Neck: NAROM   Respiratory: Normal effort.  No obvious use of accessory muscles   Musculoskeletal: Moved upper extremities well   Neuro: Alert, answers questions appropriately    Psych: appropriate mood and affect      Initial orders will be placed and care will be transferred to an alternate provider when patient is roomed for a full evaluation. Any additional orders and the final disposition will be determined by that provider.        ORDERS  Labs Reviewed   HIV 1 / 2 ANTIBODY   HEPATITIS C ANTIBODY       ED Orders (720h ago, onward)      Start Ordered     Status Ordering Provider    07/12/23 1130 07/12/23 1129  HIV 1/2 Ag/Ab (4th Gen)  STAT         Ordered AXEL LOREDO    07/12/23 1130 07/12/23 1129  Hepatitis C Antibody  STAT         Ordered AXEL LOREDO              Virtual Visit Note: The provider triage portion of this emergency department evaluation and documentation was performed via [employer], a HIPAA-compliant telemedicine application, in concert with a tele-presenter in the room. A face to face patient evaluation with one of my colleagues will occur once the patient is placed in an emergency department " room.      DISCLAIMER: This note was prepared with [employer] voice recognition transcription software. Garbled syntax, mangled pronouns, and other bizarre constructions may be attributed to that software system.

## 2023-07-12 NOTE — ED PROVIDER NOTES
"Encounter Date: 7/12/2023       History     Chief Complaint   Patient presents with    Abnormal Lab     BUN&Cr 90&9; PCP told pt "go to ED to get admitted for permcath and to start HD "     HPI  Review of patient's allergies indicates:  No Known Allergies  Past Medical History:   Diagnosis Date    CKD (chronic kidney disease), stage V     Cocaine abuse     Diabetes mellitus type I     Diabetic retinopathy     Hyperlipidemia     Hypertension      Past Surgical History:   Procedure Laterality Date    CIRCUMCISION      WISDOM TOOTH EXTRACTION       Family History   Problem Relation Age of Onset    Cancer Mother     Hypertension Mother     Cataracts Mother     Hypertension Father     Diabetes Father     Diabetes Brother     Amblyopia Neg Hx     Blindness Neg Hx     Glaucoma Neg Hx     Macular degeneration Neg Hx     Retinal detachment Neg Hx     Strabismus Neg Hx     Stroke Neg Hx     Thyroid disease Neg Hx      Social History     Tobacco Use    Smoking status: Never    Smokeless tobacco: Never   Substance Use Topics    Alcohol use: No    Drug use: Yes     Types: "Crack" cocaine     Comment: in remission      Review of Systems    Physical Exam     Initial Vitals [07/12/23 1128]   BP Pulse Resp Temp SpO2   (!) 147/77 61 20 97.5 °F (36.4 °C) 100 %      MAP       --         Physical Exam    Nursing note and vitals reviewed.  Constitutional: He appears well-developed and well-nourished.   HENT:   Head: Normocephalic and atraumatic.   Eyes: Conjunctivae are normal. Pupils are equal, round, and reactive to light.   Neck: Neck supple.   Normal range of motion.  Cardiovascular:  Normal rate.           Pulmonary/Chest: Breath sounds normal.   Abdominal: Abdomen is soft. There is no abdominal tenderness.   Musculoskeletal:         General: Normal range of motion.      Cervical back: Normal range of motion and neck supple.      Comments: Mild edema to bilateral lower extremities.     Neurological: He is alert and oriented to " person, place, and time. GCS score is 15. GCS eye subscore is 4. GCS verbal subscore is 5. GCS motor subscore is 6.   Skin: Skin is warm and dry. Capillary refill takes less than 2 seconds.   Psychiatric: He has a normal mood and affect.       ED Course   Procedures  Labs Reviewed   CBC W/ AUTO DIFFERENTIAL - Abnormal; Notable for the following components:       Result Value    RBC 3.30 (*)     Hemoglobin 8.7 (*)     Hematocrit 27.9 (*)     MCH 26.4 (*)     MCHC 31.2 (*)     Platelets 129 (*)     All other components within normal limits    Narrative:     Release to patient->Immediate   COMPREHENSIVE METABOLIC PANEL - Abnormal; Notable for the following components:    CO2 20 (*)     Glucose 226 (*)     BUN 84 (*)     Creatinine 9.3 (*)     Calcium 8.3 (*)     eGFR 6.2 (*)     All other components within normal limits    Narrative:     Release to patient->Immediate   URINALYSIS, REFLEX TO URINE CULTURE - Abnormal; Notable for the following components:    Protein, UA 2+ (*)     Glucose, UA 1+ (*)     Occult Blood UA 1+ (*)     All other components within normal limits    Narrative:     Specimen Source->Urine   MAGNESIUM - Abnormal; Notable for the following components:    Magnesium 1.2 (*)     All other components within normal limits    Narrative:     Release to patient->Immediate   PHOSPHORUS - Abnormal; Notable for the following components:    Phosphorus 5.1 (*)     All other components within normal limits    Narrative:     Release to patient->Immediate   HIV 1 / 2 ANTIBODY    Narrative:     Release to patient->Immediate   HEPATITIS C ANTIBODY    Narrative:     Release to patient->Immediate   URINALYSIS MICROSCOPIC    Narrative:     Specimen Source->Urine          Imaging Results    None          Medications - No data to display  Medical Decision Making:   History:   Old Medical Records: I decided to obtain old medical records.  Initial Assessment:   53-year-old male with history of CKD was referred to the ED for  PermCath placement and dialysis by his nephrologist due to recent abnormal labs.  Differential Diagnosis:   Hypokalemia, volume overload, acute renal failure  Clinical Tests:   Lab Tests: Ordered and Reviewed  ED Management:  Creatinine 9.3 today.  His potassium is within normal limits.  He is otherwise asymptomatic.  No hypoxia.  No indication for emergent dialysis at this time.  Will admit to hospital medicine for nephrology consult and PermCath placement.           ED Course as of 07/12/23 1305   Wed Jul 12, 2023   1212 No stemi [AC]      ED Course User Index  [AC] Oniel Banerjee DO                 Clinical Impression:   Final diagnoses:  [S37.009A] Kidney injury  [N19] Renal failure, unspecified chronicity (Primary)        ED Disposition Condition    Admit Stable                Marck Ocasio PA-C  07/12/23 1305

## 2023-07-12 NOTE — CONSULTS
Tunneled Dialysis Catheter Placement Consult Note  Interventional Radiology    Consult Requested By: Opal Barragan MD   Reason for Consult: ESRD new start HD - permcath placement     SUBJECTIVE:     Chief Complaint:  ESRD, request TDC for HD access    History of Present Illness:  Luis Daniel Howell is a 53 y.o. male with a PMHx of CKD V, cocaine abuse, T1DM, HLD, and HTN who was admitted from the ED on 7/12/23 for worsening CKD in need of HD. Pt was seen in nephrology clinic yesterday and was instructed to present to the ED for TDC placement and to start HD due to his recent labs with worsening Cr and BUN. Interventional Radiology has been consulted for TDC placement for HD access. Pt has not initiated HD and currently does not have a temporary HD line in place. The pt has not had a TDC in the past. The pt's Cr is 9.3 and his K is 4.7. His WBC is 4.86. The pt is hemodynamically stable.     Review of Systems   Constitutional:  Positive for malaise/fatigue. Negative for chills, fever and weight loss.   Respiratory: Negative.     Cardiovascular:  Positive for leg swelling. Negative for chest pain.   Gastrointestinal: Negative.    Genitourinary: Negative.    Musculoskeletal: Negative.    Neurological: Negative.      Scheduled Meds:   amLODIPine  10 mg Oral Daily    aspirin  81 mg Oral Daily    atorvastatin  40 mg Oral QHS    [START ON 7/13/2023] calcitRIOL  0.25 mcg Oral Daily    furosemide  40 mg Oral Daily    heparin (porcine)  5,000 Units Subcutaneous Q8H    hydrALAZINE  100 mg Oral Q8H    insulin detemir U-100 (Levemir)  35 Units Subcutaneous QHS    labetaloL  300 mg Oral BID    sevelamer carbonate  800 mg Oral TID WM    sodium bicarbonate  650 mg Oral TID     Continuous Infusions:  PRN Meds:albuterol **AND** MDI Q4H PRN, dextrose 10%, dextrose 10%, dextrose, dextrose, glucagon (human recombinant), insulin aspart U-100, melatonin, naloxone, ondansetron, sodium chloride 0.9%    Review of patient's allergies  "indicates:  No Known Allergies    Past Medical History:   Diagnosis Date    CKD (chronic kidney disease), stage V     Cocaine abuse     Diabetes mellitus type I     Diabetic retinopathy     Hyperlipidemia     Hypertension      Past Surgical History:   Procedure Laterality Date    CIRCUMCISION      WISDOM TOOTH EXTRACTION       Family History   Problem Relation Age of Onset    Cancer Mother     Hypertension Mother     Cataracts Mother     Hypertension Father     Diabetes Father     Diabetes Brother     Amblyopia Neg Hx     Blindness Neg Hx     Glaucoma Neg Hx     Macular degeneration Neg Hx     Retinal detachment Neg Hx     Strabismus Neg Hx     Stroke Neg Hx     Thyroid disease Neg Hx      Social History     Tobacco Use    Smoking status: Never    Smokeless tobacco: Never   Substance Use Topics    Alcohol use: No    Drug use: Yes     Types: "Crack" cocaine     Comment: in remission        OBJECTIVE:     Vital Signs (Most Recent)  Temp: 98 °F (36.7 °C) (07/12/23 1229)  Pulse: 66 (07/12/23 1229)  Resp: 16 (07/12/23 1229)  BP: (!) 140/70 (07/12/23 1229)  SpO2: 98 % (07/12/23 1229)    Physical Exam:  Physical Exam  Vitals and nursing note reviewed.   Constitutional:       General: He is not in acute distress.     Appearance: Normal appearance. He is obese. He is not ill-appearing.   HENT:      Head: Normocephalic and atraumatic.   Eyes:      Extraocular Movements: Extraocular movements intact.      Conjunctiva/sclera: Conjunctivae normal.      Pupils: Pupils are equal, round, and reactive to light.   Pulmonary:      Effort: Pulmonary effort is normal. No respiratory distress.   Abdominal:      General: Abdomen is flat. There is no distension.   Skin:     General: Skin is warm and dry.      Coloration: Skin is not jaundiced.   Neurological:      General: No focal deficit present.      Mental Status: He is alert and oriented to person, place, and time.   Psychiatric:         Mood and Affect: Mood normal.         Behavior: " Behavior normal.         Thought Content: Thought content normal.         Judgment: Judgment normal.       Laboratory  I have reviewed all pertinent lab results within the past 24 hours.  CBC:   Recent Labs   Lab 07/12/23  1147   WBC 4.86   RBC 3.30*   HGB 8.7*   HCT 27.9*   *   MCV 85   MCH 26.4*   MCHC 31.2*     BMP:   Recent Labs   Lab 07/12/23  1147   *      K 4.7      CO2 20*   BUN 84*   CREATININE 9.3*   CALCIUM 8.3*   MG 1.2*     CMP:   Recent Labs   Lab 07/12/23  1147   *   CALCIUM 8.3*   ALBUMIN 3.5   PROT 6.7      K 4.7   CO2 20*      BUN 84*   CREATININE 9.3*   ALKPHOS 65   ALT 15   AST 20   BILITOT 0.4     LFTs:   Recent Labs   Lab 07/12/23  1147   ALT 15   AST 20   ALKPHOS 65   BILITOT 0.4   PROT 6.7   ALBUMIN 3.5     Coagulation:   Recent Labs   Lab 07/12/23  1329   LABPROT 11.4   INR 1.1   APTT 27.0     Microbiology Results (last 7 days)       ** No results found for the last 168 hours. **            ASA/Mallampati  ASA: 3  Mallampati: 2    Imaging:  Recent imaging studies reviewed.     ASSESSMENT/PLAN:     Assessment:  53 y.o. male with a PMHx of CKD V, cocaine abuse, T1DM, HLD, and HTN who has been referred to IR for TDC placement for HD access. The procedure was discussed in great detail with the patient including thorough explanations of the potential risks and benefits of TDC placement. Risks include bleeding at the puncture site, infection, catheter related thrombus, catheter dysfunction, and central vein stenosis. The patient is a candidate for TDC placement under moderate sedation. Plan discussed with ordering physician.The pt verbalized understanding of the plan and would like to proceed.    Plan:  Will proceed with TDC placement under moderate sedation on 7/13/23.   Please keep pt NPO starting at midnight on 7/13/23.   Anticoagulation history reviewed.   Coagulation labs reviewed.  Thank you for the consult. Please contact with questions via Epic  secure chat or spectra    Ella Sharp PA-C  Interventional Radiology  Spectra: [id number]

## 2023-07-12 NOTE — ASSESSMENT & PLAN NOTE
Calcium   Date Value Ref Range Status   07/12/2023 8.3 (L) 8.7 - 10.5 mg/dL Final     Phosphorus   Date Value Ref Range Status   07/12/2023 5.1 (H) 2.7 - 4.5 mg/dL Final     PTH, Intact   Date Value Ref Range Status   08/30/2022 623.1 (H) 9.0 - 77.0 pg/mL Final     Repeat PTH  Calcitrol 0.25  Sevelamer 800 TID

## 2023-07-12 NOTE — CONSULTS
[facility] - Emergency Dept  Nephrology  Consult Note    Patient Name: Luis Daniel Howell  MRN: [id number]  Admission Date: 7/12/2023  Hospital Length of Stay: 0 days  Attending Provider: Opal Barragan MD   Primary Care Physician: Janak Wilson MD  Principal Problem:ESRD needing dialysis    Inpatient consult to Nephrology  Consult performed by: Martell Bonilla MD  Consult ordered by: Opal Barragan MD        Subjective:     HPI: 53 year old man with a history of CKD 5 due to cocaine abuse and poorly controlled diabetes, hypertension and hyperlipidemia admitted from nephrology clinic for concerns of worsening CKD 5. He states that he is of his usual health, but presented 7/12 for his usual nephrology appointment and was told to come to the ED for admission. He states that he has a little swelling in both his legs, but he states he is constantly urinating. He is on a home dose of lasix 40. Furthermore, he denies any confusion, nausea, vomiting, metallic taste or change in his ability to do his daily activity. He is able to walk 2 blocks before needing to stop.    He has attended dialysis education and is aware that he is on the verge of needing dialysis, however has not met with the  just yet. He was supposed to have an appointment for AV fistula placement, but has not yet made his appointments and has somewhat been lost to follow up.     [place] [facility] [place] a hemoglobin of 8.7, Tsat of 27, bicarb of 20, BUN of 84 with creatinine of 9.3. UA shows 2+ protein, 1+ glucose.     Nephrology consulted for initiation of HD      Past Medical History:   Diagnosis Date    CKD (chronic kidney disease), stage V     Cocaine abuse     Diabetes mellitus type I     Diabetic retinopathy     Hyperlipidemia     Hypertension        Past Surgical History:   Procedure Laterality Date    CIRCUMCISION      WISDOM TOOTH EXTRACTION         Review of patient's allergies indicates:  No Known Allergies  Current  "Facility-Administered Medications   Medication Frequency    albuterol inhaler 2 puff Q4H PRN    amLODIPine tablet 10 mg Daily    aspirin EC tablet 81 mg Daily    atorvastatin tablet 40 mg QHS    [START ON 7/13/2023] calcitRIOL capsule 0.25 mcg Daily    dextrose 10% bolus 125 mL 125 mL PRN    dextrose 10% bolus 250 mL 250 mL PRN    dextrose 40 % gel 15,000 mg PRN    dextrose 40 % gel 30,000 mg PRN    furosemide tablet 40 mg Daily    glucagon (human recombinant) injection 1 mg PRN    heparin (porcine) injection 5,000 Units Q8H    hydrALAZINE tablet 100 mg Q8H    insulin aspart U-100 pen 0-5 Units QID (AC + HS) PRN    insulin detemir U-100 (Levemir) pen 35 Units QHS    labetaloL tablet 300 mg BID    melatonin tablet 6 mg Nightly PRN    naloxone 0.4 mg/mL injection 0.02 mg PRN    ondansetron injection 4 mg Q8H PRN    sevelamer carbonate tablet 800 mg TID WM    sodium bicarbonate tablet 650 mg TID    sodium chloride 0.9% flush 10 mL PRN     Current Outpatient Medications   Medication    amLODIPine (NORVASC) 10 MG tablet    atorvastatin (LIPITOR) 40 MG tablet    calcitRIOL (ROCALTROL) 0.25 MCG Cap    furosemide (LASIX) 40 MG tablet    hydrALAZINE (APRESOLINE) 100 MG tablet    insulin detemir U-100, Levemir, (LEVEMIR FLEXTOUCH U100 INSULIN) 100 unit/mL (3 mL) InPn pen    labetaloL (NORMODYNE) 300 MG tablet    metOLazone (ZAROXOLYN) 5 MG tablet    sodium bicarbonate 650 MG tablet    aspirin 81 MG Chew    blood sugar diagnostic Strp    sevelamer carbonate (RENVELA) 800 mg Tab    sildenafiL (VIAGRA) 100 MG tablet     Family History       Problem Relation (Age of Onset)    Cancer Mother    Cataracts Mother    Diabetes Father, Brother    Hypertension Mother, Father          Tobacco Use    Smoking status: Never    Smokeless tobacco: Never   Substance and Sexual Activity    Alcohol use: No    Drug use: Yes     Types: "Crack" cocaine     Comment: in remission     Sexual activity: Yes     " Partners: Female     Review of Systems   Constitutional:  Negative for chills and fever.   HENT:  Negative for rhinorrhea and sore throat.    Eyes:  Negative for pain and visual disturbance.   Respiratory:  Negative for cough and shortness of breath.    Cardiovascular:  Negative for chest pain and palpitations.   Gastrointestinal:  Negative for abdominal pain, constipation, diarrhea and nausea.   Endocrine: Negative for cold intolerance, heat intolerance and polyuria.   Genitourinary:  Negative for dysuria and flank pain.   Musculoskeletal:  Negative for back pain, gait problem and joint swelling.   Allergic/Immunologic: Negative for immunocompromised state.   Neurological:  Negative for light-headedness, numbness and headaches.   Objective:     Vital Signs (Most Recent):  Temp: 98 °F (36.7 °C) (07/12/23 1411)  Pulse: 64 (07/12/23 1411)  Resp: 16 (07/12/23 1411)  BP: (!) 160/100 (07/12/23 1411)  SpO2: 99 % (07/12/23 1411) Vital Signs (24h Range):  Temp:  [97.5 °F (36.4 °C)-98 °F (36.7 °C)] 98 °F (36.7 °C)  Pulse:  [61-66] 64  Resp:  [16-20] 16  SpO2:  [98 %-100 %] 99 %  BP: (140-160)/() 160/100     Weight: 103 kg (227 lb) (07/12/23 1128)  Body mass index is 31.66 kg/m².  Body surface area is 2.27 meters squared.    No intake/output data recorded.     Physical Exam  Constitutional:       General: He is not in acute distress.     Appearance: Normal appearance. He is obese. He is not ill-appearing or toxic-appearing.   HENT:      Head: Normocephalic and atraumatic.      Nose: Nose normal. No congestion.      Mouth/Throat:      Mouth: Mucous membranes are moist.   Eyes:      Pupils: Pupils are equal, round, and reactive to light.   Cardiovascular:      Rate and Rhythm: Normal rate.      Heart sounds: No murmur heard.  Pulmonary:      Effort: Pulmonary effort is normal. No respiratory distress.      Breath sounds: No wheezing or rales.   Abdominal:      General: Abdomen is flat. There is no distension.       Tenderness: There is no abdominal tenderness.   Musculoskeletal:      Cervical back: Normal range of motion. No rigidity.      Right lower leg: Edema (trace to midshin) present.      Left lower leg: Edema (trace to midshin) present.   Lymphadenopathy:      Cervical: No cervical adenopathy.   Neurological:      Mental Status: He is alert.        Significant Labs:  All labs within the past 24 hours have been reviewed.    Significant Imaging:  Labs: Reviewed    Assessment/Plan:     Cardiac/Vascular  Essential hypertension  -    Renal/  * ESRD needing dialysis  CKD 5 due to longstanding diabetes and history of cocaine abuse; hx of HTN as well    UPCR 6/29/23 with 0.75  Last renal US 2019 - repeat pending    Plan/Recommendation  -Initiating dialysis when access is placed. Hopeful for better support structure after initation  -Continue lasix 40 QD.  -Renal US.  -Will get 24 hour urine creatinine clearance.  -IR consult for permacath placement  -Increase bicarb to 1300 bid  -Consent obtained 7/12/23  -Will speak with case management to find dialysis clinic  -Keep MAP > 65  -Keep hemoglobin > 7  -Strict ins and outs  -Avoid nephrotoxic agents if possible and renally dose medications  -Avoid drastic hemodynamic changes if possible        Oncology  Anemia of chronic kidney failure, unspecified stage  Goal hemoglobin in CKD 5 is 10-12  Hemoglobin   Date Value Ref Range Status   07/12/2023 8.7 (L) 14.0 - 18.0 g/dL Final     Iron   Date Value Ref Range Status   07/12/2023 76 45 - 160 ug/dL Final     Transferrin   Date Value Ref Range Status   07/12/2023 189 (L) 200 - 375 mg/dL Final     TIBC   Date Value Ref Range Status   07/12/2023 280 250 - 450 ug/dL Final     Saturated Iron   Date Value Ref Range Status   07/12/2023 27 20 - 50 % Final     Ferritin   Date Value Ref Range Status   07/12/2023 34 20.0 - 300.0 ng/mL Final       Adequate iron stores  EPO once weekly    Endocrine  Secondary hyperparathyroidism of renal  origin  Calcium   Date Value Ref Range Status   07/12/2023 8.3 (L) 8.7 - 10.5 mg/dL Final     Phosphorus   Date Value Ref Range Status   07/12/2023 5.1 (H) 2.7 - 4.5 mg/dL Final     PTH, Intact   Date Value Ref Range Status   08/30/2022 623.1 (H) 9.0 - 77.0 pg/mL Final     Repeat PTH  Calcitrol 0.25  Sevelamer 800 TID    Type 2 diabetes mellitus with hyperglycemia, with long-term current use of insulin  -        Thank you for your consult. I will follow-up with patient. Please contact us if you have any additional questions.    Martell Bonilla MD  Nephrology  [facility] - Emergency Dept

## 2023-07-13 PROBLEM — I50.32 CHRONIC DIASTOLIC HEART FAILURE: Status: ACTIVE | Noted: 2023-07-13

## 2023-07-13 LAB
ALBUMIN SERPL BCP-MCNC: 3.6 G/DL (ref 3.5–5.2)
ALP SERPL-CCNC: 57 U/L (ref 55–135)
ALT SERPL W/O P-5'-P-CCNC: 13 U/L (ref 10–44)
ANION GAP SERPL CALC-SCNC: 11 MMOL/L (ref 8–16)
AST SERPL-CCNC: 20 U/L (ref 10–40)
BASOPHILS # BLD AUTO: 0.01 K/UL (ref 0–0.2)
BASOPHILS NFR BLD: 0.2 % (ref 0–1.9)
BILIRUB SERPL-MCNC: 0.3 MG/DL (ref 0.1–1)
BUN SERPL-MCNC: 88 MG/DL (ref 6–20)
CALCIUM SERPL-MCNC: 8.6 MG/DL (ref 8.7–10.5)
CHLORIDE SERPL-SCNC: 110 MMOL/L (ref 95–110)
CO2 SERPL-SCNC: 19 MMOL/L (ref 23–29)
CREAT SERPL-MCNC: 9.2 MG/DL (ref 0.5–1.4)
DIFFERENTIAL METHOD: ABNORMAL
EOSINOPHIL # BLD AUTO: 0.2 K/UL (ref 0–0.5)
EOSINOPHIL NFR BLD: 2.8 % (ref 0–8)
ERYTHROCYTE [DISTWIDTH] IN BLOOD BY AUTOMATED COUNT: 14.2 % (ref 11.5–14.5)
EST. GFR  (NO RACE VARIABLE): 6.3 ML/MIN/1.73 M^2
GLUCOSE SERPL-MCNC: 42 MG/DL (ref 70–110)
HBV CORE AB SERPL QL IA: NORMAL
HBV SURFACE AB SER-ACNC: <3 MIU/ML
HBV SURFACE AB SER-ACNC: NORMAL M[IU]/ML
HBV SURFACE AG SERPL QL IA: NORMAL
HCT VFR BLD AUTO: 28.3 % (ref 40–54)
HGB BLD-MCNC: 8.8 G/DL (ref 14–18)
IMM GRANULOCYTES # BLD AUTO: 0.01 K/UL (ref 0–0.04)
IMM GRANULOCYTES NFR BLD AUTO: 0.2 % (ref 0–0.5)
LYMPHOCYTES # BLD AUTO: 1.4 K/UL (ref 1–4.8)
LYMPHOCYTES NFR BLD: 25.1 % (ref 18–48)
MAGNESIUM SERPL-MCNC: 1.3 MG/DL (ref 1.6–2.6)
MCH RBC QN AUTO: 25.8 PG (ref 27–31)
MCHC RBC AUTO-ENTMCNC: 31.1 G/DL (ref 32–36)
MCV RBC AUTO: 83 FL (ref 82–98)
MONOCYTES # BLD AUTO: 0.7 K/UL (ref 0.3–1)
MONOCYTES NFR BLD: 12.8 % (ref 4–15)
NEUTROPHILS # BLD AUTO: 3.4 K/UL (ref 1.8–7.7)
NEUTROPHILS NFR BLD: 58.9 % (ref 38–73)
NRBC BLD-RTO: 0 /100 WBC
PHOSPHATE SERPL-MCNC: 4.9 MG/DL (ref 2.7–4.5)
PLATELET # BLD AUTO: 146 K/UL (ref 150–450)
PMV BLD AUTO: 12.2 FL (ref 9.2–12.9)
POCT GLUCOSE: 236 MG/DL (ref 70–110)
POCT GLUCOSE: 36 MG/DL (ref 70–110)
POCT GLUCOSE: 38 MG/DL (ref 70–110)
POCT GLUCOSE: 46 MG/DL (ref 70–110)
POCT GLUCOSE: 70 MG/DL (ref 70–110)
POCT GLUCOSE: 82 MG/DL (ref 70–110)
POCT GLUCOSE: 95 MG/DL (ref 70–110)
POCT GLUCOSE: 98 MG/DL (ref 70–110)
POCT GLUCOSE: 98 MG/DL (ref 70–110)
POTASSIUM SERPL-SCNC: 4 MMOL/L (ref 3.5–5.1)
PROT SERPL-MCNC: 6.7 G/DL (ref 6–8.4)
RBC # BLD AUTO: 3.41 M/UL (ref 4.6–6.2)
SODIUM SERPL-SCNC: 140 MMOL/L (ref 136–145)
WBC # BLD AUTO: 5.69 K/UL (ref 3.9–12.7)

## 2023-07-13 PROCEDURE — 25000003 PHARM REV CODE 250: Performed by: RADIOLOGY

## 2023-07-13 PROCEDURE — 83735 ASSAY OF MAGNESIUM: CPT | Performed by: HOSPITALIST

## 2023-07-13 PROCEDURE — 85025 COMPLETE CBC W/AUTO DIFF WBC: CPT | Performed by: HOSPITALIST

## 2023-07-13 PROCEDURE — 36415 COLL VENOUS BLD VENIPUNCTURE: CPT | Performed by: HOSPITALIST

## 2023-07-13 PROCEDURE — 84100 ASSAY OF PHOSPHORUS: CPT | Performed by: HOSPITALIST

## 2023-07-13 PROCEDURE — 99222 PR INITIAL HOSPITAL CARE,LEVL II: ICD-10-PCS | Mod: ,,, | Performed by: NURSE PRACTITIONER

## 2023-07-13 PROCEDURE — 99900035 HC TECH TIME PER 15 MIN (STAT)

## 2023-07-13 PROCEDURE — 25000003 PHARM REV CODE 250: Performed by: HOSPITALIST

## 2023-07-13 PROCEDURE — 99232 SBSQ HOSP IP/OBS MODERATE 35: CPT | Mod: ,,, | Performed by: INTERNAL MEDICINE

## 2023-07-13 PROCEDURE — 94761 N-INVAS EAR/PLS OXIMETRY MLT: CPT

## 2023-07-13 PROCEDURE — 63600175 PHARM REV CODE 636 W HCPCS: Performed by: HOSPITALIST

## 2023-07-13 PROCEDURE — 21400001 HC TELEMETRY ROOM

## 2023-07-13 PROCEDURE — S5010 5% DEXTROSE AND 0.45% SALINE: HCPCS | Performed by: HOSPITALIST

## 2023-07-13 PROCEDURE — 80053 COMPREHEN METABOLIC PANEL: CPT | Performed by: HOSPITALIST

## 2023-07-13 PROCEDURE — 99222 1ST HOSP IP/OBS MODERATE 55: CPT | Mod: ,,, | Performed by: NURSE PRACTITIONER

## 2023-07-13 PROCEDURE — 86706 HEP B SURFACE ANTIBODY: CPT | Mod: 91 | Performed by: HOSPITALIST

## 2023-07-13 PROCEDURE — 63600175 PHARM REV CODE 636 W HCPCS: Performed by: RADIOLOGY

## 2023-07-13 PROCEDURE — 86704 HEP B CORE ANTIBODY TOTAL: CPT | Performed by: HOSPITALIST

## 2023-07-13 PROCEDURE — 99232 PR SUBSEQUENT HOSPITAL CARE,LEVL II: ICD-10-PCS | Mod: ,,, | Performed by: INTERNAL MEDICINE

## 2023-07-13 PROCEDURE — 86480 TB TEST CELL IMMUN MEASURE: CPT | Performed by: HOSPITALIST

## 2023-07-13 PROCEDURE — 87340 HEPATITIS B SURFACE AG IA: CPT | Performed by: HOSPITALIST

## 2023-07-13 RX ORDER — IBUPROFEN 200 MG
16 TABLET ORAL
Status: DISCONTINUED | OUTPATIENT
Start: 2023-07-13 | End: 2023-07-16

## 2023-07-13 RX ORDER — DEXTROSE MONOHYDRATE AND SODIUM CHLORIDE 5; .45 G/100ML; G/100ML
INJECTION, SOLUTION INTRAVENOUS CONTINUOUS
Status: ACTIVE | OUTPATIENT
Start: 2023-07-13 | End: 2023-07-13

## 2023-07-13 RX ORDER — GLUCAGON 1 MG
1 KIT INJECTION
Status: DISCONTINUED | OUTPATIENT
Start: 2023-07-13 | End: 2023-07-13

## 2023-07-13 RX ORDER — FENTANYL CITRATE 50 UG/ML
INJECTION, SOLUTION INTRAMUSCULAR; INTRAVENOUS
Status: COMPLETED | OUTPATIENT
Start: 2023-07-13 | End: 2023-07-13

## 2023-07-13 RX ORDER — IBUPROFEN 200 MG
24 TABLET ORAL
Status: DISCONTINUED | OUTPATIENT
Start: 2023-07-13 | End: 2023-07-16

## 2023-07-13 RX ORDER — MIDAZOLAM HYDROCHLORIDE 1 MG/ML
INJECTION INTRAMUSCULAR; INTRAVENOUS
Status: COMPLETED | OUTPATIENT
Start: 2023-07-13 | End: 2023-07-13

## 2023-07-13 RX ORDER — LIDOCAINE HYDROCHLORIDE 10 MG/ML
INJECTION INFILTRATION; PERINEURAL
Status: COMPLETED | OUTPATIENT
Start: 2023-07-13 | End: 2023-07-13

## 2023-07-13 RX ORDER — INSULIN ASPART 100 [IU]/ML
0-5 INJECTION, SOLUTION INTRAVENOUS; SUBCUTANEOUS
Status: DISCONTINUED | OUTPATIENT
Start: 2023-07-13 | End: 2023-07-16

## 2023-07-13 RX ORDER — HEPARIN SODIUM 1000 [USP'U]/ML
INJECTION, SOLUTION INTRAVENOUS; SUBCUTANEOUS
Status: COMPLETED | OUTPATIENT
Start: 2023-07-13 | End: 2023-07-13

## 2023-07-13 RX ADMIN — FENTANYL CITRATE 50 MCG: 50 INJECTION, SOLUTION INTRAMUSCULAR; INTRAVENOUS at 02:07

## 2023-07-13 RX ADMIN — FUROSEMIDE 40 MG: 40 TABLET ORAL at 09:07

## 2023-07-13 RX ADMIN — ASPIRIN 81 MG: 81 TABLET, COATED ORAL at 09:07

## 2023-07-13 RX ADMIN — HYDRALAZINE HYDROCHLORIDE 100 MG: 50 TABLET ORAL at 06:07

## 2023-07-13 RX ADMIN — MIDAZOLAM HYDROCHLORIDE 0.5 MG: 2 INJECTION, SOLUTION INTRAMUSCULAR; INTRAVENOUS at 02:07

## 2023-07-13 RX ADMIN — HEPARIN SODIUM 1800 UNITS: 1000 INJECTION, SOLUTION INTRAVENOUS; SUBCUTANEOUS at 02:07

## 2023-07-13 RX ADMIN — SODIUM BICARBONATE 650 MG TABLET 650 MG: at 09:07

## 2023-07-13 RX ADMIN — FENTANYL CITRATE 25 MCG: 50 INJECTION, SOLUTION INTRAMUSCULAR; INTRAVENOUS at 02:07

## 2023-07-13 RX ADMIN — DEXTROSE 30000 MG: 15 GEL ORAL at 05:07

## 2023-07-13 RX ADMIN — LABETALOL HYDROCHLORIDE 300 MG: 100 TABLET, FILM COATED ORAL at 09:07

## 2023-07-13 RX ADMIN — LIDOCAINE HYDROCHLORIDE 5 ML: 10 INJECTION, SOLUTION INFILTRATION; PERINEURAL at 02:07

## 2023-07-13 RX ADMIN — SEVELAMER CARBONATE 800 MG: 800 TABLET, FILM COATED ORAL at 05:07

## 2023-07-13 RX ADMIN — ATORVASTATIN CALCIUM 40 MG: 40 TABLET, FILM COATED ORAL at 09:07

## 2023-07-13 RX ADMIN — DEXTROSE MONOHYDRATE 1 G: 5 INJECTION INTRAVENOUS at 02:07

## 2023-07-13 RX ADMIN — HEPARIN SODIUM 5000 UNITS: 5000 INJECTION INTRAVENOUS; SUBCUTANEOUS at 09:07

## 2023-07-13 RX ADMIN — CALCITRIOL CAPSULES 0.25 MCG 0.25 MCG: 0.25 CAPSULE ORAL at 09:07

## 2023-07-13 RX ADMIN — Medication 6 MG: at 11:07

## 2023-07-13 RX ADMIN — SODIUM BICARBONATE 650 MG TABLET 650 MG: at 04:07

## 2023-07-13 RX ADMIN — AMLODIPINE BESYLATE 10 MG: 10 TABLET ORAL at 09:07

## 2023-07-13 RX ADMIN — HYDRALAZINE HYDROCHLORIDE 100 MG: 50 TABLET ORAL at 09:07

## 2023-07-13 RX ADMIN — DEXTROSE AND SODIUM CHLORIDE: 5; 450 INJECTION, SOLUTION INTRAVENOUS at 07:07

## 2023-07-13 RX ADMIN — MIDAZOLAM HYDROCHLORIDE 1 MG: 2 INJECTION, SOLUTION INTRAMUSCULAR; INTRAVENOUS at 02:07

## 2023-07-13 RX ADMIN — HYDRALAZINE HYDROCHLORIDE 100 MG: 50 TABLET ORAL at 04:07

## 2023-07-13 NOTE — H&P
"[facility] Medicine  History & Physical    Patient Name: Luis Daniel Howell  MRN: [id number]  Patient Class: IP- Inpatient  Admission Date: 7/12/2023  Attending Physician: Opal Barragan MD   Primary Care Provider: Janak Wilson MD         Patient information was obtained from ER records.     Subjective:     Principal Problem:ESRD needing dialysis    Chief Complaint:   Chief Complaint   Patient presents with    Abnormal Lab     BUN&Cr 90&9; PCP told pt "go to ED to get admitted for permcath and to start HD "        HPI: 54 y/o male with CKD-V, T2DM, HTN, HLD, h/o cocaine abuse, cardiomyopathy is referred to the ED to begin dialysis treatment. Nephrology was consulted and recommended admission to . Labs/VS reviewed.      Past Medical History:   Diagnosis Date    CKD (chronic kidney disease), stage V     Cocaine abuse     Diabetes mellitus type I     Diabetic retinopathy     Hyperlipidemia     Hypertension        Past Surgical History:   Procedure Laterality Date    CIRCUMCISION      WISDOM TOOTH EXTRACTION         Review of patient's allergies indicates:  No Known Allergies    No current facility-administered medications on file prior to encounter.     Current Outpatient Medications on File Prior to Encounter   Medication Sig    amLODIPine (NORVASC) 10 MG tablet Take 1 tablet (10 mg total) by mouth once daily.    aspirin 81 MG Chew Take 1 tablet (81 mg total) by mouth once daily. (Patient taking differently: Take 81 mg by mouth once daily.)    atorvastatin (LIPITOR) 40 MG tablet Take 1 tablet (40 mg total) by mouth every evening.    calcitRIOL (ROCALTROL) 0.25 MCG Cap TAKE 1 CAPSULE (0.25 MCG TOTAL) BY MOUTH ONCE DAILY.    cholecalciferol, vitamin D3, (VITAMIN D3) 50 mcg (2,000 unit) Cap capsule Take 2,000 Units by mouth once daily.    furosemide (LASIX) 40 MG tablet Take 1 tablet (40 mg total) by mouth once daily.    hydrALAZINE (APRESOLINE) 100 MG tablet Take 100 mg by mouth every " "12 (twelve) hours.    insulin detemir U-100, Levemir, (LEVEMIR FLEXTOUCH U100 INSULIN) 100 unit/mL (3 mL) InPn pen Inject 45 Units into the skin every evening. (Patient taking differently: Inject 47 Units into the skin every evening.)    labetaloL (NORMODYNE) 300 MG tablet Take 1 tablet (300 mg total) by mouth 2 (two) times daily.    metOLazone (ZAROXOLYN) 5 MG tablet TAKE 1 TABLET BY MOUTH EVERY DAY AT NIGHT    sildenafiL (VIAGRA) 100 MG tablet Take 1 tablet (100 mg total) by mouth daily as needed for Erectile Dysfunction.    sodium bicarbonate 650 MG tablet Take 1 tablet (650 mg total) by mouth 3 (three) times daily. (Patient taking differently: Take 650 mg by mouth once daily.)    blood sugar diagnostic Strp To check BG 2 times daily, to use with insurance preferred meter    sevelamer carbonate (RENVELA) 800 mg Tab TAKE 1 TABLET BY MOUTH 3 TIMES DAILY WITH MEALS.     Family History       Problem Relation (Age of Onset)    Cancer Mother    Cataracts Mother    Diabetes Father, Brother    Hypertension Mother, Father          Tobacco Use    Smoking status: Never    Smokeless tobacco: Never   Substance and Sexual Activity    Alcohol use: No    Drug use: Yes     Types: "Crack" cocaine     Comment: in remission     Sexual activity: Yes     Partners: Female     Review of Systems   Constitutional:  Negative for chills and fever.   Respiratory:  Negative for cough and shortness of breath.    Cardiovascular:  Negative for chest pain and palpitations.   Gastrointestinal:  Negative for abdominal distention, nausea and vomiting.   Neurological:  Negative for weakness.   All other systems reviewed and are negative.  Objective:       Weight: 103.8 kg (228 lb 13.4 oz)  Body mass index is 31.92 kg/m².     Physical Exam  Vitals and nursing note reviewed.   Constitutional:       General: He is not in acute distress.     Appearance: He is not toxic-appearing.   HENT:      Head: Normocephalic and atraumatic. "   Cardiovascular:      Rate and Rhythm: Normal rate and regular rhythm.      Pulses: Normal pulses.   Pulmonary:      Effort: Pulmonary effort is normal. No respiratory distress.   Abdominal:      Tenderness: There is no abdominal tenderness. There is no guarding.   Skin:     General: Skin is warm.   Neurological:      General: No focal deficit present.      Mental Status: He is alert. Mental status is at baseline.   Psychiatric:         Behavior: Behavior normal.              Significant Labs: All pertinent labs within the past 24 hours have been reviewed.    Significant Imaging: I have reviewed all pertinent imaging results/findings within the past 24 hours.    Assessment/Plan:     * ESRD needing dialysis  HDS, labs reviewed - lytes are ok  -IR consult - HD catheter insertion  -Nephrology consult - following recs  -CM consult to assist with discharge planning/outpatient HD set up  -renal diet        Secondary hyperparathyroidism of renal origin  Resume calcitriol and phos binders.    Anemia of chronic kidney failure, unspecified stage  Check basic hematinic labs  -CBC daily      Type 2 diabetes mellitus with hyperglycemia, with long-term current use of insulin  Patient's FSGs are controlled on current medication regimen.  Last A1c reviewed-   Lab Results   Component Value Date    HGBA1C 6.4 (H) 07/12/2023     Most recent fingerstick glucose reviewed-   Recent Labs   Lab 07/12/23  1407   POCTGLUCOSE 90     Current correctional scale  Low  Decrease anti-hyperglycemic dose as follows-   Antihyperglycemics (From admission, onward)    Start     Stop Route Frequency Ordered    07/12/23 2100  insulin detemir U-100 (Levemir) pen 35 Units         -- SubQ Nightly 07/12/23 1319    07/12/23 1415  insulin aspart U-100 pen 0-5 Units         -- SubQ Before meals & nightly PRN 07/12/23 1319        Hold Oral hypoglycemics while patient is in the hospital.    Dyslipidemia  Continue statin medication      Essential  hypertension  Continue home medications        VTE Risk Mitigation (From admission, onward)         Ordered     heparin (porcine) injection 5,000 Units  Every 8 hours         07/12/23 1319     IP VTE HIGH RISK PATIENT  Once         07/12/23 1319     Place sequential compression device  Until discontinued         07/12/23 1319     Place sequential compression device  Until discontinued         07/12/23 1319                           Opal Barragan MD  Department of Hospital Medicine  [facility]

## 2023-07-13 NOTE — ASSESSMENT & PLAN NOTE
Appears controlled  - Amlodipine 10 daily  - Hydralazine 100 mg TID (only takes it once a day)  - Labetolol 300mg BID  (- lasix 40 QD, Zaroxolyn 5mg nightly)    - adjust as needed after HD initiation

## 2023-07-13 NOTE — ASSESSMENT & PLAN NOTE
HDS, labs reviewed - charles are ok  -IR consult - HD catheter insertion  -Nephrology consult - following recs  -CM consult to assist with discharge planning/outpatient HD set up  -renal diet

## 2023-07-13 NOTE — ASSESSMENT & PLAN NOTE
Resume calcitriol and phos binders.  Lab Results   Component Value Date    .1 (H) 08/30/2022    CALCIUM 8.6 (L) 07/13/2023    PHOS 4.9 (H) 07/13/2023

## 2023-07-13 NOTE — PROGRESS NOTES
"[facility] Medicine  Progress Note    Patient Name: Luis Daniel Howell  MRN: [id number]  Patient Class: IP- Inpatient   Admission Date: 7/12/2023  Length of Stay: 1 days  Attending Physician: Roberto Cardoza MD  Primary Care Provider: Janak Wilson MD        Subjective:     Principal Problem:ESRD needing dialysis        HPI:  52 y/o male with CKD-V, T2DM, HTN, HLD, h/o cocaine abuse, cardiomyopathy is referred to the ED to begin dialysis treatment. Nephrology was consulted and recommended admission to . Labs/VS reviewed.      Overview/Hospital Course:  No notes on file    Interval History: awaiting perm-a-cath placement. Had hypoglycemia episode this AM - treated with glucose.     No new sx. This AM. Feeling "fine"     Discussed with RN at the bedside.     Review of Systems   Constitutional:  Positive for diaphoresis. Negative for unexpected weight change.   HENT:  Negative for congestion.    Eyes:  Negative for discharge.   Respiratory:  Negative for apnea and chest tightness.    Cardiovascular:  Negative for chest pain and leg swelling.   Gastrointestinal:  Negative for abdominal distention and abdominal pain.   Endocrine: Negative for cold intolerance and heat intolerance.   Musculoskeletal:  Negative for arthralgias and back pain.   Skin:  Negative for color change.   Neurological:  Positive for numbness. Negative for dizziness and facial asymmetry.   Hematological:  Negative for adenopathy.   Psychiatric/Behavioral:  Negative for agitation.    Objective:     Vital Signs (Most Recent):  Temp: 98.7 °F (37.1 °C) (07/13/23 1126)  Pulse: 61 (07/13/23 1145)  Resp: 16 (07/13/23 1126)  BP: 136/79 (07/13/23 1126)  SpO2: 99 % (07/13/23 1126) Vital Signs (24h Range):  Temp:  [97.3 °F (36.3 °C)-98.7 °F (37.1 °C)] 98.7 °F (37.1 °C)  Pulse:  [59-67] 61  Resp:  [16-20] 16  SpO2:  [97 %-100 %] 99 %  BP: (136-160)/() 136/79     Weight: 103.1 kg (227 lb 4.7 oz)  Body mass index is 31.7 " kg/m².    Intake/Output Summary (Last 24 hours) at 7/13/2023 1406  Last data filed at 7/13/2023 0520  Gross per 24 hour   Intake 370 ml   Output 700 ml   Net -330 ml         Physical Exam  Vitals and nursing note reviewed.   Constitutional:       General: He is not in acute distress.  HENT:      Nose: No congestion.      Mouth/Throat:      Mouth: Mucous membranes are moist.   Eyes:      General: No scleral icterus.  Cardiovascular:      Rate and Rhythm: Normal rate and regular rhythm.      Heart sounds: Murmur heard.     No friction rub.   Pulmonary:      Effort: Pulmonary effort is normal. No respiratory distress.      Breath sounds: No wheezing.   Abdominal:      General: Abdomen is flat. There is no distension.      Palpations: There is no mass.   Musculoskeletal:         General: No swelling or deformity.      Cervical back: No rigidity or tenderness.   Skin:     Coloration: Skin is not jaundiced.   Neurological:      Mental Status: He is alert.      Comments: Right arm / hand weakness (old).            Significant Labs: All pertinent labs within the past 24 hours have been reviewed.  A1C:   Recent Labs   Lab 06/29/23  1007 07/12/23  1329   HGBA1C 6.3* 6.4*     BMP:   Recent Labs   Lab 07/13/23  0439   GLU 42*      K 4.0      CO2 19*   BUN 88*   CREATININE 9.2*   CALCIUM 8.6*   MG 1.3*     CBC:   Recent Labs   Lab 07/12/23  1147 07/13/23  0439   WBC 4.86 5.69   HGB 8.7* 8.8*   HCT 27.9* 28.3*   * 146*       Significant Imaging: I have reviewed all pertinent imaging results/findings within the past 24 hours.  I have reviewed and interpreted all pertinent imaging results/findings within the past 24 hours.      Assessment/Plan:      * ESRD needing dialysis  - Discussed with Dr. Bonilla,   - plans for Perm-a-cath placement and initiation of HD.   - Increase Bicarb  To 1300 BID  - CM to arrange chair time.         Type 2 diabetes mellitus with hyperglycemia, with long-term current use of  insulin  Patient's FSGs are uncontrolled due to hypoglycemia on current medication regimen.  Last A1c reviewed-   Lab Results   Component Value Date    HGBA1C 6.4 (H) 07/12/2023     Most recent fingerstick glucose reviewed-   Recent Labs   Lab 07/13/23  0608 07/13/23  0626 07/13/23  0722 07/13/23  1128   POCTGLUCOSE 46* 82 98 98     Current correctional scale  Low  Decrease anti-hyperglycemic dose as follows-   Antihyperglycemics (From admission, onward)    Start     Stop Route Frequency Ordered    07/12/23 2100  insulin detemir U-100 (Levemir) pen 35 Units         -- SubQ Nightly 07/12/23 1319    07/12/23 1415  insulin aspart U-100 pen 0-5 Units         -- SubQ Before meals & nightly PRN 07/12/23 1319        Hold Oral hypoglycemics while patient is in the hospital.    Pt. Reports non / poor adherence to insulin Rx. He has decreased his Levemir from >40 to 35 units on his own - too strong  - does not use bolus insulin  - has had hypoglycemia sx. At home prior to admission  - now hypoglycemia, likely related to NPO status and receiving 35 Units of Levemir last night   - serial CBG while NPO (every 2 hours) and correction  - will reduce Levemir by 50% while NPO  - requires aggressive teaching - will ask DM educator to see.     Essential hypertension  Appears controlled  - Amlodipine 10 daily  - Hydralazine 100 mg TID (only takes it once a day)  - Labetolol 300mg BID  (- lasix 40 QD, Zaroxolyn 5mg nightly)    - adjust as needed after HD initiation      Secondary hyperparathyroidism of renal origin  Resume calcitriol and phos binders.  Lab Results   Component Value Date    .1 (H) 08/30/2022    CALCIUM 8.6 (L) 07/13/2023    PHOS 4.9 (H) 07/13/2023         Chronic diastolic heart failure  Echo 7/22   The left ventricle is normal in size with severe concentric hypertrophy and normal systolic function.   The estimated ejection fraction is 55%.   Grade II left ventricular diastolic dysfunction.   Normal right  ventricular size with normal right ventricular systolic function.   Severe left atrial enlargement.   Mild tricuspid regurgitation.   Moderate-to-severe mitral regurgitation.   Intermediate central venous pressure (8 mmHg).   The estimated PA systolic pressure is 40 mmHg.        Crack cocaine use  By history      Anemia of chronic kidney failure, unspecified stage  Check basic hematinic labs  -CBC daily  Lab Results   Component Value Date    WBC 5.69 07/13/2023    HGB 8.8 (L) 07/13/2023    HCT 28.3 (L) 07/13/2023    MCV 83 07/13/2023     (L) 07/13/2023     - at baseline. No need for transfusion        Dyslipidemia  Continue statin medication      VTE Risk Mitigation (From admission, onward)         Ordered     heparin (porcine) injection 5,000 Units  Every 8 hours         07/12/23 1319     IP VTE HIGH RISK PATIENT  Once         07/12/23 1319     Place sequential compression device  Until discontinued         07/12/23 1319     Place sequential compression device  Until discontinued         07/12/23 1319                Discharge Planning   LOREE: 7/17/2023     Code Status: Full Code   Is the patient medically ready for discharge?: No    Reason for patient still in hospital (select all that apply): Treatment and Consult recommendations                     Roberto Cardoza MD  Department of Hospital Medicine   [facility]

## 2023-07-13 NOTE — ASSESSMENT & PLAN NOTE
- Discussed with Dr. Bonilla,   - plans for Perm-a-cath placement and initiation of HD.   - Increase Bicarb  To 1300 BID  - CM to arrange chair time.

## 2023-07-13 NOTE — ED NOTES
Received report and assumed care of pt.  Pt came in with elevated kidney function test.  Patient identifiers for Luis Daniel Howell checked and correct.    LOC: The patient is awake, alert and aware of environment with an appropriate affect, the patient is oriented x 4 and speaking appropriately.    APPEARANCE: Patient resting comfortably and in no acute distress, patient is clean and well groomed, patient's clothing is properly fastened.    SKIN: The skin is warm and dry, color consistent with ethnicity, patient has normal skin turgor and moist mucus membranes, skin intact, no breakdown or bruising noted.    MUSCULOSKELETAL: Patient moving all extremities well, no obvious swelling or deformities noted.    RESPIRATORY: Airway is open and patent, respirations are spontaneous and even, patient has a normal effort and rate.    CARDIAC: Patient has a normal rate and rhythm, no periphreal edema noted, capillary refill < 3 seconds.    ABDOMEN: Soft and non tender to palpation, no distention noted. Patient denies any nausea, vomiting, diarrhea, or constipation.     NEUROLOGIC: Eyes open spontaneously, PERRL, behavior appropriate to situation, follows commands, facial expression symmetrical, bilateral hand grasp equal and even, purposeful motor response noted, normal sensation in all extremities.     HEENT: No abnormalities noted. White sclera and pupils equal round and reactive to light. Denies headache, dizziness.     : Pt voids independently, denies dysuria, hematuria, frequency.

## 2023-07-13 NOTE — ASSESSMENT & PLAN NOTE
BG goal 140-180    Decrease Levemir to 10 units q HS (can dose BID if BG elevated billy morning)   Low Dose Correction Scale  BG monitoring ac/hs    ** Please call Endocrine for any BG related issues **    Discharge plans:  Needs DM education  Would benefit from Dexcom    Lab Results   Component Value Date    HGBA1C 6.4 (H) 07/12/2023

## 2023-07-13 NOTE — SUBJECTIVE & OBJECTIVE
"Interval History: awaiting perm-a-cath placement. Had hypoglycemia episode this AM - treated with glucose.     No new sx. This AM. Feeling "fine"     Discussed with RN at the bedside.     Review of Systems   Constitutional:  Positive for diaphoresis. Negative for unexpected weight change.   HENT:  Negative for congestion.    Eyes:  Negative for discharge.   Respiratory:  Negative for apnea and chest tightness.    Cardiovascular:  Negative for chest pain and leg swelling.   Gastrointestinal:  Negative for abdominal distention and abdominal pain.   Endocrine: Negative for cold intolerance and heat intolerance.   Musculoskeletal:  Negative for arthralgias and back pain.   Skin:  Negative for color change.   Neurological:  Positive for numbness. Negative for dizziness and facial asymmetry.   Hematological:  Negative for adenopathy.   Psychiatric/Behavioral:  Negative for agitation.    Objective:     Vital Signs (Most Recent):  Temp: 98.7 °F (37.1 °C) (07/13/23 1126)  Pulse: 61 (07/13/23 1145)  Resp: 16 (07/13/23 1126)  BP: 136/79 (07/13/23 1126)  SpO2: 99 % (07/13/23 1126) Vital Signs (24h Range):  Temp:  [97.3 °F (36.3 °C)-98.7 °F (37.1 °C)] 98.7 °F (37.1 °C)  Pulse:  [59-67] 61  Resp:  [16-20] 16  SpO2:  [97 %-100 %] 99 %  BP: (136-160)/() 136/79     Weight: 103.1 kg (227 lb 4.7 oz)  Body mass index is 31.7 kg/m².    Intake/Output Summary (Last 24 hours) at 7/13/2023 1406  Last data filed at 7/13/2023 0520  Gross per 24 hour   Intake 370 ml   Output 700 ml   Net -330 ml         Physical Exam  Vitals and nursing note reviewed.   Constitutional:       General: He is not in acute distress.  HENT:      Nose: No congestion.      Mouth/Throat:      Mouth: Mucous membranes are moist.   Eyes:      General: No scleral icterus.  Cardiovascular:      Rate and Rhythm: Normal rate and regular rhythm.      Heart sounds: Murmur heard.     No friction rub.   Pulmonary:      Effort: Pulmonary effort is normal. No respiratory " distress.      Breath sounds: No wheezing.   Abdominal:      General: Abdomen is flat. There is no distension.      Palpations: There is no mass.   Musculoskeletal:         General: No swelling or deformity.      Cervical back: No rigidity or tenderness.   Skin:     Coloration: Skin is not jaundiced.   Neurological:      Mental Status: He is alert.      Comments: Right arm / hand weakness (old).            Significant Labs: All pertinent labs within the past 24 hours have been reviewed.  A1C:   Recent Labs   Lab 06/29/23  1007 07/12/23  1329   HGBA1C 6.3* 6.4*     BMP:   Recent Labs   Lab 07/13/23  0439   GLU 42*      K 4.0      CO2 19*   BUN 88*   CREATININE 9.2*   CALCIUM 8.6*   MG 1.3*     CBC:   Recent Labs   Lab 07/12/23  1147 07/13/23  0439   WBC 4.86 5.69   HGB 8.7* 8.8*   HCT 27.9* 28.3*   * 146*       Significant Imaging: I have reviewed all pertinent imaging results/findings within the past 24 hours.  I have reviewed and interpreted all pertinent imaging results/findings within the past 24 hours.

## 2023-07-13 NOTE — SUBJECTIVE & OBJECTIVE
Past Medical History:   Diagnosis Date    CKD (chronic kidney disease), stage V     Cocaine abuse     Diabetes mellitus type I     Diabetic retinopathy     Hyperlipidemia     Hypertension        Past Surgical History:   Procedure Laterality Date    CIRCUMCISION      WISDOM TOOTH EXTRACTION         Review of patient's allergies indicates:  No Known Allergies    No current facility-administered medications on file prior to encounter.     Current Outpatient Medications on File Prior to Encounter   Medication Sig    amLODIPine (NORVASC) 10 MG tablet Take 1 tablet (10 mg total) by mouth once daily.    aspirin 81 MG Chew Take 1 tablet (81 mg total) by mouth once daily. (Patient taking differently: Take 81 mg by mouth once daily.)    atorvastatin (LIPITOR) 40 MG tablet Take 1 tablet (40 mg total) by mouth every evening.    calcitRIOL (ROCALTROL) 0.25 MCG Cap TAKE 1 CAPSULE (0.25 MCG TOTAL) BY MOUTH ONCE DAILY.    cholecalciferol, vitamin D3, (VITAMIN D3) 50 mcg (2,000 unit) Cap capsule Take 2,000 Units by mouth once daily.    furosemide (LASIX) 40 MG tablet Take 1 tablet (40 mg total) by mouth once daily.    hydrALAZINE (APRESOLINE) 100 MG tablet Take 100 mg by mouth every 12 (twelve) hours.    insulin detemir U-100, Levemir, (LEVEMIR FLEXTOUCH U100 INSULIN) 100 unit/mL (3 mL) InPn pen Inject 45 Units into the skin every evening. (Patient taking differently: Inject 47 Units into the skin every evening.)    labetaloL (NORMODYNE) 300 MG tablet Take 1 tablet (300 mg total) by mouth 2 (two) times daily.    metOLazone (ZAROXOLYN) 5 MG tablet TAKE 1 TABLET BY MOUTH EVERY DAY AT NIGHT    sildenafiL (VIAGRA) 100 MG tablet Take 1 tablet (100 mg total) by mouth daily as needed for Erectile Dysfunction.    sodium bicarbonate 650 MG tablet Take 1 tablet (650 mg total) by mouth 3 (three) times daily. (Patient taking differently: Take 650 mg by mouth once daily.)    blood sugar diagnostic Strp To check BG 2 times daily, to use with  "insurance preferred meter    sevelamer carbonate (RENVELA) 800 mg Tab TAKE 1 TABLET BY MOUTH 3 TIMES DAILY WITH MEALS.     Family History       Problem Relation (Age of Onset)    Cancer Mother    Cataracts Mother    Diabetes Father, Brother    Hypertension Mother, Father          Tobacco Use    Smoking status: Never    Smokeless tobacco: Never   Substance and Sexual Activity    Alcohol use: No    Drug use: Yes     Types: "Crack" cocaine     Comment: in remission     Sexual activity: Yes     Partners: Female     Review of Systems   Constitutional:  Negative for chills and fever.   Respiratory:  Negative for cough and shortness of breath.    Cardiovascular:  Negative for chest pain and palpitations.   Gastrointestinal:  Negative for abdominal distention, nausea and vomiting.   Neurological:  Negative for weakness.   All other systems reviewed and are negative.  Objective:       Weight: 103.8 kg (228 lb 13.4 oz)  Body mass index is 31.92 kg/m².     Physical Exam  Vitals and nursing note reviewed.   Constitutional:       General: He is not in acute distress.     Appearance: He is not toxic-appearing.   HENT:      Head: Normocephalic and atraumatic.   Cardiovascular:      Rate and Rhythm: Normal rate and regular rhythm.      Pulses: Normal pulses.   Pulmonary:      Effort: Pulmonary effort is normal. No respiratory distress.   Abdominal:      Tenderness: There is no abdominal tenderness. There is no guarding.   Skin:     General: Skin is warm.   Neurological:      General: No focal deficit present.      Mental Status: He is alert. Mental status is at baseline.   Psychiatric:         Behavior: Behavior normal.              Significant Labs: All pertinent labs within the past 24 hours have been reviewed.    Significant Imaging: I have reviewed all pertinent imaging results/findings within the past 24 hours.  "

## 2023-07-13 NOTE — CONSULTS
[facility] Surg  Endocrinology  Diabetes Consult Note    Consult Requested by: Roberto Cardoza MD   Reason for admit: ESRD needing dialysis    HISTORY OF PRESENT ILLNESS:  Reason for Consult: Management of T2DM, Hyperglycemia     Surgical Procedure and Date: HD catheter placement today     Diabetes diagnosis year: 2014    Home Diabetes Medications:  Levemir 45 units q HS (reports he takes anywhere from 25-50 units depending on how much he eats)     How often checking glucose at home?  Not checking     BG readings on regimen: not checking  Hypoglycemia on the regimen?  rarely   Missed doses on regimen?  No    Diabetes Complications include:     Hyperglycemia, Hypoglycemia , Diabetic nephropathy  , and Diabetic chronic kidney disease         Complicating diabetes co morbidities:   CKD, HTN, HLD       HPI:   Patient is a 53 y.o. male with a diagnosis of CKD-V, T2DM, HTN, HLD, h/o cocaine abuse, cardiomyopathy is referred to the ED to begin dialysis treatment. Nephrology was consulted and recommended admission to . Endocrinology consulted for management of T2DM.       Lab Results   Component Value Date    HGBA1C 6.4 (H) 07/12/2023           Interval HPI:   Overnight events: NAEON. HD catheter placement today. Hypoglycemia noted this morning on current SQ insulin regimen. Diet NPO    Eating:   NPO  Nausea: No  Hypoglycemia and intervention: No  Fever: No  TPN and/or TF: No  If yes, type of TF/TPN and rate: n/a    PMH, PSH, FH, SH reviewed     ROS:  Constitutional: Negative for weight changes.  Eyes: Negative for visual disturbance.  Respiratory: Negative for cough.   Cardiovascular: Negative for chest pain.  Gastrointestinal: Negative for nausea.  Endocrine: Negative for polyuria, polydipsia.  Musculoskeletal: Negative for back pain.  Skin: Negative for rash.  Neurological: Negative for syncope.  Psychiatric/Behavioral: Negative for depression.      Review of Systems    Current Medications and/or Treatments  Impacting Glycemic Control  Immunotherapy:    Immunosuppressants       None          Steroids:   Hormones (From admission, onward)      Start     Stop Route Frequency Ordered    07/12/23 1415  melatonin tablet 6 mg         -- Oral Nightly PRN 07/12/23 1319          Pressors:    Autonomic Drugs (From admission, onward)      None          Hyperglycemia/Diabetes Medications:   Antihyperglycemics (From admission, onward)      Start     Stop Route Frequency Ordered    07/13/23 2100  insulin detemir U-100 (Levemir) pen 17 Units         -- SubQ Nightly 07/13/23 1430    07/12/23 1415  insulin aspart U-100 pen 0-5 Units         -- SubQ Before meals & nightly PRN 07/12/23 1319             PHYSICAL EXAMINATION:  Vitals:    07/13/23 1615   BP: (!) 156/82   Pulse: 64   Resp: 18   Temp: 98.8 °F (37.1 °C)     Body mass index is 31.7 kg/m².     Physical Exam   Constitutional: Well developed, well nourished, NAD.  ENT: External ears no masses with nose patent; normal hearing.  Neck: Supple; trachea midline.  Cardiovascular: Normal heart sounds, no LE edema.   Lungs: Normal effort; lungs anterior bilaterally clear to auscultation.  Abdomen: Soft, no masses, no hernias.  MS: No clubbing or cyanosis of nails noted; unable to assess gait.  Skin: No rashes, lesions, or ulcers; no nodules. Injection sites are ok. No lipo hypertropthy or atrophy.  Psychiatric: Good judgement and insight; normal mood and affect.  Neurological: Cranial nerves are grossly intact.  Foot: Nails in good condition, no amputations noted.        Labs Reviewed and Include   Recent Labs   Lab 07/13/23  0439   GLU 42*   CALCIUM 8.6*   ALBUMIN 3.6   PROT 6.7      K 4.0   CO2 19*      BUN 88*   CREATININE 9.2*   ALKPHOS 57   ALT 13   AST 20   BILITOT 0.3     Lab Results   Component Value Date    WBC 5.69 07/13/2023    HGB 8.8 (L) 07/13/2023    HCT 28.3 (L) 07/13/2023    MCV 83 07/13/2023     (L) 07/13/2023     No results for input(s): TSH, FREET4 in  the last 168 hours.  Lab Results   Component Value Date    HGBA1C 6.4 (H) 07/12/2023       Nutritional status:   Body mass index is 31.7 kg/m².  Lab Results   Component Value Date    ALBUMIN 3.6 07/13/2023    ALBUMIN 3.5 07/12/2023    ALBUMIN 3.7 06/29/2023     No results found for: PREALBUMIN    Estimated Creatinine Clearance: 11.5 mL/min (A) (based on SCr of 9.2 mg/dL (H)).    Accu-Checks  Recent Labs     07/12/23  1407 07/12/23 2020 07/13/23  0550 07/13/23  0557 07/13/23  0608 07/13/23  0626 07/13/23  0722 07/13/23  1128 07/13/23  1612   POCTGLUCOSE 90 70 36* 38* 46* 82 98 98 95        ASSESSMENT and PLAN    Renal/  * ESRD needing dialysis  Lab Results   Component Value Date    CREATININE 9.2 (H) 07/13/2023     Avoid insulin stacking  Titrate insulin slowly       Endocrine  Type 2 diabetes mellitus with hyperglycemia, with long-term current use of insulin  BG goal 140-180    Decrease Levemir to 10 units q HS (can dose BID if BG elevated billy morning)   Low Dose Correction Scale  BG monitoring ac/hs    ** Please call Endocrine for any BG related issues **    Discharge plans:  Needs DM education  Would benefit from Dexcom    Lab Results   Component Value Date    HGBA1C 6.4 (H) 07/12/2023               Plan discussed with patient, family, and RN at bedside.     Teresa Do NP  Endocrinology  [facility] Surg

## 2023-07-13 NOTE — PLAN OF CARE
I have reviewed the chart of Luis Daniel Dante and collaborated with Roberto Cardoza MD in the care of the patient who is hospitalized for the following:    Active Hospital Problems    Diagnosis    *ESRD needing dialysis    Chronic diastolic heart failure    Type 2 diabetes mellitus with hyperglycemia, with long-term current use of insulin    Dyslipidemia    Secondary hyperparathyroidism of renal origin    Anemia of chronic kidney failure, unspecified stage    Dyspnea on exertion    Cardiomyopathy    Obesity (BMI 30-39.9)    Essential hypertension          I have reviewed Luis Daniel Howell with the multidisciplinary team during discharge huddle.       Geena Vital PA-C  Unit Based NAYELI        No

## 2023-07-13 NOTE — ASSESSMENT & PLAN NOTE
Echo 7/22   The left ventricle is normal in size with severe concentric hypertrophy and normal systolic function.   The estimated ejection fraction is 55%.   Grade II left ventricular diastolic dysfunction.   Normal right ventricular size with normal right ventricular systolic function.   Severe left atrial enlargement.   Mild tricuspid regurgitation.   Moderate-to-severe mitral regurgitation.   Intermediate central venous pressure (8 mmHg).   The estimated PA systolic pressure is 40 mmHg.

## 2023-07-13 NOTE — ASSESSMENT & PLAN NOTE
Lab Results   Component Value Date    CREATININE 9.2 (H) 07/13/2023     Avoid insulin stacking  Titrate insulin slowly

## 2023-07-13 NOTE — ASSESSMENT & PLAN NOTE
Check basic hematinic labs  -CBC daily  Lab Results   Component Value Date    WBC 5.69 07/13/2023    HGB 8.8 (L) 07/13/2023    HCT 28.3 (L) 07/13/2023    MCV 83 07/13/2023     (L) 07/13/2023     - at baseline. No need for transfusion

## 2023-07-13 NOTE — NURSING
Nurses Note -- 4 Eyes      7/12/2023   11:59 PM      Skin assessed during: Admit      [x] No Altered Skin Integrity Present    [x]Prevention Measures Documented      [] Yes- Altered Skin Integrity Present or Discovered   [] LDA Added if Not in Epic (Describe Wound)   [] New Altered Skin Integrity was Present on Admit and Documented in LDA   [] Wound Image Taken    Wound Care Consulted? No    Attending Nurse:  Nina Castro RN     Second RN/Staff Member:

## 2023-07-13 NOTE — HPI
Reason for Consult: Management of T2DM, Hyperglycemia     Surgical Procedure and Date: HD catheter placement today     Diabetes diagnosis year: 2014    Home Diabetes Medications:  Levemir 45 units q HS (reports he takes anywhere from 25-50 units depending on how much he eats)     How often checking glucose at home?  Not checking     BG readings on regimen: not checking  Hypoglycemia on the regimen?  rarely   Missed doses on regimen?  No    Diabetes Complications include:     Hyperglycemia, Hypoglycemia , Diabetic nephropathy  , and Diabetic chronic kidney disease         Complicating diabetes co morbidities:   CKD, HTN, HLD       HPI:   Patient is a 53 y.o. male with a diagnosis of CKD-V, T2DM, HTN, HLD, h/o cocaine abuse, cardiomyopathy is referred to the ED to begin dialysis treatment. Nephrology was consulted and recommended admission to . Endocrinology consulted for management of T2DM.       Lab Results   Component Value Date    HGBA1C 6.4 (H) 07/12/2023

## 2023-07-13 NOTE — PLAN OF CARE
Procedure completed, pt tolerated well. No apparent distress noted. Dressing applied CDI. Pt to be transferred to MPU for 1 hour recovery. Report to be given at bedside.

## 2023-07-13 NOTE — PLAN OF CARE
Pt arrived to IR room 190 for HD cath placement. Pt oriented to unit and staff. Plan of care reviewed with patient, patient verbalizes understanding. Comfort measures utilized. Pt safely transferred from stretcher to procedural table. Fall risk reviewed with patient, fall risk interventions maintained. Safety strap applied, positioner pillows utilized to minimize pressure points. Blankets applied. Pt prepped and draped utilizing standard sterile technique. Patient placed on continuous monitoring, as required by sedation policy. Timeouts completed utilizing standard universal time-out, per department and facility policy. RN to remain at bedside, continuous monitoring maintained. Pt resting comfortably. Denies pain/discomfort. Will continue to monitor. See flow sheets for monitoring, medication administration, and updates.

## 2023-07-13 NOTE — ASSESSMENT & PLAN NOTE
Patient's FSGs are uncontrolled due to hypoglycemia on current medication regimen.  Last A1c reviewed-   Lab Results   Component Value Date    HGBA1C 6.4 (H) 07/12/2023     Most recent fingerstick glucose reviewed-   Recent Labs   Lab 07/13/23  0608 07/13/23  0626 07/13/23  0722 07/13/23  1128   POCTGLUCOSE 46* 82 98 98     Current correctional scale  Low  Decrease anti-hyperglycemic dose as follows-   Antihyperglycemics (From admission, onward)    Start     Stop Route Frequency Ordered    07/12/23 2100  insulin detemir U-100 (Levemir) pen 35 Units         -- SubQ Nightly 07/12/23 1319    07/12/23 1415  insulin aspart U-100 pen 0-5 Units         -- SubQ Before meals & nightly PRN 07/12/23 1319        Hold Oral hypoglycemics while patient is in the hospital.    Pt. Reports non / poor adherence to insulin Rx. He has decreased his Levemir from >40 to 35 units on his own - too strong  - does not use bolus insulin  - has had hypoglycemia sx. At home prior to admission  - now hypoglycemia, likely related to NPO status and receiving 35 Units of Levemir last night   - serial CBG while NPO (every 2 hours) and correction  - will reduce Levemir by 50% while NPO  - requires aggressive teaching - will ask DM educator to see.

## 2023-07-13 NOTE — ASSESSMENT & PLAN NOTE
Patient's FSGs are controlled on current medication regimen.  Last A1c reviewed-   Lab Results   Component Value Date    HGBA1C 6.4 (H) 07/12/2023     Most recent fingerstick glucose reviewed-   Recent Labs   Lab 07/12/23  1407   POCTGLUCOSE 90     Current correctional scale  Low  Decrease anti-hyperglycemic dose as follows-   Antihyperglycemics (From admission, onward)    Start     Stop Route Frequency Ordered    07/12/23 2100  insulin detemir U-100 (Levemir) pen 35 Units         -- SubQ Nightly 07/12/23 1319    07/12/23 1415  insulin aspart U-100 pen 0-5 Units         -- SubQ Before meals & nightly PRN 07/12/23 1319        Hold Oral hypoglycemics while patient is in the hospital.

## 2023-07-13 NOTE — HPI
52 y/o male with CKD-V, T2DM, HTN, HLD, h/o cocaine abuse, cardiomyopathy is referred to the ED to begin dialysis treatment. Nephrology was consulted and recommended admission to . Labs/VS reviewed.

## 2023-07-13 NOTE — PLAN OF CARE
[facility]  Initial Discharge Assessment       Primary Care Provider: Janak Wilson MD    Admission Diagnosis: Kidney injury [S37.009A]  Chest pain [R07.9]  ESRD needing dialysis [N18.6, Z99.2]  Renal failure, unspecified chronicity [N19]    Admission Date: 7/12/2023  Expected Discharge Date: 7/17/2023    Transition of Care Barriers: (P) None    Payor: MEDICAID / Plan: [facility] / Product Type: Managed Medicaid /     Extended Emergency Contact Information  Primary Emergency Contact: Queen Howell  Address: [address] [place] [address] [id number] [place] [address]  Home Phone: [phone]  Mobile Phone: [phone]  Relation: Mother    Discharge Plan A: (P) Home with family  Discharge Plan B: (P) Home      [id number] - [place], [place] - [address]  [address]  [place] [address]  Phone: [phone] Fax: [phone]    [id number] - [place], [place] - [address] [facility]  [address]  [place] [address]  Phone: [phone] Fax: [phone]    [facility]  [address]  [facility] [address]  Phone: [phone] Fax: [phone]      Initial Assessment (most recent)       Adult Discharge Assessment - 07/13/23 1445          Discharge Assessment    Assessment Type Discharge Planning Assessment (P)      Confirmed/corrected address, phone number and insurance Yes (P)      Confirmed Demographics Correct on Facesheet (P)      Source of Information family (P)      If unable to respond/provide information was family/caregiver contacted? Yes (P)      Contact Name/Number Mother Queen Howell [phone] (P)      Communicated LOREE with patient/caregiver Yes (P)      Reason For Admission ESRD (P)      People in Home parent(s) (P)      Facility Arrived From: Home (P)      Do you expect to return to your current living situation? Yes (P)      Do you have help at home or someone to help you manage your care  at home? Yes (P)      Who are your caregiver(s) and their phone number(s)? Mother Queen Howell [phone] (P)      Prior to hospitilization cognitive status: Unable to Assess (P)      Current cognitive status: Unable to Assess (P)      Home Accessibility wheelchair accessible (P)      Home Layout Able to live on 1st floor (P)      Equipment Currently Used at Home none (P)      Readmission within 30 days? No (P)      Patient currently being followed by outpatient case management? No (P)      Do you currently have service(s) that help you manage your care at home? No (P)      Do you take prescription medications? Yes (P)      Do you have prescription coverage? Yes (P)      Coverage Medicaid (P)      Do you have any problems affording any of your prescribed medications? TBD (P)      Who is going to help you get home at discharge? Mother Queen Howell [phone] (P)      How do you get to doctors appointments? family or friend will provide (P)      Are you on dialysis? No (P)    May need new HD chair upon admission    Do you take coumadin? No (P)      Discharge Plan A Home with family (P)      Discharge Plan B Home (P)      DME Needed Upon Discharge  none (P)      Discharge Plan discussed with: Parent(s) (P)      Name(s) and Number(s) Mother Queen Howell [phone] (P)      Transition of Care Barriers None (P)         OTHER    Name(s) of People in Home Mother Queen Howell [phone] (P)                    Pt lives with his mother in a one story home with 0 ANGELES. Pt was independent with ADL's prior to admission and uses no DME. Pt was not on dialysis or Coumadin upon admission. Pt has transportation home with family. SW will follow for all discharge planning needs, including a possible HD chair.     BRAXTON Geiger, LMSW [facility]  [id number]

## 2023-07-13 NOTE — SUBJECTIVE & OBJECTIVE
Interval HPI:   Overnight events: NAEON. HD catheter placement today. Hypoglycemia noted this morning on current SQ insulin regimen. Diet NPO    Eating:   NPO  Nausea: No  Hypoglycemia and intervention: No  Fever: No  TPN and/or TF: No  If yes, type of TF/TPN and rate: n/a    PMH, PSH, FH, SH reviewed     ROS:  Constitutional: Negative for weight changes.  Eyes: Negative for visual disturbance.  Respiratory: Negative for cough.   Cardiovascular: Negative for chest pain.  Gastrointestinal: Negative for nausea.  Endocrine: Negative for polyuria, polydipsia.  Musculoskeletal: Negative for back pain.  Skin: Negative for rash.  Neurological: Negative for syncope.  Psychiatric/Behavioral: Negative for depression.      Review of Systems    Current Medications and/or Treatments Impacting Glycemic Control  Immunotherapy:    Immunosuppressants       None          Steroids:   Hormones (From admission, onward)      Start     Stop Route Frequency Ordered    07/12/23 1415  melatonin tablet 6 mg         -- Oral Nightly PRN 07/12/23 1319          Pressors:    Autonomic Drugs (From admission, onward)      None          Hyperglycemia/Diabetes Medications:   Antihyperglycemics (From admission, onward)      Start     Stop Route Frequency Ordered    07/13/23 2100  insulin detemir U-100 (Levemir) pen 17 Units         -- SubQ Nightly 07/13/23 1430    07/12/23 1415  insulin aspart U-100 pen 0-5 Units         -- SubQ Before meals & nightly PRN 07/12/23 1319             PHYSICAL EXAMINATION:  Vitals:    07/13/23 1615   BP: (!) 156/82   Pulse: 64   Resp: 18   Temp: 98.8 °F (37.1 °C)     Body mass index is 31.7 kg/m².     Physical Exam   Constitutional: Well developed, well nourished, NAD.  ENT: External ears no masses with nose patent; normal hearing.  Neck: Supple; trachea midline.  Cardiovascular: Normal heart sounds, no LE edema.   Lungs: Normal effort; lungs anterior bilaterally clear to auscultation.  Abdomen: Soft, no masses, no  hernias.  MS: No clubbing or cyanosis of nails noted; unable to assess gait.  Skin: No rashes, lesions, or ulcers; no nodules. Injection sites are ok. No lipo hypertropthy or atrophy.  Psychiatric: Good judgement and insight; normal mood and affect.  Neurological: Cranial nerves are grossly intact.  Foot: Nails in good condition, no amputations noted.

## 2023-07-14 LAB
ALBUMIN SERPL BCP-MCNC: 3.4 G/DL (ref 3.5–5.2)
ALP SERPL-CCNC: 52 U/L (ref 55–135)
ALT SERPL W/O P-5'-P-CCNC: 10 U/L (ref 10–44)
ANION GAP SERPL CALC-SCNC: 12 MMOL/L (ref 8–16)
AST SERPL-CCNC: 17 U/L (ref 10–40)
BASOPHILS # BLD AUTO: 0.02 K/UL (ref 0–0.2)
BASOPHILS NFR BLD: 0.3 % (ref 0–1.9)
BILIRUB SERPL-MCNC: 0.4 MG/DL (ref 0.1–1)
BUN SERPL-MCNC: 95 MG/DL (ref 6–20)
CALCIUM SERPL-MCNC: 8.7 MG/DL (ref 8.7–10.5)
CHLORIDE SERPL-SCNC: 109 MMOL/L (ref 95–110)
CO2 SERPL-SCNC: 19 MMOL/L (ref 23–29)
CREAT SERPL-MCNC: 9.3 MG/DL (ref 0.5–1.4)
DIFFERENTIAL METHOD: ABNORMAL
EOSINOPHIL # BLD AUTO: 0.1 K/UL (ref 0–0.5)
EOSINOPHIL NFR BLD: 1.2 % (ref 0–8)
ERYTHROCYTE [DISTWIDTH] IN BLOOD BY AUTOMATED COUNT: 14.1 % (ref 11.5–14.5)
EST. GFR  (NO RACE VARIABLE): 6.2 ML/MIN/1.73 M^2
GAMMA INTERFERON BACKGROUND BLD IA-ACNC: 0.06 IU/ML
GLUCOSE SERPL-MCNC: 80 MG/DL (ref 70–110)
HCT VFR BLD AUTO: 27.9 % (ref 40–54)
HGB BLD-MCNC: 8.8 G/DL (ref 14–18)
IMM GRANULOCYTES # BLD AUTO: 0.01 K/UL (ref 0–0.04)
IMM GRANULOCYTES NFR BLD AUTO: 0.2 % (ref 0–0.5)
LYMPHOCYTES # BLD AUTO: 1 K/UL (ref 1–4.8)
LYMPHOCYTES NFR BLD: 15.7 % (ref 18–48)
M TB IFN-G CD4+ BCKGRND COR BLD-ACNC: -0.01 IU/ML
M TB IFN-G CD4+ BCKGRND COR BLD-ACNC: -0.01 IU/ML
MAGNESIUM SERPL-MCNC: 1.3 MG/DL (ref 1.6–2.6)
MCH RBC QN AUTO: 26.6 PG (ref 27–31)
MCHC RBC AUTO-ENTMCNC: 31.5 G/DL (ref 32–36)
MCV RBC AUTO: 84 FL (ref 82–98)
MITOGEN IGNF BCKGRD COR BLD-ACNC: 9.94 IU/ML
MONOCYTES # BLD AUTO: 0.7 K/UL (ref 0.3–1)
MONOCYTES NFR BLD: 10.9 % (ref 4–15)
NEUTROPHILS # BLD AUTO: 4.7 K/UL (ref 1.8–7.7)
NEUTROPHILS NFR BLD: 71.7 % (ref 38–73)
NRBC BLD-RTO: 0 /100 WBC
PHOSPHATE SERPL-MCNC: 4.8 MG/DL (ref 2.7–4.5)
PLATELET # BLD AUTO: 135 K/UL (ref 150–450)
PMV BLD AUTO: 13.3 FL (ref 9.2–12.9)
POCT GLUCOSE: 127 MG/DL (ref 70–110)
POCT GLUCOSE: 147 MG/DL (ref 70–110)
POCT GLUCOSE: 151 MG/DL (ref 70–110)
POCT GLUCOSE: 254 MG/DL (ref 70–110)
POTASSIUM SERPL-SCNC: 4.6 MMOL/L (ref 3.5–5.1)
PROT SERPL-MCNC: 6.4 G/DL (ref 6–8.4)
RBC # BLD AUTO: 3.31 M/UL (ref 4.6–6.2)
SODIUM SERPL-SCNC: 140 MMOL/L (ref 136–145)
TB GOLD PLUS: NEGATIVE
WBC # BLD AUTO: 6.5 K/UL (ref 3.9–12.7)

## 2023-07-14 PROCEDURE — 63600175 PHARM REV CODE 636 W HCPCS: Performed by: NURSE PRACTITIONER

## 2023-07-14 PROCEDURE — 84100 ASSAY OF PHOSPHORUS: CPT | Performed by: HOSPITALIST

## 2023-07-14 PROCEDURE — 21400001 HC TELEMETRY ROOM

## 2023-07-14 PROCEDURE — 99232 PR SUBSEQUENT HOSPITAL CARE,LEVL II: ICD-10-PCS | Mod: ,,, | Performed by: INTERNAL MEDICINE

## 2023-07-14 PROCEDURE — 85025 COMPLETE CBC W/AUTO DIFF WBC: CPT | Performed by: HOSPITALIST

## 2023-07-14 PROCEDURE — 36415 COLL VENOUS BLD VENIPUNCTURE: CPT | Performed by: HOSPITALIST

## 2023-07-14 PROCEDURE — 63600175 PHARM REV CODE 636 W HCPCS: Performed by: STUDENT IN AN ORGANIZED HEALTH CARE EDUCATION/TRAINING PROGRAM

## 2023-07-14 PROCEDURE — 99232 SBSQ HOSP IP/OBS MODERATE 35: CPT | Mod: ,,, | Performed by: INTERNAL MEDICINE

## 2023-07-14 PROCEDURE — 63600175 PHARM REV CODE 636 W HCPCS: Performed by: HOSPITALIST

## 2023-07-14 PROCEDURE — 25000003 PHARM REV CODE 250: Performed by: HOSPITALIST

## 2023-07-14 PROCEDURE — 63600175 PHARM REV CODE 636 W HCPCS: Performed by: PHYSICIAN ASSISTANT

## 2023-07-14 PROCEDURE — 94761 N-INVAS EAR/PLS OXIMETRY MLT: CPT

## 2023-07-14 PROCEDURE — 25000003 PHARM REV CODE 250: Performed by: PHYSICIAN ASSISTANT

## 2023-07-14 PROCEDURE — 90935 HEMODIALYSIS ONE EVALUATION: CPT

## 2023-07-14 PROCEDURE — 25000003 PHARM REV CODE 250: Performed by: STUDENT IN AN ORGANIZED HEALTH CARE EDUCATION/TRAINING PROGRAM

## 2023-07-14 PROCEDURE — 83735 ASSAY OF MAGNESIUM: CPT | Performed by: HOSPITALIST

## 2023-07-14 PROCEDURE — 80053 COMPREHEN METABOLIC PANEL: CPT | Performed by: HOSPITALIST

## 2023-07-14 RX ORDER — SODIUM CHLORIDE 9 MG/ML
INJECTION, SOLUTION INTRAVENOUS ONCE
Status: COMPLETED | OUTPATIENT
Start: 2023-07-15 | End: 2023-07-15

## 2023-07-14 RX ORDER — MUPIROCIN 20 MG/G
OINTMENT TOPICAL 2 TIMES DAILY
Status: DISCONTINUED | OUTPATIENT
Start: 2023-07-14 | End: 2023-07-17 | Stop reason: HOSPADM

## 2023-07-14 RX ORDER — SODIUM CHLORIDE 9 MG/ML
INJECTION, SOLUTION INTRAVENOUS ONCE
Status: COMPLETED | OUTPATIENT
Start: 2023-07-14 | End: 2023-07-14

## 2023-07-14 RX ORDER — INSULIN ASPART 100 [IU]/ML
4 INJECTION, SOLUTION INTRAVENOUS; SUBCUTANEOUS
Status: DISCONTINUED | OUTPATIENT
Start: 2023-07-14 | End: 2023-07-14

## 2023-07-14 RX ORDER — HEPARIN SODIUM 1000 [USP'U]/ML
1000 INJECTION, SOLUTION INTRAVENOUS; SUBCUTANEOUS
Status: DISCONTINUED | OUTPATIENT
Start: 2023-07-14 | End: 2023-07-17 | Stop reason: HOSPADM

## 2023-07-14 RX ORDER — INSULIN ASPART 100 [IU]/ML
3 INJECTION, SOLUTION INTRAVENOUS; SUBCUTANEOUS
Status: DISCONTINUED | OUTPATIENT
Start: 2023-07-14 | End: 2023-07-16

## 2023-07-14 RX ORDER — HYDROMORPHONE HYDROCHLORIDE 1 MG/ML
0.5 INJECTION, SOLUTION INTRAMUSCULAR; INTRAVENOUS; SUBCUTANEOUS ONCE
Status: COMPLETED | OUTPATIENT
Start: 2023-07-14 | End: 2023-07-14

## 2023-07-14 RX ADMIN — SODIUM BICARBONATE 650 MG TABLET 650 MG: at 04:07

## 2023-07-14 RX ADMIN — HYDRALAZINE HYDROCHLORIDE 100 MG: 50 TABLET ORAL at 04:07

## 2023-07-14 RX ADMIN — SEVELAMER CARBONATE 800 MG: 800 TABLET, FILM COATED ORAL at 04:07

## 2023-07-14 RX ADMIN — HEPARIN SODIUM 5000 UNITS: 5000 INJECTION INTRAVENOUS; SUBCUTANEOUS at 05:07

## 2023-07-14 RX ADMIN — SEVELAMER CARBONATE 800 MG: 800 TABLET, FILM COATED ORAL at 12:07

## 2023-07-14 RX ADMIN — LABETALOL HYDROCHLORIDE 300 MG: 100 TABLET, FILM COATED ORAL at 08:07

## 2023-07-14 RX ADMIN — HEPARIN SODIUM 1000 UNITS: 1000 INJECTION, SOLUTION INTRAVENOUS; SUBCUTANEOUS at 03:07

## 2023-07-14 RX ADMIN — SODIUM BICARBONATE 650 MG TABLET 650 MG: at 09:07

## 2023-07-14 RX ADMIN — Medication 6 MG: at 08:07

## 2023-07-14 RX ADMIN — INSULIN ASPART 3 UNITS: 100 INJECTION, SOLUTION INTRAVENOUS; SUBCUTANEOUS at 04:07

## 2023-07-14 RX ADMIN — HYDROMORPHONE HYDROCHLORIDE 0.5 MG: 1 INJECTION, SOLUTION INTRAMUSCULAR; INTRAVENOUS; SUBCUTANEOUS at 12:07

## 2023-07-14 RX ADMIN — SEVELAMER CARBONATE 800 MG: 800 TABLET, FILM COATED ORAL at 08:07

## 2023-07-14 RX ADMIN — HYDRALAZINE HYDROCHLORIDE 100 MG: 50 TABLET ORAL at 09:07

## 2023-07-14 RX ADMIN — ATORVASTATIN CALCIUM 40 MG: 40 TABLET, FILM COATED ORAL at 08:07

## 2023-07-14 RX ADMIN — INSULIN DETEMIR 6 UNITS: 100 INJECTION, SOLUTION SUBCUTANEOUS at 08:07

## 2023-07-14 RX ADMIN — ASPIRIN 81 MG: 81 TABLET, COATED ORAL at 08:07

## 2023-07-14 RX ADMIN — FUROSEMIDE 40 MG: 40 TABLET ORAL at 08:07

## 2023-07-14 RX ADMIN — INSULIN DETEMIR 6 UNITS: 100 INJECTION, SOLUTION SUBCUTANEOUS at 09:07

## 2023-07-14 RX ADMIN — AMLODIPINE BESYLATE 10 MG: 10 TABLET ORAL at 08:07

## 2023-07-14 RX ADMIN — HYDRALAZINE HYDROCHLORIDE 100 MG: 50 TABLET ORAL at 05:07

## 2023-07-14 RX ADMIN — INSULIN ASPART 3 UNITS: 100 INJECTION, SOLUTION INTRAVENOUS; SUBCUTANEOUS at 12:07

## 2023-07-14 RX ADMIN — SODIUM CHLORIDE: 9 INJECTION, SOLUTION INTRAVENOUS at 01:07

## 2023-07-14 RX ADMIN — INSULIN ASPART 3 UNITS: 100 INJECTION, SOLUTION INTRAVENOUS; SUBCUTANEOUS at 09:07

## 2023-07-14 RX ADMIN — HEPARIN SODIUM 5000 UNITS: 5000 INJECTION INTRAVENOUS; SUBCUTANEOUS at 09:07

## 2023-07-14 RX ADMIN — CALCITRIOL CAPSULES 0.25 MCG 0.25 MCG: 0.25 CAPSULE ORAL at 08:07

## 2023-07-14 RX ADMIN — SODIUM BICARBONATE 650 MG TABLET 650 MG: at 08:07

## 2023-07-14 RX ADMIN — MUPIROCIN: 20 OINTMENT TOPICAL at 12:07

## 2023-07-14 NOTE — PROGRESS NOTES
[facility] Medicine  Progress Note    Patient Name: Luis Daniel Howell  MRN: [id number]  Patient Class: IP- Inpatient   Admission Date: 7/12/2023  Length of Stay: 2 days  Attending Physician: Roberto Cardoza MD  Primary Care Provider: Janak Wilson MD        Subjective:     Principal Problem:ESRD (end stage renal disease)        HPI:  52 y/o male with CKD-V, T2DM, HTN, HLD, h/o cocaine abuse, cardiomyopathy is referred to the ED to begin dialysis treatment. Nephrology was consulted and recommended admission to . Labs/VS reviewed.      Overview/Hospital Course:  No notes on file    Interval History: Awaiting HD session. No new complaints.   Appreciate endocrinology evaluation and recommendations    Review of Systems   Constitutional:  Negative for diaphoresis and unexpected weight change.   HENT:  Negative for congestion.    Eyes:  Negative for discharge.   Respiratory:  Negative for apnea and chest tightness.    Cardiovascular:  Negative for chest pain and leg swelling.   Gastrointestinal:  Negative for abdominal distention and abdominal pain.   Endocrine: Negative for cold intolerance and heat intolerance.   Musculoskeletal:  Negative for arthralgias and back pain.   Skin:  Negative for color change.   Neurological:  Positive for numbness. Negative for dizziness and facial asymmetry.   Hematological:  Negative for adenopathy.   Psychiatric/Behavioral:  Negative for agitation.    Objective:     Vital Signs (Most Recent):  Temp: 97.9 °F (36.6 °C) (07/14/23 1613)  Pulse: 70 (07/14/23 1613)  Resp: 18 (07/14/23 1613)  BP: (!) 174/99 (07/14/23 1613)  SpO2: 98 % (07/14/23 1613) Vital Signs (24h Range):  Temp:  [97.6 °F (36.4 °C)-98.6 °F (37 °C)] 97.9 °F (36.6 °C)  Pulse:  [62-70] 70  Resp:  [16-18] 18  SpO2:  [96 %-98 %] 98 %  BP: (148-174)/(72-99) 174/99     Weight: 105 kg (231 lb 7.7 oz)  Body mass index is 32.29 kg/m².    Intake/Output Summary (Last 24 hours) at 7/14/2023 6889  Last data filed at  7/14/2023 1614  Gross per 24 hour   Intake 1383.33 ml   Output 1000 ml   Net 383.33 ml           Physical Exam  Vitals and nursing note reviewed.   Constitutional:       General: He is not in acute distress.  HENT:      Nose: No congestion.      Mouth/Throat:      Mouth: Mucous membranes are moist.   Eyes:      General: No scleral icterus.  Cardiovascular:      Rate and Rhythm: Normal rate and regular rhythm.      Heart sounds: Murmur heard.     No friction rub.   Pulmonary:      Effort: Pulmonary effort is normal. No respiratory distress.      Breath sounds: No wheezing.   Abdominal:      General: Abdomen is flat. There is no distension.      Palpations: There is no mass.   Musculoskeletal:         General: No swelling or deformity.      Cervical back: No rigidity or tenderness.   Skin:     Coloration: Skin is not jaundiced.   Neurological:      Mental Status: He is alert.      Comments: Right arm / hand weakness (old).            Significant Labs: All pertinent labs within the past 24 hours have been reviewed.  A1C:   Recent Labs   Lab 06/29/23  1007 07/12/23  1329   HGBA1C 6.3* 6.4*       BMP:   Recent Labs   Lab 07/14/23  0501   GLU 80      K 4.6      CO2 19*   BUN 95*   CREATININE 9.3*   CALCIUM 8.7   MG 1.3*       CBC:   Recent Labs   Lab 07/13/23  0439 07/14/23  0501   WBC 5.69 6.50   HGB 8.8* 8.8*   HCT 28.3* 27.9*   * 135*         Significant Imaging: I have reviewed all pertinent imaging results/findings within the past 24 hours.  I have reviewed and interpreted all pertinent imaging results/findings within the past 24 hours.      Assessment/Plan:      * ESRD (end stage renal disease)    - Perm-a-cath placement and initiation of HD 7/13  - Increase Bicarb  To 1300 BID  - Tolerated 1st session HD well 7/14  - awaiting chair time, likely delay until Monday  - managed by Nephrology - plans for HD again 7/15    Type 2 diabetes mellitus with hyperglycemia, with long-term current use of  insulin  Patient's FSGs are uncontrolled due to hypoglycemia on current medication regimen.  Last A1c reviewed-   Lab Results   Component Value Date    HGBA1C 6.4 (H) 07/12/2023     Most recent fingerstick glucose reviewed-   Recent Labs   Lab 07/13/23  1956 07/14/23  0818 07/14/23  1545   POCTGLUCOSE 236* 254* 127*     Current correctional scale  Low  Decrease anti-hyperglycemic dose as follows-   Antihyperglycemics (From admission, onward)    Start     Stop Route Frequency Ordered    07/14/23 1130  insulin aspart U-100 pen 3 Units         -- SubQ 3 times daily with meals 07/14/23 0835    07/14/23 0945  insulin detemir U-100 (Levemir) pen 6 Units         -- SubQ 2 times daily 07/14/23 0835    07/13/23 1720  insulin aspart U-100 pen 0-5 Units         -- SubQ Before meals & nightly PRN 07/13/23 1620        Hold Oral hypoglycemics while patient is in the hospital.    Pt. Reports non / poor adherence to insulin Rx. He has decreased his Levemir from >40 to 35 units on his own - too strong  - Endocrine consulted for education and Rx adjustment  - appreciate assistance. Rx. Changes noted.     Essential hypertension  Appears controlled  - Amlodipine 10 daily  - Hydralazine 100 mg TID (only takes it once a day)  - Labetolol 300mg BID  (- lasix 40 QD, Zaroxolyn 5mg nightly)  - Will adjust PRN      Secondary hyperparathyroidism of renal origin  Resume calcitriol and phos binders.  Lab Results   Component Value Date    .1 (H) 08/30/2022    CALCIUM 8.7 07/14/2023    PHOS 4.8 (H) 07/14/2023         Chronic diastolic heart failure  - compensated.     Echo 7/22   The left ventricle is normal in size with severe concentric hypertrophy and normal systolic function.   The estimated ejection fraction is 55%.   Grade II left ventricular diastolic dysfunction.   Normal right ventricular size with normal right ventricular systolic function.   Severe left atrial enlargement.   Mild tricuspid  regurgitation.   Moderate-to-severe mitral regurgitation.   Intermediate central venous pressure (8 mmHg).   The estimated PA systolic pressure is 40 mmHg.        Crack cocaine use  By history      Anemia of chronic kidney failure, unspecified stage  Check basic hematinic labs  -CBC daily  Lab Results   Component Value Date    WBC 5.69 07/13/2023    HGB 8.8 (L) 07/13/2023    HCT 28.3 (L) 07/13/2023    MCV 83 07/13/2023     (L) 07/13/2023     - at baseline. No need for transfusion        Dyslipidemia  Continue statin medication        VTE Risk Mitigation (From admission, onward)         Ordered     heparin (porcine) injection 1,000 Units  As needed (PRN)         07/14/23 0957     heparin (porcine) injection 5,000 Units  Every 8 hours         07/12/23 1319     IP VTE HIGH RISK PATIENT  Once         07/12/23 1319     Place sequential compression device  Until discontinued         07/12/23 1319     Place sequential compression device  Until discontinued         07/12/23 1319                Discharge Planning   LOREE: 7/17/2023     Code Status: Full Code   Is the patient medically ready for discharge?: No    Reason for patient still in hospital (select all that apply): Treatment and Consult recommendations  Discharge Plan A: Home with family                  Roberto Cardoza MD  Department of Hospital Medicine   [facility]

## 2023-07-14 NOTE — PLAN OF CARE
CM requested update on new HD chair from [facility] . New referral takes usually two days. LOREE adjusted to Mon. 7/17/23. Team aware. CM staff following.      Leanna Mojica RN  Weekend  - [facility] [address]  Spectralink: [phone]

## 2023-07-14 NOTE — ASSESSMENT & PLAN NOTE
CKD 5 due to longstanding diabetes and history of cocaine abuse; hx of HTN as well    UPCR 6/29/23 with 0.75  Last renal US 2019 - repeat pending    Plan/Recommendation  -HD today  -Continue lasix 40 QD.  -Renal US.  -Will get 24 hour urine creatinine clearance.  -IR consult for permacath placement  -Increase bicarb to 1300 bid  -Consent obtained 7/12/23  -Will speak with case management to find dialysis clinic  -Keep MAP > 65  -Keep hemoglobin > 7  -Strict ins and outs  -Avoid nephrotoxic agents if possible and renally dose medications  -Avoid drastic hemodynamic changes if possible

## 2023-07-14 NOTE — ASSESSMENT & PLAN NOTE
Goal hemoglobin in CKD 5 is 10-12  Hemoglobin   Date Value Ref Range Status   07/14/2023 8.8 (L) 14.0 - 18.0 g/dL Final     Iron   Date Value Ref Range Status   07/12/2023 76 45 - 160 ug/dL Final     Transferrin   Date Value Ref Range Status   07/12/2023 189 (L) 200 - 375 mg/dL Final     TIBC   Date Value Ref Range Status   07/12/2023 280 250 - 450 ug/dL Final     Saturated Iron   Date Value Ref Range Status   07/12/2023 27 20 - 50 % Final     Ferritin   Date Value Ref Range Status   07/12/2023 34 20.0 - 300.0 ng/mL Final       Adequate iron stores  EPO once weekly

## 2023-07-14 NOTE — PROGRESS NOTES
Patient arrived in a wheelchair to dialysis unit.   Report received from Jamilah Webber per dialysis Flowsheet.  Hemodialysis initiated using the following:    Dialysis Access: right TDC    Aspirated, flushed, and accessed using aseptic technique.    Will Maintain telemetry and blood pressure monitoring throughout treatment.  Refer to dialysis flowsheet and MAR for details.

## 2023-07-14 NOTE — ASSESSMENT & PLAN NOTE
- Perm-a-cath placement and initiation of HD 7/13  - Increase Bicarb  To 1300 BID  - Tolerated 1st session HD well 7/14  - awaiting chair time, likely delay until Monday  - managed by Nephrology - plans for HD again 7/15

## 2023-07-14 NOTE — SUBJECTIVE & OBJECTIVE
Interval History: no acute events overnight, plan for HD today    Review of patient's allergies indicates:  No Known Allergies  Current Facility-Administered Medications   Medication Frequency    albuterol inhaler 2 puff Q4H PRN    amLODIPine tablet 10 mg Daily    aspirin EC tablet 81 mg Daily    atorvastatin tablet 40 mg QHS    calcitRIOL capsule 0.25 mcg Daily    dextrose 10% bolus 125 mL 125 mL PRN    dextrose 10% bolus 250 mL 250 mL PRN    dextrose 40 % gel 15,000 mg PRN    dextrose 40 % gel 30,000 mg PRN    epoetin michael-epbx injection 5,150 Units Q7 Days    furosemide tablet 40 mg Daily    glucagon (human recombinant) injection 1 mg PRN    glucose chewable tablet 16 g PRN    glucose chewable tablet 24 g PRN    heparin (porcine) injection 5,000 Units Q8H    hydrALAZINE tablet 100 mg Q8H    insulin aspart U-100 pen 0-5 Units QID (AC + HS) PRN    insulin aspart U-100 pen 3 Units TIDWM    insulin detemir U-100 (Levemir) pen 6 Units BID    labetaloL tablet 300 mg BID    melatonin tablet 6 mg Nightly PRN    naloxone 0.4 mg/mL injection 0.02 mg PRN    ondansetron injection 4 mg Q8H PRN    sevelamer carbonate tablet 800 mg TID WM    sodium bicarbonate tablet 650 mg TID    sodium chloride 0.9% flush 10 mL PRN       Objective:     Vital Signs (Most Recent):  Temp: 97.7 °F (36.5 °C) (07/14/23 0716)  Pulse: 69 (07/14/23 0812)  Resp: 18 (07/14/23 0716)  BP: (!) 148/89 (07/14/23 0812)  SpO2: 98 % (07/14/23 0716) Vital Signs (24h Range):  Temp:  [97.5 °F (36.4 °C)-98.8 °F (37.1 °C)] 97.7 °F (36.5 °C)  Pulse:  [59-69] 69  Resp:  [11-18] 18  SpO2:  [96 %-100 %] 98 %  BP: (136-163)/(79-93) 148/89     Weight: 105 kg (231 lb 7.7 oz) (07/14/23 0400)  Body mass index is 32.29 kg/m².  Body surface area is 2.29 meters squared.    I/O last 3 completed shifts:  In: 1220 [P.O.:1220]  Out: 700 [Urine:700]     Physical Exam  Constitutional:       General: He is not in acute distress.     Appearance: Normal appearance. He is obese. He is  not ill-appearing or toxic-appearing.   HENT:      Head: Normocephalic and atraumatic.      Nose: Nose normal. No congestion.      Mouth/Throat:      Mouth: Mucous membranes are moist.   Eyes:      Pupils: Pupils are equal, round, and reactive to light.   Cardiovascular:      Rate and Rhythm: Normal rate.      Heart sounds: No murmur heard.     Comments: Tunneled dialysis catheter  Pulmonary:      Effort: Pulmonary effort is normal. No respiratory distress.      Breath sounds: No wheezing or rales.   Abdominal:      General: Abdomen is flat. There is no distension.      Tenderness: There is no abdominal tenderness.   Musculoskeletal:      Cervical back: Normal range of motion. No rigidity.      Right lower leg: Edema (trace to midshin) present.      Left lower leg: Edema (trace to midshin) present.   Lymphadenopathy:      Cervical: No cervical adenopathy.   Neurological:      Mental Status: He is alert.        Significant Labs:  All labs within the past 24 hours have been reviewed.     Significant Imaging:  Labs: Reviewed

## 2023-07-14 NOTE — CARE UPDATE
-Glucose Goal 140-180    -A1C:   Hemoglobin A1C   Date Value Ref Range Status   07/12/2023 6.4 (H) 4.0 - 5.6 % Final     Comment:     ADA Screening Guidelines:  5.7-6.4%  Consistent with prediabetes  >or=6.5%  Consistent with diabetes    High levels of fetal hemoglobin interfere with the HbA1C  assay. Heterozygous hemoglobin variants (HbS, HgC, etc)do  not significantly interfere with this assay.   However, presence of multiple variants may affect accuracy.           -HOME REGIMEN: Levemir 45 units q HS (reports he takes anywhere from 25-50 units depending on how much he eats)     -GLUCOSE TREND FOR THE PAST 24HRS:   Recent Labs   Lab 07/13/23  0626 07/13/23  0722 07/13/23  1128 07/13/23  1612 07/13/23  1956 07/14/23  0818   POCTGLUCOSE 82 98 98 95 236* 254*         -NO HYPOGYCEMIAS NOTED     - Diet  Diet renal Fluid - 1500mL    T2DM.  Low bg  yesterday while NPO.  Diet advanced. BG above goal last night and this morning.  Will adjust regimen to Levemir BID 6 units and add mealtime conservatively given poor kidney function and previous lows.  (WBD~0.2-0.)    Plan:   - -Adjust Levemir to 6 units BID,   -Start Novolog 3 units with meals,  -Continue Novolog correction dose with ISF 50 starting at 200   - POCT Glucose before meals and at bedtime  - Hypoglycemia protocol in place      ** Please notify Endocrine for any change and/or advance in diet**  ** Please call Endocrine for any BG related issues **     Discharge Planning:   TBD. Please notify endocrinology prior to discharge.

## 2023-07-14 NOTE — ASSESSMENT & PLAN NOTE
Appears controlled  - Amlodipine 10 daily  - Hydralazine 100 mg TID (only takes it once a day)  - Labetolol 300mg BID  (- lasix 40 QD, Zaroxolyn 5mg nightly)  - Will adjust PRN

## 2023-07-14 NOTE — ASSESSMENT & PLAN NOTE
Resume calcitriol and phos binders.  Lab Results   Component Value Date    .1 (H) 08/30/2022    CALCIUM 8.7 07/14/2023    PHOS 4.8 (H) 07/14/2023

## 2023-07-14 NOTE — PROGRESS NOTES
"Referral successfully faxed to [facility] [place] central intake for outpt dialysis coordination.      SW will continue to follow with updates.    Michelle Mak, LMSW [facility]  [phone]      Your fax has been successfully sent to [phone] at [phone].  ------------------------------------------------------------  From: [id number]  ------------------------------------------------------------  7/14/2023 3:30:13 PM Transmission Record          Sent to [phone] with remote ID "[email or web address]"          Result: (0/339;0/0) Success          Page record: 1 - 33          Elapsed time: 17:51 on channel 1  "

## 2023-07-14 NOTE — ASSESSMENT & PLAN NOTE
Calcium   Date Value Ref Range Status   07/14/2023 8.7 8.7 - 10.5 mg/dL Final     Phosphorus   Date Value Ref Range Status   07/14/2023 4.8 (H) 2.7 - 4.5 mg/dL Final     PTH, Intact   Date Value Ref Range Status   08/30/2022 623.1 (H) 9.0 - 77.0 pg/mL Final     Repeat PTH  Calcitrol 0.25  Sevelamer 800 TID

## 2023-07-14 NOTE — PROGRESS NOTES
[facility]  Nephrology  Progress Note    Patient Name: Luis Daniel Howell  MRN: [id number]  Admission Date: 7/12/2023  Hospital Length of Stay: 2 days  Attending Provider: Roberto Cardoza MD   Primary Care Physician: Janak Wilson MD  Principal Problem:ESRD needing dialysis    Subjective:     HPI: 53 year old man with a history of CKD 5 due to cocaine abuse and poorly controlled diabetes, hypertension and hyperlipidemia admitted from nephrology clinic for concerns of worsening CKD 5. He states that he is of his usual health, but presented 7/12 for his usual nephrology appointment and was told to come to the ED for admission. He states that he has a little swelling in both his legs, but he states he is constantly urinating. He is on a home dose of lasix 40. Furthermore, he denies any confusion, nausea, vomiting, metallic taste or change in his ability to do his daily activity. He is able to walk 2 blocks before needing to stop.    He has attended dialysis education and is aware that he is on the verge of needing dialysis, however has not met with the  just yet. He was supposed to have an appointment for AV fistula placement, but has not yet made his appointments and has somewhat been lost to follow up.     On admission to [facility], he has a hemoglobin of 8.7, Tsat of 27, bicarb of 20, BUN of 84 with creatinine of 9.3. UA shows 2+ protein, 1+ glucose.     Nephrology consulted for initiation of HD      Interval History: no acute events overnight, plan for HD today    Review of patient's allergies indicates:  No Known Allergies  Current Facility-Administered Medications   Medication Frequency    albuterol inhaler 2 puff Q4H PRN    amLODIPine tablet 10 mg Daily    aspirin EC tablet 81 mg Daily    atorvastatin tablet 40 mg QHS    calcitRIOL capsule 0.25 mcg Daily    dextrose 10% bolus 125 mL 125 mL PRN    dextrose 10% bolus 250 mL 250 mL PRN    dextrose 40 % gel 15,000 mg PRN    dextrose 40 % gel 30,000 mg  PRN    epoetin michael-epbx injection 5,150 Units Q7 Days    furosemide tablet 40 mg Daily    glucagon (human recombinant) injection 1 mg PRN    glucose chewable tablet 16 g PRN    glucose chewable tablet 24 g PRN    heparin (porcine) injection 5,000 Units Q8H    hydrALAZINE tablet 100 mg Q8H    insulin aspart U-100 pen 0-5 Units QID (AC + HS) PRN    insulin aspart U-100 pen 3 Units TIDWM    insulin detemir U-100 (Levemir) pen 6 Units BID    labetaloL tablet 300 mg BID    melatonin tablet 6 mg Nightly PRN    naloxone 0.4 mg/mL injection 0.02 mg PRN    ondansetron injection 4 mg Q8H PRN    sevelamer carbonate tablet 800 mg TID WM    sodium bicarbonate tablet 650 mg TID    sodium chloride 0.9% flush 10 mL PRN       Objective:     Vital Signs (Most Recent):  Temp: 97.7 °F (36.5 °C) (07/14/23 0716)  Pulse: 69 (07/14/23 0812)  Resp: 18 (07/14/23 0716)  BP: (!) 148/89 (07/14/23 0812)  SpO2: 98 % (07/14/23 0716) Vital Signs (24h Range):  Temp:  [97.5 °F (36.4 °C)-98.8 °F (37.1 °C)] 97.7 °F (36.5 °C)  Pulse:  [59-69] 69  Resp:  [11-18] 18  SpO2:  [96 %-100 %] 98 %  BP: (136-163)/(79-93) 148/89     Weight: 105 kg (231 lb 7.7 oz) (07/14/23 0400)  Body mass index is 32.29 kg/m².  Body surface area is 2.29 meters squared.    I/O last 3 completed shifts:  In: 1220 [P.O.:1220]  Out: 700 [Urine:700]     Physical Exam  Constitutional:       General: He is not in acute distress.     Appearance: Normal appearance. He is obese. He is not ill-appearing or toxic-appearing.   HENT:      Head: Normocephalic and atraumatic.      Nose: Nose normal. No congestion.      Mouth/Throat:      Mouth: Mucous membranes are moist.   Eyes:      Pupils: Pupils are equal, round, and reactive to light.   Cardiovascular:      Rate and Rhythm: Normal rate.      Heart sounds: No murmur heard.     Comments: Tunneled dialysis catheter  Pulmonary:      Effort: Pulmonary effort is normal. No respiratory distress.      Breath sounds: No wheezing or rales.    Abdominal:      General: Abdomen is flat. There is no distension.      Tenderness: There is no abdominal tenderness.   Musculoskeletal:      Cervical back: Normal range of motion. No rigidity.      Right lower leg: Edema (trace to midshin) present.      Left lower leg: Edema (trace to midshin) present.   Lymphadenopathy:      Cervical: No cervical adenopathy.   Neurological:      Mental Status: He is alert.        Significant Labs:  All labs within the past 24 hours have been reviewed.     Significant Imaging:  Labs: Reviewed    Assessment/Plan:     Cardiac/Vascular  Essential hypertension  -    Renal/  * ESRD needing dialysis  CKD 5 due to longstanding diabetes and history of cocaine abuse; hx of HTN as well    UPCR 6/29/23 with 0.75  Last renal US 2019 - repeat pending    Plan/Recommendation  -HD today  -Continue lasix 40 QD.  -Consent obtained 7/12/23  -Will speak with case management to find dialysis clinic  -Keep MAP > 65  -Keep hemoglobin > 7  -Strict ins and outs  -Avoid nephrotoxic agents if possible and renally dose medications  -Avoid drastic hemodynamic changes if possible        Oncology  Anemia of chronic kidney failure, unspecified stage  Goal hemoglobin in CKD 5 is 10-12  Hemoglobin   Date Value Ref Range Status   07/14/2023 8.8 (L) 14.0 - 18.0 g/dL Final     Iron   Date Value Ref Range Status   07/12/2023 76 45 - 160 ug/dL Final     Transferrin   Date Value Ref Range Status   07/12/2023 189 (L) 200 - 375 mg/dL Final     TIBC   Date Value Ref Range Status   07/12/2023 280 250 - 450 ug/dL Final     Saturated Iron   Date Value Ref Range Status   07/12/2023 27 20 - 50 % Final     Ferritin   Date Value Ref Range Status   07/12/2023 34 20.0 - 300.0 ng/mL Final       Adequate iron stores  EPO once weekly    Endocrine  Secondary hyperparathyroidism of renal origin  Calcium   Date Value Ref Range Status   07/14/2023 8.7 8.7 - 10.5 mg/dL Final     Phosphorus   Date Value Ref Range Status   07/14/2023 4.8  (H) 2.7 - 4.5 mg/dL Final     PTH, Intact   Date Value Ref Range Status   08/30/2022 623.1 (H) 9.0 - 77.0 pg/mL Final     Repeat PTH  Calcitrol 0.25  Sevelamer 800 TID        Thank you for your consult. I will follow-up with patient. Please contact us if you have any additional questions.    Martell Bonilla MD  Nephrology  [facility]

## 2023-07-14 NOTE — ASSESSMENT & PLAN NOTE
- compensated.     Echo 7/22   The left ventricle is normal in size with severe concentric hypertrophy and normal systolic function.   The estimated ejection fraction is 55%.   Grade II left ventricular diastolic dysfunction.   Normal right ventricular size with normal right ventricular systolic function.   Severe left atrial enlargement.   Mild tricuspid regurgitation.   Moderate-to-severe mitral regurgitation.   Intermediate central venous pressure (8 mmHg).   The estimated PA systolic pressure is 40 mmHg.

## 2023-07-14 NOTE — PROGRESS NOTES
Hemodialysis treatment completed.    Treatment time received: 2 hours    Net fluid removed: 500 ml    Tolerated Treatment well. VSS. No acute distress.    Blood returned, heparin locked ports per order, capped, and taped.    Report given to Jamilah  Refer to dialysis flowsheet and MAR for details.  Patient transported back in wheelchair from Dialysis to primary unit.

## 2023-07-14 NOTE — ASSESSMENT & PLAN NOTE
Patient's FSGs are uncontrolled due to hypoglycemia on current medication regimen.  Last A1c reviewed-   Lab Results   Component Value Date    HGBA1C 6.4 (H) 07/12/2023     Most recent fingerstick glucose reviewed-   Recent Labs   Lab 07/13/23  1956 07/14/23  0818 07/14/23  1545   POCTGLUCOSE 236* 254* 127*     Current correctional scale  Low  Decrease anti-hyperglycemic dose as follows-   Antihyperglycemics (From admission, onward)    Start     Stop Route Frequency Ordered    07/14/23 1130  insulin aspart U-100 pen 3 Units         -- SubQ 3 times daily with meals 07/14/23 0835    07/14/23 0945  insulin detemir U-100 (Levemir) pen 6 Units         -- SubQ 2 times daily 07/14/23 0835    07/13/23 1720  insulin aspart U-100 pen 0-5 Units         -- SubQ Before meals & nightly PRN 07/13/23 1620        Hold Oral hypoglycemics while patient is in the hospital.    Pt. Reports non / poor adherence to insulin Rx. He has decreased his Levemir from >40 to 35 units on his own - too strong  - Endocrine consulted for education and Rx adjustment  - appreciate assistance. Rx. Changes noted.

## 2023-07-15 PROBLEM — F14.90 CRACK COCAINE USE: Status: RESOLVED | Noted: 2019-09-08 | Resolved: 2023-07-15

## 2023-07-15 PROBLEM — R06.09 DYSPNEA ON EXERTION: Status: RESOLVED | Noted: 2019-08-09 | Resolved: 2023-07-15

## 2023-07-15 PROBLEM — I42.9 CARDIOMYOPATHY: Status: RESOLVED | Noted: 2019-08-09 | Resolved: 2023-07-15

## 2023-07-15 LAB
ALBUMIN SERPL BCP-MCNC: 3.3 G/DL (ref 3.5–5.2)
ANION GAP SERPL CALC-SCNC: 11 MMOL/L (ref 8–16)
BUN SERPL-MCNC: 66 MG/DL (ref 6–20)
CALCIUM SERPL-MCNC: 8.6 MG/DL (ref 8.7–10.5)
CHLORIDE SERPL-SCNC: 109 MMOL/L (ref 95–110)
CO2 SERPL-SCNC: 21 MMOL/L (ref 23–29)
CREAT SERPL-MCNC: 8 MG/DL (ref 0.5–1.4)
EST. GFR  (NO RACE VARIABLE): 7.5 ML/MIN/1.73 M^2
GLUCOSE SERPL-MCNC: 89 MG/DL (ref 70–110)
PHOSPHATE SERPL-MCNC: 4.9 MG/DL (ref 2.7–4.5)
POCT GLUCOSE: 103 MG/DL (ref 70–110)
POCT GLUCOSE: 139 MG/DL (ref 70–110)
POCT GLUCOSE: 140 MG/DL (ref 70–110)
POCT GLUCOSE: 146 MG/DL (ref 70–110)
POTASSIUM SERPL-SCNC: 4.2 MMOL/L (ref 3.5–5.1)
SODIUM SERPL-SCNC: 141 MMOL/L (ref 136–145)

## 2023-07-15 PROCEDURE — 63600175 PHARM REV CODE 636 W HCPCS: Performed by: HOSPITALIST

## 2023-07-15 PROCEDURE — 80069 RENAL FUNCTION PANEL: CPT | Performed by: INTERNAL MEDICINE

## 2023-07-15 PROCEDURE — 99232 PR SUBSEQUENT HOSPITAL CARE,LEVL II: ICD-10-PCS | Mod: ,,, | Performed by: INTERNAL MEDICINE

## 2023-07-15 PROCEDURE — 25000003 PHARM REV CODE 250: Performed by: STUDENT IN AN ORGANIZED HEALTH CARE EDUCATION/TRAINING PROGRAM

## 2023-07-15 PROCEDURE — 90935 HEMODIALYSIS ONE EVALUATION: CPT

## 2023-07-15 PROCEDURE — 36415 COLL VENOUS BLD VENIPUNCTURE: CPT | Performed by: INTERNAL MEDICINE

## 2023-07-15 PROCEDURE — 99233 PR SUBSEQUENT HOSPITAL CARE,LEVL III: ICD-10-PCS | Mod: ,,, | Performed by: NURSE PRACTITIONER

## 2023-07-15 PROCEDURE — 99233 SBSQ HOSP IP/OBS HIGH 50: CPT | Mod: ,,, | Performed by: NURSE PRACTITIONER

## 2023-07-15 PROCEDURE — 63600175 PHARM REV CODE 636 W HCPCS: Performed by: STUDENT IN AN ORGANIZED HEALTH CARE EDUCATION/TRAINING PROGRAM

## 2023-07-15 PROCEDURE — 25000003 PHARM REV CODE 250: Performed by: INTERNAL MEDICINE

## 2023-07-15 PROCEDURE — 25000003 PHARM REV CODE 250: Performed by: HOSPITALIST

## 2023-07-15 PROCEDURE — 94761 N-INVAS EAR/PLS OXIMETRY MLT: CPT

## 2023-07-15 PROCEDURE — 99232 SBSQ HOSP IP/OBS MODERATE 35: CPT | Mod: ,,, | Performed by: INTERNAL MEDICINE

## 2023-07-15 PROCEDURE — 11000001 HC ACUTE MED/SURG PRIVATE ROOM

## 2023-07-15 RX ORDER — ACETAMINOPHEN 325 MG/1
650 TABLET ORAL EVERY 6 HOURS PRN
Status: DISCONTINUED | OUTPATIENT
Start: 2023-07-15 | End: 2023-07-17 | Stop reason: HOSPADM

## 2023-07-15 RX ADMIN — ATORVASTATIN CALCIUM 40 MG: 40 TABLET, FILM COATED ORAL at 08:07

## 2023-07-15 RX ADMIN — SEVELAMER CARBONATE 800 MG: 800 TABLET, FILM COATED ORAL at 08:07

## 2023-07-15 RX ADMIN — CALCITRIOL CAPSULES 0.25 MCG 0.25 MCG: 0.25 CAPSULE ORAL at 08:07

## 2023-07-15 RX ADMIN — INSULIN ASPART 3 UNITS: 100 INJECTION, SOLUTION INTRAVENOUS; SUBCUTANEOUS at 04:07

## 2023-07-15 RX ADMIN — SEVELAMER CARBONATE 800 MG: 800 TABLET, FILM COATED ORAL at 04:07

## 2023-07-15 RX ADMIN — SODIUM BICARBONATE 650 MG TABLET 650 MG: at 08:07

## 2023-07-15 RX ADMIN — SEVELAMER CARBONATE 800 MG: 800 TABLET, FILM COATED ORAL at 12:07

## 2023-07-15 RX ADMIN — HEPARIN SODIUM 5000 UNITS: 5000 INJECTION INTRAVENOUS; SUBCUTANEOUS at 04:07

## 2023-07-15 RX ADMIN — ASPIRIN 81 MG: 81 TABLET, COATED ORAL at 08:07

## 2023-07-15 RX ADMIN — HEPARIN SODIUM 1000 UNITS: 1000 INJECTION, SOLUTION INTRAVENOUS; SUBCUTANEOUS at 04:07

## 2023-07-15 RX ADMIN — HEPARIN SODIUM 5000 UNITS: 5000 INJECTION INTRAVENOUS; SUBCUTANEOUS at 09:07

## 2023-07-15 RX ADMIN — HYDRALAZINE HYDROCHLORIDE 100 MG: 50 TABLET ORAL at 04:07

## 2023-07-15 RX ADMIN — FUROSEMIDE 40 MG: 40 TABLET ORAL at 08:07

## 2023-07-15 RX ADMIN — HYDRALAZINE HYDROCHLORIDE 100 MG: 50 TABLET ORAL at 05:07

## 2023-07-15 RX ADMIN — HEPARIN SODIUM 5000 UNITS: 5000 INJECTION INTRAVENOUS; SUBCUTANEOUS at 05:07

## 2023-07-15 RX ADMIN — INSULIN ASPART 3 UNITS: 100 INJECTION, SOLUTION INTRAVENOUS; SUBCUTANEOUS at 12:07

## 2023-07-15 RX ADMIN — LABETALOL HYDROCHLORIDE 300 MG: 100 TABLET, FILM COATED ORAL at 08:07

## 2023-07-15 RX ADMIN — INSULIN ASPART 3 UNITS: 100 INJECTION, SOLUTION INTRAVENOUS; SUBCUTANEOUS at 08:07

## 2023-07-15 RX ADMIN — AMLODIPINE BESYLATE 10 MG: 10 TABLET ORAL at 08:07

## 2023-07-15 RX ADMIN — SODIUM CHLORIDE: 9 INJECTION, SOLUTION INTRAVENOUS at 01:07

## 2023-07-15 RX ADMIN — HYDRALAZINE HYDROCHLORIDE 100 MG: 50 TABLET ORAL at 09:07

## 2023-07-15 RX ADMIN — MUPIROCIN: 20 OINTMENT TOPICAL at 08:07

## 2023-07-15 RX ADMIN — ACETAMINOPHEN 650 MG: 325 TABLET ORAL at 05:07

## 2023-07-15 NOTE — PROGRESS NOTES
[facility] Surg  Nephrology  Progress Note    Patient Name: Luis Daniel Howell  MRN: [id number]  Admission Date: 7/12/2023  Hospital Length of Stay: 3 days  Attending Provider: Roberto aCrdoza MD   Primary Care Physician: Janak Wilson MD  Principal Problem:ESRD (end stage renal disease)      Interval History: no acute events overnight, plan for 2nd HD treatment today    Review of patient's allergies indicates:  No Known Allergies  Current Facility-Administered Medications   Medication Frequency    0.9%  NaCl infusion Once    albuterol inhaler 2 puff Q4H PRN    amLODIPine tablet 10 mg Daily    aspirin EC tablet 81 mg Daily    atorvastatin tablet 40 mg QHS    calcitRIOL capsule 0.25 mcg Daily    dextrose 10% bolus 125 mL 125 mL PRN    dextrose 10% bolus 250 mL 250 mL PRN    dextrose 40 % gel 15,000 mg PRN    dextrose 40 % gel 30,000 mg PRN    epoetin michael-epbx injection 5,150 Units Q7 Days    furosemide tablet 40 mg Daily    glucagon (human recombinant) injection 1 mg PRN    glucose chewable tablet 16 g PRN    glucose chewable tablet 24 g PRN    heparin (porcine) injection 1,000 Units PRN    heparin (porcine) injection 5,000 Units Q8H    hydrALAZINE tablet 100 mg Q8H    insulin aspart U-100 pen 0-5 Units QID (AC + HS) PRN    insulin aspart U-100 pen 3 Units TIDWM    insulin detemir U-100 (Levemir) pen 4 Units BID    labetaloL tablet 300 mg BID    melatonin tablet 6 mg Nightly PRN    mupirocin 2 % ointment BID    naloxone 0.4 mg/mL injection 0.02 mg PRN    ondansetron injection 4 mg Q8H PRN    sevelamer carbonate tablet 800 mg TID WM    sodium bicarbonate tablet 650 mg TID    sodium chloride 0.9% bolus 250 mL 250 mL PRN    sodium chloride 0.9% flush 10 mL PRN       Objective:     Vital Signs (Most Recent):  Temp: 97.9 °F (36.6 °C) (07/15/23 0735)  Pulse: 69 (07/15/23 0814)  Resp: 16 (07/15/23 0735)  BP: (!) 147/84 (07/15/23 0814)  SpO2: 98 % (07/15/23 0735) Vital Signs (24h  Range):  Temp:  [97.9 °F (36.6 °C)-98.5 °F (36.9 °C)] 97.9 °F (36.6 °C)  Pulse:  [62-74] 69  Resp:  [16-18] 16  SpO2:  [93 %-98 %] 98 %  BP: (136-175)/(72-99) 147/84     Weight: 102 kg (224 lb 13.9 oz) (07/15/23 0400)  Body mass index is 31.36 kg/m².  Body surface area is 2.26 meters squared.    I/O last 3 completed shifts:  In: 2487.3 [P.O.:1954; I.V.:233.3; Other:300]  Out: 1000 [Other:1000]    Physical Exam  Constitutional:       General: He is not in acute distress.     Appearance: Normal appearance. He is obese. He is not ill-appearing or toxic-appearing.   HENT:      Head: Normocephalic and atraumatic.      Nose: Nose normal. No congestion.      Mouth/Throat:      Mouth: Mucous membranes are moist.   Eyes:      Pupils: Pupils are equal, round, and reactive to light.   Cardiovascular:      Rate and Rhythm: Normal rate.      Heart sounds: No murmur heard.     Comments: Tunneled dialysis catheter  Pulmonary:      Effort: Pulmonary effort is normal. No respiratory distress.      Breath sounds: No wheezing or rales.   Abdominal:      General: Abdomen is flat. There is no distension.      Tenderness: There is no abdominal tenderness.   Musculoskeletal:      Cervical back: Normal range of motion. No rigidity.      Right lower leg: Edema (trace to midshin) present.      Left lower leg: Edema (trace to midshin) present.   Lymphadenopathy:      Cervical: No cervical adenopathy.   Neurological:      Mental Status: He is alert.        Significant Labs:  All labs within the past 24 hours have been reviewed.     Significant Imaging:  Labs: Reviewed    Assessment/Plan:     Renal/  * ESRD (end stage renal disease)  CKD 5 due to longstanding diabetes and history of cocaine abuse; hx of HTN as well    UPCR 6/29/23 with 0.75    Plan/Recommendation  -HD today  -Continue lasix 40 QD.  -Will get 24 hour urine creatinine clearance.  -IR consult for permacath placement  -Stopped bicarb tabs; will adjust bicarb bath with HD    -Consent for HD obtained on 7/12/23  -Will speak with case management to find dialysis clinic  -Keep MAP > 65  -Strict ins and outs  -Avoid nephrotoxic agents if possible and renally dose medications  -Avoid drastic hemodynamic changes if possible        Oncology  Anemia of chronic kidney failure, unspecified stage  Goal hemoglobin in CKD 5 is 10-12  Hemoglobin   Date Value Ref Range Status   07/14/2023 8.8 (L) 14.0 - 18.0 g/dL Final     Iron   Date Value Ref Range Status   07/12/2023 76 45 - 160 ug/dL Final     Transferrin   Date Value Ref Range Status   07/12/2023 189 (L) 200 - 375 mg/dL Final     TIBC   Date Value Ref Range Status   07/12/2023 280 250 - 450 ug/dL Final     Saturated Iron   Date Value Ref Range Status   07/12/2023 27 20 - 50 % Final     Ferritin   Date Value Ref Range Status   07/12/2023 34 20.0 - 300.0 ng/mL Final       Adequate iron stores  EPO once weekly    Endocrine  Secondary hyperparathyroidism of renal origin  Calcium   Date Value Ref Range Status   07/15/2023 8.6 (L) 8.7 - 10.5 mg/dL Final     Phosphorus   Date Value Ref Range Status   07/15/2023 4.9 (H) 2.7 - 4.5 mg/dL Final     PTH, Intact   Date Value Ref Range Status   08/30/2022 623.1 (H) 9.0 - 77.0 pg/mL Final     Calcitrol 0.25  Sevelamer 800 TID        Thank you for your consult. I will follow-up with patient. Please contact us if you have any additional questions.    Carrillo Wakefield NP  Nephrology  [facility] - Med Surg

## 2023-07-15 NOTE — CARE UPDATE
-Glucose Goal 140-180    -A1C:   Hemoglobin A1C   Date Value Ref Range Status   07/12/2023 6.4 (H) 4.0 - 5.6 % Final     Comment:     ADA Screening Guidelines:  5.7-6.4%  Consistent with prediabetes  >or=6.5%  Consistent with diabetes    High levels of fetal hemoglobin interfere with the HbA1C  assay. Heterozygous hemoglobin variants (HbS, HgC, etc)do  not significantly interfere with this assay.   However, presence of multiple variants may affect accuracy.           -HOME REGIMEN: Levemir 45 units q HS (reports he takes anywhere from 25-50 units depending on how much he eats)     -GLUCOSE TREND FOR THE PAST 24HRS:   Recent Labs   Lab 07/13/23  1612 07/13/23  1956 07/14/23  0818 07/14/23  1224 07/14/23  1545 07/14/23  1946   POCTGLUCOSE 95 236* 254* 151* 127* 147*           -NO HYPOGYCEMIAS NOTED     - Diet  Diet renal Fluid - 1500mL    T2DM.  BG better controlled yesterday. Bg below goal this morning    - Decrease Levemir to 4 units BID,   - Continue Novolog 3 units with meals,  -Continue Novolog correction dose with ISF 50 starting at 200   - POCT Glucose before meals and at bedtime  - Hypoglycemia protocol in place      ** Please notify Endocrine for any change and/or advance in diet**  ** Please call Endocrine for any BG related issues **     Discharge Planning:   TBD. Please notify endocrinology prior to discharge.

## 2023-07-15 NOTE — ASSESSMENT & PLAN NOTE
CKD 5 due to longstanding diabetes and history of cocaine abuse; hx of HTN as well    UPCR 6/29/23 with 0.75    Plan/Recommendation  -HD today  -Continue lasix 40 QD.  -Will get 24 hour urine creatinine clearance.  -IR consult for permacath placement  -Stopped bicarb tabs; will adjust bicarb bath with HD   -Consent for HD obtained on 7/12/23  -Will speak with case management to find dialysis clinic  -Keep MAP > 65  -Strict ins and outs  -Avoid nephrotoxic agents if possible and renally dose medications  -Avoid drastic hemodynamic changes if possible

## 2023-07-15 NOTE — PLAN OF CARE
Problem: Adult Inpatient Plan of Care  Goal: Plan of Care Review  Outcome: Ongoing, Progressing  Goal: Patient-Specific Goal (Individualized)  Outcome: Ongoing, Progressing  Goal: Absence of Hospital-Acquired Illness or Injury  Outcome: Ongoing, Progressing  Goal: Optimal Comfort and Wellbeing  Outcome: Ongoing, Progressing  Goal: Readiness for Transition of Care  Outcome: Ongoing, Progressing     Problem: Diabetes Comorbidity  Goal: Blood Glucose Level Within Targeted Range  Outcome: Ongoing, Progressing     Problem: Fall Injury Risk  Goal: Absence of Fall and Fall-Related Injury  Outcome: Ongoing, Progressing     Problem: Infection  Goal: Absence of Infection Signs and Symptoms  Outcome: Ongoing, Progressing     Problem: Device-Related Complication Risk (Hemodialysis)  Goal: Safe, Effective Therapy Delivery  Outcome: Ongoing, Progressing     Problem: Hemodynamic Instability (Hemodialysis)  Goal: Effective Tissue Perfusion  Outcome: Ongoing, Progressing     Problem: Infection (Hemodialysis)  Goal: Absence of Infection Signs and Symptoms  Outcome: Ongoing, Progressing

## 2023-07-15 NOTE — ASSESSMENT & PLAN NOTE
Patient's FSGs are uncontrolled due to hypoglycemia on current medication regimen.  Last A1c reviewed-   Lab Results   Component Value Date    HGBA1C 6.4 (H) 07/12/2023     Most recent fingerstick glucose reviewed-   Recent Labs   Lab 07/14/23  1545 07/14/23  1946 07/15/23  0753 07/15/23  1237   POCTGLUCOSE 127* 147* 139* 140*     Current correctional scale  Low  Decrease anti-hyperglycemic dose as follows-   Antihyperglycemics (From admission, onward)    Start     Stop Route Frequency Ordered    07/15/23 0900  insulin detemir U-100 (Levemir) pen 4 Units         -- SubQ 2 times daily 07/15/23 0710    07/14/23 1130  insulin aspart U-100 pen 3 Units         -- SubQ 3 times daily with meals 07/14/23 0835    07/13/23 1720  insulin aspart U-100 pen 0-5 Units         -- SubQ Before meals & nightly PRN 07/13/23 1620        Hold Oral hypoglycemics while patient is in the hospital.    Pt. Reports non / poor adherence to insulin Rx. He has decreased his Levemir from >40 to 35 units on his own - too strong  - Endocrine consulted for education and Rx adjustment  - appreciate assistance.     - Rx. Adjusted further

## 2023-07-15 NOTE — PLAN OF CARE
No significant changes this shift. Continue with POC and monitor. Pt left lying semi fowlers bed in lowest position, with call light in reach, and all wheels locked X3 rails up.     Problem: Adult Inpatient Plan of Care  Goal: Plan of Care Review  Outcome: Ongoing, Progressing  Goal: Patient-Specific Goal (Individualized)  Outcome: Ongoing, Progressing  Goal: Absence of Hospital-Acquired Illness or Injury  Outcome: Ongoing, Progressing  Goal: Optimal Comfort and Wellbeing  Outcome: Ongoing, Progressing  Goal: Readiness for Transition of Care  Outcome: Ongoing, Progressing     Problem: Diabetes Comorbidity  Goal: Blood Glucose Level Within Targeted Range  Outcome: Ongoing, Progressing     Problem: Fall Injury Risk  Goal: Absence of Fall and Fall-Related Injury  Outcome: Ongoing, Progressing     Problem: Infection  Goal: Absence of Infection Signs and Symptoms  Outcome: Ongoing, Progressing     Problem: Device-Related Complication Risk (Hemodialysis)  Goal: Safe, Effective Therapy Delivery  Outcome: Ongoing, Progressing     Problem: Hemodynamic Instability (Hemodialysis)  Goal: Effective Tissue Perfusion  Outcome: Ongoing, Progressing     Problem: Infection (Hemodialysis)  Goal: Absence of Infection Signs and Symptoms  Outcome: Ongoing, Progressing     Problem: Fluid and Electrolyte Imbalance (Acute Kidney Injury/Impairment)  Goal: Fluid and Electrolyte Balance  Outcome: Ongoing, Progressing     Problem: Oral Intake Inadequate (Acute Kidney Injury/Impairment)  Goal: Optimal Nutrition Intake  Outcome: Ongoing, Progressing     Problem: Renal Function Impairment (Acute Kidney Injury/Impairment)  Goal: Effective Renal Function  Outcome: Ongoing, Progressing

## 2023-07-15 NOTE — SUBJECTIVE & OBJECTIVE
Interval History: Undergoing HD. No new sx.   Slightly hypertensive still.   Endocrine adjusting Insulin Rx.     Review of Systems   Constitutional:  Negative for diaphoresis and unexpected weight change.   HENT:  Negative for congestion.    Eyes:  Negative for discharge.   Respiratory:  Negative for apnea and chest tightness.    Cardiovascular:  Negative for chest pain and leg swelling.   Gastrointestinal:  Negative for abdominal distention and abdominal pain.   Endocrine: Negative for cold intolerance and heat intolerance.   Musculoskeletal:  Negative for arthralgias and back pain.   Skin:  Negative for color change.   Neurological:  Positive for numbness. Negative for dizziness and facial asymmetry.   Hematological:  Negative for adenopathy.   Psychiatric/Behavioral:  Negative for agitation.    Objective:     Vital Signs (Most Recent):  Temp: 98 °F (36.7 °C) (07/15/23 1300)  Pulse: 62 (07/15/23 1430)  Resp: 18 (07/15/23 1300)  BP: (!) 169/91 (07/15/23 1430)  SpO2: 97 % (07/15/23 1051) Vital Signs (24h Range):  Temp:  [97.9 °F (36.6 °C)-98.5 °F (36.9 °C)] 98 °F (36.7 °C)  Pulse:  [62-74] 62  Resp:  [16-18] 18  SpO2:  [93 %-98 %] 97 %  BP: (136-175)/(72-99) 169/91     Weight: 102 kg (224 lb 13.9 oz)  Body mass index is 31.36 kg/m².    Intake/Output Summary (Last 24 hours) at 7/15/2023 1446  Last data filed at 7/15/2023 0012  Gross per 24 hour   Intake 833.33 ml   Output 1000 ml   Net -166.67 ml           Physical Exam  Vitals and nursing note reviewed.   Constitutional:       General: He is not in acute distress.  HENT:      Nose: No congestion.      Mouth/Throat:      Mouth: Mucous membranes are moist.   Eyes:      General: No scleral icterus.  Cardiovascular:      Rate and Rhythm: Normal rate and regular rhythm.      Heart sounds: Murmur heard.     No friction rub.   Pulmonary:      Effort: Pulmonary effort is normal. No respiratory distress.      Breath sounds: No wheezing.   Abdominal:      General: Abdomen is  flat. There is no distension.      Palpations: There is no mass.   Musculoskeletal:         General: No swelling or deformity.      Cervical back: No rigidity or tenderness.   Skin:     Coloration: Skin is not jaundiced.   Neurological:      Mental Status: He is alert.      Comments: Right arm / hand weakness (old).            Significant Labs: All pertinent labs within the past 24 hours have been reviewed.  A1C:   Recent Labs   Lab 06/29/23  1007 07/12/23  1329   HGBA1C 6.3* 6.4*       BMP:   Recent Labs   Lab 07/14/23  0501 07/15/23  0416   GLU 80 89    141   K 4.6 4.2    109   CO2 19* 21*   BUN 95* 66*   CREATININE 9.3* 8.0*   CALCIUM 8.7 8.6*   MG 1.3*  --        CBC:   Recent Labs   Lab 07/14/23  0501   WBC 6.50   HGB 8.8*   HCT 27.9*   *         Significant Imaging: I have reviewed all pertinent imaging results/findings within the past 24 hours.  I have reviewed and interpreted all pertinent imaging results/findings within the past 24 hours.

## 2023-07-15 NOTE — SUBJECTIVE & OBJECTIVE
Interval History: no acute events overnight, plan for 2nd HD treatment today    Review of patient's allergies indicates:  No Known Allergies  Current Facility-Administered Medications   Medication Frequency    0.9%  NaCl infusion Once    albuterol inhaler 2 puff Q4H PRN    amLODIPine tablet 10 mg Daily    aspirin EC tablet 81 mg Daily    atorvastatin tablet 40 mg QHS    calcitRIOL capsule 0.25 mcg Daily    dextrose 10% bolus 125 mL 125 mL PRN    dextrose 10% bolus 250 mL 250 mL PRN    dextrose 40 % gel 15,000 mg PRN    dextrose 40 % gel 30,000 mg PRN    epoetin michael-epbx injection 5,150 Units Q7 Days    furosemide tablet 40 mg Daily    glucagon (human recombinant) injection 1 mg PRN    glucose chewable tablet 16 g PRN    glucose chewable tablet 24 g PRN    heparin (porcine) injection 1,000 Units PRN    heparin (porcine) injection 5,000 Units Q8H    hydrALAZINE tablet 100 mg Q8H    insulin aspart U-100 pen 0-5 Units QID (AC + HS) PRN    insulin aspart U-100 pen 3 Units TIDWM    insulin detemir U-100 (Levemir) pen 4 Units BID    labetaloL tablet 300 mg BID    melatonin tablet 6 mg Nightly PRN    mupirocin 2 % ointment BID    naloxone 0.4 mg/mL injection 0.02 mg PRN    ondansetron injection 4 mg Q8H PRN    sevelamer carbonate tablet 800 mg TID WM    sodium bicarbonate tablet 650 mg TID    sodium chloride 0.9% bolus 250 mL 250 mL PRN    sodium chloride 0.9% flush 10 mL PRN       Objective:     Vital Signs (Most Recent):  Temp: 97.9 °F (36.6 °C) (07/15/23 0735)  Pulse: 69 (07/15/23 0814)  Resp: 16 (07/15/23 0735)  BP: (!) 147/84 (07/15/23 0814)  SpO2: 98 % (07/15/23 0735) Vital Signs (24h Range):  Temp:  [97.9 °F (36.6 °C)-98.5 °F (36.9 °C)] 97.9 °F (36.6 °C)  Pulse:  [62-74] 69  Resp:  [16-18] 16  SpO2:  [93 %-98 %] 98 %  BP: (136-175)/(72-99) 147/84     Weight: 102 kg (224 lb 13.9 oz) (07/15/23 0400)  Body mass index is 31.36 kg/m².  Body surface area is 2.26 meters squared.    I/O last 3 completed shifts:  In: 2487.3  [P.O.:1954; I.V.:233.3; Other:300]  Out: 1000 [Other:1000]     Physical Exam  Constitutional:       General: He is not in acute distress.     Appearance: Normal appearance. He is obese. He is not ill-appearing or toxic-appearing.   HENT:      Head: Normocephalic and atraumatic.      Nose: Nose normal. No congestion.      Mouth/Throat:      Mouth: Mucous membranes are moist.   Eyes:      Pupils: Pupils are equal, round, and reactive to light.   Cardiovascular:      Rate and Rhythm: Normal rate.      Heart sounds: No murmur heard.     Comments: Tunneled dialysis catheter  Pulmonary:      Effort: Pulmonary effort is normal. No respiratory distress.      Breath sounds: No wheezing or rales.   Abdominal:      General: Abdomen is flat. There is no distension.      Tenderness: There is no abdominal tenderness.   Musculoskeletal:      Cervical back: Normal range of motion. No rigidity.      Right lower leg: Edema (trace to midshin) present.      Left lower leg: Edema (trace to midshin) present.   Lymphadenopathy:      Cervical: No cervical adenopathy.   Neurological:      Mental Status: He is alert.        Significant Labs:  All labs within the past 24 hours have been reviewed.     Significant Imaging:  Labs: Reviewed

## 2023-07-15 NOTE — PROGRESS NOTES
HD complete, 0.5 ml removed over 2.5 hours, tolerated well. Blood returned, both lumens flushed with NS, hep locked, capped, taped and wrapped. Report given to primary nurse Johnny. Pt transferred from unit via w/c back to rm [id number].

## 2023-07-15 NOTE — PROGRESS NOTES
[facility] Medicine  Progress Note    Patient Name: Luis Daniel Howell  MRN: [id number]  Patient Class: IP- Inpatient   Admission Date: 7/12/2023  Length of Stay: 3 days  Attending Physician: Roberto Cardoza MD  Primary Care Provider: Janak Wilson MD        Subjective:     Principal Problem:ESRD (end stage renal disease)        HPI:  52 y/o male with CKD-V, T2DM, HTN, HLD, h/o cocaine abuse, cardiomyopathy is referred to the ED to begin dialysis treatment. Nephrology was consulted and recommended admission to . Labs/VS reviewed.      Overview/Hospital Course:  Luis Daniel Howell is a pleasant 54 yo gentleman with a history of DM2, HTN and CKD who is admitted for initiation of HD.   Perm-a-cath placement. Episodes of hypoglycemia while admitted as well as at home. Endocrine consulted and insulin regiment adjusted.   1st session HD 7/15, tolerated well        Interval History: Undergoing HD. No new sx.   Slightly hypertensive still.   Endocrine adjusting Insulin Rx.     Review of Systems   Constitutional:  Negative for diaphoresis and unexpected weight change.   HENT:  Negative for congestion.    Eyes:  Negative for discharge.   Respiratory:  Negative for apnea and chest tightness.    Cardiovascular:  Negative for chest pain and leg swelling.   Gastrointestinal:  Negative for abdominal distention and abdominal pain.   Endocrine: Negative for cold intolerance and heat intolerance.   Musculoskeletal:  Negative for arthralgias and back pain.   Skin:  Negative for color change.   Neurological:  Positive for numbness. Negative for dizziness and facial asymmetry.   Hematological:  Negative for adenopathy.   Psychiatric/Behavioral:  Negative for agitation.    Objective:     Vital Signs (Most Recent):  Temp: 98 °F (36.7 °C) (07/15/23 1300)  Pulse: 62 (07/15/23 1430)  Resp: 18 (07/15/23 1300)  BP: (!) 169/91 (07/15/23 1430)  SpO2: 97 % (07/15/23 1051) Vital Signs (24h Range):  Temp:  [97.9 °F (36.6 °C)-98.5 °F  (36.9 °C)] 98 °F (36.7 °C)  Pulse:  [62-74] 62  Resp:  [16-18] 18  SpO2:  [93 %-98 %] 97 %  BP: (136-175)/(72-99) 169/91     Weight: 102 kg (224 lb 13.9 oz)  Body mass index is 31.36 kg/m².    Intake/Output Summary (Last 24 hours) at 7/15/2023 1446  Last data filed at 7/15/2023 0012  Gross per 24 hour   Intake 833.33 ml   Output 1000 ml   Net -166.67 ml           Physical Exam  Vitals and nursing note reviewed.   Constitutional:       General: He is not in acute distress.  HENT:      Nose: No congestion.      Mouth/Throat:      Mouth: Mucous membranes are moist.   Eyes:      General: No scleral icterus.  Cardiovascular:      Rate and Rhythm: Normal rate and regular rhythm.      Heart sounds: Murmur heard.     No friction rub.   Pulmonary:      Effort: Pulmonary effort is normal. No respiratory distress.      Breath sounds: No wheezing.   Abdominal:      General: Abdomen is flat. There is no distension.      Palpations: There is no mass.   Musculoskeletal:         General: No swelling or deformity.      Cervical back: No rigidity or tenderness.   Skin:     Coloration: Skin is not jaundiced.   Neurological:      Mental Status: He is alert.      Comments: Right arm / hand weakness (old).            Significant Labs: All pertinent labs within the past 24 hours have been reviewed.  A1C:   Recent Labs   Lab 06/29/23  1007 07/12/23  1329   HGBA1C 6.3* 6.4*       BMP:   Recent Labs   Lab 07/14/23  0501 07/15/23  0416   GLU 80 89    141   K 4.6 4.2    109   CO2 19* 21*   BUN 95* 66*   CREATININE 9.3* 8.0*   CALCIUM 8.7 8.6*   MG 1.3*  --        CBC:   Recent Labs   Lab 07/14/23  0501   WBC 6.50   HGB 8.8*   HCT 27.9*   *         Significant Imaging: I have reviewed all pertinent imaging results/findings within the past 24 hours.  I have reviewed and interpreted all pertinent imaging results/findings within the past 24 hours.      Assessment/Plan:      * ESRD (end stage renal disease)    - Perm-a-cath  placement and initiation of HD 7/13  - Increase Bicarb  To 1300 BID  - Tolerated 1st session HD well 7/14  - awaiting chair time, likely delay until Monday  - managed by Nephrology - HD 7/15    Type 2 diabetes mellitus with hyperglycemia, with long-term current use of insulin  Patient's FSGs are uncontrolled due to hypoglycemia on current medication regimen.  Last A1c reviewed-   Lab Results   Component Value Date    HGBA1C 6.4 (H) 07/12/2023     Most recent fingerstick glucose reviewed-   Recent Labs   Lab 07/14/23  1545 07/14/23  1946 07/15/23  0753 07/15/23  1237   POCTGLUCOSE 127* 147* 139* 140*     Current correctional scale  Low  Decrease anti-hyperglycemic dose as follows-   Antihyperglycemics (From admission, onward)    Start     Stop Route Frequency Ordered    07/15/23 0900  insulin detemir U-100 (Levemir) pen 4 Units         -- SubQ 2 times daily 07/15/23 0710    07/14/23 1130  insulin aspart U-100 pen 3 Units         -- SubQ 3 times daily with meals 07/14/23 0835    07/13/23 1720  insulin aspart U-100 pen 0-5 Units         -- SubQ Before meals & nightly PRN 07/13/23 1620        Hold Oral hypoglycemics while patient is in the hospital.    Pt. Reports non / poor adherence to insulin Rx. He has decreased his Levemir from >40 to 35 units on his own - too strong  - Endocrine consulted for education and Rx adjustment  - appreciate assistance.     - Rx. Adjusted further    Essential hypertension  Appears controlled  - Amlodipine 10 daily  - Hydralazine 100 mg TID (only takes it once a day)  - Labetolol 300mg BID  (- lasix 40 QD, Zaroxolyn 5mg nightly)  - Will adjust PRN      Secondary hyperparathyroidism of renal origin  Resume calcitriol and phos binders.  Lab Results   Component Value Date    .1 (H) 08/30/2022    CALCIUM 8.7 07/14/2023    PHOS 4.8 (H) 07/14/2023         Chronic diastolic heart failure  - compensated.     Echo 7/22   The left ventricle is normal in size with severe concentric  hypertrophy and normal systolic function.   The estimated ejection fraction is 55%.   Grade II left ventricular diastolic dysfunction.   Normal right ventricular size with normal right ventricular systolic function.   Severe left atrial enlargement.   Mild tricuspid regurgitation.   Moderate-to-severe mitral regurgitation.   Intermediate central venous pressure (8 mmHg).   The estimated PA systolic pressure is 40 mmHg.        Anemia of chronic kidney failure, unspecified stage  Check basic hematinic labs  -CBC daily  Lab Results   Component Value Date    WBC 5.69 07/13/2023    HGB 8.8 (L) 07/13/2023    HCT 28.3 (L) 07/13/2023    MCV 83 07/13/2023     (L) 07/13/2023     - at baseline. No need for transfusion        Dyslipidemia  Continue statin medication        VTE Risk Mitigation (From admission, onward)         Ordered     heparin (porcine) injection 1,000 Units  As needed (PRN)         07/14/23 0957     heparin (porcine) injection 5,000 Units  Every 8 hours         07/12/23 1319     IP VTE HIGH RISK PATIENT  Once         07/12/23 1319     Place sequential compression device  Until discontinued         07/12/23 1319     Place sequential compression device  Until discontinued         07/12/23 1319                Discharge Planning   LOREE: 7/17/2023     Code Status: Full Code   Is the patient medically ready for discharge?: No    Reason for patient still in hospital (select all that apply): Treatment  Discharge Plan A: Home with family                  Roberto Cardoza MD  Department of Hospital Medicine   [facility]

## 2023-07-15 NOTE — HOSPITAL COURSE
Luis Daniel Howell is a pleasant 54 yo gentleman with a history of DM2, HTN and CKD who is admitted for initiation of HD.   Perm-a-cath placement. Episodes of hypoglycemia while admitted as well as at home. Endocrine consulted and insulin regiment adjusted.   1st session HD 7/15, tolerated well.     Dialysis was initiated. The pt. Trolerated his trial sessions well and will follow with [facility] on Wednesday as scheduled for his first outpatient session.     The pt. Diabetic medication was adjusted. Endocrinology assisted with management. The pt. Insulin requirements have diminished since initiation of HD and he was discharged off insulin altogether. Januvia 25 mg daily was started and the pt. Was referred to endocrinology for further management.        His antihypertensive regiment was adjusted prior to discharge and will need to further be titrated outpatient. Will defer to the pt. PCP, Janak Wilson MD and Nephrology for management.

## 2023-07-15 NOTE — PROGRESS NOTES
Pt arrived to unit via w/c, AAOx4. HD tx initiated via [facility], tolerated well, flows good. UF goal 0.5 ml as tolerated

## 2023-07-15 NOTE — ASSESSMENT & PLAN NOTE
Calcium   Date Value Ref Range Status   07/15/2023 8.6 (L) 8.7 - 10.5 mg/dL Final     Phosphorus   Date Value Ref Range Status   07/15/2023 4.9 (H) 2.7 - 4.5 mg/dL Final     PTH, Intact   Date Value Ref Range Status   08/30/2022 623.1 (H) 9.0 - 77.0 pg/mL Final     Calcitrol 0.25  Sevelamer 800 TID

## 2023-07-15 NOTE — ASSESSMENT & PLAN NOTE
- Perm-a-cath placement and initiation of HD 7/13  - Increase Bicarb  To 1300 BID  - Tolerated 1st session HD well 7/14  - awaiting chair time, likely delay until Monday  - managed by Nephrology - HD 7/15

## 2023-07-16 LAB
POCT GLUCOSE: 100 MG/DL (ref 70–110)
POCT GLUCOSE: 127 MG/DL (ref 70–110)
POCT GLUCOSE: 151 MG/DL (ref 70–110)
POCT GLUCOSE: 91 MG/DL (ref 70–110)

## 2023-07-16 PROCEDURE — 63600175 PHARM REV CODE 636 W HCPCS: Performed by: HOSPITALIST

## 2023-07-16 PROCEDURE — 99232 PR SUBSEQUENT HOSPITAL CARE,LEVL II: ICD-10-PCS | Mod: ,,, | Performed by: INTERNAL MEDICINE

## 2023-07-16 PROCEDURE — 94761 N-INVAS EAR/PLS OXIMETRY MLT: CPT

## 2023-07-16 PROCEDURE — 99232 SBSQ HOSP IP/OBS MODERATE 35: CPT | Mod: ,,, | Performed by: INTERNAL MEDICINE

## 2023-07-16 PROCEDURE — 11000001 HC ACUTE MED/SURG PRIVATE ROOM

## 2023-07-16 PROCEDURE — 25000003 PHARM REV CODE 250: Performed by: INTERNAL MEDICINE

## 2023-07-16 PROCEDURE — 25000003 PHARM REV CODE 250: Performed by: HOSPITALIST

## 2023-07-16 RX ORDER — GLUCAGON 1 MG
1 KIT INJECTION
Status: DISCONTINUED | OUTPATIENT
Start: 2023-07-16 | End: 2023-07-17 | Stop reason: HOSPADM

## 2023-07-16 RX ORDER — IBUPROFEN 200 MG
24 TABLET ORAL
Status: DISCONTINUED | OUTPATIENT
Start: 2023-07-16 | End: 2023-07-16

## 2023-07-16 RX ORDER — INSULIN ASPART 100 [IU]/ML
2 INJECTION, SOLUTION INTRAVENOUS; SUBCUTANEOUS
Status: DISCONTINUED | OUTPATIENT
Start: 2023-07-16 | End: 2023-07-17 | Stop reason: HOSPADM

## 2023-07-16 RX ORDER — INSULIN ASPART 100 [IU]/ML
0-5 INJECTION, SOLUTION INTRAVENOUS; SUBCUTANEOUS
Status: DISCONTINUED | OUTPATIENT
Start: 2023-07-16 | End: 2023-07-17 | Stop reason: HOSPADM

## 2023-07-16 RX ORDER — LOSARTAN POTASSIUM 50 MG/1
50 TABLET ORAL 2 TIMES DAILY
Status: DISCONTINUED | OUTPATIENT
Start: 2023-07-16 | End: 2023-07-17 | Stop reason: HOSPADM

## 2023-07-16 RX ORDER — IBUPROFEN 200 MG
16 TABLET ORAL
Status: DISCONTINUED | OUTPATIENT
Start: 2023-07-16 | End: 2023-07-16

## 2023-07-16 RX ADMIN — CALCITRIOL CAPSULES 0.25 MCG 0.25 MCG: 0.25 CAPSULE ORAL at 09:07

## 2023-07-16 RX ADMIN — ASPIRIN 81 MG: 81 TABLET, COATED ORAL at 09:07

## 2023-07-16 RX ADMIN — LOSARTAN POTASSIUM 50 MG: 50 TABLET, FILM COATED ORAL at 08:07

## 2023-07-16 RX ADMIN — SEVELAMER CARBONATE 800 MG: 800 TABLET, FILM COATED ORAL at 04:07

## 2023-07-16 RX ADMIN — HYDRALAZINE HYDROCHLORIDE 100 MG: 50 TABLET ORAL at 03:07

## 2023-07-16 RX ADMIN — HYDRALAZINE HYDROCHLORIDE 100 MG: 50 TABLET ORAL at 10:07

## 2023-07-16 RX ADMIN — SEVELAMER CARBONATE 800 MG: 800 TABLET, FILM COATED ORAL at 09:07

## 2023-07-16 RX ADMIN — LOSARTAN POTASSIUM 50 MG: 50 TABLET, FILM COATED ORAL at 11:07

## 2023-07-16 RX ADMIN — AMLODIPINE BESYLATE 10 MG: 10 TABLET ORAL at 09:07

## 2023-07-16 RX ADMIN — Medication 6 MG: at 10:07

## 2023-07-16 RX ADMIN — SEVELAMER CARBONATE 800 MG: 800 TABLET, FILM COATED ORAL at 11:07

## 2023-07-16 RX ADMIN — HEPARIN SODIUM 5000 UNITS: 5000 INJECTION INTRAVENOUS; SUBCUTANEOUS at 05:07

## 2023-07-16 RX ADMIN — HEPARIN SODIUM 5000 UNITS: 5000 INJECTION INTRAVENOUS; SUBCUTANEOUS at 03:07

## 2023-07-16 RX ADMIN — LABETALOL HYDROCHLORIDE 300 MG: 100 TABLET, FILM COATED ORAL at 08:07

## 2023-07-16 RX ADMIN — INSULIN ASPART 2 UNITS: 100 INJECTION, SOLUTION INTRAVENOUS; SUBCUTANEOUS at 11:07

## 2023-07-16 RX ADMIN — HYDRALAZINE HYDROCHLORIDE 100 MG: 50 TABLET ORAL at 05:07

## 2023-07-16 RX ADMIN — INSULIN ASPART 2 UNITS: 100 INJECTION, SOLUTION INTRAVENOUS; SUBCUTANEOUS at 04:07

## 2023-07-16 RX ADMIN — LABETALOL HYDROCHLORIDE 300 MG: 100 TABLET, FILM COATED ORAL at 09:07

## 2023-07-16 RX ADMIN — ATORVASTATIN CALCIUM 40 MG: 40 TABLET, FILM COATED ORAL at 08:07

## 2023-07-16 RX ADMIN — MUPIROCIN: 20 OINTMENT TOPICAL at 09:07

## 2023-07-16 RX ADMIN — FUROSEMIDE 40 MG: 40 TABLET ORAL at 09:07

## 2023-07-16 NOTE — ASSESSMENT & PLAN NOTE
Check basic hematinic labs    Lab Results   Component Value Date    WBC 6.50 07/14/2023    HGB 8.8 (L) 07/14/2023    HCT 27.9 (L) 07/14/2023    MCV 84 07/14/2023     (L) 07/14/2023     - at baseline. No need for transfusion

## 2023-07-16 NOTE — SUBJECTIVE & OBJECTIVE
Interval History: Did not sleep well. Fells groggy today. No dyspnea, chest pain or nausea.       Glycemic control improved.   Review of Systems   Constitutional:  Positive for fatigue. Negative for diaphoresis and unexpected weight change.   HENT:  Negative for congestion.    Eyes:  Negative for discharge.   Respiratory:  Negative for apnea and chest tightness.    Cardiovascular:  Negative for chest pain and leg swelling.   Gastrointestinal:  Negative for abdominal distention and abdominal pain.   Endocrine: Negative for cold intolerance and heat intolerance.   Musculoskeletal:  Negative for arthralgias and back pain.   Skin:  Negative for color change.   Neurological:  Negative for dizziness, facial asymmetry and numbness.   Hematological:  Negative for adenopathy.   Psychiatric/Behavioral:  Negative for agitation.    Objective:     Vital Signs (Most Recent):  Temp: 98.1 °F (36.7 °C) (07/16/23 0813)  Pulse: 65 (07/16/23 0813)  Resp: 18 (07/16/23 0813)  BP: (!) 154/88 (07/16/23 0905)  SpO2: 99 % (07/16/23 0813) Vital Signs (24h Range):  Temp:  [97.2 °F (36.2 °C)-98.1 °F (36.7 °C)] 98.1 °F (36.7 °C)  Pulse:  [62-72] 65  Resp:  [16-18] 18  SpO2:  [94 %-99 %] 99 %  BP: (139-176)/() 154/88     Weight: 101.1 kg (222 lb 14.2 oz)  Body mass index is 31.09 kg/m².    Intake/Output Summary (Last 24 hours) at 7/16/2023 1011  Last data filed at 7/16/2023 0341  Gross per 24 hour   Intake 790 ml   Output 1400 ml   Net -610 ml           Physical Exam  Vitals and nursing note reviewed.   Constitutional:       General: He is not in acute distress.  HENT:      Nose: No congestion.      Mouth/Throat:      Mouth: Mucous membranes are moist.   Eyes:      General: No scleral icterus.  Cardiovascular:      Rate and Rhythm: Normal rate and regular rhythm.      Heart sounds: Murmur heard.     No friction rub.   Pulmonary:      Effort: Pulmonary effort is normal. No respiratory distress.      Breath sounds: No wheezing.   Abdominal:       General: Abdomen is flat. There is no distension.      Palpations: There is no mass.   Musculoskeletal:         General: No swelling or deformity.      Cervical back: No rigidity or tenderness.   Skin:     Coloration: Skin is not jaundiced.   Neurological:      Mental Status: He is alert.      Comments: Right arm / hand weakness (old).            Significant Labs: All pertinent labs within the past 24 hours have been reviewed.  A1C:   Recent Labs   Lab 06/29/23  1007 07/12/23  1329   HGBA1C 6.3* 6.4*       BMP:   Recent Labs   Lab 07/15/23  0416   GLU 89      K 4.2      CO2 21*   BUN 66*   CREATININE 8.0*   CALCIUM 8.6*       POCT Glucose   Date Value Ref Range Status   07/16/2023 91 70 - 110 mg/dL Final   07/15/2023 146 (H) 70 - 110 mg/dL Final   07/15/2023 103 70 - 110 mg/dL Final   07/15/2023 140 (H) 70 - 110 mg/dL Final   07/15/2023 139 (H) 70 - 110 mg/dL Final   07/14/2023 147 (H) 70 - 110 mg/dL Final   07/14/2023 127 (H) 70 - 110 mg/dL Final   07/14/2023 151 (H) 70 - 110 mg/dL Final   07/14/2023 254 (H) 70 - 110 mg/dL Final   07/13/2023 236 (H) 70 - 110 mg/dL Final   07/13/2023 95 70 - 110 mg/dL Final   07/13/2023 98 70 - 110 mg/dL Final       CBC:   No results for input(s): WBC, HGB, HCT, PLT in the last 48 hours.      Significant Imaging: I have reviewed all pertinent imaging results/findings within the past 24 hours.  I have reviewed and interpreted all pertinent imaging results/findings within the past 24 hours.

## 2023-07-16 NOTE — CARE UPDATE
-Glucose Goal 140-180    -A1C:   Hemoglobin A1C   Date Value Ref Range Status   07/12/2023 6.4 (H) 4.0 - 5.6 % Final     Comment:     ADA Screening Guidelines:  5.7-6.4%  Consistent with prediabetes  >or=6.5%  Consistent with diabetes    High levels of fetal hemoglobin interfere with the HbA1C  assay. Heterozygous hemoglobin variants (HbS, HgC, etc)do  not significantly interfere with this assay.   However, presence of multiple variants may affect accuracy.           -HOME REGIMEN: Levemir 45 units q HS (reports he takes anywhere from 25-50 units depending on how much he eats)     -GLUCOSE TREND FOR THE PAST 24HRS:   Recent Labs   Lab 07/14/23  1545 07/14/23  1946 07/15/23  0753 07/15/23  1237 07/15/23  1459 07/15/23  1925   POCTGLUCOSE 127* 147* 139* 140* 103 146*           -NO HYPOGYCEMIAS NOTED     - Diet  Diet renal Fluid - 1500mL    T2DM.  BG stable will continue to monitor on current regimen    -  Levemir  4 units BID,   - Continue Novolog 3 units with meals,  -Continue Novolog correction dose with ISF 50 starting at 200   - POCT Glucose before meals and at bedtime  - Hypoglycemia protocol in place      ** Please notify Endocrine for any change and/or advance in diet**  ** Please call Endocrine for any BG related issues **     Discharge Planning:   TBD. Please notify endocrinology prior to discharge.    Addendum fasting below goal  Recommend decrease regimen  Levemir 2 units BID  Novolog 2 units AC    Will restart Sliding scale- appears to be discontinued by nurse overnight.

## 2023-07-16 NOTE — ASSESSMENT & PLAN NOTE
- compensated.   - start ARB    Echo 7/22   The left ventricle is normal in size with severe concentric hypertrophy and normal systolic function.   The estimated ejection fraction is 55%.   Grade II left ventricular diastolic dysfunction.   Normal right ventricular size with normal right ventricular systolic function.   Severe left atrial enlargement.   Mild tricuspid regurgitation.   Moderate-to-severe mitral regurgitation.   Intermediate central venous pressure (8 mmHg).   The estimated PA systolic pressure is 40 mmHg.

## 2023-07-16 NOTE — ASSESSMENT & PLAN NOTE
Appears controlled  - Amlodipine 10 daily  - Hydralazine 100 mg TID (only takes it once a day)  - Labetolol 300mg BID  (- lasix 40 QD, Zaroxolyn 5mg nightly)  - Hypertensive after HD yesterday.   - Start ARB (Losartan 50 BID).

## 2023-07-16 NOTE — ASSESSMENT & PLAN NOTE
"Patient's FSGs are uncontrolled due to hypoglycemia on current medication regimen.  Last A1c reviewed-   Lab Results   Component Value Date    HGBA1C 6.4 (H) 07/12/2023     Most recent fingerstick glucose reviewed-   Recent Labs   Lab 07/15/23  1237 07/15/23  1459 07/15/23  1925 07/16/23  0814   POCTGLUCOSE 140* 103 146* 91     Current correctional scale  Low  Decrease anti-hyperglycemic dose as follows-   Antihyperglycemics (From admission, onward)    Start     Stop Route Frequency Ordered    07/16/23 2100  insulin detemir U-100 (Levemir) pen 2 Units         -- SubQ 2 times daily 07/16/23 0906    07/16/23 1130  insulin aspart U-100 pen 2 Units         -- SubQ 3 times daily with meals 07/16/23 0906 07/16/23 1022  insulin aspart U-100 pen 0-5 Units         -- SubQ Before meals & nightly PRN 07/16/23 0922        Hold Oral hypoglycemics while patient is in the hospital.    Pt. Reports non / poor adherence to insulin Rx. He has decreased his Levemir from >40 to 35 units on his own - too strong  - Endocrine consulted for education and Rx adjustment  - appreciate assistance.     - appears controlled. Needs dietary education - inquired about "what can I eat". Does not follow a DM or Renal diet, but has been to education in the past.   "

## 2023-07-16 NOTE — ASSESSMENT & PLAN NOTE
- Perm-a-cath placement and initiation of HD 7/13  - Increase Bicarb  To 1300 BID  - Tolerated 1st session HD well 7/14, HD 7/15  - awaiting chair time, likely delay until Monday  - managed by Nephrology

## 2023-07-16 NOTE — PROGRESS NOTES
[facility] Medicine  Progress Note    Patient Name: Luis Daniel Howell  MRN: [id number]  Patient Class: IP- Inpatient   Admission Date: 7/12/2023  Length of Stay: 4 days  Attending Physician: Roberto Cardoza MD  Primary Care Provider: Janak Wilson MD        Subjective:     Principal Problem:ESRD (end stage renal disease)        HPI:  54 y/o male with CKD-V, T2DM, HTN, HLD, h/o cocaine abuse, cardiomyopathy is referred to the ED to begin dialysis treatment. Nephrology was consulted and recommended admission to . Labs/VS reviewed.      Overview/Hospital Course:  Luis Daniel Howell is a pleasant 52 yo gentleman with a history of DM2, HTN and CKD who is admitted for initiation of HD.   Perm-a-cath placement. Episodes of hypoglycemia while admitted as well as at home. Endocrine consulted and insulin regiment adjusted.   1st session HD 7/15, tolerated well        Interval History: Did not sleep well. Fells groggy today. No dyspnea, chest pain or nausea.       Glycemic control improved.   Review of Systems   Constitutional:  Positive for fatigue. Negative for diaphoresis and unexpected weight change.   HENT:  Negative for congestion.    Eyes:  Negative for discharge.   Respiratory:  Negative for apnea and chest tightness.    Cardiovascular:  Negative for chest pain and leg swelling.   Gastrointestinal:  Negative for abdominal distention and abdominal pain.   Endocrine: Negative for cold intolerance and heat intolerance.   Musculoskeletal:  Negative for arthralgias and back pain.   Skin:  Negative for color change.   Neurological:  Negative for dizziness, facial asymmetry and numbness.   Hematological:  Negative for adenopathy.   Psychiatric/Behavioral:  Negative for agitation.    Objective:     Vital Signs (Most Recent):  Temp: 98.1 °F (36.7 °C) (07/16/23 0813)  Pulse: 65 (07/16/23 0813)  Resp: 18 (07/16/23 0813)  BP: (!) 154/88 (07/16/23 0905)  SpO2: 99 % (07/16/23 0813) Vital Signs (24h Range):  Temp:   [97.2 °F (36.2 °C)-98.1 °F (36.7 °C)] 98.1 °F (36.7 °C)  Pulse:  [62-72] 65  Resp:  [16-18] 18  SpO2:  [94 %-99 %] 99 %  BP: (139-176)/() 154/88     Weight: 101.1 kg (222 lb 14.2 oz)  Body mass index is 31.09 kg/m².    Intake/Output Summary (Last 24 hours) at 7/16/2023 1011  Last data filed at 7/16/2023 0341  Gross per 24 hour   Intake 790 ml   Output 1400 ml   Net -610 ml           Physical Exam  Vitals and nursing note reviewed.   Constitutional:       General: He is not in acute distress.  HENT:      Nose: No congestion.      Mouth/Throat:      Mouth: Mucous membranes are moist.   Eyes:      General: No scleral icterus.  Cardiovascular:      Rate and Rhythm: Normal rate and regular rhythm.      Heart sounds: Murmur heard.     No friction rub.   Pulmonary:      Effort: Pulmonary effort is normal. No respiratory distress.      Breath sounds: No wheezing.   Abdominal:      General: Abdomen is flat. There is no distension.      Palpations: There is no mass.   Musculoskeletal:         General: No swelling or deformity.      Cervical back: No rigidity or tenderness.   Skin:     Coloration: Skin is not jaundiced.   Neurological:      Mental Status: He is alert.      Comments: Right arm / hand weakness (old).            Significant Labs: All pertinent labs within the past 24 hours have been reviewed.  A1C:   Recent Labs   Lab 06/29/23  1007 07/12/23  1329   HGBA1C 6.3* 6.4*       BMP:   Recent Labs   Lab 07/15/23  0416   GLU 89      K 4.2      CO2 21*   BUN 66*   CREATININE 8.0*   CALCIUM 8.6*       POCT Glucose   Date Value Ref Range Status   07/16/2023 91 70 - 110 mg/dL Final   07/15/2023 146 (H) 70 - 110 mg/dL Final   07/15/2023 103 70 - 110 mg/dL Final   07/15/2023 140 (H) 70 - 110 mg/dL Final   07/15/2023 139 (H) 70 - 110 mg/dL Final   07/14/2023 147 (H) 70 - 110 mg/dL Final   07/14/2023 127 (H) 70 - 110 mg/dL Final   07/14/2023 151 (H) 70 - 110 mg/dL Final   07/14/2023 254 (H) 70 - 110 mg/dL Final  "  07/13/2023 236 (H) 70 - 110 mg/dL Final   07/13/2023 95 70 - 110 mg/dL Final   07/13/2023 98 70 - 110 mg/dL Final       CBC:   No results for input(s): WBC, HGB, HCT, PLT in the last 48 hours.      Significant Imaging: I have reviewed all pertinent imaging results/findings within the past 24 hours.  I have reviewed and interpreted all pertinent imaging results/findings within the past 24 hours.      Assessment/Plan:      * ESRD (end stage renal disease)    - Perm-a-cath placement and initiation of HD 7/13  - Increase Bicarb  To 1300 BID  - Tolerated 1st session HD well 7/14, HD 7/15  - awaiting chair time, likely delay until Monday  - managed by Nephrology      Type 2 diabetes mellitus with hyperglycemia, with long-term current use of insulin  Patient's FSGs are uncontrolled due to hypoglycemia on current medication regimen.  Last A1c reviewed-   Lab Results   Component Value Date    HGBA1C 6.4 (H) 07/12/2023     Most recent fingerstick glucose reviewed-   Recent Labs   Lab 07/15/23  1237 07/15/23  1459 07/15/23  1925 07/16/23  0814   POCTGLUCOSE 140* 103 146* 91     Current correctional scale  Low  Decrease anti-hyperglycemic dose as follows-   Antihyperglycemics (From admission, onward)    Start     Stop Route Frequency Ordered    07/16/23 2100  insulin detemir U-100 (Levemir) pen 2 Units         -- SubQ 2 times daily 07/16/23 0906    07/16/23 1130  insulin aspart U-100 pen 2 Units         -- SubQ 3 times daily with meals 07/16/23 0906    07/16/23 1022  insulin aspart U-100 pen 0-5 Units         -- SubQ Before meals & nightly PRN 07/16/23 0922        Hold Oral hypoglycemics while patient is in the hospital.    Pt. Reports non / poor adherence to insulin Rx. He has decreased his Levemir from >40 to 35 units on his own - too strong  - Endocrine consulted for education and Rx adjustment  - appreciate assistance.     - appears controlled. Needs dietary education - inquired about "what can I eat". Does not follow a " DM or Renal diet, but has been to education in the past.     Essential hypertension  Appears controlled  - Amlodipine 10 daily  - Hydralazine 100 mg TID (only takes it once a day)  - Labetolol 300mg BID  (- lasix 40 QD, Zaroxolyn 5mg nightly)  - Hypertensive after HD yesterday.   - Start ARB (Losartan 50 BID).       Secondary hyperparathyroidism of renal origin  Resume calcitriol and phos binders.  Lab Results   Component Value Date    .1 (H) 08/30/2022    CALCIUM 8.7 07/14/2023    PHOS 4.8 (H) 07/14/2023         Chronic diastolic heart failure  - compensated.   - start ARB    Echo 7/22   The left ventricle is normal in size with severe concentric hypertrophy and normal systolic function.   The estimated ejection fraction is 55%.   Grade II left ventricular diastolic dysfunction.   Normal right ventricular size with normal right ventricular systolic function.   Severe left atrial enlargement.   Mild tricuspid regurgitation.   Moderate-to-severe mitral regurgitation.   Intermediate central venous pressure (8 mmHg).   The estimated PA systolic pressure is 40 mmHg.        Anemia of chronic kidney failure, unspecified stage  Check basic hematinic labs    Lab Results   Component Value Date    WBC 6.50 07/14/2023    HGB 8.8 (L) 07/14/2023    HCT 27.9 (L) 07/14/2023    MCV 84 07/14/2023     (L) 07/14/2023     - at baseline. No need for transfusion        Dyslipidemia  Continue statin medication  Lab Results   Component Value Date    LDLCALC 74.4 06/29/2023             VTE Risk Mitigation (From admission, onward)         Ordered     heparin (porcine) injection 1,000 Units  As needed (PRN)         07/14/23 0957     heparin (porcine) injection 5,000 Units  Every 8 hours         07/12/23 1319     IP VTE HIGH RISK PATIENT  Once         07/12/23 1319     Place sequential compression device  Until discontinued         07/12/23 1319                Discharge Planning   LOREE: 7/17/2023     Code Status: Full  Code   Is the patient medically ready for discharge?: No    Reason for patient still in hospital (select all that apply): Treatment and Pending disposition  Discharge Plan A: Home with family                  Roberto Cardoza MD  Department of Hospital Medicine   [facility]

## 2023-07-17 VITALS
WEIGHT: 222.44 LBS | HEART RATE: 76 BPM | SYSTOLIC BLOOD PRESSURE: 140 MMHG | BODY MASS INDEX: 31.14 KG/M2 | OXYGEN SATURATION: 96 % | TEMPERATURE: 98 F | DIASTOLIC BLOOD PRESSURE: 81 MMHG | RESPIRATION RATE: 18 BRPM | HEIGHT: 71 IN

## 2023-07-17 LAB
ALBUMIN SERPL BCP-MCNC: 3.3 G/DL (ref 3.5–5.2)
ANION GAP SERPL CALC-SCNC: 10 MMOL/L (ref 8–16)
BASOPHILS # BLD AUTO: 0.01 K/UL (ref 0–0.2)
BASOPHILS NFR BLD: 0.2 % (ref 0–1.9)
BUN SERPL-MCNC: 56 MG/DL (ref 6–20)
CALCIUM SERPL-MCNC: 8.9 MG/DL (ref 8.7–10.5)
CHLORIDE SERPL-SCNC: 104 MMOL/L (ref 95–110)
CO2 SERPL-SCNC: 24 MMOL/L (ref 23–29)
CREAT SERPL-MCNC: 8 MG/DL (ref 0.5–1.4)
DIFFERENTIAL METHOD: ABNORMAL
EOSINOPHIL # BLD AUTO: 0.1 K/UL (ref 0–0.5)
EOSINOPHIL NFR BLD: 1.6 % (ref 0–8)
ERYTHROCYTE [DISTWIDTH] IN BLOOD BY AUTOMATED COUNT: 13.8 % (ref 11.5–14.5)
EST. GFR  (NO RACE VARIABLE): 7.5 ML/MIN/1.73 M^2
GLUCOSE SERPL-MCNC: 141 MG/DL (ref 70–110)
HCT VFR BLD AUTO: 28.1 % (ref 40–54)
HGB BLD-MCNC: 8.7 G/DL (ref 14–18)
IMM GRANULOCYTES # BLD AUTO: 0.01 K/UL (ref 0–0.04)
IMM GRANULOCYTES NFR BLD AUTO: 0.2 % (ref 0–0.5)
LYMPHOCYTES # BLD AUTO: 1.2 K/UL (ref 1–4.8)
LYMPHOCYTES NFR BLD: 21.5 % (ref 18–48)
MAGNESIUM SERPL-MCNC: 1.6 MG/DL (ref 1.6–2.6)
MCH RBC QN AUTO: 26.4 PG (ref 27–31)
MCHC RBC AUTO-ENTMCNC: 31 G/DL (ref 32–36)
MCV RBC AUTO: 85 FL (ref 82–98)
MONOCYTES # BLD AUTO: 0.9 K/UL (ref 0.3–1)
MONOCYTES NFR BLD: 16.9 % (ref 4–15)
NEUTROPHILS # BLD AUTO: 3.3 K/UL (ref 1.8–7.7)
NEUTROPHILS NFR BLD: 59.6 % (ref 38–73)
NRBC BLD-RTO: 0 /100 WBC
PHOSPHATE SERPL-MCNC: 5 MG/DL (ref 2.7–4.5)
PLATELET # BLD AUTO: 136 K/UL (ref 150–450)
PMV BLD AUTO: 12.7 FL (ref 9.2–12.9)
POCT GLUCOSE: 121 MG/DL (ref 70–110)
POCT GLUCOSE: 137 MG/DL (ref 70–110)
POCT GLUCOSE: 148 MG/DL (ref 70–110)
POCT GLUCOSE: 94 MG/DL (ref 70–110)
POTASSIUM SERPL-SCNC: 4.1 MMOL/L (ref 3.5–5.1)
RBC # BLD AUTO: 3.29 M/UL (ref 4.6–6.2)
SODIUM SERPL-SCNC: 138 MMOL/L (ref 136–145)
WBC # BLD AUTO: 5.49 K/UL (ref 3.9–12.7)

## 2023-07-17 PROCEDURE — 80069 RENAL FUNCTION PANEL: CPT | Performed by: INTERNAL MEDICINE

## 2023-07-17 PROCEDURE — 25000003 PHARM REV CODE 250: Performed by: HOSPITALIST

## 2023-07-17 PROCEDURE — 83735 ASSAY OF MAGNESIUM: CPT | Performed by: INTERNAL MEDICINE

## 2023-07-17 PROCEDURE — 94761 N-INVAS EAR/PLS OXIMETRY MLT: CPT

## 2023-07-17 PROCEDURE — 25000003 PHARM REV CODE 250: Performed by: INTERNAL MEDICINE

## 2023-07-17 PROCEDURE — 99239 PR HOSPITAL DISCHARGE DAY,>30 MIN: ICD-10-PCS | Mod: ,,, | Performed by: INTERNAL MEDICINE

## 2023-07-17 PROCEDURE — 99232 PR SUBSEQUENT HOSPITAL CARE,LEVL II: ICD-10-PCS | Mod: ,,, | Performed by: PHYSICIAN ASSISTANT

## 2023-07-17 PROCEDURE — 99239 HOSP IP/OBS DSCHRG MGMT >30: CPT | Mod: ,,, | Performed by: INTERNAL MEDICINE

## 2023-07-17 PROCEDURE — 99232 SBSQ HOSP IP/OBS MODERATE 35: CPT | Mod: ,,, | Performed by: PHYSICIAN ASSISTANT

## 2023-07-17 PROCEDURE — 25000003 PHARM REV CODE 250: Performed by: STUDENT IN AN ORGANIZED HEALTH CARE EDUCATION/TRAINING PROGRAM

## 2023-07-17 PROCEDURE — 90935 HEMODIALYSIS ONE EVALUATION: CPT

## 2023-07-17 PROCEDURE — 63600175 PHARM REV CODE 636 W HCPCS: Performed by: STUDENT IN AN ORGANIZED HEALTH CARE EDUCATION/TRAINING PROGRAM

## 2023-07-17 PROCEDURE — 36415 COLL VENOUS BLD VENIPUNCTURE: CPT | Performed by: INTERNAL MEDICINE

## 2023-07-17 PROCEDURE — 85025 COMPLETE CBC W/AUTO DIFF WBC: CPT | Performed by: INTERNAL MEDICINE

## 2023-07-17 RX ORDER — SODIUM CHLORIDE 9 MG/ML
INJECTION, SOLUTION INTRAVENOUS ONCE
Status: COMPLETED | OUTPATIENT
Start: 2023-07-17 | End: 2023-07-17

## 2023-07-17 RX ORDER — PEN NEEDLE, DIABETIC 30 GX3/16"
NEEDLE, DISPOSABLE MISCELLANEOUS
Qty: 100 EACH | Refills: 0 | Status: SHIPPED | OUTPATIENT
Start: 2023-07-17 | End: 2023-07-17 | Stop reason: HOSPADM

## 2023-07-17 RX ORDER — LOSARTAN POTASSIUM 50 MG/1
50 TABLET ORAL 2 TIMES DAILY
Qty: 180 TABLET | Refills: 3 | Status: SHIPPED | OUTPATIENT
Start: 2023-07-17 | End: 2024-01-13 | Stop reason: SDUPTHER

## 2023-07-17 RX ORDER — SODIUM CHLORIDE 9 MG/ML
INJECTION, SOLUTION INTRAVENOUS ONCE
Status: DISCONTINUED | OUTPATIENT
Start: 2023-07-18 | End: 2023-07-17

## 2023-07-17 RX ORDER — INSULIN ASPART 100 [IU]/ML
3 INJECTION, SOLUTION INTRAVENOUS; SUBCUTANEOUS
Qty: 2.7 ML | Refills: 1 | Status: SHIPPED | OUTPATIENT
Start: 2023-07-17 | End: 2023-07-17 | Stop reason: HOSPADM

## 2023-07-17 RX ADMIN — INSULIN ASPART 2 UNITS: 100 INJECTION, SOLUTION INTRAVENOUS; SUBCUTANEOUS at 11:07

## 2023-07-17 RX ADMIN — LABETALOL HYDROCHLORIDE 300 MG: 100 TABLET, FILM COATED ORAL at 11:07

## 2023-07-17 RX ADMIN — MUPIROCIN: 20 OINTMENT TOPICAL at 11:07

## 2023-07-17 RX ADMIN — HYDRALAZINE HYDROCHLORIDE 100 MG: 50 TABLET ORAL at 05:07

## 2023-07-17 RX ADMIN — LOSARTAN POTASSIUM 50 MG: 50 TABLET, FILM COATED ORAL at 11:07

## 2023-07-17 RX ADMIN — ASPIRIN 81 MG: 81 TABLET, COATED ORAL at 10:07

## 2023-07-17 RX ADMIN — FUROSEMIDE 40 MG: 40 TABLET ORAL at 11:07

## 2023-07-17 RX ADMIN — AMLODIPINE BESYLATE 10 MG: 10 TABLET ORAL at 11:07

## 2023-07-17 RX ADMIN — HEPARIN SODIUM 1000 UNITS: 1000 INJECTION, SOLUTION INTRAVENOUS; SUBCUTANEOUS at 06:07

## 2023-07-17 RX ADMIN — CALCITRIOL CAPSULES 0.25 MCG 0.25 MCG: 0.25 CAPSULE ORAL at 11:07

## 2023-07-17 RX ADMIN — SODIUM CHLORIDE: 9 INJECTION, SOLUTION INTRAVENOUS at 04:07

## 2023-07-17 RX ADMIN — SEVELAMER CARBONATE 800 MG: 800 TABLET, FILM COATED ORAL at 10:07

## 2023-07-17 NOTE — DISCHARGE SUMMARY
[facility] Medicine  Discharge Summary      Patient Name: Luis Daniel Howell  MRN: [id number]  SHAMIKA: [id number]  Patient Class: IP- Inpatient  Admission Date: 7/12/2023  Hospital Length of Stay: 5 days  Discharge Date and Time:  07/17/2023 2:38 PM  Attending Physician: Roberto Cardoza MD   Discharging Provider: Roberto Cardoza MD  Primary Care Provider: Janak Wilson MD  [facility] Medicine Team: [facility] Roberto Cardoza MD  Primary Care Team: [facility]    HPI:   52 y/o male with CKD-V, T2DM, HTN, HLD, h/o cocaine abuse, cardiomyopathy is referred to the ED to begin dialysis treatment. Nephrology was consulted and recommended admission to . Labs/VS reviewed.      * No surgery found *      Hospital Course:   Luis Daniel Howell is a pleasant 52 yo gentleman with a history of DM2, HTN and CKD who is admitted for initiation of HD.   Perm-a-cath placement. Episodes of hypoglycemia while admitted as well as at home. Endocrine consulted and insulin regiment adjusted.   1st session HD 7/15, tolerated well.     Dialysis was initiated. The pt. Trolerated his trial sessions well and will follow with [facility] on Wednesday as scheduled for his first outpatient session.     The pt. Diabetic medication was adjusted. Endocrinology assisted with management. The pt. Insulin requirements have diminished since initiation of HD and he was discharged off insulin altogether. Januvia 25 mg daily was started and the pt. Was referred to endocrinology for further management.        His antihypertensive regiment was adjusted prior to discharge and will need to further be titrated outpatient. Will defer to the pt. PCP, Janak Wilson MD and Nephrology for management.            Goals of Care Treatment Preferences:  Code Status: Full Code      Consults:   Consults (From admission, onward)        Status Ordering Provider     Inpatient consult to Endocrinology  Once        Provider:  (Not yet assigned)    Completed QUINCY  SAVANNAH STEVE     Inpatient consult to Interventional Radiology  Once        Provider:  (Not yet assigned)    Completed NEENA MANUEL     Case Management/  Once        Provider:  (Not yet assigned)    Acknowledged NEENA MANUEL     Inpatient consult to Nephrology  Once        Provider:  (Not yet assigned)    Completed NEENA MANUEL          Cardiac/Vascular  Essential hypertension  Appears controlled  - Amlodipine 10 daily  - Hydralazine 100 mg TID (only takes it once a day)  - Labetolol 300mg BID  (- lasix 40 QD, Zaroxolyn 5mg nightly)  - Hypertensive after HD yesterday.   - Started ARB (Losartan 50 BID).       Renal/  * ESRD (end stage renal disease)    - Perm-a-cath placement and initiation of HD 7/13  - Increase Bicarb  To 1300 BID  - Tolerated 1st session HD well 7/14, HD 7/15  - M/W/F dialysis outpatient  - managed by Nephrology      Endocrine  Type 2 diabetes mellitus with hyperglycemia, with long-term current use of insulin  Patient's FSGs are uncontrolled due to hypoglycemia on current medication regimen.  Last A1c reviewed-   Lab Results   Component Value Date    HGBA1C 6.4 (H) 07/12/2023     Most recent fingerstick glucose reviewed-   Recent Labs   Lab 07/16/23  1545 07/16/23  1919 07/17/23  0709 07/17/23  1043   POCTGLUCOSE 100 127* 121* 148*     Current correctional scale  Low  Decrease anti-hyperglycemic dose as follows-   Antihyperglycemics (From admission, onward)    Start     Stop Route Frequency Ordered    07/16/23 2100  insulin detemir U-100 (Levemir) pen 2 Units         -- SubQ 2 times daily 07/16/23 0906    07/16/23 1130  insulin aspart U-100 pen 2 Units         -- SubQ 3 times daily with meals 07/16/23 0906    07/16/23 1022  insulin aspart U-100 pen 0-5 Units         -- SubQ Before meals & nightly PRN 07/16/23 0922        Hold Oral hypoglycemics while patient is in the hospital.    Pt. Reports non / poor adherence to insulin Rx. He has decreased his Levemir from >40 to 35  units on his own - too strong  - Endocrine consulted for education and Rx adjustment  - appreciate assistance.     - Felt to have now low insulin needs and will be discharged on Januvia only and follow with endocrinology for further management.       Final Active Diagnoses:    Diagnosis Date Noted POA    PRINCIPAL PROBLEM:  ESRD (end stage renal disease) [N18.6] 07/12/2023 Unknown    Type 2 diabetes mellitus with hyperglycemia, with long-term current use of insulin [E11.65, Z79.4] 09/23/2021 Not Applicable    Essential hypertension [I10] 08/29/2014 Yes    Secondary hyperparathyroidism of renal origin [N25.81] 10/15/2019 Yes    Chronic diastolic heart failure [I50.32] 07/13/2023 Yes    Dyslipidemia [E78.5] 09/23/2021 Yes    Anemia of chronic kidney failure, unspecified stage [N18.9, D63.1] 10/15/2019 Yes      Problems Resolved During this Admission:    Diagnosis Date Noted Date Resolved POA    Crack cocaine use [F14.90] 09/08/2019 07/15/2023 Yes    Dyspnea on exertion [R06.09] 08/09/2019 07/15/2023 Yes    Cardiomyopathy [I42.9] 08/09/2019 07/15/2023 Yes    Obesity (BMI 30-39.9) [E66.9] 04/07/2016 07/15/2023 Yes     Chronic       Discharged Condition: stable    Disposition: Home or Self Care    Follow Up:   Follow-up Information     [facility] Follow up.    Why: Monday / Wednesday / Friday as scheduled  Contact information:  [address] [facility] [address] [facility] [address] [phone]           Janak Wilson MD Follow up in 1 week(s).    Specialty: Internal Medicine  Contact information:  [address] [facility] [address]  [phone]                       Patient Instructions:      Ambulatory referral/consult to Endocrinology   Standing Status: Future   Referral Priority: Routine Referral Type: Consultation   Requested Specialty: Endocrinology   Number of Visits Requested: 1     Diet renal     Diet diabetic     No dressing needed     Activity as tolerated       Significant Diagnostic  Studies: Labs:   BMP:   Recent Labs   Lab 07/17/23  0504   *      K 4.1      CO2 24   BUN 56*   CREATININE 8.0*   CALCIUM 8.9   MG 1.6    and CBC   Recent Labs   Lab 07/17/23  0504   WBC 5.49   HGB 8.7*   HCT 28.1*   *       Pending Diagnostic Studies:     None         Medications:  Reconciled Home Medications:      Medication List      START taking these medications    losartan 50 MG tablet  Commonly known as: COZAAR  Take 1 tablet (50 mg total) by mouth 2 (two) times a day.     SITagliptin phosphate 25 MG Tab  Commonly known as: JANUVIA  Take 1 tablet (25 mg total) by mouth once daily.        CONTINUE taking these medications    amLODIPine 10 MG tablet  Commonly known as: NORVASC  Take 1 tablet (10 mg total) by mouth once daily.     aspirin 81 MG Chew  Take 1 tablet (81 mg total) by mouth once daily.     atorvastatin 40 MG tablet  Commonly known as: LIPITOR  Take 1 tablet (40 mg total) by mouth every evening.     blood sugar diagnostic Strp  To check BG 2 times daily, to use with insurance preferred meter     calcitRIOL 0.25 MCG Cap  Commonly known as: ROCALTROL  TAKE 1 CAPSULE (0.25 MCG TOTAL) BY MOUTH ONCE DAILY.     furosemide 40 MG tablet  Commonly known as: LASIX  Take 1 tablet (40 mg total) by mouth once daily.     hydrALAZINE 100 MG tablet  Commonly known as: APRESOLINE  Take 100 mg by mouth every 12 (twelve) hours.     labetaloL 300 MG tablet  Commonly known as: NORMODYNE  Take 1 tablet (300 mg total) by mouth 2 (two) times daily.     sildenafiL 100 MG tablet  Commonly known as: VIAGRA  Take 1 tablet (100 mg total) by mouth daily as needed for Erectile Dysfunction.     VITAMIN D3 50 mcg (2,000 unit) Cap capsule  Generic drug: cholecalciferol (vitamin D3)  Take 2,000 Units by mouth once daily.        STOP taking these medications    LEVEMIR FLEXTOUCH U100 INSULIN 100 unit/mL (3 mL) Inpn pen  Generic drug: insulin detemir U-100 (Levemir)     metOLazone 5 MG tablet  Commonly known  as: ZAROXOLYN     sodium bicarbonate 650 MG tablet        ASK your doctor about these medications    sevelamer carbonate 800 mg Tab  Commonly known as: RENVELA  TAKE 1 TABLET BY MOUTH 3 TIMES DAILY WITH MEALS.            Indwelling Lines/Drains at time of discharge:   Lines/Drains/Airways     Central Venous Catheter Line  Duration                Hemodialysis Catheter 07/13/23 1434 right internal jugular 4 days                Time spent on the discharge of patient: 35 minutes         Roberto Cardoza MD  Department of Hospital Medicine  [facility]

## 2023-07-17 NOTE — PROGRESS NOTES
[facility] Medicine  Progress Note    Patient Name: Luis Daniel Howell  MRN: [id number]  Patient Class: IP- Inpatient   Admission Date: 7/12/2023  Length of Stay: 5 days  Attending Physician: Roberto Cardoza MD  Primary Care Provider: Janak Wilson MD        Subjective:     Principal Problem:ESRD (end stage renal disease)        HPI:  52 y/o male with CKD-V, T2DM, HTN, HLD, h/o cocaine abuse, cardiomyopathy is referred to the ED to begin dialysis treatment. Nephrology was consulted and recommended admission to . Labs/VS reviewed.      Overview/Hospital Course:  Luis Daniel Howell is a pleasant 52 yo gentleman with a history of DM2, HTN and CKD who is admitted for initiation of HD.   Perm-a-cath placement. Episodes of hypoglycemia while admitted as well as at home. Endocrine consulted and insulin regiment adjusted.   1st session HD 7/15, tolerated well.     Dialysis was initiated. The pt. Trolerated his trial sessions well and will follow with [facility] on Wednesday as scheduled for his first outpatient session.     The pt. Diabetic medication was adjusted. Endocrinology assisted with management. The pt. Insulin requirements have diminished since initiation of HD and he was discharged off insulin altogether. Januvia 25 mg daily was started and the pt. Was referred to endocrinology for further management.        His antihypertensive regiment was adjusted prior to discharge and will need to further be titrated outpatient. Will defer to the pt. PCP, Janak Wilson MD and Nephrology for management.           Interval History: Awaiting HD. Chair time being confirmed.   No new sx.     Review of Systems   Constitutional:  Positive for fatigue. Negative for diaphoresis and unexpected weight change.   HENT:  Negative for congestion.    Eyes:  Negative for discharge.   Respiratory:  Negative for apnea and chest tightness.    Cardiovascular:  Negative for chest pain and leg swelling.   Gastrointestinal:  Negative for  abdominal distention and abdominal pain.   Endocrine: Negative for cold intolerance and heat intolerance.   Musculoskeletal:  Negative for arthralgias and back pain.   Skin:  Negative for color change.   Neurological:  Negative for dizziness, facial asymmetry and numbness.   Hematological:  Negative for adenopathy.   Psychiatric/Behavioral:  Negative for agitation.    Objective:     Vital Signs (Most Recent):  Temp: 97.9 °F (36.6 °C) (07/17/23 1457)  Pulse: 65 (07/17/23 1457)  Resp: 18 (07/17/23 1457)  BP: 133/80 (07/17/23 1457)  SpO2: 99 % (07/17/23 1457) Vital Signs (24h Range):  Temp:  [97 °F (36.1 °C)-98.2 °F (36.8 °C)] 97.9 °F (36.6 °C)  Pulse:  [62-68] 65  Resp:  [16-20] 18  SpO2:  [96 %-99 %] 99 %  BP: (104-152)/(65-89) 133/80     Weight: 100.9 kg (222 lb 7.1 oz)  Body mass index is 31.02 kg/m².    Intake/Output Summary (Last 24 hours) at 7/17/2023 1517  Last data filed at 7/17/2023 1241  Gross per 24 hour   Intake 360 ml   Output --   Net 360 ml           Physical Exam  Vitals and nursing note reviewed.   Constitutional:       General: He is not in acute distress.  HENT:      Nose: No congestion.      Mouth/Throat:      Mouth: Mucous membranes are moist.   Eyes:      General: No scleral icterus.  Cardiovascular:      Rate and Rhythm: Normal rate and regular rhythm.      Heart sounds: Murmur heard.     No friction rub.   Pulmonary:      Effort: Pulmonary effort is normal. No respiratory distress.      Breath sounds: No wheezing.   Abdominal:      General: Abdomen is flat. There is no distension.      Palpations: There is no mass.   Musculoskeletal:         General: No swelling or deformity.      Cervical back: No rigidity or tenderness.   Skin:     Coloration: Skin is not jaundiced.   Neurological:      Mental Status: He is alert.      Comments: Right arm / hand weakness (old).            Significant Labs: All pertinent labs within the past 24 hours have been reviewed.  A1C:   Recent Labs   Lab 06/29/23  1007  07/12/23  1329   HGBA1C 6.3* 6.4*       BMP:   Recent Labs   Lab 07/17/23  0504   *      K 4.1      CO2 24   BUN 56*   CREATININE 8.0*   CALCIUM 8.9   MG 1.6       POCT Glucose   Date Value Ref Range Status   07/17/2023 148 (H) 70 - 110 mg/dL Final   07/17/2023 121 (H) 70 - 110 mg/dL Final   07/16/2023 127 (H) 70 - 110 mg/dL Final   07/16/2023 100 70 - 110 mg/dL Final   07/16/2023 151 (H) 70 - 110 mg/dL Final   07/16/2023 91 70 - 110 mg/dL Final   07/15/2023 146 (H) 70 - 110 mg/dL Final   07/15/2023 103 70 - 110 mg/dL Final   07/15/2023 140 (H) 70 - 110 mg/dL Final   07/15/2023 139 (H) 70 - 110 mg/dL Final   07/14/2023 147 (H) 70 - 110 mg/dL Final   07/14/2023 127 (H) 70 - 110 mg/dL Final       CBC:   Recent Labs   Lab 07/17/23  0504   WBC 5.49   HGB 8.7*   HCT 28.1*   *         Significant Imaging: I have reviewed all pertinent imaging results/findings within the past 24 hours.  I have reviewed and interpreted all pertinent imaging results/findings within the past 24 hours.      Assessment/Plan:      * ESRD (end stage renal disease)    - Perm-a-cath placement and initiation of HD 7/13  - Increase Bicarb  To 1300 BID  - Tolerated 1st session HD well 7/14, HD 7/15  - M/W/F dialysis outpatient - awaiting chair time confirmation  - managed by Nephrology      Type 2 diabetes mellitus with hyperglycemia, with long-term current use of insulin  Patient's FSGs are uncontrolled due to hypoglycemia on current medication regimen.  Last A1c reviewed-   Lab Results   Component Value Date    HGBA1C 6.4 (H) 07/12/2023     Most recent fingerstick glucose reviewed-   Recent Labs   Lab 07/16/23  1545 07/16/23  1919 07/17/23  0709 07/17/23  1043   POCTGLUCOSE 100 127* 121* 148*     Current correctional scale  Low  Decrease anti-hyperglycemic dose as follows-   Antihyperglycemics (From admission, onward)    Start     Stop Route Frequency Ordered    07/16/23 2100  insulin detemir U-100 (Levemir) pen 2 Units          -- SubQ 2 times daily 07/16/23 0906    07/16/23 1130  insulin aspart U-100 pen 2 Units         -- SubQ 3 times daily with meals 07/16/23 0906    07/16/23 1022  insulin aspart U-100 pen 0-5 Units         -- SubQ Before meals & nightly PRN 07/16/23 0922        Hold Oral hypoglycemics while patient is in the hospital.    Pt. Reports non / poor adherence to insulin Rx. He has decreased his Levemir from >40 to 35 units on his own - too strong  - Endocrine consulted for education and Rx adjustment  - appreciate assistance.     - Felt to have now low insulin needs and will be discharged on Januvia only and follow with endocrinology for further management.     Essential hypertension  Appears controlled  - Amlodipine 10 daily  - Hydralazine 100 mg TID (only takes it once a day)  - Labetolol 300mg BID  (- lasix 40 QD, Zaroxolyn 5mg nightly)  - Hypertensive after HD yesterday.   - Started ARB (Losartan 50 BID).       Secondary hyperparathyroidism of renal origin  Resume calcitriol and phos binders.  Lab Results   Component Value Date    .1 (H) 08/30/2022    CALCIUM 8.7 07/14/2023    PHOS 4.8 (H) 07/14/2023         Chronic diastolic heart failure  - compensated.   - start ARB    Echo 7/22   The left ventricle is normal in size with severe concentric hypertrophy and normal systolic function.   The estimated ejection fraction is 55%.   Grade II left ventricular diastolic dysfunction.   Normal right ventricular size with normal right ventricular systolic function.   Severe left atrial enlargement.   Mild tricuspid regurgitation.   Moderate-to-severe mitral regurgitation.   Intermediate central venous pressure (8 mmHg).   The estimated PA systolic pressure is 40 mmHg.        Anemia of chronic kidney failure, unspecified stage  Check basic hematinic labs    Lab Results   Component Value Date    WBC 6.50 07/14/2023    HGB 8.8 (L) 07/14/2023    HCT 27.9 (L) 07/14/2023    MCV 84 07/14/2023     (L) 07/14/2023      - at baseline. No need for transfusion        Dyslipidemia  Continue statin medication  Lab Results   Component Value Date    LDLCALC 74.4 06/29/2023             VTE Risk Mitigation (From admission, onward)         Ordered     heparin (porcine) injection 1,000 Units  As needed (PRN)         07/14/23 0957     heparin (porcine) injection 5,000 Units  Every 8 hours         07/12/23 1319     IP VTE HIGH RISK PATIENT  Once         07/12/23 1319     Place sequential compression device  Until discontinued         07/12/23 1319                Discharge Planning   LOREE: 7/17/2023     Code Status: Full Code   Is the patient medically ready for discharge?: No    Reason for patient still in hospital (select all that apply): Pending disposition  Discharge Plan A: Home with family                  Roberto Cardoza MD  Department of Hospital Medicine   [facility]

## 2023-07-17 NOTE — ASSESSMENT & PLAN NOTE
- Perm-a-cath placement and initiation of HD 7/13  - Increase Bicarb  To 1300 BID  - Tolerated 1st session HD well 7/14, HD 7/15  - M/W/F dialysis outpatient - awaiting chair time confirmation  - managed by Nephrology

## 2023-07-17 NOTE — PROGRESS NOTES
2 hour dialysis complete.  Blood returned.  RIJ PC flushed, heparin locked, capped and taped.    Net UF 2L.  Tolerated well.    Transported from [facility] to room 609 via w/c by ABDULKADIR Campos RN.

## 2023-07-17 NOTE — ASSESSMENT & PLAN NOTE
Patient's FSGs are uncontrolled due to hypoglycemia on current medication regimen.  Last A1c reviewed-   Lab Results   Component Value Date    HGBA1C 6.4 (H) 07/12/2023     Most recent fingerstick glucose reviewed-   Recent Labs   Lab 07/16/23  1545 07/16/23  1919 07/17/23  0709 07/17/23  1043   POCTGLUCOSE 100 127* 121* 148*     Current correctional scale  Low  Decrease anti-hyperglycemic dose as follows-   Antihyperglycemics (From admission, onward)    Start     Stop Route Frequency Ordered    07/16/23 2100  insulin detemir U-100 (Levemir) pen 2 Units         -- SubQ 2 times daily 07/16/23 0906    07/16/23 1130  insulin aspart U-100 pen 2 Units         -- SubQ 3 times daily with meals 07/16/23 0906    07/16/23 1022  insulin aspart U-100 pen 0-5 Units         -- SubQ Before meals & nightly PRN 07/16/23 0922        Hold Oral hypoglycemics while patient is in the hospital.    Pt. Reports non / poor adherence to insulin Rx. He has decreased his Levemir from >40 to 35 units on his own - too strong  - Endocrine consulted for education and Rx adjustment  - appreciate assistance.     - Felt to have now low insulin needs and will be discharged on Januvia only and follow with endocrinology for further management.

## 2023-07-17 NOTE — PROGRESS NOTES
"SW attempted to contact [facility] regarding outpt dialysis referral. No answer. SW left voicemail with contact information requesting call back.    SW to attempt to contact [facility] again shortly.    Inpt case management notified via secure chat.    UPDATE 10:10AM:    RONDA spoke with [facility] pt coordinator Quinten. Quinten requested HD treatment notes. RONDA successfully faxed HD treatment notes to [facility] central [facility].  All records delivered.    RONDA to continue with updates.    UPDATE 1:35PM: RONDA received [facility] welcome letter and spoke with Quinten [facility] pt coordinator. Pt was accepted for outpt HD at [facility]. Pt can begin outpt dialysis this Wednesday (07/19/23) at 2:20PM. Inpt CM and attending notified via secure chat. [facility] welcome letter forwarded to inpt CM.      UPDATE 3:00PM: RONDA spoke with in CM Violet Del Cid [facility], [facility] chair is still pending due to [facility] acceptance protocol. RONDA will continue with updates.      Michelle Mak RONDA  [facility]  [phone]        Your fax has been successfully sent to [phone] at [phone].  ------------------------------------------------------------  From: [id number]  ------------------------------------------------------------  7/17/2023 10:06:25 AM Transmission Record          Sent to [phone] with remote ID "Karolina"          Result: (0/339;0/0) Success          Page record: 1 - 10          Elapsed time: 05:19 on channel 36  "

## 2023-07-17 NOTE — SUBJECTIVE & OBJECTIVE
Interval History: Awaiting HD. Chair time being confirmed.   No new sx.     Review of Systems   Constitutional:  Positive for fatigue. Negative for diaphoresis and unexpected weight change.   HENT:  Negative for congestion.    Eyes:  Negative for discharge.   Respiratory:  Negative for apnea and chest tightness.    Cardiovascular:  Negative for chest pain and leg swelling.   Gastrointestinal:  Negative for abdominal distention and abdominal pain.   Endocrine: Negative for cold intolerance and heat intolerance.   Musculoskeletal:  Negative for arthralgias and back pain.   Skin:  Negative for color change.   Neurological:  Negative for dizziness, facial asymmetry and numbness.   Hematological:  Negative for adenopathy.   Psychiatric/Behavioral:  Negative for agitation.    Objective:     Vital Signs (Most Recent):  Temp: 97.9 °F (36.6 °C) (07/17/23 1457)  Pulse: 65 (07/17/23 1457)  Resp: 18 (07/17/23 1457)  BP: 133/80 (07/17/23 1457)  SpO2: 99 % (07/17/23 1457) Vital Signs (24h Range):  Temp:  [97 °F (36.1 °C)-98.2 °F (36.8 °C)] 97.9 °F (36.6 °C)  Pulse:  [62-68] 65  Resp:  [16-20] 18  SpO2:  [96 %-99 %] 99 %  BP: (104-152)/(65-89) 133/80     Weight: 100.9 kg (222 lb 7.1 oz)  Body mass index is 31.02 kg/m².    Intake/Output Summary (Last 24 hours) at 7/17/2023 1517  Last data filed at 7/17/2023 1241  Gross per 24 hour   Intake 360 ml   Output --   Net 360 ml           Physical Exam  Vitals and nursing note reviewed.   Constitutional:       General: He is not in acute distress.  HENT:      Nose: No congestion.      Mouth/Throat:      Mouth: Mucous membranes are moist.   Eyes:      General: No scleral icterus.  Cardiovascular:      Rate and Rhythm: Normal rate and regular rhythm.      Heart sounds: Murmur heard.     No friction rub.   Pulmonary:      Effort: Pulmonary effort is normal. No respiratory distress.      Breath sounds: No wheezing.   Abdominal:      General: Abdomen is flat. There is no distension.       Palpations: There is no mass.   Musculoskeletal:         General: No swelling or deformity.      Cervical back: No rigidity or tenderness.   Skin:     Coloration: Skin is not jaundiced.   Neurological:      Mental Status: He is alert.      Comments: Right arm / hand weakness (old).            Significant Labs: All pertinent labs within the past 24 hours have been reviewed.  A1C:   Recent Labs   Lab 06/29/23  1007 07/12/23  1329   HGBA1C 6.3* 6.4*       BMP:   Recent Labs   Lab 07/17/23  0504   *      K 4.1      CO2 24   BUN 56*   CREATININE 8.0*   CALCIUM 8.9   MG 1.6       POCT Glucose   Date Value Ref Range Status   07/17/2023 148 (H) 70 - 110 mg/dL Final   07/17/2023 121 (H) 70 - 110 mg/dL Final   07/16/2023 127 (H) 70 - 110 mg/dL Final   07/16/2023 100 70 - 110 mg/dL Final   07/16/2023 151 (H) 70 - 110 mg/dL Final   07/16/2023 91 70 - 110 mg/dL Final   07/15/2023 146 (H) 70 - 110 mg/dL Final   07/15/2023 103 70 - 110 mg/dL Final   07/15/2023 140 (H) 70 - 110 mg/dL Final   07/15/2023 139 (H) 70 - 110 mg/dL Final   07/14/2023 147 (H) 70 - 110 mg/dL Final   07/14/2023 127 (H) 70 - 110 mg/dL Final       CBC:   Recent Labs   Lab 07/17/23  0504   WBC 5.49   HGB 8.7*   HCT 28.1*   *         Significant Imaging: I have reviewed all pertinent imaging results/findings within the past 24 hours.  I have reviewed and interpreted all pertinent imaging results/findings within the past 24 hours.

## 2023-07-17 NOTE — ASSESSMENT & PLAN NOTE
- Perm-a-cath placement and initiation of HD 7/13  - Increase Bicarb  To 1300 BID  - Tolerated 1st session HD well 7/14, HD 7/15  - M/W/F dialysis outpatient  - managed by Nephrology

## 2023-07-17 NOTE — ASSESSMENT & PLAN NOTE
BG goal 140-180  T2DM.  BG stable will continue to monitor on current regimen     -  Levemir  2 units BID,   - Continue Novolog 2 units with meals,  -Continue Novolog correction dose with ISF 50 starting at 200   - POCT Glucose before meals and at bedtime  - Hypoglycemia protocol in place      ** Please notify Endocrine for any change and/or advance in diet**  ** Please call Endocrine for any BG related issues **     Discharge Planning:   Notified by primary team of discharge. Low needs here given poor kidney function.  Recommend discontinue insulin as he was administering large amounts at inconsistent doses and infrequently.  Start on DPP4 inhibitor renally dosed.

## 2023-07-17 NOTE — ASSESSMENT & PLAN NOTE
Lab Results   Component Value Date    CREATININE 8.0 (H) 07/17/2023     Avoid insulin stacking  Titrate insulin slowly

## 2023-07-17 NOTE — PROGRESS NOTES
"[facility]  Endocrinology  Progress Note    Admit Date: 2023     Reason for Consult: Management of T2DM, Hyperglycemia     Surgical Procedure and Date: HD catheter placement today     Diabetes diagnosis year:     Home Diabetes Medications:  Levemir 45 units q HS (reports he takes anywhere from 25-50 units depending on how much he eats)     How often checking glucose at home?  Not checking     BG readings on regimen: not checking  Hypoglycemia on the regimen?  rarely   Missed doses on regimen?  No    Diabetes Complications include:     Hyperglycemia, Hypoglycemia , Diabetic nephropathy  , and Diabetic chronic kidney disease         Complicating diabetes co morbidities:   CKD, HTN, HLD       HPI:   Patient is a 53 y.o. male with a diagnosis of CKD-V, T2DM, HTN, HLD, h/o cocaine abuse, cardiomyopathy is referred to the ED to begin dialysis treatment. Nephrology was consulted and recommended admission to . Endocrinology consulted for management of T2DM.       Lab Results   Component Value Date    HGBA1C 6.4 (H) 2023           Interval HPI:   NAEON. BG stable on low doses of insulin  Eatin%  Nausea: No  Hypoglycemia and intervention: No  Fever: No  TPN and/or TF: No      /80 (BP Location: Left arm, Patient Position: Lying)   Pulse 65   Temp 97.9 °F (36.6 °C) (Oral)   Resp 18   Ht 5' 11" (1.803 m)   Wt 100.9 kg (222 lb 7.1 oz)   SpO2 99%   BMI 31.02 kg/m²     Labs Reviewed and Include    Recent Labs   Lab 23  0504   *   CALCIUM 8.9   ALBUMIN 3.3*      K 4.1   CO2 24      BUN 56*   CREATININE 8.0*     Lab Results   Component Value Date    WBC 5.49 2023    HGB 8.7 (L) 2023    HCT 28.1 (L) 2023    MCV 85 2023     (L) 2023     No results for input(s): TSH, FREET4 in the last 168 hours.  Lab Results   Component Value Date    HGBA1C 6.4 (H) 2023       Nutritional status:   Body mass index is 31.02 kg/m².  Lab " Results   Component Value Date    ALBUMIN 3.3 (L) 07/17/2023    ALBUMIN 3.3 (L) 07/15/2023    ALBUMIN 3.4 (L) 07/14/2023     No results found for: PREALBUMIN    Estimated Creatinine Clearance: 13.1 mL/min (A) (based on SCr of 8 mg/dL (H)).    Accu-Checks  Recent Labs     07/15/23  0753 07/15/23  1237 07/15/23  1459 07/15/23  1925 07/16/23  0814 07/16/23  1130 07/16/23  1545 07/16/23  1919 07/17/23  0709 07/17/23  1043   POCTGLUCOSE 139* 140* 103 146* 91 151* 100 127* 121* 148*       Current Medications and/or Treatments Impacting Glycemic Control  Immunotherapy:    Immunosuppressants       None          Steroids:   Hormones (From admission, onward)      Start     Stop Route Frequency Ordered    07/12/23 1415  melatonin tablet 6 mg         -- Oral Nightly PRN 07/12/23 1319          Pressors:    Autonomic Drugs (From admission, onward)      None          Hyperglycemia/Diabetes Medications:   Antihyperglycemics (From admission, onward)      Start     Stop Route Frequency Ordered    07/16/23 2100  insulin detemir U-100 (Levemir) pen 2 Units         -- SubQ 2 times daily 07/16/23 0906    07/16/23 1130  insulin aspart U-100 pen 2 Units         -- SubQ 3 times daily with meals 07/16/23 0906    07/16/23 1022  insulin aspart U-100 pen 0-5 Units         -- SubQ Before meals & nightly PRN 07/16/23 0922            ASSESSMENT and PLAN    Cardiac/Vascular  Dyslipidemia  Managed per primary.   On statin per ADA        Renal/  * ESRD (end stage renal disease)  Lab Results   Component Value Date    CREATININE 8.0 (H) 07/17/2023     Avoid insulin stacking  Titrate insulin slowly       Endocrine  Type 2 diabetes mellitus with hyperglycemia, with long-term current use of insulin  BG goal 140-180  T2DM.  BG stable will continue to monitor on current regimen     -  Levemir  2 units BID,   - Continue Novolog 2 units with meals,  -Continue Novolog correction dose with ISF 50 starting at 200   - POCT Glucose before meals and at bedtime  -  Hypoglycemia protocol in place      ** Please notify Endocrine for any change and/or advance in diet**  ** Please call Endocrine for any BG related issues **     Discharge Planning:   Notified by primary team of discharge. Low needs here given poor kidney function.  Recommend discontinue insulin as he was administering large amounts at inconsistent doses and infrequently.  Start on DPP4 inhibitor renally dosed.        Anatoliy Metz PA-C  Endocrinology  [facility]

## 2023-07-17 NOTE — ASSESSMENT & PLAN NOTE
Appears controlled  - Amlodipine 10 daily  - Hydralazine 100 mg TID (only takes it once a day)  - Labetolol 300mg BID  (- lasix 40 QD, Zaroxolyn 5mg nightly)  - Hypertensive after HD yesterday.   - Started ARB (Losartan 50 BID).

## 2023-07-17 NOTE — PLAN OF CARE
Problem: Adult Inpatient Plan of Care  Goal: Plan of Care Review  Outcome: Ongoing, Progressing  Goal: Absence of Hospital-Acquired Illness or Injury  Outcome: Ongoing, Progressing     Problem: Diabetes Comorbidity  Goal: Blood Glucose Level Within Targeted Range  Outcome: Ongoing, Progressing     Problem: Fall Injury Risk  Goal: Absence of Fall and Fall-Related Injury  Outcome: Ongoing, Progressing     Problem: Fluid and Electrolyte Imbalance (Acute Kidney Injury/Impairment)  Goal: Fluid and Electrolyte Balance  Outcome: Ongoing, Progressing     Problem: Oral Intake Inadequate (Acute Kidney Injury/Impairment)  Goal: Optimal Nutrition Intake  Outcome: Ongoing, Progressing       Reviewed plan of care with emphasis on fluid volume management.  Patient verbalized understanding and agreement with plan of care.

## 2023-07-18 NOTE — PLAN OF CARE
[facility]  Discharge Final Note    Primary Care Provider: Janak Wilson MD    Expected Discharge Date: 7/17/2023    2:33 PM  SW spoke to Paula chel/ [facility] [phone] and confirmed HD chair time on [facility] @ 11:00a.m.  Pt will need to arrive at 10:30a.m. tomorrow.  SW notified patient and pt's mother of above information.  Pt also requested a return call from MD.      MD notified of above information.       Final Discharge Note (most recent)       Final Note - 07/18/23 1425          Final Note    Assessment Type Final Discharge Note     Anticipated Discharge Disposition Home or Self Care        Post-Acute Status    Post-Acute Authorization Dialysis     Diaylsis Status Set-up Complete/Auth obtained   [phone]    Coverage [employer] [facility]     Discharge Delays None known at this time                     Important Message from Medicare             Contact Info       [facility]    [address] [place] [address]   Phone: [phone]       Next Steps: Follow up    Instructions: Monday / Wednesday / Friday as scheduled    Janak Wilson MD   Specialty: Internal Medicine   Relationship: PCP - General    [address]  [place] [address]   Phone: [phone]       Next Steps: Follow up in 1 week(s)          Violet Dickey LMSW  PRN-  [facility]  Ext. [phone]

## 2023-07-18 NOTE — PLAN OF CARE
7/18-  RONDA telephoned [facility] [phone] this morning to follow up on finalizing chair time and transfer to [facility].  Alondra not in office at this time.  RONDA will need to callbackk.      RONDA telephoned pt at [phone] and notified patient we are working to finalize his chair time at [facility] and will call him back with finalized chair time.      8:43 AM  RONDA spoke to Paula milligan/ [phone] to follow up to see if Dr. Bolaños reviewed patient's clinical.  Paula advised SW she will call SW with update on chair.      Violet Dickey LMSW  PRN-  [facility]  Ext. [phone]

## 2023-07-31 ENCOUNTER — TELEPHONE (OUTPATIENT)
Dept: ORTHOPEDICS | Facility: CLINIC | Age: 53
End: 2023-07-31
Payer: COMMERCIAL

## 2023-07-31 NOTE — TELEPHONE ENCOUNTER
----- Message from Esther Neves LPN sent at 7/31/2023 10:01 AM CDT -----  Regarding: FW: appt reschedule  Contact: pt  [phone]    ----- Message -----  From: Aisha Meadows  Sent: 7/31/2023   8:46 AM CDT  To: Socrates Hennessy Staff  Subject: appt reschedule                                  Pt requesting call back RE: would like to reschedule appt, nothing available in epic. Pt went to [facility]      Confirmed contact below:  Contact Name:Luis Daniel Howell  Phone Number:[phone]

## 2023-08-03 ENCOUNTER — TELEPHONE (OUTPATIENT)
Dept: VASCULAR SURGERY | Facility: CLINIC | Age: 53
End: 2023-08-03
Payer: COMMERCIAL

## 2023-08-03 DIAGNOSIS — Z01.818 PREOP EXAMINATION: Primary | ICD-10-CM

## 2023-08-03 NOTE — TELEPHONE ENCOUNTER
Received a call from the Otilio at the dialysis center verbalizing the pt needs an appt for permanent HD access. Spoke with the pt and appt scheduled.Appt letter placed in the mail.

## 2023-08-14 ENCOUNTER — TELEPHONE (OUTPATIENT)
Dept: NEPHROLOGY | Facility: CLINIC | Age: 53
End: 2023-08-14
Payer: COMMERCIAL

## 2023-08-14 NOTE — TELEPHONE ENCOUNTER
----- Message from Chano Villegas sent at 8/14/2023 10:13 AM CDT -----  Pt would like to be called back asap regarding questions concerning his appt on 8/15/23    Pt can be reached at [phone].    Thanks

## 2023-08-18 DIAGNOSIS — H47.20 BILATERAL OPTIC ATROPHY: Primary | ICD-10-CM

## 2023-08-23 ENCOUNTER — OFFICE VISIT (OUTPATIENT)
Dept: VASCULAR SURGERY | Facility: CLINIC | Age: 53
End: 2023-08-23
Payer: MEDICAID

## 2023-08-23 ENCOUNTER — HOSPITAL ENCOUNTER (OUTPATIENT)
Dept: VASCULAR SURGERY | Facility: CLINIC | Age: 53
Discharge: HOME | End: 2023-08-23
Attending: SURGERY
Payer: MEDICAID

## 2023-08-23 ENCOUNTER — DOCUMENTATION ONLY (OUTPATIENT)
Dept: VASCULAR SURGERY | Facility: CLINIC | Age: 53
End: 2023-08-23

## 2023-08-23 VITALS
WEIGHT: 224.88 LBS | HEART RATE: 59 BPM | TEMPERATURE: 98 F | SYSTOLIC BLOOD PRESSURE: 150 MMHG | HEIGHT: 71 IN | BODY MASS INDEX: 31.48 KG/M2 | DIASTOLIC BLOOD PRESSURE: 94 MMHG

## 2023-08-23 DIAGNOSIS — Z01.818 PREOP EXAMINATION: ICD-10-CM

## 2023-08-23 DIAGNOSIS — N18.4 CKD (CHRONIC KIDNEY DISEASE) STAGE 4, GFR 15-29 ML/MIN: Primary | ICD-10-CM

## 2023-08-23 DIAGNOSIS — N18.4 CKD (CHRONIC KIDNEY DISEASE), STAGE IV: Primary | ICD-10-CM

## 2023-08-23 PROCEDURE — 99214 PR OFFICE/OUTPT VISIT, EST, LEVL IV, 30-39 MIN: ICD-10-PCS | Mod: S$PBB,,, | Performed by: SURGERY

## 2023-08-23 PROCEDURE — 3044F HG A1C LEVEL LT 7.0%: CPT | Mod: CPTII,,, | Performed by: SURGERY

## 2023-08-23 PROCEDURE — 99999 PR PBB SHADOW E&M-EST. PATIENT-LVL III: CPT | Mod: PBBFAC,,, | Performed by: SURGERY

## 2023-08-23 PROCEDURE — 99999 PR PBB SHADOW E&M-EST. PATIENT-LVL III: ICD-10-PCS | Mod: PBBFAC,,, | Performed by: SURGERY

## 2023-08-23 PROCEDURE — 99214 OFFICE O/P EST MOD 30 MIN: CPT | Mod: S$PBB,,, | Performed by: SURGERY

## 2023-08-23 PROCEDURE — 3077F SYST BP >= 140 MM HG: CPT | Mod: CPTII,,, | Performed by: SURGERY

## 2023-08-23 PROCEDURE — 3080F DIAST BP >= 90 MM HG: CPT | Mod: CPTII,,, | Performed by: SURGERY

## 2023-08-23 PROCEDURE — 99213 OFFICE O/P EST LOW 20 MIN: CPT | Mod: PBBFAC,25 | Performed by: SURGERY

## 2023-08-23 PROCEDURE — 3080F PR MOST RECENT DIASTOLIC BLOOD PRESSURE >= 90 MM HG: ICD-10-PCS | Mod: CPTII,,, | Performed by: SURGERY

## 2023-08-23 PROCEDURE — 3008F PR BODY MASS INDEX (BMI) DOCUMENTED: ICD-10-PCS | Mod: CPTII,,, | Performed by: SURGERY

## 2023-08-23 PROCEDURE — 4010F PR ACE/ARB THEARPY RXD/TAKEN: ICD-10-PCS | Mod: CPTII,,, | Performed by: SURGERY

## 2023-08-23 PROCEDURE — 3066F PR DOCUMENTATION OF TREATMENT FOR NEPHROPATHY: ICD-10-PCS | Mod: CPTII,,, | Performed by: SURGERY

## 2023-08-23 PROCEDURE — 3008F BODY MASS INDEX DOCD: CPT | Mod: CPTII,,, | Performed by: SURGERY

## 2023-08-23 PROCEDURE — 4010F ACE/ARB THERAPY RXD/TAKEN: CPT | Mod: CPTII,,, | Performed by: SURGERY

## 2023-08-23 PROCEDURE — 1159F PR MEDICATION LIST DOCUMENTED IN MEDICAL RECORD: ICD-10-PCS | Mod: CPTII,,, | Performed by: SURGERY

## 2023-08-23 PROCEDURE — 3077F PR MOST RECENT SYSTOLIC BLOOD PRESSURE >= 140 MM HG: ICD-10-PCS | Mod: CPTII,,, | Performed by: SURGERY

## 2023-08-23 PROCEDURE — 3066F NEPHROPATHY DOC TX: CPT | Mod: CPTII,,, | Performed by: SURGERY

## 2023-08-23 PROCEDURE — 3044F PR MOST RECENT HEMOGLOBIN A1C LEVEL <7.0%: ICD-10-PCS | Mod: CPTII,,, | Performed by: SURGERY

## 2023-08-23 PROCEDURE — 93970 EXTREMITY STUDY: CPT | Mod: 26,S$PBB,, | Performed by: SURGERY

## 2023-08-23 PROCEDURE — 93970 PR US DUPLEX, UPPER OR LOWER EXT VENOUS,COMPLETE BILAT: ICD-10-PCS | Mod: 26,S$PBB,, | Performed by: SURGERY

## 2023-08-23 PROCEDURE — 93970 EXTREMITY STUDY: CPT | Mod: PBBFAC | Performed by: SURGERY

## 2023-08-23 PROCEDURE — 1159F MED LIST DOCD IN RCRD: CPT | Mod: CPTII,,, | Performed by: SURGERY

## 2023-08-23 RX ORDER — INSULIN DETEMIR 100 [IU]/ML
INJECTION, SOLUTION SUBCUTANEOUS 2 TIMES DAILY
COMMUNITY
Start: 2023-07-25 | End: 2023-10-06

## 2023-08-23 RX ORDER — METOLAZONE 5 MG/1
5 TABLET ORAL
COMMUNITY
Start: 2023-08-04 | End: 2023-12-13

## 2023-08-23 NOTE — H&P (VIEW-ONLY)
Luis Daniel Howell  08/23/2023    HPI:  Patient is a 53 y.o. male with a h/o stage V CKD on HD (June 2023), prior crack cocaine use (smokes); HTN, HLD who is here today for evaluation of AV access  R hand dominant     No MI  +stroke - L post corona radiata   Tobacco use: Denies    Renal is Dr JAY Wilson, Janak MANCILLA Jr., MD   Employment: Disabled used to weld      Current Outpatient Medications:     amLODIPine (NORVASC) 10 MG tablet, Take 1 tablet (10 mg total) by mouth once daily., Disp: 90 tablet, Rfl: 3    atorvastatin (LIPITOR) 40 MG tablet, Take 1 tablet (40 mg total) by mouth every evening., Disp: 90 tablet, Rfl: 3    blood sugar diagnostic Strp, To check BG 2 times daily, to use with insurance preferred meter, Disp: 200 each, Rfl: 3    calcitRIOL (ROCALTROL) 0.25 MCG Cap, TAKE 1 CAPSULE (0.25 MCG TOTAL) BY MOUTH ONCE DAILY., Disp: 90 capsule, Rfl: 0    cholecalciferol, vitamin D3, (VITAMIN D3) 50 mcg (2,000 unit) Cap capsule, Take 2,000 Units by mouth once daily., Disp: , Rfl:     furosemide (LASIX) 40 MG tablet, Take 1 tablet (40 mg total) by mouth once daily., Disp: 90 tablet, Rfl: 3    hydrALAZINE (APRESOLINE) 100 MG tablet, Take 100 mg by mouth every 12 (twelve) hours., Disp: , Rfl:     labetaloL (NORMODYNE) 300 MG tablet, Take 1 tablet (300 mg total) by mouth 2 (two) times daily., Disp: 180 tablet, Rfl: 3    LEVEMIR FLEXPEN 100 unit/mL (3 mL) InPn pen, Inject into the skin., Disp: , Rfl:     losartan (COZAAR) 50 MG tablet, Take 1 tablet (50 mg total) by mouth 2 (two) times a day., Disp: 180 tablet, Rfl: 3    metOLazone (ZAROXOLYN) 5 MG tablet, Take 5 mg by mouth., Disp: , Rfl:     sevelamer carbonate (RENVELA) 800 mg Tab, TAKE 1 TABLET BY MOUTH 3 TIMES DAILY WITH MEALS., Disp: , Rfl:     sildenafiL (VIAGRA) 100 MG tablet, Take 1 tablet (100 mg total) by mouth daily as needed for Erectile Dysfunction., Disp: 10 tablet, Rfl: 3    SITagliptin phosphate (JANUVIA) 25 MG Tab, Take 1 tablet (25 mg total) by  mouth once daily., Disp: 90 tablet, Rfl: 3    aspirin 81 MG Chew, Take 1 tablet (81 mg total) by mouth once daily. (Patient taking differently: Take 81 mg by mouth once daily.), Disp: 30 tablet, Rfl: 11    PHYSICAL EXAM:   Right Arm BP - Sittin/97 (23 0830)  Left Arm BP - Sittin/94 (23 0830)  Pulse: (!) 59  Temp: 98.4 °F (36.9 °C)                   Neck: No carotid bruit can be auscultated             Cardiac: Normal rate and regular rhythm, S1 and S2 normal; PMI non-displaced          Abdomen: Soft, nontender, non-distended     Pulsatile aortic mass is not palpable.      Extremities:   2+ L brachial and radial pulses     L Reynaldo's test - negative    LAB RESULTS:  Lab Results   Component Value Date    K 3.9 2023    K 4.1 2023    K 4.2 07/15/2023    CREATININE 8.2 (H) 2023    CREATININE 8.0 (H) 2023    CREATININE 8.0 (H) 07/15/2023     Lab Results   Component Value Date    WBC 6.56 2023    WBC 5.49 2023    WBC 6.50 2023    HCT 31.3 (L) 2023    HCT 28.1 (L) 2023    HCT 27.9 (L) 2023     2023     (L) 2023     (L) 2023     Lab Results   Component Value Date    HGBA1C 6.4 (H) 2023    HGBA1C 6.3 (H) 2023    HGBA1C 8.0 (H) 2022       IMAGING (I have independently reviewed and interpreted the following tests):  Vein mapping on chart    IMP/PLAN:     53 y.o. male with a a h/o stage V CKD on HD (2023), prior crack cocaine use (smokes); HTN, HLD - Plan for L BC AVF 23       Joaquin Morgan MD [facility] FACS   Vascular/Endovascular Surgery    Time spent personally reviewing the patient's chart, interpreting images and test, and conferring with physicians was HBtimespent2: 45 mins.

## 2023-09-05 ENCOUNTER — TELEPHONE (OUTPATIENT)
Dept: VASCULAR SURGERY | Facility: CLINIC | Age: 53
End: 2023-09-05
Payer: COMMERCIAL

## 2023-09-05 NOTE — TELEPHONE ENCOUNTER
Spoke with the pt and informed him of his time of arrival for surgery will be given on 9/07/23 due to changes in the OR schedule.Pt verbalized understanding of information received.  ----- Message from Clyde Lynn sent at 9/5/2023  1:01 PM CDT -----  Regarding: Arrival Time  Contact: @[phone]  Patient is calling to see the arrival time for there procedure... Please call and advise @[phone]

## 2023-09-07 ENCOUNTER — TELEPHONE (OUTPATIENT)
Dept: VASCULAR SURGERY | Facility: CLINIC | Age: 53
End: 2023-09-07
Payer: COMMERCIAL

## 2023-09-07 NOTE — TELEPHONE ENCOUNTER
Spoke with the pt and informed him of the time of arrival for surgery tomorrow is 930am at the main campus on [facility],second floor,[facility]. Pt reminded not to eat or drink anything after midnight and he verbalizes understanding of information received.

## 2023-09-07 NOTE — PRE-PROCEDURE INSTRUCTIONS
PreOp Instructions given:   - Verbal medication information (what to hold and what to take)   - NPO guidelines   - Arrival place directions given; time to be given the day before procedure by the   Surgeon's Office 0930 DOSC  - Bathing with antibacterial soap   - Don't wear any jewelry or bring any valuables AM of surgery   - No makeup or moisturizer to face   - No perfume/cologne, powder, lotions or aftershave   Pt. verbalized understanding.   Pt denies any h/o Anesthesia/Sedation complications or side effects.

## 2023-09-08 ENCOUNTER — ANESTHESIA EVENT (OUTPATIENT)
Dept: SURGERY | Facility: HOSPITAL | Age: 53
End: 2023-09-08
Payer: MEDICAID

## 2023-09-08 ENCOUNTER — HOSPITAL ENCOUNTER (OUTPATIENT)
Facility: HOSPITAL | Age: 53
Discharge: HOME | End: 2023-09-08
Attending: SURGERY | Admitting: SURGERY
Payer: MEDICAID

## 2023-09-08 ENCOUNTER — ANESTHESIA (OUTPATIENT)
Dept: SURGERY | Facility: HOSPITAL | Age: 53
End: 2023-09-08
Payer: MEDICAID

## 2023-09-08 VITALS
HEART RATE: 60 BPM | DIASTOLIC BLOOD PRESSURE: 82 MMHG | RESPIRATION RATE: 21 BRPM | TEMPERATURE: 98 F | SYSTOLIC BLOOD PRESSURE: 161 MMHG | BODY MASS INDEX: 31.24 KG/M2 | OXYGEN SATURATION: 100 % | WEIGHT: 224 LBS

## 2023-09-08 DIAGNOSIS — I77.0 AV (ARTERIOVENOUS FISTULA): ICD-10-CM

## 2023-09-08 DIAGNOSIS — N18.5 CKD (CHRONIC KIDNEY DISEASE), STAGE V: Primary | ICD-10-CM

## 2023-09-08 LAB
POCT GLUCOSE: 101 MG/DL (ref 70–110)
POCT GLUCOSE: 65 MG/DL (ref 70–110)
POCT GLUCOSE: 90 MG/DL (ref 70–110)

## 2023-09-08 PROCEDURE — D9220A PRA ANESTHESIA: Mod: ,,, | Performed by: ANESTHESIOLOGY

## 2023-09-08 PROCEDURE — 27201423 OPTIME MED/SURG SUP & DEVICES STERILE SUPPLY: Performed by: SURGERY

## 2023-09-08 PROCEDURE — 36821 PR ANASTOMOSIS,AV,ANY SITE: ICD-10-PCS | Mod: LT,,, | Performed by: SURGERY

## 2023-09-08 PROCEDURE — 71000015 HC POSTOP RECOV 1ST HR: Performed by: SURGERY

## 2023-09-08 PROCEDURE — 71000044 HC DOSC ROUTINE RECOVERY FIRST HOUR: Performed by: SURGERY

## 2023-09-08 PROCEDURE — D9220A PRA ANESTHESIA: ICD-10-PCS | Mod: ,,, | Performed by: ANESTHESIOLOGY

## 2023-09-08 PROCEDURE — 37000008 HC ANESTHESIA 1ST 15 MINUTES: Performed by: SURGERY

## 2023-09-08 PROCEDURE — 63600175 PHARM REV CODE 636 W HCPCS

## 2023-09-08 PROCEDURE — 82962 GLUCOSE BLOOD TEST: CPT | Performed by: SURGERY

## 2023-09-08 PROCEDURE — 36821 AV FUSION DIRECT ANY SITE: CPT | Mod: LT,,, | Performed by: SURGERY

## 2023-09-08 PROCEDURE — 37000009 HC ANESTHESIA EA ADD 15 MINS: Performed by: SURGERY

## 2023-09-08 PROCEDURE — 63600175 PHARM REV CODE 636 W HCPCS: Performed by: SURGERY

## 2023-09-08 PROCEDURE — 25000003 PHARM REV CODE 250

## 2023-09-08 PROCEDURE — 36000707: Performed by: SURGERY

## 2023-09-08 PROCEDURE — 36000706: Performed by: SURGERY

## 2023-09-08 RX ORDER — MIDAZOLAM HYDROCHLORIDE 1 MG/ML
INJECTION, SOLUTION INTRAMUSCULAR; INTRAVENOUS
Status: DISCONTINUED | OUTPATIENT
Start: 2023-09-08 | End: 2023-09-08

## 2023-09-08 RX ORDER — HEPARIN SODIUM 1000 [USP'U]/ML
INJECTION, SOLUTION INTRAVENOUS; SUBCUTANEOUS
Status: DISCONTINUED | OUTPATIENT
Start: 2023-09-08 | End: 2023-09-08

## 2023-09-08 RX ORDER — ONDANSETRON 2 MG/ML
INJECTION INTRAMUSCULAR; INTRAVENOUS
Status: DISCONTINUED | OUTPATIENT
Start: 2023-09-08 | End: 2023-09-08

## 2023-09-08 RX ORDER — SODIUM CHLORIDE 9 MG/ML
INJECTION, SOLUTION INTRAVENOUS CONTINUOUS
Status: ACTIVE | OUTPATIENT
Start: 2023-09-08

## 2023-09-08 RX ORDER — PROPOFOL 10 MG/ML
VIAL (ML) INTRAVENOUS CONTINUOUS PRN
Status: DISCONTINUED | OUTPATIENT
Start: 2023-09-08 | End: 2023-09-08

## 2023-09-08 RX ORDER — OXYCODONE HYDROCHLORIDE 5 MG/1
5 TABLET ORAL EVERY 6 HOURS PRN
Qty: 15 TABLET | Refills: 0 | Status: SHIPPED | OUTPATIENT
Start: 2023-09-08

## 2023-09-08 RX ORDER — LIDOCAINE HYDROCHLORIDE 10 MG/ML
1 INJECTION INFILTRATION; PERINEURAL ONCE
Status: DISCONTINUED | OUTPATIENT
Start: 2023-09-08 | End: 2023-09-08 | Stop reason: HOSPADM

## 2023-09-08 RX ORDER — DEXMEDETOMIDINE HYDROCHLORIDE 100 UG/ML
INJECTION, SOLUTION INTRAVENOUS
Status: DISCONTINUED | OUTPATIENT
Start: 2023-09-08 | End: 2023-09-08

## 2023-09-08 RX ORDER — ACETAMINOPHEN 500 MG
1000 TABLET ORAL EVERY 8 HOURS
Qty: 20 TABLET | Refills: 0 | Status: SHIPPED | OUTPATIENT
Start: 2023-09-08

## 2023-09-08 RX ORDER — SODIUM CHLORIDE 0.9 % (FLUSH) 0.9 %
3 SYRINGE (ML) INJECTION
Status: DISCONTINUED | OUTPATIENT
Start: 2023-09-08 | End: 2023-09-08 | Stop reason: HOSPADM

## 2023-09-08 RX ORDER — CEFAZOLIN SODIUM 1 G/3ML
INJECTION, POWDER, FOR SOLUTION INTRAMUSCULAR; INTRAVENOUS
Status: DISCONTINUED | OUTPATIENT
Start: 2023-09-08 | End: 2023-09-08

## 2023-09-08 RX ORDER — SODIUM CHLORIDE 9 MG/ML
INJECTION, SOLUTION INTRAVENOUS CONTINUOUS
Status: DISCONTINUED | OUTPATIENT
Start: 2023-09-08 | End: 2023-09-08 | Stop reason: HOSPADM

## 2023-09-08 RX ORDER — PROTAMINE SULFATE 10 MG/ML
INJECTION, SOLUTION INTRAVENOUS
Status: DISCONTINUED | OUTPATIENT
Start: 2023-09-08 | End: 2023-09-08

## 2023-09-08 RX ORDER — LIDOCAINE HYDROCHLORIDE 20 MG/ML
INJECTION, SOLUTION EPIDURAL; INFILTRATION; INTRACAUDAL; PERINEURAL
Status: DISCONTINUED | OUTPATIENT
Start: 2023-09-08 | End: 2023-09-08

## 2023-09-08 RX ORDER — ONDANSETRON 2 MG/ML
4 INJECTION INTRAMUSCULAR; INTRAVENOUS EVERY 12 HOURS PRN
Status: ACTIVE | OUTPATIENT
Start: 2023-09-08

## 2023-09-08 RX ORDER — HEPARIN SODIUM 1000 [USP'U]/ML
INJECTION, SOLUTION INTRAVENOUS; SUBCUTANEOUS
Status: DISCONTINUED | OUTPATIENT
Start: 2023-09-08 | End: 2023-09-08 | Stop reason: HOSPADM

## 2023-09-08 RX ORDER — FENTANYL CITRATE 50 UG/ML
INJECTION, SOLUTION INTRAMUSCULAR; INTRAVENOUS
Status: DISCONTINUED | OUTPATIENT
Start: 2023-09-08 | End: 2023-09-08

## 2023-09-08 RX ADMIN — PROPOFOL 20 MG: 10 INJECTION, EMULSION INTRAVENOUS at 02:09

## 2023-09-08 RX ADMIN — PROTAMINE SULFATE 40 MG: 10 INJECTION, SOLUTION INTRAVENOUS at 02:09

## 2023-09-08 RX ADMIN — PROPOFOL 50 MCG/KG/MIN: 10 INJECTION, EMULSION INTRAVENOUS at 01:09

## 2023-09-08 RX ADMIN — DEXMEDETOMIDINE 8 MCG: 200 INJECTION, SOLUTION INTRAVENOUS at 01:09

## 2023-09-08 RX ADMIN — SODIUM CHLORIDE: 9 INJECTION, SOLUTION INTRAVENOUS at 01:09

## 2023-09-08 RX ADMIN — ONDANSETRON 4 MG: 2 INJECTION INTRAMUSCULAR; INTRAVENOUS at 02:09

## 2023-09-08 RX ADMIN — FENTANYL CITRATE 100 MCG: 0.05 INJECTION, SOLUTION INTRAMUSCULAR; INTRAVENOUS at 01:09

## 2023-09-08 RX ADMIN — FENTANYL CITRATE 25 MCG: 0.05 INJECTION, SOLUTION INTRAMUSCULAR; INTRAVENOUS at 01:09

## 2023-09-08 RX ADMIN — PROTAMINE SULFATE 5 MG: 10 INJECTION, SOLUTION INTRAVENOUS at 02:09

## 2023-09-08 RX ADMIN — LIDOCAINE HYDROCHLORIDE 100 MG: 20 INJECTION, SOLUTION EPIDURAL; INFILTRATION; INTRACAUDAL at 01:09

## 2023-09-08 RX ADMIN — DEXMEDETOMIDINE 8 MCG: 200 INJECTION, SOLUTION INTRAVENOUS at 02:09

## 2023-09-08 RX ADMIN — CEFAZOLIN 2 G: 330 INJECTION, POWDER, FOR SOLUTION INTRAMUSCULAR; INTRAVENOUS at 01:09

## 2023-09-08 RX ADMIN — HEPARIN SODIUM 8000 UNITS: 1000 INJECTION, SOLUTION INTRAVENOUS; SUBCUTANEOUS at 02:09

## 2023-09-08 RX ADMIN — MIDAZOLAM 2 MG: 1 INJECTION INTRAMUSCULAR; INTRAVENOUS at 01:09

## 2023-09-08 NOTE — ANESTHESIA POSTPROCEDURE EVALUATION
Anesthesia Post Evaluation    Patient: Luis Daniel Howell    Procedure(s) Performed: Procedure(s) (LRB):  CREATION, AV FISTULA (Left)    Final Anesthesia Type: regional      Patient location during evaluation: PACU  Patient participation: Yes- Able to Participate  Level of consciousness: awake and alert  Post-procedure vital signs: reviewed and stable  Pain management: adequate  Airway patency: patent    PONV status at discharge: No PONV  Anesthetic complications: no      Cardiovascular status: stable  Respiratory status: unassisted and spontaneous ventilation  Hydration status: euvolemic  Follow-up not needed.          Vitals Value Taken Time   /81 09/08/23 1531   Temp 36.3 °C (97.3 °F) 09/08/23 1505   Pulse 53 09/08/23 1535   Resp 23 09/08/23 1535   SpO2 100 % 09/08/23 1535   Vitals shown include unvalidated device data.      No case tracking events are documented in the log.      Pain/Juan Manuel Score: Juan Manuel Score: 10 (9/8/2023  3:20 PM)

## 2023-09-08 NOTE — BRIEF OP NOTE
Brief Operative Note  Date: 09/08/2023    Pre-op Diagnosis:  CKD (chronic kidney disease), stage IV [N18.4]    Post-op Diagnosis:  Same    Procedure(s):   Creation L BC AVF    Surgeon: MONIE Morgan MD [facility] FACS    Assistant: AMELIA Bravo MD    Anesthesia: Regional    Findings/Key Components:  Strong thrill in L BC AVFl dilated to 4-5mm  2+ L radial pulse with no augmentation by doppler with AVF compression; L ulnar has slight augmentation    EBL: < 10 ml      Specimens (From admission, onward)      None          I attest to being present for the procedure and performing the case.  Joaquin Morgan MD [facility] FACS  Discharge Note  SUMMARY    Admit Date: 9/8/2023    Attending Physician: Joaquin Morgan MD FACS    Discharge Physician: Joaquin Morgan MD FACS    Discharge Date: 09/08/2023    Final Diagnosis: CKD (chronic kidney disease), stage IV [N18.4]    Outcome of Treatment: Successful AVF creation    Disposition: Home or self-care    Patient Instructions:   Current Discharge Medication List        CONTINUE these medications which have NOT CHANGED    Details   amLODIPine (NORVASC) 10 MG tablet Take 1 tablet (10 mg total) by mouth once daily.  Qty: 90 tablet, Refills: 3    Comments: .  Associated Diagnoses: Hyperglycemia; Dermatitis      aspirin 81 MG Chew Take 1 tablet (81 mg total) by mouth once daily.  Qty: 30 tablet, Refills: 11    Associated Diagnoses: Hyperglycemia; Dermatitis      atorvastatin (LIPITOR) 40 MG tablet Take 1 tablet (40 mg total) by mouth every evening.  Qty: 90 tablet, Refills: 3      calcitRIOL (ROCALTROL) 0.25 MCG Cap TAKE 1 CAPSULE (0.25 MCG TOTAL) BY MOUTH ONCE DAILY.  Qty: 90 capsule, Refills: 0      cholecalciferol, vitamin D3, (VITAMIN D3) 50 mcg (2,000 unit) Cap capsule Take 2,000 Units by mouth once daily.      furosemide (LASIX) 40 MG tablet Take 1 tablet (40 mg total) by mouth once daily.  Qty: 90 tablet, Refills: 3      hydrALAZINE (APRESOLINE) 100 MG tablet Take 100 mg by mouth every 12 (twelve) hours.       labetaloL (NORMODYNE) 300 MG tablet Take 1 tablet (300 mg total) by mouth 2 (two) times daily.  Qty: 180 tablet, Refills: 3    Comments: .      LEVEMIR FLEXPEN 100 unit/mL (3 mL) InPn pen Inject into the skin 2 (two) times a day.      losartan (COZAAR) 50 MG tablet Take 1 tablet (50 mg total) by mouth 2 (two) times a day.  Qty: 180 tablet, Refills: 3    Comments: .      metOLazone (ZAROXOLYN) 5 MG tablet Take 5 mg by mouth.      sevelamer carbonate (RENVELA) 800 mg Tab TAKE 1 TABLET BY MOUTH 3 TIMES DAILY WITH MEALS.      sildenafiL (VIAGRA) 100 MG tablet Take 1 tablet (100 mg total) by mouth daily as needed for Erectile Dysfunction.  Qty: 10 tablet, Refills: 3    Associated Diagnoses: Erectile dysfunction, unspecified erectile dysfunction type      SITagliptin phosphate (JANUVIA) 25 MG Tab Take 1 tablet (25 mg total) by mouth once daily.  Qty: 90 tablet, Refills: 3      blood sugar diagnostic Strp To check BG 2 times daily, to use with insurance preferred meter  Qty: 200 each, Refills: 3             Diet:  Resume pre-operative diet    Activity:  Ad aniya    Follow-up:  Follow-up in clinic with Dr Morgan within 4-6 weeks; please call clinic nurse at

## 2023-09-08 NOTE — INTERVAL H&P NOTE
The patient has been examined and the H&P has been reviewed:    I concur with the findings and no changes have occurred since H&P was written.    Surgery risks, benefits and alternative options discussed and understood by patient/family.    Patient marked. Consent in chart.

## 2023-09-08 NOTE — TRANSFER OF CARE
Anesthesia Transfer of Care Note    Patient: Luis Daniel Howell    Procedure(s) Performed: Procedure(s) (LRB):  CREATION, AV FISTULA (Left)    Patient location: PACU    Anesthesia Type: MAC and regional    Transport from OR: Transported from OR on 6-10 L/min O2 by face mask with adequate spontaneous ventilation    Post pain: adequate analgesia    Post assessment: no apparent anesthetic complications and tolerated procedure well    Post vital signs: stable    Level of consciousness: awake, alert and oriented    Nausea/Vomiting: no nausea/vomiting    Complications: none    Transfer of care protocol was followed      Last vitals:   Visit Vitals  BP (!) 140/79 (BP Location: Right leg, Patient Position: Lying)   Pulse (!) 53   Temp 36.3 °C (97.3 °F) (Temporal)   Resp 12   Wt 101.6 kg (224 lb)   SpO2 100%   BMI 31.24 kg/m²

## 2023-09-08 NOTE — PROGRESS NOTES
Glucometer not connecting to pt ID-  Glucose reading at 65. Pt drank two apple juices. Will recheck in 15 mins.

## 2023-09-08 NOTE — PLAN OF CARE
Pt is awake, alert, and oriented with no s/s of acute distress. VSS. He denies pain, denies N/V. Tolerates PO fluids without difficulty. Pt LUE pulses intact, numbness r/t nerve block- pt placed in arm sling for caution to extremity. Pt states he wants to go home so he can eat a sausage sandwich.

## 2023-09-08 NOTE — ANESTHESIA PREPROCEDURE EVALUATION
09/08/2023  Luis Daniel Howell is a 53 y.o., male.      Pre-op Assessment    I have reviewed the Patient Summary Reports.     I have reviewed the Nursing Notes. I have reviewed the NPO Status.   I have reviewed the Medications.     Review of Systems  Anesthesia Hx:  No problems with previous Anesthesia  History of prior surgery of interest to airway management or planning: Denies Family Hx of Anesthesia complications.   Denies Personal Hx of Anesthesia complications.   Cardiovascular:   Hypertension Denies MI.  Denies CAD.    CHF hyperlipidemia ECG has been reviewed.    Pulmonary:  Pulmonary Normal  Denies COPD.  Denies Sleep Apnea.    Renal/:   Chronic Renal Disease, ESRD    Hepatic/GI:  Hepatic/GI Normal    Musculoskeletal:  Musculoskeletal Normal    Neurological:   CVA Denies Seizures.    Endocrine:   Diabetes    Psych:   H/o cocaine abuse         Physical Exam  General: Well nourished, Cooperative, Alert and Oriented    Airway:  Mouth Opening: Normal  TM Distance: Normal  Tongue: Normal  Neck ROM: Normal ROM    Dental:  Intact    Chest/Lungs:  Clear to auscultation, Normal Respiratory Rate    Heart:  Rate: Normal  Rhythm: Regular Rhythm  Sounds: Normal    Abdomen:  Normal        Anesthesia Plan  Type of Anesthesia, risks & benefits discussed:    Anesthesia Type: Gen Natural Airway, Gen ETT, MAC  Intra-op Monitoring Plan: Standard ASA Monitors  Post Op Pain Control Plan: multimodal analgesia and IV/PO Opioids PRN  Induction:  IV  Airway Plan: Direct, Post-Induction  Informed Consent: Informed consent signed with the Patient and all parties understand the risks and agree with anesthesia plan.  All questions answered.   ASA Score: 4  Day of Surgery Review of History & Physical: H&P Update referred to the surgeon/provider.    Ready For Surgery From Anesthesia Perspective.     .

## 2023-09-08 NOTE — OP NOTE
OPERATIVE REPORT    Patient: Luis Daniel Howell  Surgery Date: 09/08/2023    Pre-op Diagnosis:  CKD (chronic kidney disease), stage IV [N18.4]     Post-op Diagnosis:  Same     Procedure(s):   Creation L BC AVF     Surgeon: MONIE Morgan MD [facility] FACS     Assistant: AMELIA Bravo MD     Anesthesia: Regional     Findings/Key Components:  Strong thrill in L BC AVFl dilated to 4-5mm  2+ L radial pulse with no augmentation by doppler with AVF compression; L ulnar has slight augmentation     EBL: < 10 ml    Indications for Procedure:  Luis Daniel Howell is a 53 y.o. male who with history of CKD stage 4 eventually requiring dialysis. The patient was offered creation of a left brachiocephalic AV fistula. All risks, benefits, and alternatives were discussed and the patient understood and wished to proceed and gave written consent.     Operative Details:   After an informed consent was obtained the patient was taken to the operating room and placed in the supine position. A regional block was performed by the anesthesia team. The left arm was prepped and draped in the usual sterile fashion.  Preop time-out was performed, confirming patient's identity procedure and laterality . A skin incision was made at the AC fossa and dissection carried down through subcutaneous tissue and fat. The fascia was identified and entered sharply. Dissection was continued down to the brachial artery which was healthy without calcifications. This was dissected circumferentially. Next, we dissected out the cephalic vein which appeared healthy.  An adequate segment of vein was dissected circumferentially with branches being ligated with silk ties.     The patient was systemically heparinized scratch that systemically anticoagulated with IV heparin. The vein was transected distally. The venotomy was opened and dilated to 4-5 mm. Heparinized saline was then flushed into the vein which flowed without evidence of restriction. The artery was controlled proximally and  distally with vessel loops and spring clips and an arteriotomy was made using 11 blade scalpel and extended with Song scissors to approximately 6mm. An end-to-side anastomosis between cephalic vein and the brachial artery was performed in a running fashion with 6-0 prolene. Hemostasis was secured, the heparin was reversed protamine was given. Good thrill was noted in the vein, doppler demonstrated continuous low resistance flow pattern. The incision was then closed in two layers, subcutaneous tissue with 3-0 vicryl and skin with 4-0 Monocryl.     The patient tolerated the procedure well and was taken to the post anesthesia recovery unit in good condition.      All needle, sponge and instrument counts were correct at the end of the case.    The family was notified of the results afterwards. Dr. Morgan was present for the entire case.      Joaquin Morgan MD [facility] FACS   Vascular/Endovascular Surgery

## 2023-09-15 DIAGNOSIS — Z98.890 S/P ARTERIOVENOUS (AV) FISTULA CREATION: Primary | ICD-10-CM

## 2023-10-06 ENCOUNTER — OFFICE VISIT (OUTPATIENT)
Dept: INTERNAL MEDICINE | Facility: CLINIC | Age: 53
End: 2023-10-06
Payer: COMMERCIAL

## 2023-10-06 VITALS
DIASTOLIC BLOOD PRESSURE: 70 MMHG | WEIGHT: 224.19 LBS | HEIGHT: 71 IN | SYSTOLIC BLOOD PRESSURE: 136 MMHG | OXYGEN SATURATION: 99 % | BODY MASS INDEX: 31.39 KG/M2 | HEART RATE: 63 BPM

## 2023-10-06 DIAGNOSIS — N18.6 ESRD (END STAGE RENAL DISEASE): ICD-10-CM

## 2023-10-06 DIAGNOSIS — Z12.11 COLON CANCER SCREENING: Primary | ICD-10-CM

## 2023-10-06 DIAGNOSIS — E11.65 TYPE 2 DIABETES MELLITUS WITH HYPERGLYCEMIA, WITH LONG-TERM CURRENT USE OF INSULIN: ICD-10-CM

## 2023-10-06 DIAGNOSIS — L30.9 DERMATITIS: ICD-10-CM

## 2023-10-06 DIAGNOSIS — R73.9 HYPERGLYCEMIA: ICD-10-CM

## 2023-10-06 DIAGNOSIS — Z79.4 TYPE 2 DIABETES MELLITUS WITH HYPERGLYCEMIA, WITH LONG-TERM CURRENT USE OF INSULIN: ICD-10-CM

## 2023-10-06 PROCEDURE — 99999 PR PBB SHADOW E&M-EST. PATIENT-LVL III: ICD-10-PCS | Mod: PBBFAC,,, | Performed by: INTERNAL MEDICINE

## 2023-10-06 PROCEDURE — 4010F ACE/ARB THERAPY RXD/TAKEN: CPT | Mod: CPTII,,, | Performed by: INTERNAL MEDICINE

## 2023-10-06 PROCEDURE — 1159F PR MEDICATION LIST DOCUMENTED IN MEDICAL RECORD: ICD-10-PCS | Mod: CPTII,,, | Performed by: INTERNAL MEDICINE

## 2023-10-06 PROCEDURE — 3075F PR MOST RECENT SYSTOLIC BLOOD PRESS GE 130-139MM HG: ICD-10-PCS | Mod: CPTII,,, | Performed by: INTERNAL MEDICINE

## 2023-10-06 PROCEDURE — 3008F PR BODY MASS INDEX (BMI) DOCUMENTED: ICD-10-PCS | Mod: CPTII,,, | Performed by: INTERNAL MEDICINE

## 2023-10-06 PROCEDURE — 3066F NEPHROPATHY DOC TX: CPT | Mod: CPTII,,, | Performed by: INTERNAL MEDICINE

## 2023-10-06 PROCEDURE — 3075F SYST BP GE 130 - 139MM HG: CPT | Mod: CPTII,,, | Performed by: INTERNAL MEDICINE

## 2023-10-06 PROCEDURE — 1159F MED LIST DOCD IN RCRD: CPT | Mod: CPTII,,, | Performed by: INTERNAL MEDICINE

## 2023-10-06 PROCEDURE — 3078F DIAST BP <80 MM HG: CPT | Mod: CPTII,,, | Performed by: INTERNAL MEDICINE

## 2023-10-06 PROCEDURE — 99213 OFFICE O/P EST LOW 20 MIN: CPT | Mod: PBBFAC | Performed by: INTERNAL MEDICINE

## 2023-10-06 PROCEDURE — 3044F HG A1C LEVEL LT 7.0%: CPT | Mod: CPTII,,, | Performed by: INTERNAL MEDICINE

## 2023-10-06 PROCEDURE — 3044F PR MOST RECENT HEMOGLOBIN A1C LEVEL <7.0%: ICD-10-PCS | Mod: CPTII,,, | Performed by: INTERNAL MEDICINE

## 2023-10-06 PROCEDURE — 3066F PR DOCUMENTATION OF TREATMENT FOR NEPHROPATHY: ICD-10-PCS | Mod: CPTII,,, | Performed by: INTERNAL MEDICINE

## 2023-10-06 PROCEDURE — 4010F PR ACE/ARB THEARPY RXD/TAKEN: ICD-10-PCS | Mod: CPTII,,, | Performed by: INTERNAL MEDICINE

## 2023-10-06 PROCEDURE — 3008F BODY MASS INDEX DOCD: CPT | Mod: CPTII,,, | Performed by: INTERNAL MEDICINE

## 2023-10-06 PROCEDURE — 99214 OFFICE O/P EST MOD 30 MIN: CPT | Mod: S$PBB,,, | Performed by: INTERNAL MEDICINE

## 2023-10-06 PROCEDURE — 99214 PR OFFICE/OUTPT VISIT, EST, LEVL IV, 30-39 MIN: ICD-10-PCS | Mod: S$PBB,,, | Performed by: INTERNAL MEDICINE

## 2023-10-06 PROCEDURE — 3078F PR MOST RECENT DIASTOLIC BLOOD PRESSURE < 80 MM HG: ICD-10-PCS | Mod: CPTII,,, | Performed by: INTERNAL MEDICINE

## 2023-10-06 PROCEDURE — 99999 PR PBB SHADOW E&M-EST. PATIENT-LVL III: CPT | Mod: PBBFAC,,, | Performed by: INTERNAL MEDICINE

## 2023-10-06 RX ORDER — HYDRALAZINE HYDROCHLORIDE 100 MG/1
100 TABLET, FILM COATED ORAL EVERY 12 HOURS
Qty: 180 TABLET | Refills: 3 | Status: SHIPPED | OUTPATIENT
Start: 2023-10-06 | End: 2024-01-13 | Stop reason: SDUPTHER

## 2023-10-06 RX ORDER — ATORVASTATIN CALCIUM 40 MG/1
40 TABLET, FILM COATED ORAL NIGHTLY
Qty: 90 TABLET | Refills: 3 | Status: SHIPPED | OUTPATIENT
Start: 2023-10-06 | End: 2024-01-13 | Stop reason: SDUPTHER

## 2023-10-06 RX ORDER — LABETALOL 300 MG/1
300 TABLET, FILM COATED ORAL 2 TIMES DAILY
Qty: 180 TABLET | Refills: 3 | Status: SHIPPED | OUTPATIENT
Start: 2023-10-06 | End: 2024-01-13 | Stop reason: SDUPTHER

## 2023-10-06 RX ORDER — AMLODIPINE BESYLATE 10 MG/1
10 TABLET ORAL DAILY
Qty: 90 TABLET | Refills: 3 | Status: SHIPPED | OUTPATIENT
Start: 2023-10-06 | End: 2024-01-13 | Stop reason: SDUPTHER

## 2023-10-06 RX ORDER — SEVELAMER CARBONATE 800 MG/1
TABLET, FILM COATED ORAL
Qty: 270 TABLET | Refills: 3 | Status: SHIPPED | OUTPATIENT
Start: 2023-10-06

## 2023-10-06 NOTE — PROGRESS NOTES
"  He is a 53-year-old  gentleman with history of noncompliance,   with history of cocaine abuse, end-stage renal disease   on dialysis, uncontrolled hypertension, and diabetes mellitus type 2. I last saw him last  in  April 2023         Since last visit he has started HD.  He got a dialysis cath in he right chest-- He is getting HD through this now.  He has a graft placed in his left arm-- it was placed month ago but it needs another month to mature.             He horribly noncompliant.  But today he has taken all his meds and his bp is 136/70 .         He has changed his diet and is not drinking cold drinks " back to back to back", which is good because he is diabetic.  HGB A1c was 5.9  in 9/29/2023.  .    He has gotten medicaid -- on januvia-- not on insulin anymore.          He has cocaine addictions-- recently this has been doing pretty well.  He says he slips every now and then.      He says since he has been not working , he does have the money so it has been doing pretty good.       IN August 2022 he had his stroke,. MRI showed a left subcortical MCA noncritical stenosis and stroke in in her capsule and corona radii.  In the hospital he also had a MRA of his neck vessels which were unremarkable.  He was started on Plavix for 21 days and aspirin.  He is still  on aspirin after    He had  speech therapy.  He iis about to start therapy on his right hand for right hand therapy.  .             Review of Systems       ROS : Gen - no fatigue or significant weight change    Eyes - no eye pain or visual changes  ENT - no hoarseness or sore throat  CV - No chest pain or SOB.  NO palpitations.  Pulm - no cough or wheezing  GI - no N/V/D   no dysuria or incontinence  MS - no joint pain or muscle pain  Skin - no rash, or c/o of skin lesions  Neuro - he has some hand pain but it is on the right side.    Heme - no abnormal bleeding or bruising  Endo - no polydipsia, or temperature changes  Psych - no anxiety or " "depression        Objective:   Physical Exam             BP (!) 156/94 (BP Location: Left arm, Patient Position: Sitting, BP Method: Large (Manual))   Pulse 67   Ht 5' 11" (1.803 m)   Wt 103.1 kg (227 lb 4.7 oz)   SpO2 99%   BMI 31.70 kg/m²            He is a well-appearing gentleman in no acute distress.  He is well dressed andwith his wife today.  He has a right-sided facial droop that is sparing his forehead.  He still has absolutely no insight into his medical problems.  He is very apologetic about not taking his medication and ensures me this is a 1 time 0 fence, but today I reviewed notes with him and he has some really horrible blood pressures during some of his other visits and those notes stated that he did not take his blood pressure those days..    His chest are clear bilaterally.  Lungs are without any abnormal breath sounds.  Cardiovascular exam is unremarkable.  Abdomen soft and nontender.  He is walking without any difficulty and his upper lower extremities have  Normal strength.  His speech is normal He is not having any trouble with word finding.  He does not have any trouble understanding words or sentences either.        Assessment and plan:     Assessment:       1. Thrombotic stroke involving left middle cerebral artery    2. Substance abuse    3. Type 1 diabetes mellitus with hyperosmolarity without coma, with long-term current use of insulin    4. Malignant hypertension    5. Essential hypertension    6.  7.  CKD (chronic kidney disease), ESRD-- on HD-- better compliance.     Cologaurd           Plan:        Much better the last visit- compliance better.  I am happy to see this.  WIfe is with him today.  BP is better  -taking his meds,     follow  up in 4 months        "

## 2023-10-11 ENCOUNTER — HOSPITAL ENCOUNTER (OUTPATIENT)
Dept: VASCULAR SURGERY | Facility: CLINIC | Age: 53
Discharge: HOME | End: 2023-10-11
Attending: SURGERY
Payer: MEDICAID

## 2023-10-11 ENCOUNTER — OFFICE VISIT (OUTPATIENT)
Dept: VASCULAR SURGERY | Facility: CLINIC | Age: 53
End: 2023-10-11
Payer: MEDICAID

## 2023-10-11 VITALS
HEART RATE: 66 BPM | HEIGHT: 71 IN | WEIGHT: 220.44 LBS | BODY MASS INDEX: 30.86 KG/M2 | SYSTOLIC BLOOD PRESSURE: 138 MMHG | DIASTOLIC BLOOD PRESSURE: 80 MMHG | TEMPERATURE: 98 F

## 2023-10-11 DIAGNOSIS — N18.6 ESRD ON DIALYSIS: Primary | ICD-10-CM

## 2023-10-11 DIAGNOSIS — Z99.2 ESRD ON DIALYSIS: Primary | ICD-10-CM

## 2023-10-11 DIAGNOSIS — Z98.890 S/P ARTERIOVENOUS (AV) FISTULA CREATION: ICD-10-CM

## 2023-10-11 PROCEDURE — 99999 PR PBB SHADOW E&M-EST. PATIENT-LVL III: CPT | Mod: PBBFAC,,, | Performed by: SURGERY

## 2023-10-11 PROCEDURE — 4010F ACE/ARB THERAPY RXD/TAKEN: CPT | Mod: CPTII,,, | Performed by: SURGERY

## 2023-10-11 PROCEDURE — 93990 DOPPLER FLOW TESTING: CPT | Mod: 26,S$PBB,, | Performed by: SURGERY

## 2023-10-11 PROCEDURE — 3079F DIAST BP 80-89 MM HG: CPT | Mod: CPTII,,, | Performed by: SURGERY

## 2023-10-11 PROCEDURE — 3075F SYST BP GE 130 - 139MM HG: CPT | Mod: CPTII,,, | Performed by: SURGERY

## 2023-10-11 PROCEDURE — 1159F MED LIST DOCD IN RCRD: CPT | Mod: CPTII,,, | Performed by: SURGERY

## 2023-10-11 PROCEDURE — 93990 DOPPLER FLOW TESTING: CPT | Mod: PBBFAC | Performed by: SURGERY

## 2023-10-11 PROCEDURE — 3079F PR MOST RECENT DIASTOLIC BLOOD PRESSURE 80-89 MM HG: ICD-10-PCS | Mod: CPTII,,, | Performed by: SURGERY

## 2023-10-11 PROCEDURE — 99024 PR POST-OP FOLLOW-UP VISIT: ICD-10-PCS | Mod: ,,, | Performed by: SURGERY

## 2023-10-11 PROCEDURE — 3066F NEPHROPATHY DOC TX: CPT | Mod: CPTII,,, | Performed by: SURGERY

## 2023-10-11 PROCEDURE — 99213 OFFICE O/P EST LOW 20 MIN: CPT | Mod: PBBFAC | Performed by: SURGERY

## 2023-10-11 PROCEDURE — 93990 PR DUPLEX HEMODIALYSIS ACCESS: ICD-10-PCS | Mod: 26,S$PBB,, | Performed by: SURGERY

## 2023-10-11 PROCEDURE — 99024 POSTOP FOLLOW-UP VISIT: CPT | Mod: ,,, | Performed by: SURGERY

## 2023-10-11 PROCEDURE — 4010F PR ACE/ARB THEARPY RXD/TAKEN: ICD-10-PCS | Mod: CPTII,,, | Performed by: SURGERY

## 2023-10-11 PROCEDURE — 3044F HG A1C LEVEL LT 7.0%: CPT | Mod: CPTII,,, | Performed by: SURGERY

## 2023-10-11 PROCEDURE — 3066F PR DOCUMENTATION OF TREATMENT FOR NEPHROPATHY: ICD-10-PCS | Mod: CPTII,,, | Performed by: SURGERY

## 2023-10-11 PROCEDURE — 1159F PR MEDICATION LIST DOCUMENTED IN MEDICAL RECORD: ICD-10-PCS | Mod: CPTII,,, | Performed by: SURGERY

## 2023-10-11 PROCEDURE — 3075F PR MOST RECENT SYSTOLIC BLOOD PRESS GE 130-139MM HG: ICD-10-PCS | Mod: CPTII,,, | Performed by: SURGERY

## 2023-10-11 PROCEDURE — 3044F PR MOST RECENT HEMOGLOBIN A1C LEVEL <7.0%: ICD-10-PCS | Mod: CPTII,,, | Performed by: SURGERY

## 2023-10-11 PROCEDURE — 99999 PR PBB SHADOW E&M-EST. PATIENT-LVL III: ICD-10-PCS | Mod: PBBFAC,,, | Performed by: SURGERY

## 2023-10-11 NOTE — PROGRESS NOTES
Luis Daniel Howell  10/11/2023    HPI:  Patient is a 53 y.o. male with ah/o stage V CKD on HD (June 2023), prior crack cocaine use (smokes); HTN, HLD who is here today for f/u after L BC AVF creation Sept 2023  R hand dominant      S/p  9/8/23  Creation L BC AVF    Findings/Key Components:  Strong thrill in L BC AVFl dilated to 4-5mm  2+ L radial pulse with no augmentation by doppler with AVF compression; L ulnar has slight augmentation    No MI  +stroke - L post corona radiata   Tobacco use: Denies     Renal is Janak Rhoades Jr., MD   Employment: Disabled used to weld    Renal is Janak Rhoades Jr., MD   Employment: Disabled used to weld      Current Outpatient Medications:     acetaminophen (TYLENOL) 500 MG tablet, Take 2 tablets (1,000 mg total) by mouth every 8 (eight) hours., Disp: 20 tablet, Rfl: 0    amLODIPine (NORVASC) 10 MG tablet, Take 1 tablet (10 mg total) by mouth once daily., Disp: 90 tablet, Rfl: 3    atorvastatin (LIPITOR) 40 MG tablet, Take 1 tablet (40 mg total) by mouth every evening., Disp: 90 tablet, Rfl: 3    blood sugar diagnostic Strp, To check BG 2 times daily, to use with insurance preferred meter, Disp: 200 each, Rfl: 3    calcitRIOL (ROCALTROL) 0.25 MCG Cap, TAKE 1 CAPSULE (0.25 MCG TOTAL) BY MOUTH ONCE DAILY., Disp: 90 capsule, Rfl: 0    cholecalciferol, vitamin D3, (VITAMIN D3) 50 mcg (2,000 unit) Cap capsule, Take 2,000 Units by mouth once daily., Disp: , Rfl:     furosemide (LASIX) 40 MG tablet, Take 1 tablet (40 mg total) by mouth once daily., Disp: 90 tablet, Rfl: 3    hydrALAZINE (APRESOLINE) 100 MG tablet, Take 1 tablet (100 mg total) by mouth every 12 (twelve) hours., Disp: 180 tablet, Rfl: 3    labetaloL (NORMODYNE) 300 MG tablet, Take 1 tablet (300 mg total) by mouth 2 (two) times daily., Disp: 180 tablet, Rfl: 3    losartan (COZAAR) 50 MG tablet, Take 1 tablet (50 mg total) by mouth 2 (two) times a day., Disp: 180 tablet, Rfl: 3    metOLazone  (ZAROXOLYN) 5 MG tablet, Take 5 mg by mouth., Disp: , Rfl:     oxyCODONE (ROXICODONE) 5 MG immediate release tablet, Take 1 tablet (5 mg total) by mouth every 6 (six) hours as needed for Pain., Disp: 15 tablet, Rfl: 0    sevelamer carbonate (RENVELA) 800 mg Tab, TAKE 1 TABLET BY MOUTH 3 TIMES DAILY WITH MEALS., Disp: 270 tablet, Rfl: 3    sildenafiL (VIAGRA) 100 MG tablet, Take 1 tablet (100 mg total) by mouth daily as needed for Erectile Dysfunction., Disp: 10 tablet, Rfl: 3    SITagliptin phosphate (JANUVIA) 25 MG Tab, Take 1 tablet (25 mg total) by mouth once daily., Disp: 90 tablet, Rfl: 3    aspirin 81 MG Chew, Take 1 tablet (81 mg total) by mouth once daily. (Patient taking differently: Take 81 mg by mouth once daily.), Disp: 30 tablet, Rfl: 11  No current facility-administered medications for this visit.    Facility-Administered Medications Ordered in Other Visits:     0.9%  NaCl infusion, , Intravenous, Continuous, Karlie Wyman MD    ceFAZolin 2 g in dextrose 5 % in water (D5W) 50 mL IVPB (MB+), 2 g, Intravenous, On Call Procedure, Karlie Wyman MD    ondansetron injection 4 mg, 4 mg, Intravenous, Q12H PRN, Karlie Wyman MD    PHYSICAL EXAM:   Right Arm BP - Sittin/80 (10/11/23 0824)  Pulse: 66  Temp: 98.1 °F (36.7 °C)                   Neck: No carotid bruit can be auscultated             Cardiac: Normal rate and regular rhythm, S1 and S2 normal; PMI non-displaced          Abdomen: Soft, nontender, non-distended     Pulsatile aortic mass is not palpable.      Extremities:   Strong thrill in L BC AVF - no pulsatility     2+ L radial pulse     L Reynaldo's test - negative    LAB RESULTS:  Lab Results   Component Value Date    K 4.4 2023    K 3.9 2023    K 4.1 2023    CREATININE 7.1 (H) 2023    CREATININE 8.2 (H) 2023    CREATININE 8.0 (H) 2023     Lab Results   Component Value Date    WBC 6.56 2023    WBC 5.49 2023    WBC 6.50 2023    HCT 31.3  (L) 08/03/2023    HCT 28.1 (L) 07/17/2023    HCT 27.9 (L) 07/14/2023     08/03/2023     (L) 07/17/2023     (L) 07/14/2023     Lab Results   Component Value Date    HGBA1C 5.9 (H) 09/29/2023    HGBA1C 6.4 (H) 07/12/2023    HGBA1C 6.3 (H) 06/29/2023       IMAGING (I have independently reviewed and interpreted the following tests):  U/S: flow vol 1010 ml/min,  cm/s   Diam 4.4 - 6.7  Dept < 2mm proximal and mid    IMP/PLAN:     53 y.o. male with a a h/o stage V CKD on HD (June 2023), prior crack cocaine use (smokes); HTN, HLD - s/p 9/8/23, L BC AVF - doing well  Has strong thrill -- watchful waiting on proximal PSV  Note given to begin HD with AVF; rtc 6 wks       Joaquin Morgan MD DFSVS FACS   Vascular/Endovascular Surgery    Time spent personally reviewing the patient's chart, interpreting images and test, and conferring with physicians was HBtimespent2: 45 mins.

## 2023-10-13 ENCOUNTER — TELEPHONE (OUTPATIENT)
Dept: OPHTHALMOLOGY | Facility: CLINIC | Age: 53
End: 2023-10-13
Payer: COMMERCIAL

## 2023-10-13 NOTE — TELEPHONE ENCOUNTER
----- Message from Elsa Perea MA sent at 10/13/2023  4:14 PM CDT -----  Contact: [phone]    ----- Message -----  From: Rosa Mckinley  Sent: 10/13/2023  12:33 PM CDT  To: [facility] Staff    [email or web address]     Patient is calling for Medical Advice regarding: pt had to cancel his appt and would like to reschedule. No blue slots generated       Would like response via [facility]:  call back     Comments:

## 2023-10-27 LAB — NONINV COLON CA DNA+OCC BLD SCRN STL QL: NEGATIVE

## 2023-12-01 ENCOUNTER — TELEPHONE (OUTPATIENT)
Dept: VASCULAR SURGERY | Facility: CLINIC | Age: 53
End: 2023-12-01
Payer: MEDICARE

## 2023-12-01 DIAGNOSIS — T82.898A ARTERIOVENOUS FISTULA OCCLUSION, INITIAL ENCOUNTER: Primary | ICD-10-CM

## 2023-12-01 NOTE — TELEPHONE ENCOUNTER
Received a message from the nurse Jo Ann Longoria nurse at the dialysis center stating the pt needs to be eval for numbness in access hand.Spoke with the pt who denies hand pain but does verbalized that he has numbness in his hand during dialysis only.Attempted to schedule u/s today but pt declined.Appt sched for u/s on 12/04/23 per pt request.Dr Morgan to be made aware.

## 2023-12-04 ENCOUNTER — HOSPITAL ENCOUNTER (OUTPATIENT)
Dept: VASCULAR SURGERY | Facility: CLINIC | Age: 53
Discharge: HOME | End: 2023-12-04
Attending: SURGERY
Payer: COMMERCIAL

## 2023-12-04 DIAGNOSIS — R20.0 NUMBNESS OF LEFT HAND: Primary | ICD-10-CM

## 2023-12-04 DIAGNOSIS — T82.898A ARTERIOVENOUS FISTULA OCCLUSION, INITIAL ENCOUNTER: ICD-10-CM

## 2023-12-04 DIAGNOSIS — Z99.2 ESRD ON DIALYSIS: Primary | ICD-10-CM

## 2023-12-04 DIAGNOSIS — N18.6 ESRD ON DIALYSIS: Primary | ICD-10-CM

## 2023-12-04 PROCEDURE — 93990 DOPPLER FLOW TESTING: CPT | Mod: S$GLB,,, | Performed by: SURGERY

## 2023-12-04 PROCEDURE — 93990 PR DUPLEX HEMODIALYSIS ACCESS: ICD-10-PCS | Mod: S$GLB,,, | Performed by: SURGERY

## 2023-12-06 ENCOUNTER — OFFICE VISIT (OUTPATIENT)
Dept: VASCULAR SURGERY | Facility: CLINIC | Age: 53
End: 2023-12-06
Payer: MEDICARE

## 2023-12-06 ENCOUNTER — HOSPITAL ENCOUNTER (OUTPATIENT)
Dept: VASCULAR SURGERY | Facility: CLINIC | Age: 53
Discharge: HOME | End: 2023-12-06
Attending: SURGERY
Payer: MEDICARE

## 2023-12-06 VITALS
TEMPERATURE: 99 F | WEIGHT: 220.44 LBS | SYSTOLIC BLOOD PRESSURE: 118 MMHG | BODY MASS INDEX: 30.86 KG/M2 | DIASTOLIC BLOOD PRESSURE: 73 MMHG | HEART RATE: 60 BPM | HEIGHT: 71 IN

## 2023-12-06 DIAGNOSIS — N18.6 ESRD ON DIALYSIS: Primary | ICD-10-CM

## 2023-12-06 DIAGNOSIS — Z99.2 ESRD ON DIALYSIS: Primary | ICD-10-CM

## 2023-12-06 DIAGNOSIS — R20.0 NUMBNESS OF LEFT HAND: ICD-10-CM

## 2023-12-06 PROCEDURE — 3008F BODY MASS INDEX DOCD: CPT | Mod: CPTII,S$GLB,, | Performed by: SURGERY

## 2023-12-06 PROCEDURE — 3066F PR DOCUMENTATION OF TREATMENT FOR NEPHROPATHY: ICD-10-PCS | Mod: CPTII,S$GLB,, | Performed by: SURGERY

## 2023-12-06 PROCEDURE — 99999 PR PBB SHADOW E&M-EST. PATIENT-LVL IV: CPT | Mod: PBBFAC,,, | Performed by: SURGERY

## 2023-12-06 PROCEDURE — 36589 REMOVAL TUNNELED CV CATH: CPT | Mod: 58,S$GLB,, | Performed by: SURGERY

## 2023-12-06 PROCEDURE — 99999 PR PBB SHADOW E&M-EST. PATIENT-LVL IV: ICD-10-PCS | Mod: PBBFAC,,, | Performed by: SURGERY

## 2023-12-06 PROCEDURE — 1159F PR MEDICATION LIST DOCUMENTED IN MEDICAL RECORD: ICD-10-PCS | Mod: CPTII,S$GLB,, | Performed by: SURGERY

## 2023-12-06 PROCEDURE — 93923 UPR/LXTR ART STDY 3+ LVLS: CPT | Mod: S$GLB,,, | Performed by: SURGERY

## 2023-12-06 PROCEDURE — 3078F DIAST BP <80 MM HG: CPT | Mod: CPTII,S$GLB,, | Performed by: SURGERY

## 2023-12-06 PROCEDURE — 93923 PR NON-INVASIVE PHYSIOLOGIC STUDY EXTREMITY 3 LEVELS: ICD-10-PCS | Mod: S$GLB,,, | Performed by: SURGERY

## 2023-12-06 PROCEDURE — 3066F NEPHROPATHY DOC TX: CPT | Mod: CPTII,S$GLB,, | Performed by: SURGERY

## 2023-12-06 PROCEDURE — 4010F PR ACE/ARB THEARPY RXD/TAKEN: ICD-10-PCS | Mod: CPTII,S$GLB,, | Performed by: SURGERY

## 2023-12-06 PROCEDURE — 36589 PR REMOVAL TUNNELED CV CATH W/O SUBQ PORT OR PUMP: ICD-10-PCS | Mod: 58,S$GLB,, | Performed by: SURGERY

## 2023-12-06 PROCEDURE — 3074F SYST BP LT 130 MM HG: CPT | Mod: CPTII,S$GLB,, | Performed by: SURGERY

## 2023-12-06 PROCEDURE — 3074F PR MOST RECENT SYSTOLIC BLOOD PRESSURE < 130 MM HG: ICD-10-PCS | Mod: CPTII,S$GLB,, | Performed by: SURGERY

## 2023-12-06 PROCEDURE — 3078F PR MOST RECENT DIASTOLIC BLOOD PRESSURE < 80 MM HG: ICD-10-PCS | Mod: CPTII,S$GLB,, | Performed by: SURGERY

## 2023-12-06 PROCEDURE — 3044F PR MOST RECENT HEMOGLOBIN A1C LEVEL <7.0%: ICD-10-PCS | Mod: CPTII,S$GLB,, | Performed by: SURGERY

## 2023-12-06 PROCEDURE — 3044F HG A1C LEVEL LT 7.0%: CPT | Mod: CPTII,S$GLB,, | Performed by: SURGERY

## 2023-12-06 PROCEDURE — 4010F ACE/ARB THERAPY RXD/TAKEN: CPT | Mod: CPTII,S$GLB,, | Performed by: SURGERY

## 2023-12-06 PROCEDURE — 3008F PR BODY MASS INDEX (BMI) DOCUMENTED: ICD-10-PCS | Mod: CPTII,S$GLB,, | Performed by: SURGERY

## 2023-12-06 PROCEDURE — 1159F MED LIST DOCD IN RCRD: CPT | Mod: CPTII,S$GLB,, | Performed by: SURGERY

## 2023-12-06 RX ORDER — AMOXICILLIN 500 MG/1
CAPSULE ORAL 2 TIMES DAILY
COMMUNITY
Start: 2023-11-14

## 2023-12-06 NOTE — PROGRESS NOTES
Luis Daniel Howell  12/06/2023    HPI:  Patient is a 53 y.o. male with ah/o stage V CKD on HD (June 2023), prior crack cocaine use (smokes); HTN, HLD who is here today for f/u after L BC AVF creation Sept 2023 - matias HD well.  R hand dominant      S/p  9/8/23  Creation L BC AVF    Findings/Key Components:  Strong thrill in L BC AVFl dilated to 4-5mm  2+ L radial pulse with no augmentation by doppler with AVF compression; L ulnar has slight augmentation    No MI  +stroke - L post corona radiata   Tobacco use: Denies     Renal is Janak Rhoades Jr., MD   Employment: Disabled used to weld    Renal is Janak Rhoades Jr., MD   Employment: Disabled used to weld      Current Outpatient Medications:     acetaminophen (TYLENOL) 500 MG tablet, Take 2 tablets (1,000 mg total) by mouth every 8 (eight) hours., Disp: 20 tablet, Rfl: 0    amLODIPine (NORVASC) 10 MG tablet, Take 1 tablet (10 mg total) by mouth once daily., Disp: 90 tablet, Rfl: 3    atorvastatin (LIPITOR) 40 MG tablet, Take 1 tablet (40 mg total) by mouth every evening., Disp: 90 tablet, Rfl: 3    blood sugar diagnostic Strp, To check BG 2 times daily, to use with insurance preferred meter, Disp: 200 each, Rfl: 3    calcitRIOL (ROCALTROL) 0.25 MCG Cap, TAKE 1 CAPSULE (0.25 MCG TOTAL) BY MOUTH ONCE DAILY., Disp: 90 capsule, Rfl: 0    cholecalciferol, vitamin D3, (VITAMIN D3) 50 mcg (2,000 unit) Cap capsule, Take 2,000 Units by mouth once daily., Disp: , Rfl:     furosemide (LASIX) 40 MG tablet, Take 1 tablet (40 mg total) by mouth once daily., Disp: 90 tablet, Rfl: 3    hydrALAZINE (APRESOLINE) 100 MG tablet, Take 1 tablet (100 mg total) by mouth every 12 (twelve) hours., Disp: 180 tablet, Rfl: 3    labetaloL (NORMODYNE) 300 MG tablet, Take 1 tablet (300 mg total) by mouth 2 (two) times daily., Disp: 180 tablet, Rfl: 3    losartan (COZAAR) 50 MG tablet, Take 1 tablet (50 mg total) by mouth 2 (two) times a day., Disp: 180 tablet, Rfl: 3     metOLazone (ZAROXOLYN) 5 MG tablet, Take 5 mg by mouth., Disp: , Rfl:     oxyCODONE (ROXICODONE) 5 MG immediate release tablet, Take 1 tablet (5 mg total) by mouth every 6 (six) hours as needed for Pain., Disp: 15 tablet, Rfl: 0    sevelamer carbonate (RENVELA) 800 mg Tab, TAKE 1 TABLET BY MOUTH 3 TIMES DAILY WITH MEALS., Disp: 270 tablet, Rfl: 3    sildenafiL (VIAGRA) 100 MG tablet, Take 1 tablet (100 mg total) by mouth daily as needed for Erectile Dysfunction., Disp: 10 tablet, Rfl: 3    SITagliptin phosphate (JANUVIA) 25 MG Tab, Take 1 tablet (25 mg total) by mouth once daily., Disp: 90 tablet, Rfl: 3    amoxicillin (AMOXIL) 500 MG capsule, Take by mouth 2 (two) times daily., Disp: , Rfl:     aspirin 81 MG Chew, Take 1 tablet (81 mg total) by mouth once daily. (Patient taking differently: Take 81 mg by mouth once daily.), Disp: 30 tablet, Rfl: 11  No current facility-administered medications for this visit.    Facility-Administered Medications Ordered in Other Visits:     0.9%  NaCl infusion, , Intravenous, Continuous, Karlie Wyman MD    ceFAZolin 2 g in dextrose 5 % in water (D5W) 50 mL IVPB (MB+), 2 g, Intravenous, On Call Procedure, Karlie Wyman MD    ondansetron injection 4 mg, 4 mg, Intravenous, Q12H PRN, Karlie Wyman MD    PHYSICAL EXAM:   Right Arm BP - Sittin/73 (23 1025)  Pulse: 60  Temp: 98.5 °F (36.9 °C)                   Neck: No carotid bruit can be auscultated             Cardiac: Normal rate and regular rhythm, S1 and S2 normal; PMI non-displaced          Abdomen: Soft, nontender, non-distended     Pulsatile aortic mass is not palpable.      Extremities:   Strong thrill in L BC AVF - no pulsatility     2+ L radial pulse     L Reynaldo's test - negative    LAB RESULTS:  Lab Results   Component Value Date    K 4.4 2023    K 3.9 2023    K 4.1 2023    CREATININE 7.1 (H) 2023    CREATININE 8.2 (H) 2023    CREATININE 8.0 (H) 2023     Lab Results    Component Value Date    WBC 6.56 08/03/2023    WBC 5.49 07/17/2023    WBC 6.50 07/14/2023    HCT 31.3 (L) 08/03/2023    HCT 28.1 (L) 07/17/2023    HCT 27.9 (L) 07/14/2023     08/03/2023     (L) 07/17/2023     (L) 07/14/2023     Lab Results   Component Value Date    HGBA1C 5.9 (H) 09/29/2023    HGBA1C 6.4 (H) 07/12/2023    HGBA1C 6.3 (H) 06/29/2023       IMAGING (I have independently reviewed and interpreted the following tests):  U/S: flow vol 1010 ml/min,  cm/s   Diam 4.4 - 6.7  Dept < 2mm proximal and mid    IMP/PLAN:     53 y.o. male with a a h/o stage V CKD on HD (June 2023), prior crack cocaine use (smokes); HTN, HLD - s/p 9/8/23, L BC AVF - doing well  Has strong thrill -- watchful waiting on proximal PSV  Permcath removed today  Follow-up with me in 1 year for PE and u/s surveillance; sooner should issues arise       Joaquin Morgan MD DFSVS FACS   Vascular/Endovascular Surgery    Time spent personally reviewing the patient's chart, interpreting images and test, and conferring with physicians was HBtimespent2: 45 mins.     Tunneled Central Venous Catheter Removal    Procedure:  The patient was prepped and draped in sterile fashion. 2% Lidocaine with Epinephrine was used for local anesthetic. A blunt hemostat was used to dissect the exit sheath and fibrin sheath from the catheter cuff. After the cuff was freed, the catheter was pulled and pressure was applied to the venotomy. Once hemostasis was achieved, a pressure dressing was applied.

## 2023-12-12 DIAGNOSIS — N52.9 ERECTILE DYSFUNCTION, UNSPECIFIED ERECTILE DYSFUNCTION TYPE: ICD-10-CM

## 2023-12-12 NOTE — TELEPHONE ENCOUNTER
Refill Routing Note   Medication(s) are not appropriate for processing by [facility] for the following reason(s):        Drug-disease interaction: Viagra & ESRD  Required labs abnormal: Metolazone  No active prescription written by provider: Metolazone    [facility] action(s):  Defer             Pharmacist review requested: Yes     Appointments  past 12m or future 3m with PCP    Date Provider   Last Visit   10/6/2023 Janak Wilson Jr., MD   Next Visit   2/5/2024 Janak Wilson Jr., MD   ED visits in past 90 days: 0        Note composed:4:25 PM 12/12/2023

## 2023-12-12 NOTE — TELEPHONE ENCOUNTER
No care due was identified.  Health [facility] Embedded Care Due Messages. Reference number: [id number].   12/12/2023 10:35:07 AM CST

## 2023-12-12 NOTE — TELEPHONE ENCOUNTER
Refill Routing Note   Medication(s) are not appropriate for processing by [facility] for the following reason(s):        Drug-disease interaction   Required labs abnormal: Metolazone (Cr)  No active prescription written by provider: Metolazone    [facility] action(s):  Defer             Pharmacist review requested: Yes     Appointments  past 12m or future 3m with PCP    Date Provider   Last Visit   10/6/2023 Janak Wilson Jr., MD   Next Visit   2/5/2024 Janak Wilson Jr., MD   ED visits in past 90 days: 0        Note composed:4:17 PM 12/12/2023

## 2023-12-13 RX ORDER — SILDENAFIL 100 MG/1
TABLET, FILM COATED ORAL
Qty: 10 TABLET | Refills: 11 | Status: SHIPPED | OUTPATIENT
Start: 2023-12-13 | End: 2024-01-13 | Stop reason: SDUPTHER

## 2023-12-13 RX ORDER — METOLAZONE 5 MG/1
5 TABLET ORAL NIGHTLY
Qty: 90 TABLET | Refills: 0 | Status: SHIPPED | OUTPATIENT
Start: 2023-12-13 | End: 2024-01-13 | Stop reason: SDUPTHER

## 2023-12-19 ENCOUNTER — TELEPHONE (OUTPATIENT)
Dept: OPTOMETRY | Facility: CLINIC | Age: 53
End: 2023-12-19
Payer: MEDICARE

## 2024-01-06 PROBLEM — R07.81 RIB PAIN: Status: ACTIVE | Noted: 2024-01-06

## 2024-01-11 DIAGNOSIS — Z00.00 ENCOUNTER FOR MEDICARE ANNUAL WELLNESS EXAM: ICD-10-CM

## 2024-01-12 DIAGNOSIS — N52.9 ERECTILE DYSFUNCTION, UNSPECIFIED ERECTILE DYSFUNCTION TYPE: ICD-10-CM

## 2024-01-12 DIAGNOSIS — L30.9 DERMATITIS: ICD-10-CM

## 2024-01-12 DIAGNOSIS — R73.9 HYPERGLYCEMIA: ICD-10-CM

## 2024-01-12 NOTE — TELEPHONE ENCOUNTER
No care due was identified.  Health [facility] Embedded Care Due Messages. Reference number: [id number].   1/12/2024 9:53:32 AM CST

## 2024-01-13 RX ORDER — FUROSEMIDE 40 MG/1
40 TABLET ORAL DAILY
Qty: 90 TABLET | Refills: 3 | Status: SHIPPED | OUTPATIENT
Start: 2024-01-13 | End: 2025-01-12

## 2024-01-13 RX ORDER — METOLAZONE 5 MG/1
TABLET ORAL
Qty: 90 TABLET | Refills: 0 | OUTPATIENT
Start: 2024-01-13

## 2024-01-13 RX ORDER — AMLODIPINE BESYLATE 10 MG/1
10 TABLET ORAL DAILY
Qty: 90 TABLET | Refills: 3 | Status: SHIPPED | OUTPATIENT
Start: 2024-01-13

## 2024-01-13 RX ORDER — HYDRALAZINE HYDROCHLORIDE 100 MG/1
100 TABLET, FILM COATED ORAL EVERY 12 HOURS
Qty: 180 TABLET | Refills: 3 | Status: SHIPPED | OUTPATIENT
Start: 2024-01-13

## 2024-01-13 RX ORDER — LOSARTAN POTASSIUM 50 MG/1
50 TABLET ORAL 2 TIMES DAILY
Qty: 180 TABLET | Refills: 3 | Status: SHIPPED | OUTPATIENT
Start: 2024-01-13 | End: 2025-01-12

## 2024-01-13 RX ORDER — ATORVASTATIN CALCIUM 40 MG/1
TABLET, FILM COATED ORAL
Qty: 90 TABLET | Refills: 0 | OUTPATIENT
Start: 2024-01-13

## 2024-01-13 RX ORDER — AMLODIPINE BESYLATE 10 MG/1
TABLET ORAL
Qty: 90 TABLET | Refills: 0 | OUTPATIENT
Start: 2024-01-13

## 2024-01-13 RX ORDER — INSULIN DETEMIR 100 [IU]/ML
INJECTION, SOLUTION SUBCUTANEOUS
Qty: 45 ML | Refills: 0 | OUTPATIENT
Start: 2024-01-13

## 2024-01-13 RX ORDER — LABETALOL 300 MG/1
TABLET, FILM COATED ORAL
Qty: 180 TABLET | Refills: 0 | OUTPATIENT
Start: 2024-01-13

## 2024-01-13 RX ORDER — FUROSEMIDE 40 MG/1
TABLET ORAL
Qty: 90 TABLET | Refills: 0 | OUTPATIENT
Start: 2024-01-13

## 2024-01-13 RX ORDER — METOLAZONE 5 MG/1
5 TABLET ORAL NIGHTLY
Qty: 90 TABLET | Refills: 0 | Status: SHIPPED | OUTPATIENT
Start: 2024-01-13

## 2024-01-13 RX ORDER — LOSARTAN POTASSIUM 50 MG/1
TABLET ORAL
Qty: 180 TABLET | Refills: 0 | OUTPATIENT
Start: 2024-01-13

## 2024-01-13 RX ORDER — SEVELAMER CARBONATE 800 MG/1
TABLET, FILM COATED ORAL
Qty: 270 TABLET | Refills: 0 | OUTPATIENT
Start: 2024-01-13

## 2024-01-13 RX ORDER — HYDRALAZINE HYDROCHLORIDE 100 MG/1
TABLET, FILM COATED ORAL
Qty: 180 TABLET | Refills: 0 | OUTPATIENT
Start: 2024-01-13

## 2024-01-13 RX ORDER — ATORVASTATIN CALCIUM 40 MG/1
40 TABLET, FILM COATED ORAL NIGHTLY
Qty: 90 TABLET | Refills: 3 | Status: SHIPPED | OUTPATIENT
Start: 2024-01-13

## 2024-01-13 RX ORDER — SILDENAFIL 100 MG/1
TABLET, FILM COATED ORAL
Qty: 10 TABLET | Refills: 11 | Status: SHIPPED | OUTPATIENT
Start: 2024-01-13

## 2024-01-13 RX ORDER — SILDENAFIL 100 MG/1
TABLET, FILM COATED ORAL
Qty: 90 TABLET | Refills: 0 | OUTPATIENT
Start: 2024-01-13

## 2024-01-13 RX ORDER — LABETALOL 300 MG/1
300 TABLET, FILM COATED ORAL 2 TIMES DAILY
Qty: 180 TABLET | Refills: 3 | Status: SHIPPED | OUTPATIENT
Start: 2024-01-13

## 2024-01-22 LAB
LEFT EYE DM RETINOPATHY: NEGATIVE
RIGHT EYE DM RETINOPATHY: NEGATIVE

## 2024-02-15 ENCOUNTER — PATIENT OUTREACH (OUTPATIENT)
Dept: ADMINISTRATIVE | Facility: HOSPITAL | Age: 54
End: 2024-02-15
Payer: MEDICARE

## 2024-02-15 NOTE — PROGRESS NOTES
Health Maintenance Due   Topic Date Due    Pneumococcal Vaccines (Age 0-64) (2 of 2 - PCV) 2015    TETANUS VACCINE  02/15/2018    Shingles Vaccine (1 of 2) Never done    Influenza Vaccine (1) 2023    COVID-19 Vaccine (3 - -24 season) 2023     Population Health Chart Review & Patient Outreach Details    Updates Requested / Reviewed:     [x]  Care Everywhere    [x]     []  External Sources ([facility], [facility], [facility], etc.)    [] [facility]   [] [facility]   [] Other:    [x]  Care Team Updated   []  Removed  or Duplicate Orders   [x]  Immunization Reconciliation Completed / Queried    [x] [place]   [] [place]   [] [place]   [] [place]      Health Maintenance Topics Addressed and Outreach Outcomes / Actions Taken:             Breast Cancer Screening []  Mammogram Order Placed    []  Mammogram Screening Scheduled    []  External Records Requested & Care Team Updated if Applicable    []  External Records Uploaded & Care Team Updated if Applicable    []  Pt Declined Scheduling Mammogram    []  Pt Will Schedule with External Provider / Order Routed & Care Team Updated if Applicable              Cervical Cancer Screening []  Pap Smear Scheduled in Primary Care or OBGYN    []  External Records Requested & Care Team Updated if Applicable       []  External Records Uploaded, Care Team Updated, & History Updated if Applicable    []  Patient Declined Scheduling Pap Smear    []  Patient Will Schedule with External Provider & Care Team Updated if Applicable                  Colorectal Cancer Screening []  Colonoscopy Case Request / Referral / Home Test Order Placed    []  External Records Requested & Care Team Updated if Applicable    []  External Records Uploaded, Care Team Updated, & History Updated if Applicable    []  Patient Declined Completing Colon Cancer Screening    []  Patient Will Schedule with External Provider & Care Team Updated if Applicable    []  Fit Kit Mailed (add the SmartPhrase  under additional notes)    []  Reminded Patient to Complete Home Test                Diabetic Eye Exam []  Eye Exam Screening Order Placed    []  Eye Camera Scheduled or Optometry/Ophthalmology Referral Placed    []  External Records Requested & Care Team Updated if Applicable    [x]  External Records Uploaded, Care Team Updated, & History Updated if Applicable    []  Patient Declined Scheduling Eye Exam    []  Patient Will Schedule with External Provider & Care Team Updated if Applicable             Blood Pressure Control []  Primary Care Follow Up Visit Scheduled     []  Remote Blood Pressure Reading Captured    []  Patient Declined Remote Reading or Scheduling Appt - Escalated to PCP    []  Patient Will Call Back or Send Portal Message with Reading                 HbA1c & Other Labs []  Overdue Lab(s) Ordered    []  Overdue Lab(s) Scheduled    []  External Records Uploaded & Care Team Updated if Applicable    []  Primary Care Follow Up Visit Scheduled     []  Reminded Patient to Complete A1c Home Test    []  Patient Declined Scheduling Labs or Will Call Back to Schedule    []  Patient Will Schedule with External Provider / Order Routed, & Care Team Updated if Applicable           Primary Care Appointment []  Primary Care Appt Scheduled    []  Patient Declined Scheduling or Will Call Back to Schedule    []  Pt Established with External Provider, Updated Care Team, & Informed Pt to Notify Payor if Applicable           Medication Adherence /    Statin Use []  Primary Care Appointment Scheduled    []  Patient Reminded to  Prescription    []  Patient Declined, Provider Notified if Needed    []  Sent Provider Message to Review to Evaluate Pt for Statin, Add Exclusion Dx Codes, Document   Exclusion in Problem List, Change Statin Intensity Level to Moderate or High Intensity if Applicable                Osteoporosis Screening []  Dexa Order Placed    []  Dexa Appointment Scheduled    []  External Records Requested  & Care Team Updated    []  External Records Uploaded, Care Team Updated, & History Updated if Applicable    []  Patient Declined Scheduling Dexa or Will Call Back to Schedule    []  Patient Will Schedule with External Provider / Order Routed & Care Team Updated if Applicable       Additional Notes:    Chart review completed.

## 2024-02-22 ENCOUNTER — TELEPHONE (OUTPATIENT)
Dept: TRANSPLANT | Facility: CLINIC | Age: 54
End: 2024-02-22
Payer: MEDICARE

## 2024-02-27 ENCOUNTER — DOCUMENTATION ONLY (OUTPATIENT)
Dept: TRANSPLANT | Facility: CLINIC | Age: 54
End: 2024-02-27
Payer: MEDICARE

## 2024-03-04 ENCOUNTER — TELEPHONE (OUTPATIENT)
Dept: TRANSPLANT | Facility: CLINIC | Age: 54
End: 2024-03-04
Payer: MEDICARE

## 2024-03-20 NOTE — TELEPHONE ENCOUNTER
Care Due:                  Date            Visit Type   Department     Provider  --------------------------------------------------------------------------------                                EP -                              PRIMARY      [facility] INTERNAL  Last Visit: 10-      CARE ([facility])   NANCY Wilson                              EP -                              PRIMARY      [facility] INTERNAL  Next Visit: 05-      CARE ([facility])   NANCY Wilson                                                            Last  Test          Frequency    Reason                     Performed    Due Date  --------------------------------------------------------------------------------    HBA1C.......  6 months...  SITagliptin..............  09- 03-    [facility] Embedded Care Due Messages. Reference number: [id number].   3/20/2024 4:18:31 PM CDT

## 2024-03-21 RX ORDER — ATORVASTATIN CALCIUM 40 MG/1
40 TABLET, FILM COATED ORAL NIGHTLY
Qty: 90 TABLET | Refills: 3 | Status: SHIPPED | OUTPATIENT
Start: 2024-03-21

## 2024-03-26 DIAGNOSIS — Z91.89 CARDIOVASCULAR EVENT RISK: ICD-10-CM

## 2024-03-26 DIAGNOSIS — Z76.82 ORGAN TRANSPLANT CANDIDATE: Primary | ICD-10-CM

## 2024-03-26 DIAGNOSIS — Z01.810 HIGH RISK SURGERY, PRE-OPERATIVE CARDIOVASCULAR EXAMINATION: ICD-10-CM

## 2024-03-28 DIAGNOSIS — Z76.82 ORGAN TRANSPLANT CANDIDATE: Primary | ICD-10-CM

## 2024-04-15 ENCOUNTER — TELEPHONE (OUTPATIENT)
Dept: TRANSPLANT | Facility: CLINIC | Age: 54
End: 2024-04-15
Payer: MEDICARE

## 2024-04-16 ENCOUNTER — LAB VISIT (OUTPATIENT)
Dept: LAB | Facility: HOSPITAL | Age: 54
End: 2024-04-16
Payer: MEDICARE

## 2024-04-16 ENCOUNTER — OFFICE VISIT (OUTPATIENT)
Dept: TRANSPLANT | Facility: CLINIC | Age: 54
End: 2024-04-16
Payer: MEDICARE

## 2024-04-16 VITALS
OXYGEN SATURATION: 99 % | DIASTOLIC BLOOD PRESSURE: 67 MMHG | TEMPERATURE: 97 F | BODY MASS INDEX: 30.46 KG/M2 | RESPIRATION RATE: 18 BRPM | HEART RATE: 68 BPM | SYSTOLIC BLOOD PRESSURE: 127 MMHG | HEIGHT: 72 IN | WEIGHT: 224.88 LBS

## 2024-04-16 DIAGNOSIS — N18.9 ANEMIA OF CHRONIC KIDNEY FAILURE, UNSPECIFIED STAGE: ICD-10-CM

## 2024-04-16 DIAGNOSIS — N18.6 BENIGN HYPERTENSION WITH ESRD (END-STAGE RENAL DISEASE): ICD-10-CM

## 2024-04-16 DIAGNOSIS — R73.9 HYPERGLYCEMIA: ICD-10-CM

## 2024-04-16 DIAGNOSIS — Z99.2 ESRD ON HEMODIALYSIS: ICD-10-CM

## 2024-04-16 DIAGNOSIS — L30.9 DERMATITIS: ICD-10-CM

## 2024-04-16 DIAGNOSIS — E11.22 TYPE 2 DIABETES MELLITUS WITH CHRONIC KIDNEY DISEASE, WITHOUT LONG-TERM CURRENT USE OF INSULIN, UNSPECIFIED CKD STAGE: ICD-10-CM

## 2024-04-16 DIAGNOSIS — N18.6 ESRD ON HEMODIALYSIS: ICD-10-CM

## 2024-04-16 DIAGNOSIS — I12.0 BENIGN HYPERTENSION WITH ESRD (END-STAGE RENAL DISEASE): ICD-10-CM

## 2024-04-16 DIAGNOSIS — N25.81 SECONDARY HYPERPARATHYROIDISM OF RENAL ORIGIN: ICD-10-CM

## 2024-04-16 DIAGNOSIS — Z76.82 ORGAN TRANSPLANT CANDIDATE: ICD-10-CM

## 2024-04-16 DIAGNOSIS — Z71.85 IMMUNIZATION COUNSELING: Primary | ICD-10-CM

## 2024-04-16 DIAGNOSIS — F14.10 COCAINE ABUSE: ICD-10-CM

## 2024-04-16 DIAGNOSIS — Z01.818 PRE-TRANSPLANT EVALUATION FOR KIDNEY TRANSPLANT: ICD-10-CM

## 2024-04-16 DIAGNOSIS — D63.1 ANEMIA OF CHRONIC KIDNEY FAILURE, UNSPECIFIED STAGE: ICD-10-CM

## 2024-04-16 DIAGNOSIS — E78.5 DYSLIPIDEMIA: ICD-10-CM

## 2024-04-16 PROBLEM — E11.29 TYPE 2 DIABETES MELLITUS WITH KIDNEY COMPLICATION, WITHOUT LONG-TERM CURRENT USE OF INSULIN: Status: ACTIVE | Noted: 2021-09-23

## 2024-04-16 PROBLEM — N18.5 CKD (CHRONIC KIDNEY DISEASE), STAGE V: Status: RESOLVED | Noted: 2023-01-04 | Resolved: 2024-04-16

## 2024-04-16 PROBLEM — I50.32 CHRONIC DIASTOLIC HEART FAILURE: Status: RESOLVED | Noted: 2023-07-13 | Resolved: 2024-04-16

## 2024-04-16 LAB
ABO + RH BLD: NORMAL
ALBUMIN SERPL BCP-MCNC: 3.8 G/DL (ref 3.5–5.2)
ALP SERPL-CCNC: 100 U/L (ref 55–135)
ALT SERPL W/O P-5'-P-CCNC: 16 U/L (ref 10–44)
ANION GAP SERPL CALC-SCNC: 9 MMOL/L (ref 8–16)
APTT PPP: 29.9 SEC (ref 21–32)
AST SERPL-CCNC: 17 U/L (ref 10–40)
BASOPHILS # BLD AUTO: 0.02 K/UL (ref 0–0.2)
BASOPHILS NFR BLD: 0.5 % (ref 0–1.9)
BILIRUB DIRECT SERPL-MCNC: 0.2 MG/DL (ref 0.1–0.3)
BILIRUB SERPL-MCNC: 0.4 MG/DL (ref 0.1–1)
BLD GP AB SCN CELLS X3 SERPL QL: NORMAL
BUN SERPL-MCNC: 43 MG/DL (ref 6–20)
CALCIUM SERPL-MCNC: 8.9 MG/DL (ref 8.7–10.5)
CHLORIDE SERPL-SCNC: 104 MMOL/L (ref 95–110)
CHOLEST SERPL-MCNC: 121 MG/DL (ref 120–199)
CHOLEST/HDLC SERPL: 2.5 {RATIO} (ref 2–5)
CO2 SERPL-SCNC: 24 MMOL/L (ref 23–29)
COMPLEXED PSA SERPL-MCNC: 0.55 NG/ML (ref 0–4)
CREAT SERPL-MCNC: 7.3 MG/DL (ref 0.5–1.4)
DIFFERENTIAL METHOD BLD: ABNORMAL
EOSINOPHIL # BLD AUTO: 0.1 K/UL (ref 0–0.5)
EOSINOPHIL NFR BLD: 2.6 % (ref 0–8)
ERYTHROCYTE [DISTWIDTH] IN BLOOD BY AUTOMATED COUNT: 15.4 % (ref 11.5–14.5)
EST. GFR  (NO RACE VARIABLE): 8.3 ML/MIN/1.73 M^2
ESTIMATED AVG GLUCOSE: 126 MG/DL (ref 68–131)
GLUCOSE SERPL-MCNC: 181 MG/DL (ref 70–110)
HAV IGG SER QL IA: REACTIVE
HBA1C MFR BLD: 6 % (ref 4–5.6)
HBV CORE AB SERPL QL IA: NORMAL
HBV SURFACE AB SER-ACNC: 206.83 MIU/ML
HBV SURFACE AB SER-ACNC: REACTIVE M[IU]/ML
HBV SURFACE AG SERPL QL IA: NORMAL
HCT VFR BLD AUTO: 34.4 % (ref 40–54)
HCV AB SERPL QL IA: NORMAL
HDLC SERPL-MCNC: 49 MG/DL (ref 40–75)
HDLC SERPL: 40.5 % (ref 20–50)
HGB BLD-MCNC: 10.9 G/DL (ref 14–18)
HIV 1+2 AB+HIV1 P24 AG SERPL QL IA: NORMAL
IMM GRANULOCYTES # BLD AUTO: 0.01 K/UL (ref 0–0.04)
IMM GRANULOCYTES NFR BLD AUTO: 0.2 % (ref 0–0.5)
INR PPP: 1.1 (ref 0.8–1.2)
LDLC SERPL CALC-MCNC: 61.4 MG/DL (ref 63–159)
LYMPHOCYTES # BLD AUTO: 1.1 K/UL (ref 1–4.8)
LYMPHOCYTES NFR BLD: 26.1 % (ref 18–48)
MCH RBC QN AUTO: 27.9 PG (ref 27–31)
MCHC RBC AUTO-ENTMCNC: 31.7 G/DL (ref 32–36)
MCV RBC AUTO: 88 FL (ref 82–98)
MONOCYTES # BLD AUTO: 0.6 K/UL (ref 0.3–1)
MONOCYTES NFR BLD: 14.4 % (ref 4–15)
NEUTROPHILS # BLD AUTO: 2.4 K/UL (ref 1.8–7.7)
NEUTROPHILS NFR BLD: 56.2 % (ref 38–73)
NONHDLC SERPL-MCNC: 72 MG/DL
NRBC BLD-RTO: 0 /100 WBC
PHOSPHATE SERPL-MCNC: 4.8 MG/DL (ref 2.7–4.5)
PLATELET # BLD AUTO: 115 K/UL (ref 150–450)
PMV BLD AUTO: 13 FL (ref 9.2–12.9)
POTASSIUM SERPL-SCNC: 4.4 MMOL/L (ref 3.5–5.1)
PROT SERPL-MCNC: 7.2 G/DL (ref 6–8.4)
PROTHROMBIN TIME: 11.5 SEC (ref 9–12.5)
PTH-INTACT SERPL-MCNC: 656.5 PG/ML (ref 9–77)
RBC # BLD AUTO: 3.91 M/UL (ref 4.6–6.2)
RPR SER QL: REACTIVE
RPR SER-TITR: ABNORMAL {TITER}
SODIUM SERPL-SCNC: 137 MMOL/L (ref 136–145)
SPECIMEN OUTDATE: NORMAL
TRIGL SERPL-MCNC: 53 MG/DL (ref 30–150)
VARICELLA INTERPRETATION: POSITIVE
VARICELLA ZOSTER IGG: 1919
WBC # BLD AUTO: 4.25 K/UL (ref 3.9–12.7)

## 2024-04-16 PROCEDURE — 83036 HEMOGLOBIN GLYCOSYLATED A1C: CPT | Mod: HCNC,TXP | Performed by: NURSE PRACTITIONER

## 2024-04-16 PROCEDURE — 86833 HLA CLASS II HIGH DEFIN QUAL: CPT | Mod: HCNC,TXP | Performed by: NURSE PRACTITIONER

## 2024-04-16 PROCEDURE — 86803 HEPATITIS C AB TEST: CPT | Mod: HCNC,TXP | Performed by: NURSE PRACTITIONER

## 2024-04-16 PROCEDURE — 86644 CMV ANTIBODY: CPT | Mod: HCNC,TXP | Performed by: NURSE PRACTITIONER

## 2024-04-16 PROCEDURE — 3008F BODY MASS INDEX DOCD: CPT | Mod: CPTII,S$GLB,TXP, | Performed by: NURSE PRACTITIONER

## 2024-04-16 PROCEDURE — 86790 VIRUS ANTIBODY NOS: CPT | Mod: HCNC,TXP | Performed by: NURSE PRACTITIONER

## 2024-04-16 PROCEDURE — 86780 TREPONEMA PALLIDUM: CPT | Mod: HCNC,TXP | Performed by: NURSE PRACTITIONER

## 2024-04-16 PROCEDURE — 99203 OFFICE O/P NEW LOW 30 MIN: CPT | Mod: S$GLB,TXP,, | Performed by: TRANSPLANT SURGERY

## 2024-04-16 PROCEDURE — 85610 PROTHROMBIN TIME: CPT | Mod: HCNC,TXP | Performed by: NURSE PRACTITIONER

## 2024-04-16 PROCEDURE — 36415 COLL VENOUS BLD VENIPUNCTURE: CPT | Mod: HCNC,TXP | Performed by: NURSE PRACTITIONER

## 2024-04-16 PROCEDURE — 1159F MED LIST DOCD IN RCRD: CPT | Mod: CPTII,S$GLB,TXP, | Performed by: NURSE PRACTITIONER

## 2024-04-16 PROCEDURE — 81241 F5 GENE: CPT | Mod: HCNC | Performed by: NURSE PRACTITIONER

## 2024-04-16 PROCEDURE — 81376 HLA II TYPING 1 LOCUS LR: CPT | Mod: 59,HCNC | Performed by: NURSE PRACTITIONER

## 2024-04-16 PROCEDURE — 85613 RUSSELL VIPER VENOM DILUTED: CPT | Mod: HCNC,TXP | Performed by: NURSE PRACTITIONER

## 2024-04-16 PROCEDURE — 80053 COMPREHEN METABOLIC PANEL: CPT | Mod: HCNC,TXP | Performed by: NURSE PRACTITIONER

## 2024-04-16 PROCEDURE — 3044F HG A1C LEVEL LT 7.0%: CPT | Mod: CPTII,S$GLB,TXP, | Performed by: NURSE PRACTITIONER

## 2024-04-16 PROCEDURE — 81373 HLA I TYPING 1 LOCUS LR: CPT | Mod: HCNC | Performed by: NURSE PRACTITIONER

## 2024-04-16 PROCEDURE — 86480 TB TEST CELL IMMUN MEASURE: CPT | Mod: HCNC,TXP | Performed by: NURSE PRACTITIONER

## 2024-04-16 PROCEDURE — 85025 COMPLETE CBC W/AUTO DIFF WBC: CPT | Mod: HCNC,TXP | Performed by: NURSE PRACTITIONER

## 2024-04-16 PROCEDURE — 80061 LIPID PANEL: CPT | Mod: HCNC,TXP | Performed by: NURSE PRACTITIONER

## 2024-04-16 PROCEDURE — 86592 SYPHILIS TEST NON-TREP QUAL: CPT | Mod: HCNC,TXP | Performed by: NURSE PRACTITIONER

## 2024-04-16 PROCEDURE — 86704 HEP B CORE ANTIBODY TOTAL: CPT | Mod: HCNC,TXP | Performed by: NURSE PRACTITIONER

## 2024-04-16 PROCEDURE — 83970 ASSAY OF PARATHORMONE: CPT | Mod: HCNC,TXP | Performed by: NURSE PRACTITIONER

## 2024-04-16 PROCEDURE — 80307 DRUG TEST PRSMV CHEM ANLYZR: CPT | Mod: HCNC,TXP | Performed by: NURSE PRACTITIONER

## 2024-04-16 PROCEDURE — 99205 OFFICE O/P NEW HI 60 MIN: CPT | Mod: S$GLB,TXP,, | Performed by: NURSE PRACTITIONER

## 2024-04-16 PROCEDURE — 86832 HLA CLASS I HIGH DEFIN QUAL: CPT | Mod: HCNC,TXP | Performed by: NURSE PRACTITIONER

## 2024-04-16 PROCEDURE — 3078F DIAST BP <80 MM HG: CPT | Mod: CPTII,S$GLB,TXP, | Performed by: NURSE PRACTITIONER

## 2024-04-16 PROCEDURE — 86787 VARICELLA-ZOSTER ANTIBODY: CPT | Mod: HCNC,TXP | Performed by: NURSE PRACTITIONER

## 2024-04-16 PROCEDURE — 87340 HEPATITIS B SURFACE AG IA: CPT | Mod: HCNC,TXP | Performed by: NURSE PRACTITIONER

## 2024-04-16 PROCEDURE — 84100 ASSAY OF PHOSPHORUS: CPT | Mod: HCNC,TXP | Performed by: NURSE PRACTITIONER

## 2024-04-16 PROCEDURE — 86147 CARDIOLIPIN ANTIBODY EA IG: CPT | Mod: HCNC,TXP | Performed by: NURSE PRACTITIONER

## 2024-04-16 PROCEDURE — 99203 OFFICE O/P NEW LOW 30 MIN: CPT | Mod: S$GLB,TXP,, | Performed by: STUDENT IN AN ORGANIZED HEALTH CARE EDUCATION/TRAINING PROGRAM

## 2024-04-16 PROCEDURE — 85306 CLOT INHIBIT PROT S FREE: CPT | Mod: HCNC,TXP | Performed by: NURSE PRACTITIONER

## 2024-04-16 PROCEDURE — 85300 ANTITHROMBIN III ACTIVITY: CPT | Mod: HCNC,TXP | Performed by: NURSE PRACTITIONER

## 2024-04-16 PROCEDURE — 3074F SYST BP LT 130 MM HG: CPT | Mod: CPTII,S$GLB,TXP, | Performed by: NURSE PRACTITIONER

## 2024-04-16 PROCEDURE — 1160F RVW MEDS BY RX/DR IN RCRD: CPT | Mod: CPTII,S$GLB,TXP, | Performed by: NURSE PRACTITIONER

## 2024-04-16 PROCEDURE — 85610 PROTHROMBIN TIME: CPT | Mod: 91,HCNC,TXP | Performed by: NURSE PRACTITIONER

## 2024-04-16 PROCEDURE — 99999 PR PBB SHADOW E&M-EST. PATIENT-LVL V: CPT | Mod: PBBFAC,HCNC,TXP, | Performed by: NURSE PRACTITIONER

## 2024-04-16 PROCEDURE — 81240 F2 GENE: CPT | Mod: HCNC,TXP | Performed by: NURSE PRACTITIONER

## 2024-04-16 PROCEDURE — 86682 HELMINTH ANTIBODY: CPT | Mod: HCNC,TXP | Performed by: NURSE PRACTITIONER

## 2024-04-16 PROCEDURE — 84153 ASSAY OF PSA TOTAL: CPT | Mod: HCNC,TXP | Performed by: NURSE PRACTITIONER

## 2024-04-16 PROCEDURE — 81376 HLA II TYPING 1 LOCUS LR: CPT | Mod: HCNC | Performed by: NURSE PRACTITIONER

## 2024-04-16 PROCEDURE — 81373 HLA I TYPING 1 LOCUS LR: CPT | Mod: 59,HCNC | Performed by: NURSE PRACTITIONER

## 2024-04-16 PROCEDURE — 86850 RBC ANTIBODY SCREEN: CPT | Mod: HCNC,TXP | Performed by: NURSE PRACTITIONER

## 2024-04-16 PROCEDURE — 85730 THROMBOPLASTIN TIME PARTIAL: CPT | Mod: 91,HCNC,TXP | Performed by: NURSE PRACTITIONER

## 2024-04-16 PROCEDURE — 86593 SYPHILIS TEST NON-TREP QUANT: CPT | Mod: HCNC,TXP | Performed by: NURSE PRACTITIONER

## 2024-04-16 PROCEDURE — 82248 BILIRUBIN DIRECT: CPT | Mod: HCNC,TXP | Performed by: NURSE PRACTITIONER

## 2024-04-16 PROCEDURE — 85303 CLOT INHIBIT PROT C ACTIVITY: CPT | Mod: HCNC,TXP | Performed by: NURSE PRACTITIONER

## 2024-04-16 PROCEDURE — 86706 HEP B SURFACE ANTIBODY: CPT | Mod: 91,HCNC,TXP | Performed by: NURSE PRACTITIONER

## 2024-04-16 PROCEDURE — 87389 HIV-1 AG W/HIV-1&-2 AB AG IA: CPT | Mod: HCNC,TXP | Performed by: NURSE PRACTITIONER

## 2024-04-16 PROCEDURE — 4010F ACE/ARB THERAPY RXD/TAKEN: CPT | Mod: CPTII,S$GLB,TXP, | Performed by: NURSE PRACTITIONER

## 2024-04-16 RX ORDER — AMLODIPINE BESYLATE 10 MG/1
TABLET ORAL
Qty: 30 TABLET | Refills: 5 | Status: SHIPPED | OUTPATIENT
Start: 2024-04-16

## 2024-04-16 NOTE — PROGRESS NOTES
Transplant Nephrology  Kidney Transplant Recipient Evaluation    Referring Physician: Ian Bolaños  Current Nephrologist: Ian Bolaños    Subjective:   CC:  Initial evaluation of kidney transplant candidacy.    HPI:  Mr. Howell is a 53 y.o. year old Black or  male who has presented to be evaluated as a potential kidney transplant recipient.  He has ESRD secondary to diabetic nephropathy.  Patient is currently on hemodialysis started on 7/13/23. Patient is dialyzing on MWF schedule.  Patient reports that he is tolerating dialysis well.. He has a LUE AV fistula for dialysis access.   He is dialyzing for 4 hours per session.       Previous Transplant: no    HX chronic Cocaine/crack use  per PMH  Reports last use to SW ~ 2 months ago  and will use socially. Hx  past drug rehab attempts  toxicology today--results pending   + toxicology  for cocaine, in EPIC,  dating back to 2019 And + amphetamine 7/31/2019  -current drug use discussed with the patient and his Mother that he will be discussed with the committee for further recs regarding his drug use , but will ultimately need  maintain sobriety  to be acceptable for txp.     Hx left cerebral stoke --Suspect small-vessel disease exacerbated by cocaine use  Last Assessment & Plan Note :Ryan Castanon MD 8/2/2022    Residual: Right hand does not close all the way    Fx assessment: cont to drive to dialysis . Is no longer working. He runs errands , denies FARLEY, chest pain or claudication. Does not appear frail.     Cardiac HX:   HTN, HLD, cardiomyopathy    Diabetes: Type II    Onset of Diabetes: ~ 15 years ago  On insulin: no , Left eye   Retinopathy: yes  Neuropathy: unknown   Amputation:  no  Symptomatic Peripheral Vascular Disease: unknown       Past Medical and Surgical History: Mr. Howell  has a past medical history of Anemia, Cardiomyopathy, CKD (chronic kidney disease), stage V, Cocaine abuse, CVA (cerebral vascular accident), Diabetes  "mellitus, type 2, Diabetic retinopathy, Hyperlipidemia, Hypertension, and Obesity.  He has a past surgical history that includes Circumcision; [place] tooth extraction; and AV fistula placement (Left, 9/8/2023).    Past Social and Family History: Mr. Howell reports that he has never smoked. He has never used smokeless tobacco. He reports current drug use. Drugs: "Crack" cocaine, Cocaine, and Amphetamines. He reports that he does not drink alcohol. His family history includes Cancer in his mother; Cataracts in his mother; Diabetes in his brother and father; Hypertension in his father and mother.    Review of Systems   Constitutional:  Negative for activity change, appetite change, chills, fatigue, fever and unexpected weight change.   HENT:  Negative for congestion, facial swelling, postnasal drip, rhinorrhea, sinus pressure, sore throat and trouble swallowing.    Eyes:  Positive for visual disturbance (left eye). Negative for pain and redness.   Respiratory:  Negative for cough, chest tightness, shortness of breath and wheezing.    Cardiovascular: Negative.  Negative for chest pain, palpitations and leg swelling.   Gastrointestinal:  Negative for abdominal pain, diarrhea, nausea and vomiting.   Genitourinary:  Positive for decreased urine volume. Negative for dysuria, flank pain and urgency.   Musculoskeletal:  Negative for gait problem, neck pain and neck stiffness.   Skin:  Negative for rash.   Allergic/Immunologic: Negative for environmental allergies, food allergies and immunocompromised state.   Neurological:  Positive for headaches. Negative for dizziness, weakness and light-headedness.        HX CVA   Psychiatric/Behavioral:  Positive for sleep disturbance. Negative for agitation and confusion. The patient is not nervous/anxious.        Objective:   Blood pressure 127/67, pulse 68, temperature 97.2 °F (36.2 °C), temperature source Temporal, resp. rate 18, height 5' 11.81" (1.824 m), weight 102 kg (224 lb 13.9 " "oz), SpO2 99%.body mass index is 30.66 kg/m².    Physical Exam  Constitutional:       Appearance: Normal appearance. He is well-developed.   HENT:      Head: Normocephalic.      Nose: Nose normal.   Eyes:      Conjunctiva/sclera: Conjunctivae normal.      Pupils: Pupils are equal, round, and reactive to light.   Cardiovascular:      Rate and Rhythm: Normal rate and regular rhythm.      Heart sounds: Normal heart sounds.   Pulmonary:      Effort: Pulmonary effort is normal.      Breath sounds: Normal breath sounds.   Abdominal:      General: Bowel sounds are normal.      Palpations: Abdomen is soft. There is no hepatomegaly or splenomegaly.   Musculoskeletal:        Arms:       Cervical back: Normal range of motion and neck supple.   Skin:     General: Skin is warm and dry.   Neurological:      Mental Status: He is alert and oriented to person, place, and time.   Psychiatric:         Behavior: Behavior normal.         Labs:  Lab Results   Component Value Date    WBC 4.25 04/16/2024    HGB 10.9 (L) 04/16/2024    HCT 34.4 (L) 04/16/2024     04/16/2024    K 4.4 04/16/2024     04/16/2024    CO2 24 04/16/2024    BUN 43 (H) 04/16/2024    CREATININE 7.3 (H) 04/16/2024    EGFRNORACEVR 8.3 (A) 04/16/2024    CALCIUM 8.9 04/16/2024    PHOS 4.8 (H) 04/16/2024    MG 1.6 07/17/2023    ALBUMIN 3.8 04/16/2024    AST 17 04/16/2024    ALT 16 04/16/2024    UTPCR 0.40 (H) 08/03/2023    .5 (H) 04/16/2024       Lab Results   Component Value Date    BILIRUBINUA Negative 08/03/2023    LIPASE 28 07/31/2019    PROTEINUA 1+ (A) 08/03/2023    NITRITE Negative 08/03/2023    RBCUA 1 08/03/2023    WBCUA 4 08/03/2023       No results found for: "HLAABCTYPE"    Labs were reviewed with the patient.    Assessment:     1. Immunization counseling    2. Pre-transplant evaluation for kidney transplant    3. ESRD on hemodialysis    4. Type 2 diabetes mellitus with chronic kidney disease, without long-term current use of insulin, " unspecified CKD stage    5. Benign hypertension with ESRD (end-stage renal disease)    6. Secondary hyperparathyroidism of renal origin    7. Dyslipidemia    8. Anemia of chronic kidney failure, unspecified stage    9. Cocaine abuse        Plan:     Cocaine/crack use since at least ~ 2016 per PMH  Reports last use to SW ~ 2 months ago  and will use socially. Hx  past drug rehab attempts  toxicology today--results pending   Will need to discuss with committee Friday for Recs moving f/w     When acceptable to proceed,  will need:   Psyc clearance and recs for txp,  maintain random (-) toxicology screenings  Colonoscopy /guidelines   Cardiology clearance: HX HTN, HLD, cardiomyopathy    Transplant Candidacy:   Based on available information, Mr. Howell is an unacceptable kidney transplant candidate d/t current drug use .   Meets center eligibility for accepting HCV+ donor offer - Yes.  Patient educated on HCV+ donors. Luis Daniel is willing to accept HCV+ donor offer - Yes   Patient is a candidate for KDPI > 85 kidney donor offer - No d/t weight   Final determination of transplant candidacy will be made once workup is complete and reviewed by the selection committee.    Patient advised that it is recommended that all transplant candidates, and their close contacts and household members receive Covid vaccination.    UNOS Patient Status  Functional Status: 60% - Requires occasional assistance but is able to care for needs     Shayy Sorensen NP

## 2024-04-16 NOTE — PROGRESS NOTES
Transplant Surgery  Kidney Transplant Recipient Evaluation    Referring Physician: Ian Bolaños  Current Nephrologist: Ian Bolaños    Subjective:     Reason for Visit: evaluate transplant candidacy    History of Present Illness: Luis Daniel Howell is a 53 y.o. year old male undergoing transplant evaluation.    Dialysis History: Luis Daniel is on hemodialysis.      Transplant History: N/A    Etiology of Renal Disease: Diabetes Mellitus - Type II (based on medical records from referral).    External provider notes reviewed: Yes    Review of Systems  Objective:     Physical Exam:  Constitutional:   Vitals reviewed: yes   Well-nourished and well-groomed: yes  Eyes:   Sclerae icteric: no   Extraocular movements intact: yes  GI:    Bowel sounds normal: yes   Tenderness: no    If yes, quadrant/location: not applicable   Palpable masses: no    If yes, quadrant/location: not applicable   Hepatosplenomegaly: no   Ascites: no   Hernia: no    If yes, type/location: not applicable   Surgical scars: no    If yes, type/location: not applicable  Resp:   Effort normal: yes   Breath sounds normal: yes    CV:   Regular rate and rhythm: yes   Heart sounds normal: yes   Femoral pulses normal: yes   Extremities edematous: no  Skin:   Rashes or lesions present: no    If yes, describe:not applicable   Jaundice:: no    Musculoskeletal:   Gait normal: yes   Strength normal: yes  Psych:   Oriented to person, place, and time: yes   Affect and mood normal: yes    Additional comments: not applicable    Diagnostics:  The following labs have been reviewed: NA  The following radiology images have been independently reviewed and interpreted: NA    Counseling: We provided Luis Daniel Howell with a group education session today.  We discussed kidney transplantation at length with him, including risks, potential complications, and alternatives in the management of his renal failure.  The discussion included complications related to anesthesia,  bleeding, infection, primary nonfunction, and ATN.  I discussed the typical postoperative course, length of hospitalization, the need for long-term immunosuppression, and the need for long-term routine follow-up.  I discussed living-donor and -donor transplantation and the relative advantages and disadvantages of each.  I also discussed average waiting times for both living donation and  donation.  I discussed national and center-specific survival rates.  I also mentioned the potential benefit of multicenter listing to candidates listed with centers within more than one organ procurement organization.  All questions were answered.    Patient advised that it is recommended that all transplant candidates, and their close contacts and household members receive Covid vaccination.    Final determination of transplant candidacy will be made once evaluation is complete and reviewed by the Kidney & Kidney/Pancreas Selection Committee.    Coronavirus disease (COVID-19) caused by severe acute respiratory virus coronavirus 2 (SARS-C0V 2) is associated with increased mortality in solid organ transplant recipients (SOT) compared to non-transplant patients. Vaccine responses to vaccination are depressed against SARS-CoV2 compared to normal individuals but improve with third vaccination doses. Vaccination prior to SOT provides both the best opportunity for transplant candidates to develop protective immunity and to reduce the risk of serious COVID19 infections post transplantation. Organ transplant candidates at [facility] will be required to receive SARS-CoV-2 vaccination prior to being listed with a an active status, whenever possible. Exceptions will be made for disability related reasons or for sincerely held Mu-ism beliefs.          Transplant Surgery - Candidacy   Assessment/Plan:   Luis Daniel Howell has end stage renal disease (ESRD) on dialysis. I see no surgical  contraindication to placing a kidney transplant. Based on available information, Luis Daniel Howell is a suitable kidney transplant candidate.     Additional testing to be completed according to the Written Order Guidelines for Adult Pre-kidney and Pancreas Transplant Evaluation (KI-02).  Interpretation of tests and discussion of patient management involves all members of the multidisciplinary transplant team.    Drake Joe MD

## 2024-04-16 NOTE — PROGRESS NOTES
PRE-TRANSPLANT INFECTIOUS DISEASE CONSULT    Reason for Visit:  Pre-transplant evaluation  Referring Provider: Dr. Ian Bolaños     History of Present Illness:    53 y.o. male with a history of  DM2, HTN, ESRD on HD -- presents for pre-kidney transplant evaluation.    Infectious History:  Recent hospital admissions: No  Recent infections: Yes  Recent or current antibiotic use: No  History of recurrent infections *(sinus / pneumonia / UTI / SBP)*: No  History of diabetic foot wound or bone/joint infection: No  Recent dental infections, issues or procedures: No  History of chicken pox: Yes  History of shingles: No  History of STI: No  History of COVID infection: No    History of Immunosuppression:  Prior chemotherapy / immunosuppression: No  Prior transplant: No  History of splenectomy: No    Tuberculosis:  Prior screening for latent TB: Yes  Prior diagnosis of latent TB: No  Risk factors for TB *known exposure, incarceration, homelessness*: No    Geographical exposures:  Currently lives in [place] with orion, and father in law.  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  No  Tobacco: No  Alcohol: No  Recreational drug use:  No  Sexual partners: #1 female partner (orion); mutually monogamous.       Past Histories:   Past Medical History:   Diagnosis Date    CKD (chronic kidney disease), stage V     Cocaine abuse     Diabetes mellitus type I     Diabetic retinopathy     Hyperlipidemia     Hypertension      Past Surgical History:   Procedure Laterality Date    AV FISTULA PLACEMENT Left 9/8/2023    Procedure: CREATION, AV FISTULA;  Surgeon: Joaquin Morgan MD;  Location: [facility] OR  "2ND FLR;  Service: Vascular;  Laterality: Left;  brachio cephalic AV fistula    CIRCUMCISION      WISDOM TOOTH EXTRACTION       Family History   Problem Relation Name Age of Onset    Cancer Mother      Hypertension Mother      Cataracts Mother      Hypertension Father      Diabetes Father      Diabetes Brother      Amblyopia Neg Hx      Blindness Neg Hx      Glaucoma Neg Hx      Macular degeneration Neg Hx      Retinal detachment Neg Hx      Strabismus Neg Hx      Stroke Neg Hx      Thyroid disease Neg Hx       Social History     Tobacco Use    Smoking status: Never    Smokeless tobacco: Never   Substance Use Topics    Alcohol use: No    Drug use: Yes     Types: "Crack" cocaine     Comment: in remission      Review of patient's allergies indicates:  No Known Allergies      Immunization History:  Received all childhood vaccines: Yes  All household members receive annual flu vaccine: No  All household members are up to date on COVID vaccine: No      Current antibiotics:  Antibiotics (From admission, onward)      None              Review of Systems  Review of Systems   Constitutional: Negative.   All other systems reviewed and are negative.         Objective  Physical Exam  Vitals reviewed.   Constitutional:       General: He is not in acute distress.     Appearance: Normal appearance. He is normal weight. He is not ill-appearing, toxic-appearing or diaphoretic.   HENT:      Nose: No congestion.   Eyes:      General: No scleral icterus.     Conjunctiva/sclera: Conjunctivae normal.   Cardiovascular:      Rate and Rhythm: Normal rate.   Pulmonary:      Effort: No respiratory distress.   Skin:     General: Skin is warm and dry.      Findings: No rash.   Neurological:      Mental Status: He is alert and oriented to person, place, and time. Mental status is at baseline.   Psychiatric:         Behavior: Behavior normal.         Thought Content: Thought content normal.           Labs:    CBC:   Lab Results   Component Value " "Date    WBC 4.25 04/16/2024    HGB 10.9 (L) 04/16/2024    HCT 34.4 (L) 04/16/2024    MCV 88 04/16/2024     (L) 04/16/2024    GRAN 2.4 04/16/2024    GRAN 56.2 04/16/2024    LYMPH 1.1 04/16/2024    LYMPH 26.1 04/16/2024    MONO 0.6 04/16/2024    MONO 14.4 04/16/2024    EOSINOPHIL 2.6 04/16/2024       Syphilis screening: No results found for: "RPR", "PRPQ", "FTAABS"     TB screening:   Lab Results   Component Value Date    TBGOLDPLUS Negative 07/13/2023       HIV screening:   Lab Results   Component Value Date    [id number] Non-reactive 04/16/2024       Strongyloides IgG: No results found for: "STRONGANTIGG"    Hepatitis Serologies:   Lab Results   Component Value Date    HEPAIGG Reactive 04/16/2024    HEPBCAB Non-reactive 04/16/2024    HEPBSAB 206.83 04/16/2024    HEPBSAB Reactive 04/16/2024    HEPCAB Non-reactive 04/16/2024        Varicella IgG: No results found for: "VARICELLAINT"      Immunization History   Administered Date(s) Administered    COVID-19, MRNA, LN-S, PF (Pfizer) (Purple Cap) 05/10/2021, 06/03/2021    Pneumococcal Polysaccharide - 23 Valent 03/14/2014    Td (ADULT) 02/15/2008          Assessment and Plan    1. Risks of Infection: Available serologies were reviewed. No unusual risks of infection or significant barriers to transplantation were identified from the infectious disease standpoint given the information available at this time.    - Acute infectious issues: None   - Pending serologies: Hepatitis B surface Ag, HIV, Quantiferon gold / T-spot, RPR, and Strongyloides IgG   - Please call if any pending serologic testing is positive.    2. Immunizations:  Based on the patient's immunization history and serologies, the following immunizations are recommended:  - Hepatitis A    Patient does have immunity to hepatitis A    Vaccination ordered today: No. Reason for not ordering: immunity demonstrated on serology   - Hepatitis B    Patient does have immunity to hepatitis B    Vaccination ordered " today: No. Reason for not ordering: Immunity   - COVID    Current CDC vaccination recommendations were discussed with the patient   - Annual high dose influenza     Vaccination ordered today: No. Reason for not ordering: vaccination up to date   - Prevnar 20    Vaccination ordered today: No. Reason for not ordering: vaccination up to date   - Tdap    Vaccination ordered today: Yes   - Shingrix    Vaccination ordered today: Yes    Recommended Pre-Transplant Immunization Schedule   Vaccine  0m 1m 2m 6m   Pneumococcal conjugate vaccine (Prevnar 20) X      Tetanus-diphtheria-pertussis (Tdap)* X      Hepatitis A Vaccine (Havrix)** X   X   Hepatitis B Vaccine (Heplisav)** X X     Influenza (annual) X      Zoster Recombinant Vaccine (Shingrix) X  X           *Administer booster every 10 years.       **Administer if no immunity demonstrated on serologies               Patient will receive vaccines at local pharmacy. A written prescription was provided for all vaccine doses.     3. Counseling:   I discussed with the patient the risk for increased susceptibility to infections following transplantation including increased risk for infection right after transplant and if rejection should occur.  The patient has been counseled on the importance of vaccinations to decrease risk of infection and severe illness. Specific guidance has been provided to the patient regarding the patient's occupation, hobbies and activities to avoid future infectious complications.     4. Transplant Candidacy: Based on available information, there are no identified significant barriers to transplantation from an infectious disease standpoint.  Final determination of transplant candidacy will be made once evaluation is complete and reviewed by the Selection Committee.      Follow up with infectious disease as needed.       The total time for evaluation and management services performed on 04/16/2024 was greater than 30 minutes.

## 2024-04-16 NOTE — TELEPHONE ENCOUNTER
No care due was identified.  [facility] Embedded Care Due Messages. Reference number: [id number].   4/16/2024 8:43:49 AM CDT

## 2024-04-16 NOTE — PROGRESS NOTES
PHARM.D. PRE-TRANSPLANT NOTE:    This patient's medication therapy was evaluated as part of his pre-transplant evaluation.      The following general pharmacologic concerns were noted:   Aspirin can increase rogelio-op bleed risk    The following concerns for post-operative pain management were noted: none    The following pharmacologic concerns related to HCV therapy were noted: Atorvastatin interacts with DAA therapy therefore should be transitioned to rosuvastatin prior to initiating (max: 10 mg)       This patient's medication profile was reviewed for considerations for DAA Hepatitis C therapy:    [x]  No current inducers of CYP 3A4 or PGP  [x]  No amiodarone on this patient's EMR profile in the last 24 months  [x]  No past or current atrial fibrillation on this patient's EMR profile   - To note, 2022 note states risk for underlying PAF, but do not see diagnosis present and patient reported no diagnosis of afib      Current Outpatient Medications   Medication Sig Dispense Refill    amLODIPine (NORVASC) 10 MG tablet Take 1 tablet (10 mg total) by mouth once daily. 90 tablet 3    aspirin 81 MG Chew Take 1 tablet (81 mg total) by mouth once daily. (Patient taking differently: Take 81 mg by mouth once daily.) 30 tablet 11    atorvastatin (LIPITOR) 40 MG tablet TAKE 1 TABLET(S) BY MOUTH EVERY EVENING 90 tablet 3    blood sugar diagnostic Strp To check BG 2 times daily, to use with insurance preferred meter 200 each 3    calcitRIOL (ROCALTROL) 0.25 MCG Cap TAKE 1 CAPSULE (0.25 MCG TOTAL) BY MOUTH ONCE DAILY. 90 capsule 0    cholecalciferol, vitamin D3, (VITAMIN D3) 50 mcg (2,000 unit) Cap capsule Take 2,000 Units by mouth once daily.      furosemide (LASIX) 40 MG tablet Take 1 tablet (40 mg total) by mouth once daily. 90 tablet 3    hydrALAZINE (APRESOLINE) 100 MG tablet Take 1 tablet (100 mg total) by mouth every 12 (twelve) hours. 180 tablet 3    labetaloL (NORMODYNE) 300 MG tablet Take 1 tablet (300 mg total) by mouth  2 (two) times daily. 180 tablet 3    losartan (COZAAR) 50 MG tablet Take 1 tablet (50 mg total) by mouth 2 (two) times a day. 180 tablet 3    metOLazone (ZAROXOLYN) 5 MG tablet Take 1 tablet (5 mg total) by mouth every evening. 90 tablet 0    sevelamer carbonate (RENVELA) 800 mg Tab TAKE 1 TABLET BY MOUTH 3 TIMES DAILY WITH MEALS. 270 tablet 3    sildenafiL (VIAGRA) 100 MG tablet TAKE 1 TABLET(S) BY MOUTH EVERY DAY AS NEEDED FOR ERECTILE DYSFUNCTION 10 tablet 11    SITagliptin phosphate (JANUVIA) 25 MG Tab Take 1 tablet (25 mg total) by mouth once daily. 90 tablet 3     No current facility-administered medications for this visit.     Facility-Administered Medications Ordered in Other Visits   Medication Dose Route Frequency Provider Last Rate Last Admin    0.9%  NaCl infusion   Intravenous Continuous Karlie Wyman MD        ceFAZolin 2 g in dextrose 5 % in water (D5W) 50 mL IVPB (MB+)  2 g Intravenous On Call Procedure Karlie Wyman MD        ondansetron injection 4 mg  4 mg Intravenous Q12H PRN Karlie Wyman MD               I am available for consultation and can be contacted, as needed by the other members of the Transplant team.

## 2024-04-16 NOTE — PROGRESS NOTES
INITIAL PATIENT EDUCATION NOTE AA    Mr. Luis Daniel Howell was seen in pre-kidney transplant clinic for evaluation for kidney, kidney/pancreas or pancreas only transplant.  The patient attended an individual video education session that discussed/reviewed the following aspects of transplantation: evaluation including diagnostic and laboratory testing,(Chemistries, Hematology, Serologies including HIV and Hepatitis and HLA) required for transplantation and selection committee process, UNOS waitlist management/multiple listings, types of organs offered (KDPI < 85%, KDPI > 85%, PHS risk, DCD, HCV+, HIV+ for HIV+ recipients and enbloc/dual), financial aspects, surgical procedures, dietary instruction pre- and post-transplant, health maintenance pre- and post-transplant, post-transplant hospitalization and outpatient follow-up, potential to participate in a research protocol, and medication management and side effects.  A question and answer session was provided after the presentation.    The patient was seen by all members of the multi-disciplinary team to include: Nephrologist/NAYELI, Surgeon, , Transplant Coordinator, , Pharmacist and Dietician (if applicable).    The patient reviewed and signed all consents for evaluation which were witnessed and sent to scanning into the [facility] chart.    The patient was given an education book and plan for further evaluation based on his individual assessment.      The Patient was educated on OPTN policy change regarding race based eGFR. For Black or  Americans, this eGFR could have shown that their kidneys were working better than they were.    Because of this change, we are looking at everyones record and assessing waiting time for people who are eligible. We will be reviewing your medical records and will notify you if you are eligible. We also encouraged patient to provide span 20 labs that are not in our electronic medical  records    Reviewed program requirement for complete COVID vaccination with documentation prior to listing.  COVID education information reviewed with patient. Patient encouraged to be up to date on all vaccinations.     The patient was informed that the transplant team would manage immediate post op pain. If the patient requires long term pain management, they will need to have that pain management addressed by their PCP or previous provider who wrote for long term pain medicines.    The patient was encouraged to call with any questions or concerns.

## 2024-04-16 NOTE — LETTER
April 18, 2024        Ian Bloaños  [address]  [address]  [place] [facility] [address]  Phone: [phone]  Fax: [phone]             Fan Barker- Transplant [facility]  [address]  [facility] [address]  Phone: [phone]   Patient: Luis Daniel Howell   MR Number: [id number]   YOB: 1970   Date of Visit: 4/16/2024       Dear Dr. Ian Bolaños    Thank you for referring Luis Daniel Howell to me for evaluation. Attached you will find relevant portions of my assessment and plan of care.    If you have questions, please do not hesitate to call me. I look forward to following Luis Daniel Howell along with you.    Sincerely,    Shayy Sorensen, NP    Enclosure    If you would like to receive this communication electronically, please contact [email or web address] or [phone] to request [employer] access.    [employer] is a tool which provides read-only access to select patient information with whom you have a relationship. Its easy to use and provides real time access to review your patients record including encounter summaries, notes, results, and demographic information.    If you feel you have received this communication in error or would no longer like to receive these types of communications, please e-mail [email or web address]
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
## 2024-04-17 LAB
GAMMA INTERFERON BACKGROUND BLD IA-ACNC: 0.05 IU/ML
M TB IFN-G CD4+ BCKGRND COR BLD-ACNC: 0.03 IU/ML
M TB IFN-G CD4+ BCKGRND COR BLD-ACNC: 0.03 IU/ML
MITOGEN IGNF BCKGRD COR BLD-ACNC: 9.95 IU/ML
STRONGYLOIDES ANTIBODY IGG: NEGATIVE
T PALLIDUM AB SER QL IF: REACTIVE
TB GOLD PLUS: NEGATIVE

## 2024-04-17 NOTE — PROGRESS NOTES
Nutritional assessment from dialysis unit received and reviewed--no nutritional changes to plan needed at this time.  Pt to continue to follow-up with renal dietitians recommendations.

## 2024-04-18 ENCOUNTER — TELEPHONE (OUTPATIENT)
Dept: TRANSPLANT | Facility: CLINIC | Age: 54
End: 2024-04-18
Payer: MEDICARE

## 2024-04-18 LAB
AMPHETAMINES SERPL QL: NEGATIVE
AT III ACT/NOR PPP CHRO: 77 % (ref 83–118)
BARBITURATES SERPL QL SCN: NEGATIVE
BENZODIAZ SERPL QL SCN: NEGATIVE
BZE SERPL QL: POSITIVE
CARBOXYTHC SERPL QL SCN: NEGATIVE
CARDIOLIPIN IGG SER IA-ACNC: <9.4 GPL (ref 0–14.99)
CARDIOLIPIN IGM SER IA-ACNC: <9.4 MPL (ref 0–12.49)
CONFIRM DRVVT STA-STACLOT: NORMAL S
DRVVT SCREEN TO CONFIRM RATIO: NORMAL {RATIO}
ETHANOL SERPL QL SCN: NEGATIVE
HEPARIN NT PPP QL: NORMAL
LA 3 SCREEN W REFLEX-IMP: NORMAL
LMW HEPARIN IND PLT AB SER QL: NORMAL
METHADONE SERPL QL SCN: NEGATIVE
MIXING DRVVT/NORMAL: NORMAL %
NEUTRALIZED DRVVT SCREEN RATIO: NORMAL
OPIATES SERPL QL SCN: NEGATIVE
PCP SERPL QL SCN: NEGATIVE
PROPOXYPH SERPL QL: NEGATIVE
PROT C ACT/NOR PPP CHRO: 57 % (ref 70–150)
PROT S ACT/NOR PPP: 87 % (ref 65–150)
PROTHROMBIN TIME: 13.4 S (ref 12–15.5)
SCREEN APTT/NORMAL: 0.98
SCREEN APTT/NORMAL: NORMAL
SCREEN DRVVT/NORMAL: 0.91 %
THROMBIN TIME: NORMAL S

## 2024-04-18 NOTE — TELEPHONE ENCOUNTER
MA notes per Adherence form     FOR THE PAST THREE MONTHS:    0-AMA's     4-No-shows 2/6/24, 3/6/24, 3/20/24, 4/3/24 pt had doctor appts     MA did speak to Opal (tanika) with [facility] pt does reschedule his missed appts.     No concerns with care giving, transportation, or mental health    Scanned in pt's media    Huma Person  Abdominal Transplant MA

## 2024-04-19 ENCOUNTER — DOCUMENTATION ONLY (OUTPATIENT)
Dept: TRANSPLANT | Facility: CLINIC | Age: 54
End: 2024-04-19
Payer: MEDICARE

## 2024-04-19 ENCOUNTER — COMMITTEE REVIEW (OUTPATIENT)
Dept: TRANSPLANT | Facility: CLINIC | Age: 54
End: 2024-04-19
Payer: MEDICARE

## 2024-04-19 LAB
CMV IGG SERPL QL IA: REACTIVE
F2 C.20210G>A GENO BLD/T: NEGATIVE
F5 P.R506Q BLD/T QL: NEGATIVE

## 2024-04-19 NOTE — LETTER
April 19, 2024    Luis Daniel Howell  [address] [place] [address]    Dear Luis Daniel Howell:  MRN: [id number]    It is the duty of the [facility] Selection Committee to determine which patients are candidates for a transplant. For this reason, our committee has the difficult task of evaluating patients to determine which ones have the greatest chance of having a successful transplant. We are aware of the magnitude of this responsibility, and we approach it with reverence and humility.    It is with regret I inform you that you are not approved as a transplant candidate due to documented illicit drug use. Your chart will be closed. If you would like to be reconsidered in the future, you will need to enroll in a drug rehabilitation program, provide proof of program completion, submit twelve consecutive monthly negative drug screens, and Psychiatry clearance with a new referral.  Your future transplant appointments for 06/11/24 have been canceled.    Based on this review, we have determined that at this time, you are not a candidate for a transplant at [facility].      The selection committee carefully considers each patient's transplant candidacy and determines whether it is safe to proceed with transplantation on a case-by-case basis using established selection criteria.  At present, the risk of proceeding with an elective transplant surgery has become too high.                                                                               Although the selection committee believes you are not a suitable transplant candidate, you have the option to be evaluated at other transplant centers who may have different selection criteria.  You may request your [facility] records be sent to any center of your choice by contacting our Medical Records Department at [phone].                                                                               Attached is a letter from the United Network for Organ Sharing  (UNOS).  It describes the services and information offered to patients by UNOS and the Organ Procurement and Transplant Network.    The [facility] Selection Committee sincerely wishes you the best and remains available to answer any questions.  Please do not hesitate to contact our pre-transplant office if we can assist you in any other way.                                                                               Sincerely,      Vianney Burgess MD  Medical Director, Kidney & Kidney/Pancreas Transplantation    Cc:   Ian Bolaños MD           [facility]    Encl: UNOS Letter               The Organ Procurement and Transplantation Network   Toll-free patient services line: [phone]  Your resource for organ transplant information      Staffed 8:30 am - 5:00 pm ET Monday - Friday   Leave a message 24/7 to receive a call back    The Organ Procurement and Transplantation Network (OPTN) is the national transplant system. It makes the policies that decide how donated organs are matched to patients waiting for a transplant. The OPTN:    Makes sure donated organs get matched to people on the transplant waiting list  Tells people about the donation and transplant processes  Makes sure that the public knows about the need for more organ and tissue donations    The OPTN has a free patient services line that you can call to:  Get more information about:   o Organ donation and organ transplants   o Donation and transplant policies  Get an information kit with:   o A list of transplant hospitals   o Waiting list information  Talk about any questions you may have about your transplant hospital or organ procurement organization. The staff will do their best to help you or point you to others who may help.  Find out how you can volunteer with the OPTN and help shape transplant policy    The patient services line number is: 7-888-640-5222    Patient services line staff CANNOT answer questions about your  own medical care, including:  Waiting list status  Test results  Medical records  You will need to call your transplant hospital for this information.    The following websites have more information about transplantation and donation:  OPTN: https://optn.transplant.hrsa.gov/  For potential living donors and transplant recipients:   o Living with transplant: https://www.transplantliving.org/   o Living donation process: https://optn.transplant.hrsa.gov/living-donation/     o Financial assistance: https://www.livingdonorassistance.org/  Transplantation data: https://www.srtr.org/  Organ donation: https://www.organdonor.gov/    Volunteer with the OPTN: https://optn.transplant.hrsa.gov/get-involved

## 2024-04-19 NOTE — PROGRESS NOTES
Phoned patient, informed of the kidney selection committee outcome and the requirements for re-referral. Informed patient the denial letter will be sent to his home address and faxed to his referring provider and dialysis unit. Patient verbalized understanding of above.

## 2024-04-19 NOTE — COMMITTEE REVIEW
Native Organ Dx: Diabetes Mellitus - Type II      Not approved for LRD/CAD transplant due to positive drug screen, cocaine. If patient would like to be reconsidered for possible kidney transplant evaluation will need to complete a drug rehab program, negative monthly drug screens for one year, and Psychiatry clearance.       Note written by     ===============================================    I was present at the meeting and attest to the decision of the committee.    Nahid Vickers  04/19/2024  I was present at the meeting and attest to the decision of the committee.    Nahid Vickers  04/19/2024

## 2024-04-19 NOTE — LETTER
April 19, 2024    Luis Daniel Howell  [address] [place] [address]        Dear Luis Daniel Howell:  MRN: [id number]    It is the duty of the [facility] Selection [facility] to determine which patients are candidates for a transplant. For this reason, our committee has the difficult task of evaluating patients to determine which ones have the greatest chance of having a successful transplant. We are aware of the magnitude of this responsibility, and we approach it with reverence and humility.    It is with regret I inform you that you are not approved as a transplant candidate due to documented illicit drug use. Your chart will be closed. If you would like to be reconsidered in the future, you will need to enroll in a drug rehabilitation program, provide proof of program completion, submit twelve consecutive monthly negative drug screens, and Psychiatry clearance with a new referral.  Your future transplant appointments for 06/11/24 have been canceled.  Based on this review, we have determined that at this time, you are not a candidate for a transplant at [facility].      The selection committee carefully considers each patient's transplant candidacy and determines whether it is safe to proceed with transplantation on a case-by-case basis using established selection criteria.  At present, the risk of proceeding with an elective transplant surgery has become too high.                                                                               Although the selection committee believes you are not a suitable transplant candidate, you have the option to be evaluated at other transplant centers who may have different selection criteria.  You may request your [facility] records be sent to any center of your choice by contacting our Medical Records Department at [phone].                                                                               Attached is a letter from the [employer] Organ  [employer]aring (UNOS).  It describes the services and information offered to patients by UN[employer] and the Organ Procurement and Transplant Network.    The [facility] sincerely wishes you the best and remains available to answer any questions.  Please do not hesitate to contact our pre-transplant office if we can assist you in any other way.                                                                               Sincerely,    {Signature:[id number]}    Cc: ***    Encl: UNOS Letter               The Organ Procurement and Transplantation Network   Toll-free patient services line: [phone]  Your resource for organ transplant information      Staffed 8:30 am - 5:00 pm ET Monday - Friday   Leave a message 24/7 to receive a call back    The Organ Procurement and Transplantation Network (OPTN) is the national transplant system. It makes the policies that decide how donated organs are matched to patients waiting for a transplant. The OPTN:    Makes sure donated organs get matched to people on the transplant waiting list  Tells people about the donation and transplant processes  Makes sure that the public knows about the need for more organ and tissue donations    The OPTN has a free patient services line that you can call to:  Get more information about:   o Organ donation and organ transplants   o Donation and transplant policies  Get an information kit with:   o A list of transplant hospitals   o Waiting list information  Talk about any questions you may have about your transplant hospital or organ procurement organization. The staff will do their best to help you or point you to others who may help.  Find out how you can volunteer with the OPTN and help shape transplant policy    The patient services line number is: [phone]    Patient services line staff CANNOT answer questions about your own medical care, including:  Waiting list status  Test results  Medical records  You will need to call your  [facility] for this information.    The following websites have more information about transplantation and donation:  OPTN: https://optn.transplant.hrsa.gov/  For potential living donors and transplant recipients:   o Living with transplant: https://www.transplantliving.org/   o Living donation process: https://optn.transplant.hrsa.gov/living-donation/     o Financial assistance: https://www.livingdonorassistance.org/  Transplantation data: https://www.srtr.org/  Organ donation: https://www.organdonor.gov/    Volunteer with the OPTN: https://optn.transplant.hrsa.gov/get-involved

## 2024-05-21 RX ORDER — LABETALOL 300 MG/1
TABLET, FILM COATED ORAL
Qty: 60 TABLET | Refills: 1 | Status: SHIPPED | OUTPATIENT
Start: 2024-05-21

## 2024-05-21 NOTE — TELEPHONE ENCOUNTER
No care due was identified.  [facility] Embedded Care Due Messages. Reference number: [id number].   5/21/2024 1:09:36 PM CDT

## 2024-05-22 ENCOUNTER — OFFICE VISIT (OUTPATIENT)
Dept: PRIMARY CARE CLINIC | Facility: CLINIC | Age: 54
End: 2024-05-22
Payer: MEDICARE

## 2024-05-22 VITALS
WEIGHT: 223 LBS | OXYGEN SATURATION: 98 % | DIASTOLIC BLOOD PRESSURE: 70 MMHG | BODY MASS INDEX: 31.22 KG/M2 | HEART RATE: 60 BPM | HEIGHT: 71 IN | SYSTOLIC BLOOD PRESSURE: 130 MMHG | RESPIRATION RATE: 18 BRPM

## 2024-05-22 DIAGNOSIS — Z99.2 TYPE 2 DIABETES MELLITUS WITH CHRONIC KIDNEY DISEASE ON CHRONIC DIALYSIS, WITHOUT LONG-TERM CURRENT USE OF INSULIN: ICD-10-CM

## 2024-05-22 DIAGNOSIS — Z00.00 ENCOUNTER FOR PREVENTIVE HEALTH EXAMINATION: Primary | ICD-10-CM

## 2024-05-22 DIAGNOSIS — I77.0 AV (ARTERIOVENOUS FISTULA): ICD-10-CM

## 2024-05-22 DIAGNOSIS — E11.22 TYPE 2 DIABETES MELLITUS WITH CHRONIC KIDNEY DISEASE ON CHRONIC DIALYSIS, WITHOUT LONG-TERM CURRENT USE OF INSULIN: ICD-10-CM

## 2024-05-22 DIAGNOSIS — N18.6 BENIGN HYPERTENSION WITH ESRD (END-STAGE RENAL DISEASE): ICD-10-CM

## 2024-05-22 DIAGNOSIS — E55.9 VITAMIN D DEFICIENCY: ICD-10-CM

## 2024-05-22 DIAGNOSIS — N18.6 TYPE 2 DIABETES MELLITUS WITH CHRONIC KIDNEY DISEASE ON CHRONIC DIALYSIS, WITHOUT LONG-TERM CURRENT USE OF INSULIN: ICD-10-CM

## 2024-05-22 DIAGNOSIS — E10.319 DIABETIC RETINOPATHY ASSOCIATED WITH TYPE 1 DIABETES MELLITUS, MACULAR EDEMA PRESENCE UNSPECIFIED, UNSPECIFIED LATERALITY, UNSPECIFIED RETINOPATHY SEVERITY: ICD-10-CM

## 2024-05-22 DIAGNOSIS — T82.898S ARTERIOVENOUS FISTULA OCCLUSION, SEQUELA: ICD-10-CM

## 2024-05-22 DIAGNOSIS — R80.9 TYPE 2 DIABETES MELLITUS WITH DIABETIC MICROALBUMINURIA, WITHOUT LONG-TERM CURRENT USE OF INSULIN: ICD-10-CM

## 2024-05-22 DIAGNOSIS — Z79.4 TYPE 2 DIABETES MELLITUS WITH DIABETIC NEUROPATHY, WITH LONG-TERM CURRENT USE OF INSULIN: ICD-10-CM

## 2024-05-22 DIAGNOSIS — E11.29 TYPE 2 DIABETES MELLITUS WITH DIABETIC MICROALBUMINURIA, WITHOUT LONG-TERM CURRENT USE OF INSULIN: ICD-10-CM

## 2024-05-22 DIAGNOSIS — N25.81 SECONDARY HYPERPARATHYROIDISM OF RENAL ORIGIN: ICD-10-CM

## 2024-05-22 DIAGNOSIS — Z86.73 HISTORY OF THROMBOTIC STROKE WITHOUT RESIDUAL DEFICITS: ICD-10-CM

## 2024-05-22 DIAGNOSIS — Z98.890 S/P ARTERIOVENOUS (AV) FISTULA CREATION: ICD-10-CM

## 2024-05-22 DIAGNOSIS — Z99.2 ESRD ON DIALYSIS: ICD-10-CM

## 2024-05-22 DIAGNOSIS — F19.90 ILLICIT DRUG USE: ICD-10-CM

## 2024-05-22 DIAGNOSIS — E11.40 TYPE 2 DIABETES MELLITUS WITH DIABETIC NEUROPATHY, WITH LONG-TERM CURRENT USE OF INSULIN: ICD-10-CM

## 2024-05-22 DIAGNOSIS — Z00.00 ENCOUNTER FOR MEDICARE ANNUAL WELLNESS EXAM: ICD-10-CM

## 2024-05-22 DIAGNOSIS — E11.3593 TYPE 2 DIABETES MELLITUS WITH PROLIFERATIVE DIABETIC RETINOPATHY OF BOTH EYES WITHOUT MACULAR EDEMA, UNSPECIFIED WHETHER LONG TERM INSULIN USE: ICD-10-CM

## 2024-05-22 DIAGNOSIS — I50.32 CHRONIC HEART FAILURE WITH PRESERVED EJECTION FRACTION: ICD-10-CM

## 2024-05-22 DIAGNOSIS — D63.1 ANEMIA OF CHRONIC KIDNEY FAILURE, UNSPECIFIED STAGE: ICD-10-CM

## 2024-05-22 DIAGNOSIS — N18.9 ANEMIA OF CHRONIC KIDNEY FAILURE, UNSPECIFIED STAGE: ICD-10-CM

## 2024-05-22 DIAGNOSIS — I12.0 BENIGN HYPERTENSION WITH ESRD (END-STAGE RENAL DISEASE): ICD-10-CM

## 2024-05-22 DIAGNOSIS — N18.6 ESRD ON DIALYSIS: ICD-10-CM

## 2024-05-22 DIAGNOSIS — F14.10 COCAINE ABUSE: ICD-10-CM

## 2024-05-22 PROBLEM — Z01.818 PRE-TRANSPLANT EVALUATION FOR KIDNEY TRANSPLANT: Status: RESOLVED | Noted: 2024-04-16 | Resolved: 2024-05-22

## 2024-05-22 PROBLEM — Z71.85 IMMUNIZATION COUNSELING: Status: RESOLVED | Noted: 2024-04-16 | Resolved: 2024-05-22

## 2024-05-22 PROBLEM — R07.81 RIB PAIN: Status: RESOLVED | Noted: 2024-01-06 | Resolved: 2024-05-22

## 2024-05-22 PROCEDURE — G9920 SCRNING PERF AND NEGATIVE: HCPCS | Mod: HCNC,CPTII,S$GLB, | Performed by: NURSE PRACTITIONER

## 2024-05-22 PROCEDURE — 3078F DIAST BP <80 MM HG: CPT | Mod: HCNC,CPTII,S$GLB, | Performed by: NURSE PRACTITIONER

## 2024-05-22 PROCEDURE — 3044F HG A1C LEVEL LT 7.0%: CPT | Mod: HCNC,CPTII,S$GLB, | Performed by: NURSE PRACTITIONER

## 2024-05-22 PROCEDURE — 99999 PR PBB SHADOW E&M-EST. PATIENT-LVL III: CPT | Mod: PBBFAC,HCNC,, | Performed by: NURSE PRACTITIONER

## 2024-05-22 PROCEDURE — G0439 PPPS, SUBSEQ VISIT: HCPCS | Mod: HCNC,S$GLB,, | Performed by: NURSE PRACTITIONER

## 2024-05-22 PROCEDURE — 2023F DILAT RTA XM W/O RTNOPTHY: CPT | Mod: HCNC,CPTII,S$GLB, | Performed by: NURSE PRACTITIONER

## 2024-05-22 PROCEDURE — 4010F ACE/ARB THERAPY RXD/TAKEN: CPT | Mod: HCNC,CPTII,S$GLB, | Performed by: NURSE PRACTITIONER

## 2024-05-22 PROCEDURE — 3075F SYST BP GE 130 - 139MM HG: CPT | Mod: HCNC,CPTII,S$GLB, | Performed by: NURSE PRACTITIONER

## 2024-05-22 RX ORDER — ZOSTER VACCINE RECOMBINANT, ADJUVANTED 50 MCG/0.5
0.5 KIT INTRAMUSCULAR ONCE
Qty: 1 EACH | Refills: 0 | Status: SHIPPED | OUTPATIENT
Start: 2024-05-22 | End: 2024-05-22

## 2024-05-22 NOTE — PROGRESS NOTES
"  Luis Daniel Howell presented for a  Medicare AWV and comprehensive Health Risk Assessment today. The following components were reviewed and updated:    Medical history  Family History  Social history  Allergies and Current Medications  Health Risk Assessment  Health Maintenance  Care Team         ** See Completed Assessments for Annual Wellness Visit within the encounter summary.**         The following assessments were completed:  Living Situation  CAGE  Depression Screening  Timed Get Up and Go  Whisper Test  Cognitive Function Screening  Nutrition Screening  ADL Screening  PAQ Screening        Opioid documentation:      Patient does not have a current opioid prescription.        Vitals:    05/22/24 0958   BP: 130/70   BP Location: Left arm   Patient Position: Sitting   BP Method: Medium (Manual)   Pulse: 60   Resp: 18   SpO2: 98%   Weight: 101.1 kg (222 lb 15.9 oz)   Height: 5' 11" (1.803 m)     Body mass index is 31.1 kg/m².  Physical Exam  Constitutional:       General: He is not in acute distress.     Appearance: He is not ill-appearing.   HENT:      Head: Normocephalic.   Cardiovascular:      Rate and Rhythm: Normal rate.      Pulses: Normal pulses.      Heart sounds: Murmur heard.      Systolic murmur is present with a grade of 3/6.   Pulmonary:      Effort: Pulmonary effort is normal. No respiratory distress.      Breath sounds: Normal breath sounds.   Skin:     General: Skin is warm and dry.   Neurological:      General: No focal deficit present.      Mental Status: He is alert.   Psychiatric:         Mood and Affect: Mood normal.               Diagnoses and health risks identified today and associated recommendations/orders:    1. Encounter for preventive health examination  Health maintenance updated.  Shingles vaccine sent to pharmacy.    2. Encounter for Medicare annual wellness exam  Repeat annually.  - Ambulatory Referral/Consult to Enhanced Annual Wellness Visit (eAWV)    3. Anemia of chronic kidney " failure, unspecified stage  As managed per Nephrology.    4. Cocaine abuse  Regular meetings with  for rehabilitation outpatient due to dialysis.  Patient committed to rehab.    5. S/P arteriovenous (AV) fistula creation  Follow-up with nephrology for management.    6. Secondary hyperparathyroidism of renal origin  As managed per Nephrology.    7. Arteriovenous fistula occlusion, sequela  Appears to be resolved.  Follow up with Nephrology for management.    8. Benign hypertension with ESRD (end-stage renal disease)  Well-controlled on amlodipine, hydralazine, labetalol, losartan.    9. Chronic heart failure with preserved ejection fraction  Stable.    10. Type 2 diabetes mellitus with chronic kidney disease on chronic dialysis, without long-term current use of insulin  Stable.  As managed per Nephrology.  Continue on dialysis.  Monday Wednesday Friday.    11. Type 2 diabetes mellitus with diabetic neuropathy, with long-term current use of insulin  Stable.  On gabapentin.    12. Diabetic retinopathy associated with type 1 diabetes mellitus, macular edema presence unspecified, unspecified laterality, unspecified retinopathy severity  Follow up with Dr. Schrader as planned.    13. ESRD on dialysis  Continue to follow up with Nephrology every Monday Wednesday Friday as planned.    14. History of thrombotic stroke without residual deficits  Continue low-dose aspirin for prophylaxis.    15. Type 2 diabetes mellitus with diabetic microalbuminuria, without long-term current use of insulin  Stable.    16. Vitamin D deficiency  Stable.    17. Type 2 diabetes mellitus with proliferative diabetic retinopathy of both eyes without macular edema, unspecified whether long term insulin use  Follow up with Dr. Pinon as planned.    18. AV (arteriovenous fistula)  As managed per Nephrology.    19. Illicit drug use  Interested in rehab.  Meeting with  regularly for options      Provided Luis Daniel with a 5-10  year written screening schedule and personal prevention plan. Recommendations were developed using the USPSTF age appropriate recommendations. Education, counseling, and referrals were provided as needed. After Visit Summary printed and given to patient which includes a list of additional screenings\tests needed.    Follow up in about 1 year (around 5/22/2025) for yearly Enhanced Annual Wellness Exam.    Mouna Francisco, HAKAN  I offered to discuss advanced care planning, including how to pick a person who would make decisions for you if you were unable to make them for yourself, called a health care power of , and what kind of decisions you might make such as use of life sustaining treatments such as ventilators and tube feeding when faced with a life limiting illness recorded on a living will that they will need to know. (How you want to be cared for as you near the end of your natural life)     X Patient is interested in learning more about how to make advanced directives.  I provided them paperwork and offered to discuss this with them.

## 2024-05-22 NOTE — PATIENT INSTRUCTIONS
Counseling and Referral of Other Preventative  (Italic type indicates deductible and co-insurance are waived)    Patient Name: Luis Daniel Howell  Today's Date: 5/22/2024    Health Maintenance       Date Due Completion Date    TETANUS VACCINE 02/15/2018 2/15/2008    Shingles Vaccine (1 of 2) Never done ---    Foot Exam 07/06/2024 7/6/2023    Override on 7/6/2023: Done    Override on 9/29/2021: Done    Override on 3/16/2015: Done    Hemoglobin A1c 11/15/2024 5/15/2024    Eye Exam 01/22/2025 1/22/2024    Override on 8/9/2018: Done (outside [facility])    Lipid Panel 04/16/2025 4/16/2024    Colorectal Cancer Screening 10/20/2026 10/20/2023        No orders of the defined types were placed in this encounter.      The following information is provided to all patients.  This information is to help you find resources for any of the problems found today that may be affecting your health:                  Living healthy guide: www.[facility].[place].gov      Understanding Diabetes: www.diabetes.org      Eating healthy: www.cdc.gov/healthyweight      CDC home safety checklist: www.cdc.gov/steadi/patient.html      Agency on Aging: www.goea.[place].gov      Alcoholics anonymous (AA): www.aa.org      Physical Activity: www.irina.nih.gov/ke9onip      Tobacco use: www.quitwithusla.org

## 2024-05-24 LAB
HLA DQA1 1: NORMAL
HLA DQA1 2: NORMAL
HLA DRB4 1: NORMAL
HLA REALTIMEPCR TYPING CLASSI&II INTERPRETATION: NORMAL
HLA-A 1 SERO. EQUIV: 3
HLA-A 1: NORMAL
HLA-A 2 SERO. EQUIV: 26
HLA-A 2: NORMAL
HLA-B 1 SERO. EQUIV: 8
HLA-B 1: NORMAL
HLA-B 2 SERO. EQUIV: 47
HLA-B 2: NORMAL
HLA-BW 1 SERO. EQUIV: 6
HLA-BW 2 SERO. EQUIV: 4
HLA-C 1: NORMAL
HLA-C 2: NORMAL
HLA-CW 1 SERO. EQUIV: 10
HLA-CW 2 SERO. EQUIV: 6
HLA-DPA1 1: NORMAL
HLA-DPA1 2: NORMAL
HLA-DPB1 1: NORMAL
HLA-DPB1 2: NORMAL
HLA-DQ 1 SERO. EQUIV: 9
HLA-DQ 2 SERO. EQUIV: 7
HLA-DQB1 1: NORMAL
HLA-DQB1 2: NORMAL
HLA-DRB1 1 SERO. EQUIV: 7
HLA-DRB1 1: NORMAL
HLA-DRB1 2 SERO. EQUIV: 13
HLA-DRB1 2: NORMAL
HLA-DRB3 1: NORMAL
HLA-DRB3 2: NORMAL
HLA-DRB345 1 SERO. EQUIV: 52
HLA-DRB345 2 SERO. EQUIV: NORMAL
HLA-DRB4 2: NORMAL
HLA-DRB5 1: NORMAL
HLA-DRB5 2: NORMAL
RTPCR TESTING DATE: NORMAL

## 2024-06-10 ENCOUNTER — PATIENT MESSAGE (OUTPATIENT)
Dept: INTERNAL MEDICINE | Facility: CLINIC | Age: 54
End: 2024-06-10
Payer: MEDICARE

## 2024-06-19 LAB
CLASS I ANTIBODY COMMENTS - LUMINEX: NORMAL
CLASS II ANTIBODIES - LUMINEX: NORMAL
CLASS II ANTIBODY COMMENTS - LUMINEX: NORMAL
CPRA %: 3
HLATY INTERPRETATION: NORMAL
SERUM COLLECTION DT - LUMINEX CLASS I: NORMAL
SERUM COLLECTION DT - LUMINEX CLASS II: NORMAL
SPCL1 TESTING DATE: NORMAL
SPCL2 TESTING DATE: NORMAL
SPCLU TESTING DATE: NORMAL

## 2024-07-20 DIAGNOSIS — U07.1 COVID-19 VIRUS DETECTED: ICD-10-CM

## 2024-07-20 PROBLEM — Z20.822 EXPOSURE TO COVID-19 VIRUS: Status: ACTIVE | Noted: 2024-07-20

## 2024-07-20 PROBLEM — L98.9 SKIN LESION: Status: ACTIVE | Noted: 2024-07-20

## 2024-07-30 ENCOUNTER — OFFICE VISIT (OUTPATIENT)
Dept: INTERNAL MEDICINE | Facility: CLINIC | Age: 54
End: 2024-07-30
Payer: COMMERCIAL

## 2024-07-30 VITALS
HEIGHT: 71 IN | OXYGEN SATURATION: 98 % | SYSTOLIC BLOOD PRESSURE: 136 MMHG | HEART RATE: 80 BPM | BODY MASS INDEX: 31.39 KG/M2 | DIASTOLIC BLOOD PRESSURE: 72 MMHG | WEIGHT: 224.19 LBS

## 2024-07-30 DIAGNOSIS — Z99.2 ESRD ON DIALYSIS: ICD-10-CM

## 2024-07-30 DIAGNOSIS — Z79.4 TYPE 2 DIABETES MELLITUS WITH DIABETIC NEUROPATHY, WITH LONG-TERM CURRENT USE OF INSULIN: ICD-10-CM

## 2024-07-30 DIAGNOSIS — E78.5 DYSLIPIDEMIA: ICD-10-CM

## 2024-07-30 DIAGNOSIS — R73.9 HYPERGLYCEMIA: ICD-10-CM

## 2024-07-30 DIAGNOSIS — D63.1 ANEMIA OF CHRONIC KIDNEY FAILURE, UNSPECIFIED STAGE: Primary | ICD-10-CM

## 2024-07-30 DIAGNOSIS — L30.9 DERMATITIS: ICD-10-CM

## 2024-07-30 DIAGNOSIS — N18.6 ESRD ON DIALYSIS: ICD-10-CM

## 2024-07-30 DIAGNOSIS — N18.6 TYPE 2 DIABETES MELLITUS WITH CHRONIC KIDNEY DISEASE ON CHRONIC DIALYSIS, WITHOUT LONG-TERM CURRENT USE OF INSULIN: ICD-10-CM

## 2024-07-30 DIAGNOSIS — E11.40 TYPE 2 DIABETES MELLITUS WITH DIABETIC NEUROPATHY, WITH LONG-TERM CURRENT USE OF INSULIN: ICD-10-CM

## 2024-07-30 DIAGNOSIS — N52.9 ERECTILE DYSFUNCTION, UNSPECIFIED ERECTILE DYSFUNCTION TYPE: ICD-10-CM

## 2024-07-30 DIAGNOSIS — Z99.2 TYPE 2 DIABETES MELLITUS WITH CHRONIC KIDNEY DISEASE ON CHRONIC DIALYSIS, WITHOUT LONG-TERM CURRENT USE OF INSULIN: ICD-10-CM

## 2024-07-30 DIAGNOSIS — Z86.73 HISTORY OF THROMBOTIC STROKE WITHOUT RESIDUAL DEFICITS: ICD-10-CM

## 2024-07-30 DIAGNOSIS — N18.9 ANEMIA OF CHRONIC KIDNEY FAILURE, UNSPECIFIED STAGE: Primary | ICD-10-CM

## 2024-07-30 DIAGNOSIS — E11.22 TYPE 2 DIABETES MELLITUS WITH CHRONIC KIDNEY DISEASE ON CHRONIC DIALYSIS, WITHOUT LONG-TERM CURRENT USE OF INSULIN: ICD-10-CM

## 2024-07-30 DIAGNOSIS — F14.10 COCAINE ABUSE: ICD-10-CM

## 2024-07-30 PROBLEM — Z20.822 EXPOSURE TO COVID-19 VIRUS: Status: RESOLVED | Noted: 2024-07-20 | Resolved: 2024-07-30

## 2024-07-30 PROBLEM — L98.9 SKIN LESION: Status: RESOLVED | Noted: 2024-07-20 | Resolved: 2024-07-30

## 2024-07-30 PROBLEM — U07.1 COVID: Status: RESOLVED | Noted: 2024-07-20 | Resolved: 2024-07-30

## 2024-07-30 PROCEDURE — 99214 OFFICE O/P EST MOD 30 MIN: CPT | Mod: S$GLB,,, | Performed by: INTERNAL MEDICINE

## 2024-07-30 PROCEDURE — 99999 PR PBB SHADOW E&M-EST. PATIENT-LVL III: CPT | Mod: PBBFAC,,, | Performed by: INTERNAL MEDICINE

## 2024-07-30 RX ORDER — AMLODIPINE BESYLATE 10 MG/1
10 TABLET ORAL DAILY
Qty: 90 TABLET | Refills: 3 | Status: SHIPPED | OUTPATIENT
Start: 2024-07-30

## 2024-07-30 RX ORDER — LABETALOL 300 MG/1
300 TABLET, FILM COATED ORAL EVERY 12 HOURS
Qty: 180 TABLET | Refills: 3 | Status: SHIPPED | OUTPATIENT
Start: 2024-07-30

## 2024-07-30 RX ORDER — HYDRALAZINE HYDROCHLORIDE 100 MG/1
100 TABLET, FILM COATED ORAL EVERY 12 HOURS
Qty: 180 TABLET | Refills: 3 | Status: SHIPPED | OUTPATIENT
Start: 2024-07-30

## 2024-07-30 RX ORDER — LOSARTAN POTASSIUM 50 MG/1
50 TABLET ORAL 2 TIMES DAILY
Qty: 180 TABLET | Refills: 3 | Status: SHIPPED | OUTPATIENT
Start: 2024-07-30 | End: 2025-07-30

## 2024-07-30 RX ORDER — ATORVASTATIN CALCIUM 40 MG/1
40 TABLET, FILM COATED ORAL NIGHTLY
Qty: 90 TABLET | Refills: 3 | Status: SHIPPED | OUTPATIENT
Start: 2024-07-30

## 2024-07-30 RX ORDER — SILDENAFIL 100 MG/1
TABLET, FILM COATED ORAL
Qty: 10 TABLET | Refills: 11 | Status: SHIPPED | OUTPATIENT
Start: 2024-07-30

## 2024-07-30 NOTE — PROGRESS NOTES
"  This note was generated with [employer] voice recognition software. I apologize for any possible typographical errors.  MMroshan seems to have trouble with pronouns so I apologize if I Mis pronoun anyone       He is a 54-year-old  gentleman with history of noncompliance,   with history of cocaine abuse, end-stage renal disease   on dialysis, uncontrolled hypertension, and diabetes mellitus type 2. I last saw him last  in  Oct   2023            Since last visit he hasESRD .  He got a dialysis He has a graft in his left arm.  -- He is getting HD through this now.  He has a graft placed in his left arm-- Last visit he still had his port in his chest- but that is out.        He was in the ED with COvid- 7/20-- Had dry cough-- was not feeling bad but had dry dry cough-- mother had it.  So they theu gave him paxlovid.             He has a history of being horribly noncompliant.  But today he has taken all his meds and his bp is 136/72.         He has changed his diet and is not drinking cold drinks " back to back to back", which is good because he is diabetic.  HGB A1c was 6.4   in 9/29/2023.  .    He has gotten medicaid -- on januvia-- not on insulin anymore.          He has cocaine addictions-- recently this has been doing pretty well-- "has it s up and down-- Trying to go to [employer]-- trying to stay busy-- Beig idle is bad for him.    He says he slips every now and then.      He says since he has been not working , he does have the money so it has been doing pretty good.       IN August 2022 he had his stroke,. MRI showed a left subcortical MCA noncritical stenosis and stroke in in her capsule and corona radii.  In the hospital he also had a MRA of his neck vessels which were unremarkable.  He was started on Plavix for 21 days and aspirin.  He is still  on aspirin after    He had  speech therapy. Right hand strength back to normal but has trouble closing middle finger all the way.              Review of Systems     " "  ROS : Gen - no fatigue - down 3 #   Eyes - no eye pain or visual changes  ENT - no hoarseness or sore throat  CV - No chest pain or SOB.  NO palpitations.  Pulm - no cough or wheezing  GI - no N/V/D   no dysuria or incontinence  MS - no joint pain or muscle pain  Skin - no rash, or c/o of skin lesions  Neuro - he has some hand pain but it is on the right side.    Heme - no abnormal bleeding or bruising  Endo - no polydipsia, or temperature changes  Psych - no anxiety or depression        Objective:   Physical Exam        /72 (BP Location: Right arm, Patient Position: Sitting, BP Method: Large (Manual))   Pulse 80   Ht 5' 11" (1.803 m)   Wt 101.7 kg (224 lb 3.3 oz)   SpO2 98%   BMI 31.27 kg/m²                 He is a well-appearing gentleman in no acute distress.  He is well dressed andwith his wife today.  He has a right-sided facial droop that is sparing his forehead.  He still has absolutely no insight into his medical problems.  He is very apologetic about not taking his medication and ensures me this is a 1 time 0 fence, but today I reviewed notes with him and he has some really horrible blood pressures during some of his other visits and those notes stated that he did not take his blood pressure those days..    His chest are clear bilaterally.  Lungs are without any abnormal breath sounds.  Cardiovascular exam is unremarkable.  Abdomen soft and nontender.  He is walking without any difficulty and his upper lower extremities have  Normal strength.  His speech is normal He is not having any trouble with word finding.  He does not have any trouble understanding words or sentences either.        Assessment and plan:     Assessment:       1. Thrombotic stroke involving left middle cerebral artery    2. Substance abuse    3. Type 1 diabetes mellitus with hyperosmolarity without coma, with long-term current use of insulin    4. Malignant hypertension    5. Essential hypertension    6.  7.  CKD (chronic " kidney disease), ESRD-- on HD-- better compliance.     Cologaurd - due again in 2026          Plan:        Much better the last visit- compliance better.  I am happy to see this.    BP is better  -taking his meds,     follow  up in 6 months

## 2024-08-07 RX ORDER — METOLAZONE 5 MG/1
5 TABLET ORAL NIGHTLY
Qty: 90 TABLET | Refills: 3 | Status: SHIPPED | OUTPATIENT
Start: 2024-08-07

## 2024-08-20 NOTE — TELEPHONE ENCOUNTER
No care due was identified.  [facility] Embedded Care Due Messages. Reference number: [id number].   8/20/2024 4:25:06 PM CDT

## 2024-08-21 NOTE — TELEPHONE ENCOUNTER
Refill Routing Note   Medication(s) are not appropriate for processing by [facility] for the following reason(s):        Required labs abnormal:     [facility] action(s):  Defer      Medication Therapy Plan:       Pharmacist review requested: Yes     Appointments  past 12m or future 3m with PCP    Date Provider   Last Visit   7/30/2024 Janak Wilson Jr., MD   Next Visit   1/30/2025 Janak Wilson Jr., MD   ED visits in past 90 days: 1        Note composed:10:43 AM 08/21/2024

## 2024-08-21 NOTE — TELEPHONE ENCOUNTER
Refill Routing Note   Medication(s) are not appropriate for processing by [facility] for the following reason(s):        Required labs abnormal: Cr 7.3     [facility] action(s):  Defer             Pharmacist review requested: Yes     Appointments  past 12m or future 3m with PCP    Date Provider   Last Visit   7/30/2024 Janak Wilson Jr., MD   Next Visit   1/30/2025 Janak Wilson Jr., MD   ED visits in past 90 days: 1        Note composed:3:05 PM 08/21/2024

## 2024-08-25 RX ORDER — LOSARTAN POTASSIUM 50 MG/1
50 TABLET ORAL 2 TIMES DAILY
Qty: 180 TABLET | Refills: 2 | Status: SHIPPED | OUTPATIENT
Start: 2024-08-25

## 2024-10-10 ENCOUNTER — TELEPHONE (OUTPATIENT)
Dept: OPHTHALMOLOGY | Facility: CLINIC | Age: 54
End: 2024-10-10
Payer: COMMERCIAL

## 2024-10-10 NOTE — TELEPHONE ENCOUNTER
----- Message from Anatoliy sent at 10/10/2024 11:55 AM CDT -----  Regarding: Scheduling Request  Contact: [phone]  Scheduling Request           Appt Type:  ER follow up for Abrasion of right cornea, initial encounter     Date/Time Preference: As soon as possible     Treating Provider:N/A     Caller Name: Luis Daniel Howell     Contact Preference:  [phone]     Comments/notes:

## 2024-10-17 ENCOUNTER — OFFICE VISIT (OUTPATIENT)
Dept: OPTOMETRY | Facility: CLINIC | Age: 54
End: 2024-10-17
Payer: MEDICARE

## 2024-10-17 DIAGNOSIS — E11.3593 TYPE 2 DIABETES MELLITUS WITH PROLIFERATIVE DIABETIC RETINOPATHY OF BOTH EYES WITHOUT MACULAR EDEMA, UNSPECIFIED WHETHER LONG TERM INSULIN USE: ICD-10-CM

## 2024-10-17 DIAGNOSIS — H16.223 KERATOCONJUNCTIVITIS SICCA NOT SPECIFIED AS SJOGREN'S, BILATERAL: Primary | ICD-10-CM

## 2024-10-17 PROCEDURE — 3044F HG A1C LEVEL LT 7.0%: CPT | Mod: HCNC,CPTII,S$GLB, | Performed by: OPTOMETRIST

## 2024-10-17 PROCEDURE — 99999 PR PBB SHADOW E&M-EST. PATIENT-LVL III: CPT | Mod: PBBFAC,HCNC,, | Performed by: OPTOMETRIST

## 2024-10-17 PROCEDURE — 1160F RVW MEDS BY RX/DR IN RCRD: CPT | Mod: HCNC,CPTII,S$GLB, | Performed by: OPTOMETRIST

## 2024-10-17 PROCEDURE — 4010F ACE/ARB THERAPY RXD/TAKEN: CPT | Mod: HCNC,CPTII,S$GLB, | Performed by: OPTOMETRIST

## 2024-10-17 PROCEDURE — 99213 OFFICE O/P EST LOW 20 MIN: CPT | Mod: HCNC,S$GLB,, | Performed by: OPTOMETRIST

## 2024-10-17 PROCEDURE — 1159F MED LIST DOCD IN RCRD: CPT | Mod: HCNC,CPTII,S$GLB, | Performed by: OPTOMETRIST

## 2024-10-17 NOTE — PROGRESS NOTES
HPI    NEERU: 10/08/2024 at [facility] [facility] ED   Last DFE: 06/15/2023 with Dr. Fitzpatrick  Chief complaint (CC): 53 yo M is here today for ED follow up for corneal   abrasion in OD.  Glasses? + patient did not bring them with him today   Contacts? -  H/o eye surgery, injections or laser: PRP OD with Dr. Fitzpatrick   H/o eye injury: Cornea Abrasion 10/08/24  Known eye conditions?    Type 2 diabetes mellitus with proliferative diabetic retinopathy of both   eyes without macular edema              Optic atrophy, left eye  Family h/o eye conditions? -  Eye gtts? Erythromycin at night OD                  Tetracaine PRN for pain OD    (-) Flashes (-)  Floaters (-) Mucous   (-)  Tearing (-) Itching (-) Burning   (-) Headaches (-) Eye Pain/discomfort (-) Irritation   (-)  Redness (-) Double vision (-) Blurry vision    CL Exam: No    Diabetic? +  A1c? Lab Results       Component                Value               Date                       HGBA1C                   6.4 (H)             07/17/2024              Last edited by Leidy Jensen, OD on 10/17/2024 12:38 PM.            Assessment /Plan     For exam results, see Encounter Report.        Keratoconjunctivitis sicca not specified as Sjogren's, bilateral   - Dry eyes OU. Corneal abrasion resolved.    - Discontinue tetracaine and erythromycin.    - Recommend artificial tears. 1 drop 2-4x per day.     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 Fitzpatrick

## 2024-10-28 DIAGNOSIS — R20.0 NUMBNESS OF LEFT HAND: Primary | ICD-10-CM

## 2024-10-28 DIAGNOSIS — T82.898D ARTERIOVENOUS FISTULA OCCLUSION, SUBSEQUENT ENCOUNTER: ICD-10-CM

## 2024-10-29 ENCOUNTER — TELEPHONE (OUTPATIENT)
Dept: VASCULAR SURGERY | Facility: CLINIC | Age: 54
End: 2024-10-29
Payer: MEDICARE

## 2024-10-31 ENCOUNTER — HOSPITAL ENCOUNTER (OUTPATIENT)
Dept: VASCULAR SURGERY | Facility: CLINIC | Age: 54
Discharge: HOME | End: 2024-10-31
Attending: SURGERY
Payer: MEDICARE

## 2024-10-31 DIAGNOSIS — T82.898D ARTERIOVENOUS FISTULA OCCLUSION, SUBSEQUENT ENCOUNTER: ICD-10-CM

## 2024-10-31 DIAGNOSIS — R20.0 NUMBNESS OF LEFT HAND: ICD-10-CM

## 2024-11-01 RX ORDER — FUROSEMIDE 40 MG/1
40 TABLET ORAL
Qty: 90 TABLET | Refills: 3 | Status: SHIPPED | OUTPATIENT
Start: 2024-11-01

## 2024-11-06 ENCOUNTER — OFFICE VISIT (OUTPATIENT)
Dept: VASCULAR SURGERY | Facility: CLINIC | Age: 54
End: 2024-11-06
Payer: MEDICARE

## 2024-11-06 VITALS
WEIGHT: 227.06 LBS | SYSTOLIC BLOOD PRESSURE: 128 MMHG | DIASTOLIC BLOOD PRESSURE: 72 MMHG | TEMPERATURE: 98 F | HEART RATE: 73 BPM | BODY MASS INDEX: 31.79 KG/M2 | HEIGHT: 71 IN

## 2024-11-06 DIAGNOSIS — N18.6 ESRD ON DIALYSIS: Primary | ICD-10-CM

## 2024-11-06 DIAGNOSIS — Z99.2 ESRD ON DIALYSIS: Primary | ICD-10-CM

## 2024-11-06 PROCEDURE — 3078F DIAST BP <80 MM HG: CPT | Mod: HCNC,CPTII,S$GLB, | Performed by: SURGERY

## 2024-11-06 PROCEDURE — 99214 OFFICE O/P EST MOD 30 MIN: CPT | Mod: HCNC,S$GLB,, | Performed by: SURGERY

## 2024-11-06 PROCEDURE — 99999 PR PBB SHADOW E&M-EST. PATIENT-LVL III: CPT | Mod: PBBFAC,HCNC,, | Performed by: SURGERY

## 2024-11-06 PROCEDURE — 3074F SYST BP LT 130 MM HG: CPT | Mod: HCNC,CPTII,S$GLB, | Performed by: SURGERY

## 2024-11-06 PROCEDURE — 3008F BODY MASS INDEX DOCD: CPT | Mod: HCNC,CPTII,S$GLB, | Performed by: SURGERY

## 2024-11-06 PROCEDURE — 1159F MED LIST DOCD IN RCRD: CPT | Mod: HCNC,CPTII,S$GLB, | Performed by: SURGERY

## 2024-11-06 PROCEDURE — 4010F ACE/ARB THERAPY RXD/TAKEN: CPT | Mod: HCNC,CPTII,S$GLB, | Performed by: SURGERY

## 2024-11-06 PROCEDURE — 3052F HG A1C>EQUAL 8.0%<EQUAL 9.0%: CPT | Mod: HCNC,CPTII,S$GLB, | Performed by: SURGERY

## 2024-11-06 RX ORDER — CINACALCET HYDROCHLORIDE 30 MG/1
1 TABLET, COATED ORAL
COMMUNITY
Start: 2024-07-30

## 2024-11-06 NOTE — PROGRESS NOTES
Luis Daniel Howell  11/06/2024    HPI:  Patient is a 54 y.o. male with ah/o stage V CKD on HD (June 2023), prior crack cocaine use (smokes); HTN, HLD who is here today for f/u after L BC AVF creation Sept 2023 - matias HD well.  R hand dominant      S/p  9/8/23  Creation L BC AVF    Findings/Key Components:  Strong thrill in L BC AVFl dilated to 4-5mm  2+ L radial pulse with no augmentation by doppler with AVF compression; L ulnar has slight augmentation    No MI  +stroke - L post corona radiata   Tobacco use: Denies     Renal is Janak Rhoades Jr., MD   Employment: Disabled used to weld    Renal is Janak Rhoades Jr., MD   Employment: Disabled used to weld      Current Outpatient Medications:     amLODIPine (NORVASC) 10 MG tablet, Take 1 tablet (10 mg total) by mouth once daily., Disp: 90 tablet, Rfl: 3    atorvastatin (LIPITOR) 40 MG tablet, Take 1 tablet (40 mg total) by mouth every evening., Disp: 90 tablet, Rfl: 3    blood sugar diagnostic Strp, To check BG 2 times daily, to use with insurance preferred meter, Disp: 200 each, Rfl: 3    calcitRIOL (ROCALTROL) 0.25 MCG Cap, TAKE 1 CAPSULE (0.25 MCG TOTAL) BY MOUTH ONCE DAILY., Disp: 90 capsule, Rfl: 0    cholecalciferol, vitamin D3, (VITAMIN D3) 50 mcg (2,000 unit) Cap capsule, Take 2,000 Units by mouth once daily., Disp: , Rfl:     erythromycin (ROMYCIN) ophthalmic ointment, Place a 1/2 inch ribbon of ointment into the lower eyelid every 4 hours for 5 days., Disp: 3.5 g, Rfl: 0    furosemide (LASIX) 40 MG tablet, TAKE 1 TABLET ONE TIME DAILY, Disp: 90 tablet, Rfl: 3    hydrALAZINE (APRESOLINE) 100 MG tablet, Take 1 tablet (100 mg total) by mouth every 12 (twelve) hours., Disp: 180 tablet, Rfl: 3    labetaloL (NORMODYNE) 300 MG tablet, Take 1 tablet (300 mg total) by mouth every 12 (twelve) hours., Disp: 180 tablet, Rfl: 3    losartan (COZAAR) 50 MG tablet, Take 1 tablet (50 mg total) by mouth 2 (two) times a day., Disp: 180 tablet, Rfl: 2     metOLazone (ZAROXOLYN) 5 MG tablet, TAKE 1 TABLET EVERY EVENING, Disp: 90 tablet, Rfl: 3    ondansetron (ZOFRAN-ODT) 4 MG TbDL, Take 1 tablet (4 mg total) by mouth every 6 (six) hours as needed (N/V)., Disp: 20 tablet, Rfl: 0    oxyCODONE (ROXICODONE) 5 MG immediate release tablet, Take 1 tablet (5 mg total) by mouth every 4 (four) hours as needed., Disp: 5 tablet, Rfl: 0    SENSIPAR 30 mg Tab, Take 1 tablet by mouth., Disp: , Rfl:     sevelamer carbonate (RENVELA) 800 mg Tab, TAKE 1 TABLET BY MOUTH 3 TIMES DAILY WITH MEALS., Disp: 270 tablet, Rfl: 3    sildenafiL (VIAGRA) 100 MG tablet, TAKE 1 TABLET(S) BY MOUTH EVERY DAY AS NEEDED FOR ERECTILE DYSFUNCTION, Disp: 10 tablet, Rfl: 11    SITagliptin phosphate (JANUVIA) 25 MG Tab, Take 1 tablet (25 mg total) by mouth once daily., Disp: 90 tablet, Rfl: 3    aspirin 81 MG Chew, Take 1 tablet (81 mg total) by mouth once daily. (Patient taking differently: Take 81 mg by mouth once daily.), Disp: 30 tablet, Rfl: 11  No current facility-administered medications for this visit.    Facility-Administered Medications Ordered in Other Visits:     0.9%  NaCl infusion, , Intravenous, Continuous, Karlie Wyman MD    ceFAZolin 2 g in dextrose 5 % in water (D5W) 50 mL IVPB (MB+), 2 g, Intravenous, On Call Procedure, Karlie Wyman MD    ondansetron injection 4 mg, 4 mg, Intravenous, Q12H PRN, Karlie Wyman MD    PHYSICAL EXAM:   Right Arm BP - Sittin/72 (24 1349)  Pulse: 73  Temp: 98.2 °F (36.8 °C)                   Neck: No carotid bruit can be auscultated             Cardiac: Normal rate and regular rhythm, S1 and S2 normal; PMI non-displaced          Abdomen: Soft, nontender, non-distended     Pulsatile aortic mass is not palpable.      Extremities:   Strong thrill in L BC AVF - no pulsatility     Trace 1+ L radial pulse that does become stronger with AVF compresion     L Reynaldo's test - negative    LAB RESULTS:  Lab Results   Component Value Date    K 4.4  04/16/2024    K 4.4 09/29/2023    K 3.9 08/03/2023    CREATININE 7.3 (H) 04/16/2024    CREATININE 7.1 (H) 09/29/2023    CREATININE 8.2 (H) 08/03/2023     Lab Results   Component Value Date    WBC 4.25 04/16/2024    WBC 6.56 08/03/2023    WBC 5.49 07/17/2023    HCT 34.4 (L) 04/16/2024    HCT 31.3 (L) 08/03/2023    HCT 28.1 (L) 07/17/2023     (L) 04/16/2024     08/03/2023     (L) 07/17/2023     Lab Results   Component Value Date    HGBA1C 8.3 (H) 10/16/2024    HGBA1C 6.4 (H) 07/17/2024    HGBA1C 5.8 05/15/2024     IMAGING (I have independently reviewed and interpreted the following tests):  U/S: flow vol 3626 ml/min, no stenosis    PPGs: did not change with AVF compression    IMP/PLAN:     54 y.o. male with a a h/o stage V CKD on HD (June 2023), prior crack cocaine use (smokes); HTN, HLD - s/p 9/8/23, L BC AVF - doing well  Strong thrill in L BC AVF - no pulsatility; has Trace 1+ L radial pulse that does become stronger with AVF compresion  PPGs with no changes  Follow-up with me in 1 year for PE and u/s surveillance; sooner should issues arise       Joaquin Morgan MD DFSVS FACS   Vascular/Endovascular Surgery    Time spent personally reviewing the patient's chart, interpreting images and test, and conferring with physicians was HBtimespent2: 45 mins.

## 2025-01-30 ENCOUNTER — LAB VISIT (OUTPATIENT)
Dept: LAB | Facility: HOSPITAL | Age: 55
End: 2025-01-30
Attending: INTERNAL MEDICINE
Payer: MEDICARE

## 2025-01-30 ENCOUNTER — OFFICE VISIT (OUTPATIENT)
Dept: INTERNAL MEDICINE | Facility: CLINIC | Age: 55
End: 2025-01-30
Payer: MEDICARE

## 2025-01-30 VITALS
WEIGHT: 227.5 LBS | OXYGEN SATURATION: 97 % | HEIGHT: 71 IN | HEART RATE: 70 BPM | BODY MASS INDEX: 31.85 KG/M2 | DIASTOLIC BLOOD PRESSURE: 82 MMHG | SYSTOLIC BLOOD PRESSURE: 126 MMHG

## 2025-01-30 DIAGNOSIS — E11.3593 TYPE 2 DIABETES MELLITUS WITH PROLIFERATIVE DIABETIC RETINOPATHY OF BOTH EYES WITHOUT MACULAR EDEMA, UNSPECIFIED WHETHER LONG TERM INSULIN USE: Primary | ICD-10-CM

## 2025-01-30 DIAGNOSIS — Z99.2 ESRD ON DIALYSIS: ICD-10-CM

## 2025-01-30 DIAGNOSIS — N18.6 BENIGN HYPERTENSION WITH ESRD (END-STAGE RENAL DISEASE): ICD-10-CM

## 2025-01-30 DIAGNOSIS — E11.3593 TYPE 2 DIABETES MELLITUS WITH PROLIFERATIVE DIABETIC RETINOPATHY OF BOTH EYES WITHOUT MACULAR EDEMA, UNSPECIFIED WHETHER LONG TERM INSULIN USE: ICD-10-CM

## 2025-01-30 DIAGNOSIS — N18.6 TYPE 2 DIABETES MELLITUS WITH CHRONIC KIDNEY DISEASE ON CHRONIC DIALYSIS, WITHOUT LONG-TERM CURRENT USE OF INSULIN: ICD-10-CM

## 2025-01-30 DIAGNOSIS — Z99.2 TYPE 2 DIABETES MELLITUS WITH CHRONIC KIDNEY DISEASE ON CHRONIC DIALYSIS, WITHOUT LONG-TERM CURRENT USE OF INSULIN: ICD-10-CM

## 2025-01-30 DIAGNOSIS — F14.10 COCAINE ABUSE: ICD-10-CM

## 2025-01-30 DIAGNOSIS — I12.0 BENIGN HYPERTENSION WITH ESRD (END-STAGE RENAL DISEASE): ICD-10-CM

## 2025-01-30 DIAGNOSIS — E11.22 TYPE 2 DIABETES MELLITUS WITH CHRONIC KIDNEY DISEASE ON CHRONIC DIALYSIS, WITHOUT LONG-TERM CURRENT USE OF INSULIN: ICD-10-CM

## 2025-01-30 DIAGNOSIS — N18.6 ESRD ON DIALYSIS: ICD-10-CM

## 2025-01-30 LAB
ALBUMIN SERPL BCP-MCNC: 4.1 G/DL (ref 3.5–5.2)
ALP SERPL-CCNC: 81 U/L (ref 40–150)
ALT SERPL W/O P-5'-P-CCNC: 29 U/L (ref 10–44)
ANION GAP SERPL CALC-SCNC: 12 MMOL/L (ref 8–16)
AST SERPL-CCNC: 26 U/L (ref 10–40)
BILIRUB SERPL-MCNC: 0.6 MG/DL (ref 0.1–1)
BUN SERPL-MCNC: 40 MG/DL (ref 6–20)
CALCIUM SERPL-MCNC: 9.9 MG/DL (ref 8.7–10.5)
CHLORIDE SERPL-SCNC: 104 MMOL/L (ref 95–110)
CO2 SERPL-SCNC: 22 MMOL/L (ref 23–29)
CREAT SERPL-MCNC: 7.7 MG/DL (ref 0.5–1.4)
EST. GFR  (NO RACE VARIABLE): 7.8 ML/MIN/1.73 M^2
ESTIMATED AVG GLUCOSE: 148 MG/DL (ref 68–131)
GLUCOSE SERPL-MCNC: 149 MG/DL (ref 70–110)
HBA1C MFR BLD: 6.8 % (ref 4–5.6)
POTASSIUM SERPL-SCNC: 5 MMOL/L (ref 3.5–5.1)
PROT SERPL-MCNC: 7.8 G/DL (ref 6–8.4)
SODIUM SERPL-SCNC: 138 MMOL/L (ref 136–145)

## 2025-01-30 PROCEDURE — 3079F DIAST BP 80-89 MM HG: CPT | Mod: CPTII,S$GLB,, | Performed by: INTERNAL MEDICINE

## 2025-01-30 PROCEDURE — G2211 COMPLEX E/M VISIT ADD ON: HCPCS | Mod: S$GLB,,, | Performed by: INTERNAL MEDICINE

## 2025-01-30 PROCEDURE — 3008F BODY MASS INDEX DOCD: CPT | Mod: CPTII,S$GLB,, | Performed by: INTERNAL MEDICINE

## 2025-01-30 PROCEDURE — 1159F MED LIST DOCD IN RCRD: CPT | Mod: CPTII,S$GLB,, | Performed by: INTERNAL MEDICINE

## 2025-01-30 PROCEDURE — 3074F SYST BP LT 130 MM HG: CPT | Mod: CPTII,S$GLB,, | Performed by: INTERNAL MEDICINE

## 2025-01-30 PROCEDURE — 99214 OFFICE O/P EST MOD 30 MIN: CPT | Mod: S$GLB,,, | Performed by: INTERNAL MEDICINE

## 2025-01-30 PROCEDURE — 80053 COMPREHEN METABOLIC PANEL: CPT | Performed by: INTERNAL MEDICINE

## 2025-01-30 PROCEDURE — 36415 COLL VENOUS BLD VENIPUNCTURE: CPT | Performed by: INTERNAL MEDICINE

## 2025-01-30 PROCEDURE — 83036 HEMOGLOBIN GLYCOSYLATED A1C: CPT | Performed by: INTERNAL MEDICINE

## 2025-01-30 PROCEDURE — 99999 PR PBB SHADOW E&M-EST. PATIENT-LVL IV: CPT | Mod: PBBFAC,,, | Performed by: INTERNAL MEDICINE

## 2025-01-30 RX ORDER — TRAZODONE HYDROCHLORIDE 50 MG/1
50 TABLET ORAL NIGHTLY PRN
Qty: 30 TABLET | Refills: 11 | Status: SHIPPED | OUTPATIENT
Start: 2025-01-30

## 2025-01-30 NOTE — PROGRESS NOTES
"  This note was generated with [employer] voice recognition software. I apologize for any possible typographical errors.  Adonay seems to have trouble with pronouns so I apologize if I Mis pronoun anyone         He is a 54-year-old  gentleman with history of noncompliance,   with history of cocaine abuse, end-stage renal disease   on dialysis, uncontrolled hypertension, and diabetes mellitus type 2. I last saw him last  in July 2024.             Since last visit he has ESRD .  He got a dialysis He has a graft in his left arm.  -- He is getting HD through this now.  He has a graft placed in his left arm-- Port has been removed.          He was in the ED with COvid- 7/20-- Had dry cough-- was not feeling bad but had dry dry cough-- mother had it.  So they they gave him paxlovid.             He has a history of being horribly noncompliant.  But today he has taken all his meds and his bp is 126/82.        He has changed his diet and is not drinking cold drinks " back to back to back", which is good because he is diabetic.  HGB A1c was 8.3   in 10/16/2024.   He has gotten medicaid -- on januvia--says A1c at HD is a "7"        He has cocaine addictions-- recently this has been doing pretty well-- "has it s up and down-- He is in rehab now at [facility] on Tuesday and Thursday. Needs a clean records from them to get put on transplant list.             IN August 2022 he had his stroke,. MRI showed a left subcortical MCA noncritical stenosis and stroke in in her capsule and corona radii.  In the hospital he also had a MRA of his neck vessels which were unremarkable.  He was started on Plavix for 21 days and aspirin.  He is still  on aspirin after    He had  speech therapy. Right hand strength back to normal but has trouble closing middle finger all the way.              Review of Systems       ROS : Gen - no fatigue - up 3  #   Eyes - no eye pain or visual changes  ENT - no hoarseness or sore throat  CV - No chest pain or SOB.  NO " "palpitations.  Pulm - no cough or wheezing  GI - no N/V/D   no dysuria or incontinence  MS - no joint pain or muscle pain  Skin - no rash, or c/o of skin lesions  Neuro - he has some hand pain but it is on the right side.    Heme - no abnormal bleeding or bruising  Endo - no polydipsia, or temperature changes  Psych - no anxiety or depression        Objective:   Physical Exam              /82 (BP Location: Left arm, Patient Position: Sitting)   Pulse 70   Ht 5' 11" (1.803 m)   Wt 103.2 kg (227 lb 8.2 oz)   SpO2 97%   BMI 31.73 kg/m²              He is a well-appearing gentleman in no acute distress.  He is well dressed    He has a right-sided facial droop that is sparing his forehead.   Insight into his medical problems is poor but improved from last visit.    His chest are clear bilaterally.  Lungs are without any abnormal breath sounds.  Cardiovascular exam is unremarkable.  Abdomen soft and nontender.  He is walking without any difficulty and his upper lower extremities have  Normal strength.  His speech is normal He is not having any trouble with word finding.  He does not have any trouble understanding words or sentences either.     Assessment and plan:     Assessment:       1. Thrombotic stroke involving left middle cerebral artery    2. Substance abuse -- in rehab    3. Type 1 diabetes mellitus with hyperosmolarity without coma, with long-term current use of insulin    4. Malignant hypertension - much better    5. Essential hypertension    6.  7.  CKD (chronic kidney disease), ESRD-- on HD-- better compliance.      Insomnia- will tray trazodone.           Plan:        Much better the last visit- compliance better.  I am happy to see this.   Discussed check a  BP is better  -taking his meds,  Will check glucose/ hgb A1c today  If stilled will adjust medication    follow  up in 6 months    "

## 2025-02-14 ENCOUNTER — TELEPHONE (OUTPATIENT)
Dept: INTERNAL MEDICINE | Facility: CLINIC | Age: 55
End: 2025-02-14
Payer: MEDICARE

## 2025-03-13 DIAGNOSIS — L30.9 DERMATITIS: ICD-10-CM

## 2025-03-13 DIAGNOSIS — R73.9 HYPERGLYCEMIA: ICD-10-CM

## 2025-03-13 RX ORDER — AMLODIPINE BESYLATE 10 MG/1
10 TABLET ORAL
Qty: 90 TABLET | Refills: 3 | Status: SHIPPED | OUTPATIENT
Start: 2025-03-13

## 2025-03-13 NOTE — TELEPHONE ENCOUNTER
Provider Staff:  Action required for this patient    Requires labs      Please see care gap opportunities below in Care Due Message.    Thanks!  [facility]     Appointments      Date Provider   Last Visit   1/30/2025 Janak Wilson Jr., MD   Next Visit   7/29/2025 Janak Wilson Jr., MD     Refill Decision Note   Luis Daniel Howell  is requesting a refill authorization.    Brief Assessment and Rationale for Refill:  Approve       Medication Therapy Plan:         Comments:     Note composed:6:18 PM 03/13/2025

## 2025-03-13 NOTE — TELEPHONE ENCOUNTER
Care Due:                  Date            Visit Type   Department     Provider  --------------------------------------------------------------------------------                                EP -                              PRIMARY      [facility] INTERNAL  Last Visit: 01-      CARE ([facility])   NANCY Wilson                              EP -                              PRIMARY      [facility] INTERNAL  Next Visit: 07-      CARE ([facility])   [facility]       Jnaak Wilson                                                            Last  Test          Frequency    Reason                     Performed    Due Date  --------------------------------------------------------------------------------    CBC.........  12 months..  hydrALAZINE..............  04- 04-    Lipid Panel.  12 months..  atorvastatin.............  04- 04-    [facility] Embedded Care Due Messages. Reference number: [id number].   3/13/2025 5:59:49 PM CDT

## 2025-04-09 NOTE — TELEPHONE ENCOUNTER
The following individual(s) verbally consented to be recorded using ambient AI listening technology and understand that they can each withdraw their consent to this listening technology at any point by asking the clinician to turn off or pause the recording:    Patient name: Nixon Perkins  Additional names:Karla Perkins   Attempted to call patient. No answered. Unable to leave a message vm not set up yet. No appointment until 2024.

## 2025-04-25 DIAGNOSIS — L30.9 DERMATITIS: ICD-10-CM

## 2025-04-25 DIAGNOSIS — R73.9 HYPERGLYCEMIA: ICD-10-CM

## 2025-04-25 RX ORDER — TRAZODONE HYDROCHLORIDE 50 MG/1
50 TABLET ORAL NIGHTLY PRN
Qty: 90 TABLET | Refills: 0 | Status: SHIPPED | OUTPATIENT
Start: 2025-04-25

## 2025-04-25 RX ORDER — SEVELAMER HYDROCHLORIDE 800 MG/1
800 TABLET, FILM COATED ORAL
Qty: 270 TABLET | Refills: 0 | Status: SHIPPED | OUTPATIENT
Start: 2025-04-25

## 2025-04-25 RX ORDER — ATORVASTATIN CALCIUM 40 MG/1
40 TABLET, FILM COATED ORAL NIGHTLY
Qty: 90 TABLET | Refills: 0 | Status: SHIPPED | OUTPATIENT
Start: 2025-04-25

## 2025-04-25 RX ORDER — LABETALOL 300 MG/1
300 TABLET, FILM COATED ORAL EVERY 12 HOURS
Qty: 180 TABLET | Refills: 3 | Status: SHIPPED | OUTPATIENT
Start: 2025-04-25

## 2025-04-25 RX ORDER — LOSARTAN POTASSIUM 50 MG/1
50 TABLET ORAL 2 TIMES DAILY
Qty: 180 TABLET | Refills: 0 | Status: SHIPPED | OUTPATIENT
Start: 2025-04-25

## 2025-04-25 RX ORDER — HYDRALAZINE HYDROCHLORIDE 100 MG/1
100 TABLET, FILM COATED ORAL 2 TIMES DAILY
Qty: 180 TABLET | Refills: 0 | Status: SHIPPED | OUTPATIENT
Start: 2025-04-25

## 2025-04-25 RX ORDER — AMLODIPINE BESYLATE 10 MG/1
10 TABLET ORAL DAILY
Qty: 90 TABLET | Refills: 3 | Status: SHIPPED | OUTPATIENT
Start: 2025-04-25

## 2025-04-25 RX ORDER — FUROSEMIDE 40 MG/1
40 TABLET ORAL DAILY
Qty: 90 TABLET | Refills: 0 | Status: SHIPPED | OUTPATIENT
Start: 2025-04-25

## 2025-04-25 RX ORDER — METOLAZONE 5 MG/1
5 TABLET ORAL DAILY
Qty: 90 TABLET | Refills: 0 | Status: SHIPPED | OUTPATIENT
Start: 2025-04-25

## 2025-04-25 NOTE — TELEPHONE ENCOUNTER
Refill Routing Note   Medication(s) are not appropriate for processing by [facility] for the following reason(s):        Required labs abnormal  Drug-drug interaction  Required labs outdated  Outside of protocol  New or recently adjusted medication-trazodone    [facility] action(s):  Defer  Route  Approve        Medication Therapy Plan: Sevemelir is OOP; sCR and eGFR out of range; Duplicate therapy: furosemide and metolazone; Trazodone is new start. NEW PHARMACY FROM [facility]    Pharmacist review requested: Yes  ( sCr and eGFR and DDI)    Appointments  past 12m or future 3m with PCP    Date Provider   Last Visit   1/30/2025 Janak Wilson Jr., MD   Next Visit   7/29/2025 Janak Wilson Jr., MD   ED visits in past 90 days: 0        Note composed:11:56 AM 04/25/2025

## 2025-04-25 NOTE — TELEPHONE ENCOUNTER
Refill Routing Note   Medication(s) are not appropriate for processing by [facility] for the following reason(s):        Required labs abnormal: Lasix, Cozaar, Zaroxolyn, Januvia  Required labs outdated: lipid panel & CBC  Outside of protocol: Renagel  New or recently adjusted medication: Trazodone  Other: pharmacy change     [facility] action(s):  Defer  Route  Approve           Pharmacist review requested: Yes     Appointments  past 12m or future 3m with PCP    Date Provider   Last Visit   1/30/2025 Janak Wilson Jr., MD   Next Visit   7/29/2025 Janak Wilson Jr., MD   ED visits in past 90 days: 0        Note composed:1:24 PM 04/25/2025

## 2025-04-25 NOTE — TELEPHONE ENCOUNTER
No care due was identified.  [facility] Embedded Care Due Messages. Reference number: [id number].   4/25/2025 9:24:20 AM CDT

## 2025-04-27 NOTE — TELEPHONE ENCOUNTER
Refill Routing Note   Medication(s) are not appropriate for processing by [facility] for the following reason(s):        Required labs outdated    [facility] action(s):  Defer        Medication Therapy Plan: Pharmacy change      Appointments  past 12m or future 3m with PCP    Date Provider   Last Visit   1/30/2025 Janak Wilson Jr., MD   Next Visit   7/29/2025 Janak Wilson Jr., MD   ED visits in past 90 days: 0        Note composed:8:13 AM 04/27/2025

## 2025-04-27 NOTE — TELEPHONE ENCOUNTER
No care due was identified.  [facility] Embedded Care Due Messages. Reference number: [id number].   4/26/2025 9:37:23 PM CDT   ptx will f/u with pmd

## 2025-04-28 DIAGNOSIS — Z00.00 ENCOUNTER FOR MEDICARE ANNUAL WELLNESS EXAM: ICD-10-CM

## 2025-04-28 RX ORDER — ATORVASTATIN CALCIUM 40 MG/1
40 TABLET, FILM COATED ORAL NIGHTLY
Qty: 90 TABLET | Refills: 0 | Status: SHIPPED | OUTPATIENT
Start: 2025-04-28

## 2025-05-02 ENCOUNTER — TELEPHONE (OUTPATIENT)
Dept: INTERNAL MEDICINE | Facility: CLINIC | Age: 55
End: 2025-05-02
Payer: MEDICARE

## 2025-05-02 RX ORDER — GABAPENTIN 100 MG/1
100 CAPSULE ORAL 3 TIMES DAILY
Qty: 90 CAPSULE | Refills: 2 | Status: SHIPPED | OUTPATIENT
Start: 2025-05-02

## 2025-05-02 NOTE — TELEPHONE ENCOUNTER
----- Message from Nora sent at 5/2/2025  9:42 AM CDT -----  [facility]  [email or web address] Patient is calling for Medical Advice regarding:pharmacy called for status of RX request for Gabapenpin 100mg faxed request on 5/1/25How long has patient had these symptoms:Pharmacy name and phone#: [facility], [place] - [id number] [id number]: [phone] Fax: [phone] wants a call back or thru [facility]: call Comments:Please advise patient replies from provider may take up to 48 hours.

## 2025-05-12 DIAGNOSIS — Z86.73 HISTORY OF THROMBOTIC STROKE WITHOUT RESIDUAL DEFICITS: Primary | ICD-10-CM

## 2025-05-12 RX ORDER — GABAPENTIN 100 MG/1
CAPSULE ORAL
Qty: 90 CAPSULE | Refills: 11 | Status: SHIPPED | OUTPATIENT
Start: 2025-05-12

## 2025-05-12 NOTE — TELEPHONE ENCOUNTER
Care Due:                  Date            Visit Type   Department     Provider  --------------------------------------------------------------------------------                                EP -                              PRIMARY      [facility] INTERNAL  Last Visit: 01-      CARE ([facility])   NANCY Wilson                               -                              PRIMARY      [facility] INTERNAL  Next Visit: 07-      CARE ([facility])   NANCY Wilson                                                            Last  Test          Frequency    Reason                     Performed    Due Date  --------------------------------------------------------------------------------    HBA1C.......  6 months...  SITagliptin..............  01- 07-    [facility] Embedded Care Due Messages. Reference number: [id number].   5/12/2025 5:57:24 AM CDT

## 2025-05-22 DIAGNOSIS — N52.9 ERECTILE DYSFUNCTION, UNSPECIFIED ERECTILE DYSFUNCTION TYPE: ICD-10-CM

## 2025-05-22 RX ORDER — SILDENAFIL 100 MG/1
TABLET, FILM COATED ORAL
Qty: 10 TABLET | Refills: 11 | Status: SHIPPED | OUTPATIENT
Start: 2025-05-22

## 2025-05-22 NOTE — TELEPHONE ENCOUNTER
Care Due:                  Date            Visit Type   Department     Provider  --------------------------------------------------------------------------------                                EP -                              PRIMARY      [facility] INTERNAL  Last Visit: 01-      CARE ([facility])   NANCY Wilson                              EP -                              PRIMARY      [facility] INTERNAL  Next Visit: 07-      CARE ([facility])   [facility]       Janak Wilson                                                            Last  Test          Frequency    Reason                     Performed    Due Date  --------------------------------------------------------------------------------    CBC.........  12 months..  hydrALAZINE..............  04- 04-    Lipid Panel.  12 months..  atorvastatin.............  04- 04-    [facility] Embedded Care Due Messages. Reference number: [id number].   5/22/2025 2:10:44 PM CDT

## 2025-05-22 NOTE — TELEPHONE ENCOUNTER
----- Message from Leo sent at 5/22/2025  2:03 PM CDT -----  Contact: pt @ [phone]  Requesting an RX refill or new RX. Is this a refill or new RX: RX name and strength (copy/paste from chart):  sildenafiL (VIAGRA) 100 MG tablet Is this a 30 day or 90 day RX: Pharmacy name and phone # (copy/paste from chart):  [place], [place] - [address] [address]: [phone] Fax: [phone]  The doctors have asked that we provide their patients with the following 2 reminders -- prescription refills can take up to 72 hours, and a friendly reminder that in the future you can use your [facility] account to request refills: yes

## 2025-06-12 NOTE — TELEPHONE ENCOUNTER
No care due was identified.  [facility] Embedded Care Due Messages. Reference number: [id number].   6/12/2025 6:30:18 PM CDT

## 2025-06-13 RX ORDER — HYDRALAZINE HYDROCHLORIDE 100 MG/1
TABLET, FILM COATED ORAL
Qty: 180 TABLET | Refills: 0 | Status: SHIPPED | OUTPATIENT
Start: 2025-06-13

## 2025-06-13 RX ORDER — FUROSEMIDE 40 MG/1
40 TABLET ORAL DAILY
Qty: 90 TABLET | Refills: 2 | Status: SHIPPED | OUTPATIENT
Start: 2025-06-13

## 2025-06-13 NOTE — TELEPHONE ENCOUNTER
Refill Routing Note   Medication(s) are not appropriate for processing by [facility] for the following reason(s):        Required labs abnormal      CREATININE 7.7 (H) 01/30/2025     Required labs outdated    [facility] action(s):  Defer             Appointments  past 12m or future 3m with PCP    Date Provider   Last Visit   1/30/2025 Janak Wilson Jr., MD   Next Visit   7/29/2025 Janak Wilson Jr., MD   ED visits in past 90 days: 0        Note composed:12:04 AM 06/13/2025

## 2025-07-13 NOTE — TELEPHONE ENCOUNTER
No care due was identified.  [facility] Embedded Care Due Messages. Reference number: [id number].   7/12/2025 9:26:03 PM CDT

## 2025-07-14 RX ORDER — ATORVASTATIN CALCIUM 40 MG/1
40 TABLET, FILM COATED ORAL NIGHTLY
Qty: 90 TABLET | Refills: 3 | Status: SHIPPED | OUTPATIENT
Start: 2025-07-14

## 2025-07-14 NOTE — TELEPHONE ENCOUNTER
Refill Routing Note   Medication(s) are not appropriate for processing by [facility] for the following reason(s):        Required labs outdated    [facility] action(s):  Defer             Appointments  past 12m or future 3m with PCP    Date Provider   Last Visit   1/30/2025 Janak Wilson Jr., MD   Next Visit   7/29/2025 Janak Wilson Jr., MD   ED visits in past 90 days: 0        Note composed:8:31 AM 07/14/2025

## 2025-07-29 ENCOUNTER — OFFICE VISIT (OUTPATIENT)
Dept: INTERNAL MEDICINE | Facility: CLINIC | Age: 55
End: 2025-07-29
Payer: MEDICARE

## 2025-07-29 VITALS
SYSTOLIC BLOOD PRESSURE: 146 MMHG | WEIGHT: 223.75 LBS | BODY MASS INDEX: 31.32 KG/M2 | HEIGHT: 71 IN | HEART RATE: 70 BPM | DIASTOLIC BLOOD PRESSURE: 76 MMHG | OXYGEN SATURATION: 98 %

## 2025-07-29 DIAGNOSIS — I50.32 CHRONIC HEART FAILURE WITH PRESERVED EJECTION FRACTION: ICD-10-CM

## 2025-07-29 DIAGNOSIS — M79.643 PAIN OF HAND, UNSPECIFIED LATERALITY: Primary | ICD-10-CM

## 2025-07-29 PROCEDURE — 3008F BODY MASS INDEX DOCD: CPT | Mod: CPTII,S$GLB,, | Performed by: INTERNAL MEDICINE

## 2025-07-29 PROCEDURE — 99999 PR PBB SHADOW E&M-EST. PATIENT-LVL IV: CPT | Mod: PBBFAC,,, | Performed by: INTERNAL MEDICINE

## 2025-07-29 PROCEDURE — 3077F SYST BP >= 140 MM HG: CPT | Mod: CPTII,S$GLB,, | Performed by: INTERNAL MEDICINE

## 2025-07-29 PROCEDURE — 4010F ACE/ARB THERAPY RXD/TAKEN: CPT | Mod: CPTII,S$GLB,, | Performed by: INTERNAL MEDICINE

## 2025-07-29 PROCEDURE — 99215 OFFICE O/P EST HI 40 MIN: CPT | Mod: S$GLB,,, | Performed by: INTERNAL MEDICINE

## 2025-07-29 PROCEDURE — 3044F HG A1C LEVEL LT 7.0%: CPT | Mod: CPTII,S$GLB,, | Performed by: INTERNAL MEDICINE

## 2025-07-29 PROCEDURE — 3078F DIAST BP <80 MM HG: CPT | Mod: CPTII,S$GLB,, | Performed by: INTERNAL MEDICINE

## 2025-07-29 RX ORDER — LOSARTAN POTASSIUM 100 MG/1
100 TABLET ORAL DAILY
Qty: 90 TABLET | Refills: 3 | Status: SHIPPED | OUTPATIENT
Start: 2025-07-29

## 2025-08-01 DIAGNOSIS — M79.641 RIGHT HAND PAIN: Primary | ICD-10-CM

## 2025-08-01 DIAGNOSIS — M25.511 RIGHT SHOULDER PAIN, UNSPECIFIED CHRONICITY: ICD-10-CM

## 2025-08-05 ENCOUNTER — OFFICE VISIT (OUTPATIENT)
Dept: ORTHOPEDICS | Facility: CLINIC | Age: 55
End: 2025-08-05
Payer: MEDICARE

## 2025-08-05 ENCOUNTER — HOSPITAL ENCOUNTER (OUTPATIENT)
Dept: RADIOLOGY | Facility: OTHER | Age: 55
Discharge: HOME | End: 2025-08-05
Attending: PHYSICIAN ASSISTANT
Payer: MEDICARE

## 2025-08-05 VITALS
HEART RATE: 65 BPM | DIASTOLIC BLOOD PRESSURE: 74 MMHG | BODY MASS INDEX: 31.31 KG/M2 | HEIGHT: 71 IN | WEIGHT: 223.69 LBS | SYSTOLIC BLOOD PRESSURE: 158 MMHG

## 2025-08-05 DIAGNOSIS — M25.511 CHRONIC RIGHT SHOULDER PAIN: Primary | ICD-10-CM

## 2025-08-05 DIAGNOSIS — D63.1 ANEMIA OF CHRONIC KIDNEY FAILURE, UNSPECIFIED STAGE: ICD-10-CM

## 2025-08-05 DIAGNOSIS — N18.9 ANEMIA OF CHRONIC KIDNEY FAILURE, UNSPECIFIED STAGE: ICD-10-CM

## 2025-08-05 DIAGNOSIS — Z99.2 ESRD ON DIALYSIS: ICD-10-CM

## 2025-08-05 DIAGNOSIS — M25.511 RIGHT SHOULDER PAIN, UNSPECIFIED CHRONICITY: ICD-10-CM

## 2025-08-05 DIAGNOSIS — N18.6 ESRD ON DIALYSIS: ICD-10-CM

## 2025-08-05 DIAGNOSIS — G89.29 CHRONIC RIGHT SHOULDER PAIN: Primary | ICD-10-CM

## 2025-08-05 DIAGNOSIS — M79.641 RIGHT HAND PAIN: ICD-10-CM

## 2025-08-05 DIAGNOSIS — Z86.73 HISTORY OF THROMBOTIC STROKE WITHOUT RESIDUAL DEFICITS: ICD-10-CM

## 2025-08-05 DIAGNOSIS — M79.644 PAIN IN FINGER OF RIGHT HAND: ICD-10-CM

## 2025-08-05 PROBLEM — E78.2 MIXED HYPERLIPIDEMIA: Status: ACTIVE | Noted: 2019-09-08

## 2025-08-05 PROBLEM — E11.8 TYPE 2 DIABETES MELLITUS WITH COMPLICATION, WITH LONG-TERM CURRENT USE OF INSULIN: Status: ACTIVE | Noted: 2019-09-08

## 2025-08-05 PROBLEM — Z23 ENCOUNTER FOR IMMUNIZATION: Status: ACTIVE | Noted: 2024-04-16

## 2025-08-05 PROBLEM — N18.4 CKD (CHRONIC KIDNEY DISEASE) STAGE 4, GFR 15-29 ML/MIN: Status: ACTIVE | Noted: 2019-09-08

## 2025-08-05 PROBLEM — I10 ESSENTIAL HYPERTENSION: Status: ACTIVE | Noted: 2019-09-08

## 2025-08-05 PROBLEM — Z79.4 TYPE 2 DIABETES MELLITUS WITH COMPLICATION, WITH LONG-TERM CURRENT USE OF INSULIN: Status: ACTIVE | Noted: 2019-09-08

## 2025-08-05 PROBLEM — R11.0 NAUSEA: Status: ACTIVE | Noted: 2024-11-09

## 2025-08-05 PROBLEM — E11.22 TYPE 2 DIABETES MELLITUS WITH DIABETIC CHRONIC KIDNEY DISEASE: Status: ACTIVE | Noted: 2021-09-23

## 2025-08-05 PROBLEM — N17.9 AKI (ACUTE KIDNEY INJURY): Status: RESOLVED | Noted: 2019-09-09 | Resolved: 2025-08-05

## 2025-08-05 PROCEDURE — 73130 X-RAY EXAM OF HAND: CPT | Mod: TC,RT

## 2025-08-05 PROCEDURE — 99205 OFFICE O/P NEW HI 60 MIN: CPT | Mod: S$PBB,,, | Performed by: PHYSICIAN ASSISTANT

## 2025-08-05 PROCEDURE — 99999 PR PBB SHADOW E&M-EST. PATIENT-LVL V: CPT | Mod: PBBFAC,,, | Performed by: PHYSICIAN ASSISTANT

## 2025-08-05 PROCEDURE — 73130 X-RAY EXAM OF HAND: CPT | Mod: 26,RT,, | Performed by: RADIOLOGY

## 2025-08-05 PROCEDURE — 73030 X-RAY EXAM OF SHOULDER: CPT | Mod: 26,RT,, | Performed by: RADIOLOGY

## 2025-08-05 PROCEDURE — 73030 X-RAY EXAM OF SHOULDER: CPT | Mod: TC,RT

## 2025-08-05 PROCEDURE — 4010F ACE/ARB THERAPY RXD/TAKEN: CPT | Mod: ,,, | Performed by: PHYSICIAN ASSISTANT

## 2025-08-05 RX ORDER — DICLOFENAC SODIUM 10 MG/G
2 GEL TOPICAL 4 TIMES DAILY
Qty: 150 G | Refills: 0 | Status: SHIPPED | OUTPATIENT
Start: 2025-08-05 | End: 2025-09-04

## 2025-08-05 NOTE — PROGRESS NOTES
"Lis Corona, [facility], PA-C  Orthopedic Hand and Upper Extremity  Subjective:    Patient ID: Luis Daniel Howell is a 55 y.o. male.    Primary Care Provider: Janak Wilson Jr., MD   Referred By: Janak Wilson Jr., MD     Chief Complaint: Pain, Numbness, Tingling, and Swelling of the Right Hand and Pain of the Right Shoulder    History of Present Illness on 08/05/2025  History of Present Illness    Luis Daniel is a 55 year old male who presents today with his wife for right hand pain and difficulty closing right index finger in addition to right shoulder pain. He reports right hand symptoms involving the index finger, including inability to fully close the finger and pain with movement. He notes that applying hot water helps improve finger mobility, causing the finger to "pop and close". He can cross his index and middle fingers, though this action is difficult and painful. He describes stiffness in the hand, suggesting potential arthritic changes. He has a history of stroke in 2022 with right-sided neurological deficits, including facial drooping and hand weakness; however, he notes that he underwent rehabilitation therapy  which resolved right upper extremity and facial deficits. However, he has noted a return in weakness and pain in shoulder and thumb on the right. The right-sided deficits continue to impact his function. He reports right shoulder pain that developed after the stroke, affecting his sleep quality. He experiences a popping sensation in the shoulder with certain movements. He takes ibuprofen 400mg to improve sleep. He is currently undergoing dialysis treatment. He has not had any recent physical therapy for these issues nor any advanced imaging.      Patient has previously been treated for this issue at [facility] on 07/292/205 with Janak Wilson MD with recommendations for referral to orthopedics for further evaluation and treatment recommendations.     X-rays of the right " "shoulder and hand were obtained and reviewed in office today; results discussed below.    Past Medical History:   Diagnosis Date    Anemia     Cardiomyopathy     CKD (chronic kidney disease), stage V     Cocaine abuse     CVA (cerebral vascular accident)     2022    Dependence on renal dialysis     Diabetes mellitus, type 2     Diabetic retinopathy     Heart failure     Hyperlipidemia     Hypertension     Obesity     Renal manifestation of secondary diabetes mellitus      Past Surgical History:   Procedure Laterality Date    AV FISTULA PLACEMENT Left 9/8/2023    Procedure: CREATION, AV FISTULA;  Surgeon: Joaquin Morgan MD;  Location: [facility] OR [address] [place] [address];  Service: Vascular;  Laterality: Left;  brachio cephalic AV fistula    CIRCUMCISION      WISDOM TOOTH EXTRACTION       Family History   Problem Relation Name Age of Onset    Cancer Mother      Hypertension Mother      Cataracts Mother      Hypertension Father      Diabetes Father      Diabetes Brother      Amblyopia Neg Hx      Blindness Neg Hx      Glaucoma Neg Hx      Macular degeneration Neg Hx      Retinal detachment Neg Hx      Strabismus Neg Hx      Stroke Neg Hx      Thyroid disease Neg Hx       Social History     Socioeconomic History    Marital status: Single    Number of children: 1   Occupational History     Employer: [employer]   Tobacco Use    Smoking status: Never    Smokeless tobacco: Never   Substance and Sexual Activity    Alcohol use: No    Drug use: Yes     Types: "Crack" cocaine, Cocaine, Amphetamines     Comment: in remission     Sexual activity: Yes     Partners: Female   Social History Narrative    Caregiver Mother     Social Drivers of Health     Financial Resource Strain: Low Risk  (5/22/2024)    Overall Financial Resource Strain (CARDIA)     Difficulty of Paying Living Expenses: Not very hard   Food Insecurity: No Food Insecurity (5/22/2024)    Hunger Vital Sign     Worried About Running Out of Food in the Last Year: Never true     " "Ran Out of Food in the Last Year: Never true   Transportation Needs: No Transportation Needs (5/22/2024)    PRAPARE - Transportation     Lack of Transportation (Medical): No     Lack of Transportation (Non-Medical): No   Physical Activity: Sufficiently Active (5/22/2024)    Exercise Vital Sign     Days of Exercise per Week: 7 days     Minutes of Exercise per Session: 30 min   Stress: No Stress Concern Present (5/22/2024)    [employer] - Occupational Stress Questionnaire     Feeling of Stress : Only a little   Housing Stability: Unknown (5/22/2024)    Housing Stability Vital Sign     Unable to Pay for Housing in the Last Year: No     Homeless in the Last Year: No     Current Medications[1]  Review of patient's allergies indicates:  No Known Allergies    Review of Systems    The remainder of a 14-point ROS has been performed and is otherwise negative.  Objective:      Vitals:    08/05/25 0952   BP: (!) 158/74   BP Location: Right arm   Patient Position: Sitting   Pulse: 65   Weight: 101.5 kg (223 lb 10.5 oz)   Height: 5' 11" (1.803 m)   Body mass index is 31.19 kg/m².    Physical Exam  General Exam:  Constitutional:  Well developed, Black or  male, in no acute distress  Eyes:    Normal position and movement, follows examiner appropriately, pupils round and symmetric  Ears:    Hearing intact to conversational voice  Mouth:   Normal mucosal color without swelling or bleeding present; normal dentition for age  Psychiatric:    Normal mood, appropriate affect; alert and oriented to person, place, and time    Upper Extremity Exam:   Inspection:  Skin normal, intact, without incisional scar  Palpation:   See dictation below  Range of Motion: See dictation below  Neurologic:       Sensorimotor: Radial         Median           Ulnar         RUE:  5     5      5 SILT of ulnar, median, and radial nerves     LUE:  5     5      5 SILT of ulnar, median, and radial nerves  Other " Tests   2+ radial, ulnar pulses; < 2 second capillary refill     Physical Exam    MSK: Hand/Wrist - Right: Limited ability to make full fist with right index finger. Tenderness with passive extension and palpation of right index MCP joint. MCP tenderness at volar and dorsal aspects greater than radial and ulnar aspects. Wrist nodule (Right). Wrist tenderness (Right). Decreased index finger ROM (Right). Decreased middle finger ROM (Right).  MSK: Shoulder - Right: Right-sided weakness with resistance testing. Positive Job's test on the right.       Imaging:  I have personally reviewed the 2+ view XR of the right shoulder and hand obtained on 08/05/2025 at [facility] which demonstrate no acute osseous pathology or advanced degenerative changes.    Radiologist's interpretation copied below for continuity:  X-Ray Hand Complete Right 08/05/2025  COMPARISON: 03/20/2023    Bony structures are intact joint spaces are maintained minimal degenerative change.  Vascular calcifications are identified.  No acute fractures noted.  There is old healed fracture of the distal 5th metacarpal    Electronically signed by: Gee Wynn MD    X-ray Shoulder 2 or More Views Right 08/05/2025  Glenohumeral joint is normal.  Bony structures are intact.  No soft tissue calcification is seen    Electronically signed by: Gee Wynn MD    Labs:  Lab Results   Component Value Date    HGBA1C 6.8 (H) 07/16/2025     BMP  Lab Results   Component Value Date     01/30/2025    K 5.0 01/30/2025     01/30/2025    CO2 22 (L) 01/30/2025    BUN 44 (H) 07/02/2025    CREATININE 7.7 (H) 01/30/2025    CALCIUM 9.9 01/30/2025    ANIONGAP 12 01/30/2025    EGFRNORACEVR 7.8 (A) 01/30/2025     Lab Results   Component Value Date    WBC 4.25 04/16/2024    HGB 11.2 (L) 07/30/2025    HCT 34.4 (L) 04/16/2024    MCV 88 04/16/2024     (L) 04/16/2024     Assessment:   Luis Daniel is a 55 y.o. male with chronic right index finger and shoulder  pain in the setting of mild osteoarthritis in addition to/versus likely right shoulder rotator cuff tendinopathy. Additionally patient has end-stage renal disease on dialysis in addition to history of vascular stroke without long-term deficits.    1. Chronic right shoulder pain  MRI Shoulder Without Contrast Right      2. Pain in finger of right hand  Ambulatory referral/consult to Orthopedics    Ambulatory Referral/Consult to Occupational Therapy      3. History of thrombotic stroke without residual deficits        4. ESRD on dialysis        5. Anemia of chronic kidney failure, unspecified stage          Plan:   I had a lengthy discussion with Luis Daniel and his wife about symptoms, clinical examination, and imaging findings. They present today endorsing primarily right index finger pain and secondary right shoulder pain. Their physical exam is significant for right shoulder impingement testing in addition to limited, painful ROM of the right index PIP joint. Recent imaging demonstrates no acute findings or advanced degenerative changes. Recent labs demonstrate no concerning abnormalities.    I discussed the treatment options moving forward including conservative modalities versus injection versus surgery. In my opinion, an injection would entail corticosteroid injection into the right index finger IP joint in addition to/versus right subacromial space. However, IP injections may not provided significant benefit and can be a painful injection; additionally, I do not recommend shoulder injections in the remote setting of upcoming MRI as they can distort imaging results. In this setting, I recommend a shoulder MRI to evaluate for rotator cuff pathology. I recommend follow up after the MRI images and radiologist's report are available for review and treatment discussion. In the interim, I recommend initiating a course of conservative treatment and they elect to proceed. I also provided them with a home exercise program  and referred the patient to formal Occupational Therapy for strengthening and flexibility of the right hand with targeted local modalities as indicated. I spent approximately 9 minutes demonstrating recommended HEP in addition to paraffin wax baths.     I recommend they continue taking OTC NSAIDs in a scheduled fashion for the inflammatory pattern in consultation with pharmacist and/or nephrologist given ESRD on dialysis. GI precautions were discussed, patient verbalized understanding. I recommend they maximize acetaminophen (Tylenol) in a scheduled fashion, not to exceed 3,000 mg daily) and trialing a topical anti-inflammatory agent, such as diclofenac (Voltaren) gel and/or topical CBD oil.    We discussed the red flag symptoms that should prompt an urgent phone call and/or presentation to emergency department, including but not limited to, new motor weakness, new or worsening pain, or changes to sensation.    The patient's pathophysiology was explained in detail with reference to x-rays, models, other visual aids as appropriate.   I conducted a thorough review of previous records available via [employer] EMR for optimal care coordination.    IMPRESSION:   Reviewed hand XRs, noting no fractures or advanced arthritic changes.   Observed bone cysts in hand, deemed benign and explained as analogous to street bumps.   Assessed right hand function, noting difficulty closing index finger and tenderness.   Evaluated shoulder, suspecting rotator cuff impingement and explained how muscle weakness can lead to joint popping.   Recommend MRI of right shoulder to rule out issues that may not respond to physical therapy.   Considered combination of wear and tear and health history as contributing factors to shoulder issues.    M25.511, G89.29 CHRONIC RIGHT SHOULDER PAIN:  - Ordered MRI Right Shoulder WO Contrast.  - Scheduled follow up in 6 weeks to review MRI results.      Follow Up: with Teresa Heart MD for review of MRI  results, treatment response of right index finger pain with OT   Imaging needed prior to next visit: No additional imaging necessary unless otherwise indicated.     All questions and concerns were addressed during this visit, and the patient expressed understanding and agreement with our plan. They are encouraged to call and/or return to clinic at any time if issues arise.    Lis Corona [facility], MARGARET  Hand & Upper Extremity Orthopedics  [facility]  08/05/2025 at 10:18 PM    This note was generated with the assistance of ambient listening technology. Verbal consent was obtained by the patient and accompanying visitor(s) for the recording of patient appointment to facilitate this note. I attest to having reviewed and edited the generated note for accuracy, though some syntax or spelling errors may persist. Please contact the author of this note for any clarification.    Future Appointments   Date Time Provider Department Center   8/12/2025 10:00 AM [facility] MRI1 [facility] MRI [facility]   9/11/2025  9:00 AM Froilan Espinoza, FNP-BC [facility]   9/30/2025  9:00 AM [facility] Wilson Jr., MD [facility]   10/2/2025 11:15 AM Teresa Heart MD [facility] HAND [facility]            [1]   Current Outpatient Medications   Medication Sig Dispense Refill    amLODIPine (NORVASC) 10 MG tablet Take 1 tablet (10 mg total) by mouth once daily. 90 tablet 3    atorvastatin (LIPITOR) 40 MG tablet TAKE 1 TABLET(S) BY MOUTH EVERY EVENING 90 tablet 3    blood sugar diagnostic Strp To check BG 2 times daily, to use with insurance preferred meter 200 each 3    calcitRIOL (ROCALTROL) 0.25 MCG Cap TAKE 1 CAPSULE (0.25 MCG TOTAL) BY MOUTH ONCE DAILY. 90 capsule 0    cholecalciferol, vitamin D3, (VITAMIN D3) 50 mcg (2,000 unit) Cap capsule Take 2,000 Units by mouth once daily.      erythromycin (ROMYCIN) ophthalmic ointment Place a 1/2 inch ribbon of ointment into the lower eyelid every 4 hours for 5 days. 3.5 g  0    furosemide (LASIX) 40 MG tablet TAKE 1 TABLET (40 MG TOTAL) BY MOUTH ONCE DAILY. 90 tablet 2    gabapentin (NEURONTIN) 100 MG capsule TAKE 1 CAPSULE BY MOUTH 3 TIMES DAILY 90 capsule 11    hydrALAZINE (APRESOLINE) 100 MG tablet TAKE ONE (1) TABLET BY MOUTH TWICE DAILY 180 tablet 0    losartan (COZAAR) 100 MG tablet Take 1 tablet (100 mg total) by mouth once daily. 90 tablet 3    metOLazone (ZAROXOLYN) 5 MG tablet Take 1 tablet (5 mg total) by mouth once daily. 90 tablet 0    SENSIPAR 30 mg Tab Take 1 tablet by mouth.      sevelamer carbonate (RENVELA) 800 mg Tab TAKE 1 TABLET BY MOUTH 3 TIMES DAILY WITH MEALS. 270 tablet 3    sevelamer HCL (RENAGEL) 800 MG Tab Take 1 tablet (800 mg total) by mouth 3 (three) times daily with meals. 270 tablet 0    sildenafiL (VIAGRA) 100 MG tablet TAKE 1 TABLET(S) BY MOUTH EVERY DAY AS NEEDED FOR ERECTILE DYSFUNCTION 10 tablet 11    SITagliptin phosphate (JANUVIA) 25 MG Tab Take 1 tablet (25 mg total) by mouth once daily. 90 tablet 3    traZODone (DESYREL) 50 MG tablet Take 1 tablet (50 mg total) by mouth nightly as needed. 90 tablet 0    aspirin 81 MG Chew Take 1 tablet (81 mg total) by mouth once daily. (Patient taking differently: Take 81 mg by mouth once daily.) 30 tablet 11     No current facility-administered medications for this visit.     Facility-Administered Medications Ordered in Other Visits   Medication Dose Route Frequency Provider Last Rate Last Admin    0.9%  NaCl infusion   Intravenous Continuous Karlie Wyman MD        ceFAZolin 2 g in dextrose 5 % in water (D5W) 50 mL IVPB (MB+)  2 g Intravenous On Call Procedure Karlie Wyman MD        ondansetron injection 4 mg  4 mg Intravenous Q12H PRN Karlie Wyman MD

## 2025-08-11 PROBLEM — Z23 ENCOUNTER FOR IMMUNIZATION: Status: RESOLVED | Noted: 2024-04-16 | Resolved: 2025-08-11

## 2025-08-28 ENCOUNTER — TELEPHONE (OUTPATIENT)
Dept: ORTHOPEDICS | Facility: CLINIC | Age: 55
End: 2025-08-28
Payer: MEDICARE

## (undated) DEVICE — COVERS PROBE NR-48 STERILE

## (undated) DEVICE — GOWN SURGICAL X-LARGE

## (undated) DEVICE — SEE MEDLINE ITEM 153688

## (undated) DEVICE — AV VASCULAR ACCESS PACK

## (undated) DEVICE — TOWEL OR XRAY WHITE 17X26IN

## (undated) DEVICE — SPONGE DERMACEA GAUZE 4X4

## (undated) DEVICE — SUT SILK 2-0 SH 18IN BLACK

## (undated) DEVICE — GAUZE SPONGE PEANUT STRL

## (undated) DEVICE — CANNULA VESSEL

## (undated) DEVICE — SUT 3-0 12-18IN SILK

## (undated) DEVICE — SUT D SPECIAL VICRYL 2-0

## (undated) DEVICE — SUT 4-0 12-18IN SILK BLACK

## (undated) DEVICE — DRESSING TRANS 4X4 TEGADERM

## (undated) DEVICE — PACK AV VASCULAR ACCESS OMC

## (undated) DEVICE — CLIP SPRING 6MM

## (undated) DEVICE — SUT LIGACLIP SMALL XTRA

## (undated) DEVICE — ELECTRODE REM PLYHSV RETURN 9

## (undated) DEVICE — LOOP VESSEL BLUE MAXI

## (undated) DEVICE — SUT VICRYL PLUS 3-0 SH 18IN

## (undated) DEVICE — SPONGE DERMACEA 4X4IN 12PLY

## (undated) DEVICE — SYR ONLY LUER LOCK 20CC

## (undated) DEVICE — SUT 2-0 VICRYL / SH (J417)

## (undated) DEVICE — CLIP MED TICALL

## (undated) DEVICE — HEMOSTAT SURGICEL 4X8IN

## (undated) DEVICE — SUT PROLENE 6-0 BV-1 30IN

## (undated) DEVICE — TOWEL OR DISP STRL BLUE 4/PK

## (undated) DEVICE — SET DECANTER MEDICHOICE

## (undated) DEVICE — GAUZE SPONGE 4X4 12PLY

## (undated) DEVICE — SUT VICRYL 3-0 27 SH

## (undated) DEVICE — STOCKINET 4INX48

## (undated) DEVICE — SUT 2-0 12-18IN SILK

## (undated) DEVICE — SUT MCRYL PLUS 4-0 PS2 27IN

## (undated) DEVICE — TIP YANKAUERS BULB NO VENT

## (undated) DEVICE — DECANTER FLUID TRNSF WHITE 9IN

## (undated) DEVICE — ADHESIVE MASTISOL VIAL 48/BX

## (undated) DEVICE — LOOP VESSEL BLUE MINI 2/CARD

## (undated) DEVICE — PAD CURAD NONADH 3X4IN

## (undated) DEVICE — APPLICATOR CHLORAPREP ORN 26ML

## (undated) DEVICE — CLOSURE SKIN STERI STRIP 1/2X4

## (undated) DEVICE — DRESSING TRANS 2X2 TEGADERM